# Patient Record
Sex: MALE | Race: WHITE | NOT HISPANIC OR LATINO | Employment: OTHER | URBAN - METROPOLITAN AREA
[De-identification: names, ages, dates, MRNs, and addresses within clinical notes are randomized per-mention and may not be internally consistent; named-entity substitution may affect disease eponyms.]

---

## 2017-01-25 DIAGNOSIS — E11.9 TYPE 2 DIABETES MELLITUS WITHOUT COMPLICATIONS (HCC): ICD-10-CM

## 2017-02-01 ENCOUNTER — APPOINTMENT (OUTPATIENT)
Dept: LAB | Facility: CLINIC | Age: 77
End: 2017-02-01
Payer: MEDICARE

## 2017-02-01 DIAGNOSIS — E78.5 HYPERLIPIDEMIA: ICD-10-CM

## 2017-02-01 DIAGNOSIS — I10 ESSENTIAL (PRIMARY) HYPERTENSION: ICD-10-CM

## 2017-02-01 DIAGNOSIS — E11.9 TYPE 2 DIABETES MELLITUS WITHOUT COMPLICATIONS (HCC): ICD-10-CM

## 2017-02-01 DIAGNOSIS — D64.9 ANEMIA: ICD-10-CM

## 2017-02-01 LAB
EST. AVERAGE GLUCOSE BLD GHB EST-MCNC: 180 MG/DL
HBA1C MFR BLD: 7.9 % (ref 4.2–6.3)

## 2017-02-01 PROCEDURE — 36415 COLL VENOUS BLD VENIPUNCTURE: CPT

## 2017-02-01 PROCEDURE — 83036 HEMOGLOBIN GLYCOSYLATED A1C: CPT

## 2017-03-21 ENCOUNTER — ALLSCRIPTS OFFICE VISIT (OUTPATIENT)
Dept: OTHER | Facility: OTHER | Age: 77
End: 2017-03-21

## 2017-03-22 ENCOUNTER — APPOINTMENT (OUTPATIENT)
Dept: LAB | Facility: CLINIC | Age: 77
End: 2017-03-22
Payer: MEDICARE

## 2017-03-22 DIAGNOSIS — E11.9 TYPE 2 DIABETES MELLITUS WITHOUT COMPLICATIONS (HCC): ICD-10-CM

## 2017-03-22 DIAGNOSIS — I10 ESSENTIAL (PRIMARY) HYPERTENSION: ICD-10-CM

## 2017-03-22 DIAGNOSIS — D64.9 ANEMIA: ICD-10-CM

## 2017-03-22 DIAGNOSIS — E78.5 HYPERLIPIDEMIA: ICD-10-CM

## 2017-03-22 LAB
ALBUMIN SERPL BCP-MCNC: 3.5 G/DL (ref 3.5–5)
ALP SERPL-CCNC: 98 U/L (ref 46–116)
ALT SERPL W P-5'-P-CCNC: 23 U/L (ref 12–78)
ANION GAP SERPL CALCULATED.3IONS-SCNC: 7 MMOL/L (ref 4–13)
AST SERPL W P-5'-P-CCNC: 12 U/L (ref 5–45)
BASOPHILS # BLD AUTO: 0.12 THOUSANDS/ΜL (ref 0–0.1)
BASOPHILS NFR BLD AUTO: 2 % (ref 0–1)
BILIRUB SERPL-MCNC: 0.5 MG/DL (ref 0.2–1)
BUN SERPL-MCNC: 15 MG/DL (ref 5–25)
CALCIUM SERPL-MCNC: 9.4 MG/DL (ref 8.3–10.1)
CHLORIDE SERPL-SCNC: 107 MMOL/L (ref 100–108)
CHOLEST SERPL-MCNC: 145 MG/DL (ref 50–200)
CO2 SERPL-SCNC: 28 MMOL/L (ref 21–32)
CREAT SERPL-MCNC: 1.16 MG/DL (ref 0.6–1.3)
EOSINOPHIL # BLD AUTO: 0.42 THOUSAND/ΜL (ref 0–0.61)
EOSINOPHIL NFR BLD AUTO: 7 % (ref 0–6)
ERYTHROCYTE [DISTWIDTH] IN BLOOD BY AUTOMATED COUNT: 13.2 % (ref 11.6–15.1)
FERRITIN SERPL-MCNC: 40 NG/ML (ref 8–388)
GFR SERPL CREATININE-BSD FRML MDRD: >60 ML/MIN/1.73SQ M
GLUCOSE P FAST SERPL-MCNC: 169 MG/DL (ref 65–99)
HCT VFR BLD AUTO: 43.9 % (ref 36.5–49.3)
HGB BLD-MCNC: 14.8 G/DL (ref 12–17)
IRON SERPL-MCNC: 62 UG/DL (ref 65–175)
LYMPHOCYTES # BLD AUTO: 1.93 THOUSANDS/ΜL (ref 0.6–4.47)
LYMPHOCYTES NFR BLD AUTO: 31 % (ref 14–44)
MCH RBC QN AUTO: 29.8 PG (ref 26.8–34.3)
MCHC RBC AUTO-ENTMCNC: 33.7 G/DL (ref 31.4–37.4)
MCV RBC AUTO: 88 FL (ref 82–98)
MONOCYTES # BLD AUTO: 0.42 THOUSAND/ΜL (ref 0.17–1.22)
MONOCYTES NFR BLD AUTO: 7 % (ref 4–12)
NEUTROPHILS # BLD AUTO: 3.23 THOUSANDS/ΜL (ref 1.85–7.62)
NEUTS SEG NFR BLD AUTO: 53 % (ref 43–75)
NRBC BLD AUTO-RTO: 0 /100 WBCS
PLATELET # BLD AUTO: 211 THOUSANDS/UL (ref 149–390)
PMV BLD AUTO: 11 FL (ref 8.9–12.7)
POTASSIUM SERPL-SCNC: 4.7 MMOL/L (ref 3.5–5.3)
PROT SERPL-MCNC: 7.2 G/DL (ref 6.4–8.2)
RBC # BLD AUTO: 4.97 MILLION/UL (ref 3.88–5.62)
SODIUM SERPL-SCNC: 142 MMOL/L (ref 136–145)
T4 FREE SERPL-MCNC: 0.89 NG/DL (ref 0.76–1.46)
TSH SERPL DL<=0.05 MIU/L-ACNC: 1.93 UIU/ML (ref 0.36–3.74)
VIT B12 SERPL-MCNC: 285 PG/ML (ref 100–900)
WBC # BLD AUTO: 6.14 THOUSAND/UL (ref 4.31–10.16)

## 2017-03-22 PROCEDURE — 82607 VITAMIN B-12: CPT

## 2017-03-22 PROCEDURE — 85025 COMPLETE CBC W/AUTO DIFF WBC: CPT

## 2017-03-22 PROCEDURE — 84443 ASSAY THYROID STIM HORMONE: CPT

## 2017-03-22 PROCEDURE — 36415 COLL VENOUS BLD VENIPUNCTURE: CPT

## 2017-03-22 PROCEDURE — 82728 ASSAY OF FERRITIN: CPT

## 2017-03-22 PROCEDURE — 83540 ASSAY OF IRON: CPT

## 2017-03-22 PROCEDURE — 84439 ASSAY OF FREE THYROXINE: CPT

## 2017-03-22 PROCEDURE — 82465 ASSAY BLD/SERUM CHOLESTEROL: CPT

## 2017-03-22 PROCEDURE — 80053 COMPREHEN METABOLIC PANEL: CPT

## 2017-03-22 PROCEDURE — 83918 ORGANIC ACIDS TOTAL QUANT: CPT

## 2017-03-25 LAB — METHYLMALONATE SERPL-SCNC: 206 NMOL/L (ref 0–378)

## 2017-04-04 ENCOUNTER — HOSPITAL ENCOUNTER (OUTPATIENT)
Dept: RADIOLOGY | Facility: HOSPITAL | Age: 77
Discharge: HOME | End: 2017-04-04
Attending: ORTHOPAEDIC SURGERY
Payer: MEDICARE

## 2017-04-04 ENCOUNTER — ALLSCRIPTS OFFICE VISIT (OUTPATIENT)
Dept: OTHER | Facility: OTHER | Age: 77
End: 2017-04-04

## 2017-04-04 DIAGNOSIS — M25.512 PAIN IN LEFT SHOULDER: ICD-10-CM

## 2017-04-04 PROCEDURE — 73030 X-RAY EXAM OF SHOULDER: CPT

## 2017-04-05 ENCOUNTER — GENERIC CONVERSION - ENCOUNTER (OUTPATIENT)
Dept: OTHER | Facility: OTHER | Age: 77
End: 2017-04-05

## 2017-04-05 ENCOUNTER — APPOINTMENT (OUTPATIENT)
Dept: PHYSICAL THERAPY | Age: 77
End: 2017-04-05
Payer: MEDICARE

## 2017-04-05 DIAGNOSIS — M25.512 PAIN IN LEFT SHOULDER: ICD-10-CM

## 2017-04-05 PROCEDURE — 97110 THERAPEUTIC EXERCISES: CPT

## 2017-04-05 PROCEDURE — G8991 OTHER PT/OT GOAL STATUS: HCPCS

## 2017-04-05 PROCEDURE — 97162 PT EVAL MOD COMPLEX 30 MIN: CPT

## 2017-04-05 PROCEDURE — G8990 OTHER PT/OT CURRENT STATUS: HCPCS

## 2017-04-10 ENCOUNTER — APPOINTMENT (OUTPATIENT)
Dept: PHYSICAL THERAPY | Age: 77
End: 2017-04-10
Payer: MEDICARE

## 2017-04-10 PROCEDURE — 97110 THERAPEUTIC EXERCISES: CPT

## 2017-04-10 PROCEDURE — 97140 MANUAL THERAPY 1/> REGIONS: CPT

## 2017-04-11 ENCOUNTER — APPOINTMENT (OUTPATIENT)
Dept: PHYSICAL THERAPY | Age: 77
End: 2017-04-11
Payer: MEDICARE

## 2017-04-17 ENCOUNTER — APPOINTMENT (OUTPATIENT)
Dept: PHYSICAL THERAPY | Age: 77
End: 2017-04-17
Payer: MEDICARE

## 2017-04-18 ENCOUNTER — APPOINTMENT (OUTPATIENT)
Dept: PHYSICAL THERAPY | Age: 77
End: 2017-04-18
Payer: MEDICARE

## 2017-04-24 ENCOUNTER — APPOINTMENT (OUTPATIENT)
Dept: PHYSICAL THERAPY | Age: 77
End: 2017-04-24
Payer: MEDICARE

## 2017-04-24 PROCEDURE — 97140 MANUAL THERAPY 1/> REGIONS: CPT

## 2017-04-24 PROCEDURE — 97110 THERAPEUTIC EXERCISES: CPT

## 2017-04-25 ENCOUNTER — APPOINTMENT (OUTPATIENT)
Dept: PHYSICAL THERAPY | Age: 77
End: 2017-04-25
Payer: MEDICARE

## 2017-04-25 PROCEDURE — 97110 THERAPEUTIC EXERCISES: CPT

## 2017-04-25 PROCEDURE — 97140 MANUAL THERAPY 1/> REGIONS: CPT

## 2017-05-01 ENCOUNTER — APPOINTMENT (OUTPATIENT)
Dept: PHYSICAL THERAPY | Age: 77
End: 2017-05-01
Payer: MEDICARE

## 2017-05-01 DIAGNOSIS — I65.29 OCCLUSION AND STENOSIS OF UNSPECIFIED CAROTID ARTERY: ICD-10-CM

## 2017-05-01 DIAGNOSIS — D64.9 ANEMIA: ICD-10-CM

## 2017-05-01 DIAGNOSIS — E78.5 HYPERLIPIDEMIA: ICD-10-CM

## 2017-05-01 DIAGNOSIS — I73.9 PERIPHERAL VASCULAR DISEASE (HCC): ICD-10-CM

## 2017-05-01 DIAGNOSIS — R07.9 CHEST PAIN: ICD-10-CM

## 2017-05-01 DIAGNOSIS — I25.10 ATHEROSCLEROTIC HEART DISEASE OF NATIVE CORONARY ARTERY WITHOUT ANGINA PECTORIS: ICD-10-CM

## 2017-05-01 DIAGNOSIS — I10 ESSENTIAL (PRIMARY) HYPERTENSION: ICD-10-CM

## 2017-05-02 ENCOUNTER — APPOINTMENT (OUTPATIENT)
Dept: PHYSICAL THERAPY | Age: 77
End: 2017-05-02
Payer: MEDICARE

## 2017-05-02 PROCEDURE — 97112 NEUROMUSCULAR REEDUCATION: CPT

## 2017-05-02 PROCEDURE — 97140 MANUAL THERAPY 1/> REGIONS: CPT

## 2017-05-02 PROCEDURE — 97110 THERAPEUTIC EXERCISES: CPT

## 2017-05-08 ENCOUNTER — APPOINTMENT (OUTPATIENT)
Dept: PHYSICAL THERAPY | Age: 77
End: 2017-05-08
Payer: MEDICARE

## 2017-05-08 PROCEDURE — 97110 THERAPEUTIC EXERCISES: CPT

## 2017-05-08 PROCEDURE — 97140 MANUAL THERAPY 1/> REGIONS: CPT

## 2017-05-09 ENCOUNTER — APPOINTMENT (OUTPATIENT)
Dept: PHYSICAL THERAPY | Age: 77
End: 2017-05-09
Payer: MEDICARE

## 2017-05-09 PROCEDURE — 97140 MANUAL THERAPY 1/> REGIONS: CPT

## 2017-05-09 PROCEDURE — 97110 THERAPEUTIC EXERCISES: CPT

## 2017-05-15 ENCOUNTER — APPOINTMENT (OUTPATIENT)
Dept: PHYSICAL THERAPY | Age: 77
End: 2017-05-15
Payer: MEDICARE

## 2017-05-15 PROCEDURE — 97110 THERAPEUTIC EXERCISES: CPT

## 2017-05-15 PROCEDURE — 97140 MANUAL THERAPY 1/> REGIONS: CPT

## 2017-05-16 ENCOUNTER — APPOINTMENT (OUTPATIENT)
Dept: PHYSICAL THERAPY | Age: 77
End: 2017-05-16
Payer: MEDICARE

## 2017-05-16 PROCEDURE — 97112 NEUROMUSCULAR REEDUCATION: CPT

## 2017-05-16 PROCEDURE — 97110 THERAPEUTIC EXERCISES: CPT

## 2017-05-16 PROCEDURE — G8991 OTHER PT/OT GOAL STATUS: HCPCS

## 2017-05-16 PROCEDURE — G8990 OTHER PT/OT CURRENT STATUS: HCPCS

## 2017-05-16 PROCEDURE — 97140 MANUAL THERAPY 1/> REGIONS: CPT

## 2017-05-22 ENCOUNTER — ALLSCRIPTS OFFICE VISIT (OUTPATIENT)
Dept: OTHER | Facility: OTHER | Age: 77
End: 2017-05-22

## 2017-05-22 ENCOUNTER — APPOINTMENT (OUTPATIENT)
Dept: PHYSICAL THERAPY | Age: 77
End: 2017-05-22
Payer: MEDICARE

## 2017-05-22 LAB — HBA1C MFR BLD HPLC: 8.1 %

## 2017-05-23 ENCOUNTER — APPOINTMENT (OUTPATIENT)
Dept: PHYSICAL THERAPY | Age: 77
End: 2017-05-23
Payer: MEDICARE

## 2017-05-26 ENCOUNTER — HOSPITAL ENCOUNTER (OUTPATIENT)
Dept: NON INVASIVE DIAGNOSTICS | Facility: CLINIC | Age: 77
Discharge: HOME | End: 2017-05-26
Payer: MEDICARE

## 2017-05-26 DIAGNOSIS — I25.10 ATHEROSCLEROTIC HEART DISEASE OF NATIVE CORONARY ARTERY WITHOUT ANGINA PECTORIS: ICD-10-CM

## 2017-05-26 DIAGNOSIS — R07.9 CHEST PAIN: ICD-10-CM

## 2017-05-26 LAB
ARRHY DURING EX: NORMAL
CHEST PAIN STATEMENT: NORMAL
MAX DIASTOLIC BP: 66 MMHG
MAX HEART RATE: 85 BPM
MAX PREDICTED HEART RATE: 143 BPM
MAX. SYSTOLIC BP: 156 MMHG
PROTOCOL NAME: NORMAL
REASON FOR TERMINATION: NORMAL
TARGET HR FORMULA: NORMAL
TEST INDICATION: NORMAL
TIME IN EXERCISE PHASE: 180 S

## 2017-05-26 PROCEDURE — 78452 HT MUSCLE IMAGE SPECT MULT: CPT

## 2017-05-26 PROCEDURE — A9502 TC99M TETROFOSMIN: HCPCS

## 2017-05-26 PROCEDURE — 93017 CV STRESS TEST TRACING ONLY: CPT

## 2017-05-26 RX ADMIN — REGADENOSON 0.4 MG: 0.08 INJECTION, SOLUTION INTRAVENOUS at 14:30

## 2017-06-02 ENCOUNTER — HOSPITAL ENCOUNTER (OUTPATIENT)
Dept: NON INVASIVE DIAGNOSTICS | Facility: CLINIC | Age: 77
Discharge: HOME | End: 2017-06-02
Payer: MEDICARE

## 2017-06-02 DIAGNOSIS — I25.10 ATHEROSCLEROTIC HEART DISEASE OF NATIVE CORONARY ARTERY WITHOUT ANGINA PECTORIS: ICD-10-CM

## 2017-06-02 DIAGNOSIS — I65.29 OCCLUSION AND STENOSIS OF UNSPECIFIED CAROTID ARTERY: ICD-10-CM

## 2017-06-02 DIAGNOSIS — I73.9 PERIPHERAL VASCULAR DISEASE (HCC): ICD-10-CM

## 2017-06-02 PROCEDURE — 93923 UPR/LXTR ART STDY 3+ LVLS: CPT

## 2017-06-02 PROCEDURE — 93880 EXTRACRANIAL BILAT STUDY: CPT

## 2017-06-02 PROCEDURE — 93925 LOWER EXTREMITY STUDY: CPT

## 2017-06-12 ENCOUNTER — ALLSCRIPTS OFFICE VISIT (OUTPATIENT)
Dept: OTHER | Facility: OTHER | Age: 77
End: 2017-06-12

## 2017-07-05 ENCOUNTER — ALLSCRIPTS OFFICE VISIT (OUTPATIENT)
Dept: OTHER | Facility: OTHER | Age: 77
End: 2017-07-05

## 2017-07-17 ENCOUNTER — ALLSCRIPTS OFFICE VISIT (OUTPATIENT)
Dept: OTHER | Facility: OTHER | Age: 77
End: 2017-07-17

## 2017-07-18 ENCOUNTER — ALLSCRIPTS OFFICE VISIT (OUTPATIENT)
Dept: OTHER | Facility: OTHER | Age: 77
End: 2017-07-18

## 2017-07-19 ENCOUNTER — GENERIC CONVERSION - ENCOUNTER (OUTPATIENT)
Dept: OTHER | Facility: OTHER | Age: 77
End: 2017-07-19

## 2017-07-24 ENCOUNTER — ALLSCRIPTS OFFICE VISIT (OUTPATIENT)
Dept: OTHER | Facility: OTHER | Age: 77
End: 2017-07-24

## 2017-09-19 ENCOUNTER — ALLSCRIPTS OFFICE VISIT (OUTPATIENT)
Dept: OTHER | Facility: OTHER | Age: 77
End: 2017-09-19

## 2017-11-17 DIAGNOSIS — Z72.0 TOBACCO USE: ICD-10-CM

## 2017-11-17 DIAGNOSIS — I65.29 OCCLUSION AND STENOSIS OF UNSPECIFIED CAROTID ARTERY: ICD-10-CM

## 2017-11-17 DIAGNOSIS — E11.9 TYPE 2 DIABETES MELLITUS WITHOUT COMPLICATIONS (HCC): ICD-10-CM

## 2017-11-17 DIAGNOSIS — E78.5 HYPERLIPIDEMIA: ICD-10-CM

## 2017-11-17 DIAGNOSIS — I70.219 ATHEROSCLEROSIS OF NATIVE ARTERIES OF EXTREMITY WITH INTERMITTENT CLAUDICATION (HCC): ICD-10-CM

## 2017-11-17 DIAGNOSIS — I10 ESSENTIAL (PRIMARY) HYPERTENSION: ICD-10-CM

## 2017-12-02 ENCOUNTER — GENERIC CONVERSION - ENCOUNTER (OUTPATIENT)
Dept: OTHER | Facility: OTHER | Age: 77
End: 2017-12-02

## 2017-12-20 DIAGNOSIS — I73.9 PERIPHERAL VASCULAR DISEASE (HCC): ICD-10-CM

## 2017-12-21 ENCOUNTER — GENERIC CONVERSION - ENCOUNTER (OUTPATIENT)
Dept: OTHER | Facility: OTHER | Age: 77
End: 2017-12-21

## 2017-12-21 ENCOUNTER — HOSPITAL ENCOUNTER (OUTPATIENT)
Dept: NON INVASIVE DIAGNOSTICS | Facility: CLINIC | Age: 77
Discharge: HOME | End: 2017-12-21
Payer: MEDICARE

## 2017-12-21 DIAGNOSIS — I65.29 OCCLUSION AND STENOSIS OF UNSPECIFIED CAROTID ARTERY: ICD-10-CM

## 2017-12-21 PROCEDURE — 93880 EXTRACRANIAL BILAT STUDY: CPT

## 2017-12-29 ENCOUNTER — GENERIC CONVERSION - ENCOUNTER (OUTPATIENT)
Dept: OTHER | Facility: OTHER | Age: 77
End: 2017-12-29

## 2017-12-29 ENCOUNTER — HOSPITAL ENCOUNTER (OUTPATIENT)
Dept: NON INVASIVE DIAGNOSTICS | Facility: CLINIC | Age: 77
Discharge: HOME | End: 2017-12-29
Payer: MEDICARE

## 2017-12-29 DIAGNOSIS — I73.9 PERIPHERAL VASCULAR DISEASE (HCC): ICD-10-CM

## 2017-12-29 PROCEDURE — 93971 EXTREMITY STUDY: CPT

## 2018-01-12 VITALS — RESPIRATION RATE: 14 BRPM | SYSTOLIC BLOOD PRESSURE: 130 MMHG | HEART RATE: 68 BPM | DIASTOLIC BLOOD PRESSURE: 80 MMHG

## 2018-01-12 VITALS — HEART RATE: 68 BPM | SYSTOLIC BLOOD PRESSURE: 130 MMHG | RESPIRATION RATE: 14 BRPM | DIASTOLIC BLOOD PRESSURE: 80 MMHG

## 2018-01-12 VITALS
HEART RATE: 72 BPM | RESPIRATION RATE: 14 BRPM | HEIGHT: 67 IN | BODY MASS INDEX: 28.81 KG/M2 | WEIGHT: 183.56 LBS | DIASTOLIC BLOOD PRESSURE: 74 MMHG | SYSTOLIC BLOOD PRESSURE: 120 MMHG

## 2018-01-13 VITALS
DIASTOLIC BLOOD PRESSURE: 72 MMHG | WEIGHT: 184 LBS | BODY MASS INDEX: 27.89 KG/M2 | SYSTOLIC BLOOD PRESSURE: 112 MMHG | HEART RATE: 66 BPM | HEIGHT: 68 IN

## 2018-01-13 VITALS
BODY MASS INDEX: 28.04 KG/M2 | SYSTOLIC BLOOD PRESSURE: 148 MMHG | HEART RATE: 56 BPM | DIASTOLIC BLOOD PRESSURE: 80 MMHG | HEIGHT: 68 IN | WEIGHT: 185 LBS

## 2018-01-13 VITALS
WEIGHT: 183 LBS | DIASTOLIC BLOOD PRESSURE: 80 MMHG | RESPIRATION RATE: 16 BRPM | SYSTOLIC BLOOD PRESSURE: 140 MMHG | HEIGHT: 68 IN | BODY MASS INDEX: 27.74 KG/M2 | HEART RATE: 87 BPM

## 2018-01-13 VITALS
HEART RATE: 56 BPM | BODY MASS INDEX: 28.61 KG/M2 | HEIGHT: 67 IN | WEIGHT: 182.25 LBS | DIASTOLIC BLOOD PRESSURE: 85 MMHG | SYSTOLIC BLOOD PRESSURE: 147 MMHG

## 2018-01-13 VITALS — DIASTOLIC BLOOD PRESSURE: 67 MMHG | SYSTOLIC BLOOD PRESSURE: 106 MMHG | HEART RATE: 73 BPM

## 2018-01-14 VITALS
WEIGHT: 183.25 LBS | BODY MASS INDEX: 27.77 KG/M2 | HEIGHT: 68 IN | DIASTOLIC BLOOD PRESSURE: 73 MMHG | HEART RATE: 79 BPM | SYSTOLIC BLOOD PRESSURE: 115 MMHG

## 2018-01-18 ENCOUNTER — TELEPHONE (OUTPATIENT)
Dept: RADIOLOGY | Facility: HOSPITAL | Age: 78
End: 2018-01-18

## 2018-01-18 ENCOUNTER — GENERIC CONVERSION - ENCOUNTER (OUTPATIENT)
Dept: OTHER | Facility: OTHER | Age: 78
End: 2018-01-18

## 2018-01-18 ENCOUNTER — HOSPITAL ENCOUNTER (OUTPATIENT)
Dept: NON INVASIVE DIAGNOSTICS | Facility: CLINIC | Age: 78
Discharge: HOME | End: 2018-01-18
Payer: MEDICARE

## 2018-01-18 DIAGNOSIS — I73.9 PERIPHERAL VASCULAR DISEASE (HCC): ICD-10-CM

## 2018-01-18 DIAGNOSIS — I70.219 ATHEROSCLEROSIS OF NATIVE ARTERIES OF EXTREMITY WITH INTERMITTENT CLAUDICATION (HCC): ICD-10-CM

## 2018-01-18 PROCEDURE — 93925 LOWER EXTREMITY STUDY: CPT

## 2018-01-18 PROCEDURE — 93923 UPR/LXTR ART STDY 3+ LVLS: CPT

## 2018-01-18 RX ORDER — SODIUM CHLORIDE 9 MG/ML
75 INJECTION, SOLUTION INTRAVENOUS CONTINUOUS
Status: CANCELLED | OUTPATIENT
Start: 2018-01-18

## 2018-01-19 ENCOUNTER — ALLSCRIPTS OFFICE VISIT (OUTPATIENT)
Dept: OTHER | Facility: OTHER | Age: 78
End: 2018-01-19

## 2018-01-21 ENCOUNTER — TELEPHONE (OUTPATIENT)
Dept: INPATIENT UNIT | Facility: HOSPITAL | Age: 78
End: 2018-01-21

## 2018-01-21 NOTE — PROGRESS NOTES
Assessment   1  Peripheral arterial disease (443 9) (I73 9)   2  Nicotine dependence (305 1) (F17 200)   3  Hypertension (401 9) (I10)   4  Hyperlipidemia (272 4) (E78 5)   5  Coronary artery disease (414 00) (I25 10)   6  Controlled diabetes mellitus (250 00) (E11 9)   7  Iron deficiency anemia (280 9) (D50 9)      1  Peripheral arterial disease-has had prior revascularization but now with worsening symptoms and significantly abnormal Doppler-specifically right side shows patent common femoral to below-the-knee popliteal artery bypass graft with index of 0 95  Left leg however shows greater than 75% stenosis in the anastomosis of common femoral artery to below-knee popliteal artery an GIUSEPPE of 0 26  Scheduled for aortogram with intervention on January 22nd     2  General care-reviewed what to do with his meds for this procedure  Hold metformin day before day of procedure  Hold glipizide day before day of procedure  Hold ACE-inhibitor day before day of procedure  Take usual dose of metoprolol morning up procedure     3  Tobacco abuse-longstanding-was unable to tolerate Wellbutrin  Not felt to be a good candidate for Chantix-he feels he reacted poorly to nicotine patch  He will be trying nicotine gum     4 carotid stenosis--asymptomatic-less than 50% on the right and 50-70% on the left  Continue with aggressive control of risk factors  Most recent study done a December of 2017     #5 coronary artery disease-Myoview shows mildly severe small reversible defect of the inferior wall  Asymptomatic with this study  Cardiology feels we should monitor at present and he may need a catheter in the future  He understands importance of aggressive control of his risk factors-he is a history of some chest pain over the nipples-question related to activity   At this point asymptomatic remains on dual antiplatelet therapy at this point-recently saw cardiology felt he was stable-air scheduled to see him again in 9 months #6 right shoulder pain-at one point I was worried about frozen shoulder and he was referred to orthopedic physician     #7 iron deficiency anemia-hemoglobin was down to 11 7  Colonoscopy in April 2014 showed diverticular disease  Repeat colonoscopy and endoscopy for the past year by outside surgeon benign other than diverticular disease  CAT scan of abdomen benign other than nephrolithiasis  Endoscopy of the Small Intestine Recommended but Patient Deferred  Protein Electrophoresis, Vitamin B 12 Level, Methylmalonic Acid Level, TSH Normal  Follow-Up Hemoglobin over 14  Now Discontinuing Iron and Repeat Prior to Next Visit-Have to watch carefully with dual antiplatelet therapy and if again recurs we may need to DC his aspirin     #8 previous noted weight loss-lost 5 pounds without obvious etiology  All screening labs normal  Chest x-ray normal  Probably related to metformin  At this point weight stabilized     #9 abnormal chest x-ray-felt related to nipple shadow-had repeat chest x-ray with nipple markers which were benign     #10 general care-[facility] 13 [facility]-REVIEW NEXT VISIT     11 kwzbghufcbamgdp-duycptuuobap-uy CAT scan the abdomen has nonobstructing stones  He knows to maintain a high fluid intake     #12 hypertension-stable on current dose of ACE inhibitor plus beta blocker     #13 quiav positive stool noted previously-not evident on ouusow-ha-gmn a very tight sphincter     #14 diabetes mellitus-recent goal A1c 7 1  Cannot tolerate short acting metformin  Only tolerated thousand milligrams of long-acting metformin  On glipizide  Again strongly recommended that he monitor his blood sugar  145 Potential for AAA-had prior CT scan when having his iron deficiency anemia evaluated    Having abdominal aortogram study this week      All other problems as per note of September 2013          MEDICAL REGIMEN: Paxil 40 MGs-half tab daily, pantoprazole 40 mg daily, glipizide 5 MGs in the a m  and 2 5 mg in the p m , metoprolol titrate 12 5 mg twice a day, metformin thousand milligrams daily, lisinopril 20 mg daily, aspirin 81 mg daily, Plavix 75 mg daily, generic Vytorin 1020 daily-meds for procedure adjusted as above       Plan   No glipizide day before day of procedure  No metformin day before day of procedure  Skip ACE-inhibitor day before day of procedure  Take usual dose of metoprolol morning of procedure  Prescription written for Vytorin and/or its components  History of Present Illness   HPI:  patient is seen today as an emergency appointment  This patient has known significant peripheral arterial disease  He recently underwent follow-up Doppler after having worsening claudication symptoms of his left leg  He has extreme disease on Doppler done in January the 18-specifically right side shows a widely patent common femoral artery to below-the-knee popliteal artery bypass graft with no signs of significant stenosis with an index of 0 95  However left-sided showed greater than 75% stenosis in the distal anastomosis of the common femoral artery to below the knee popliteal bypass graft  Compared to 6 months ago this is a new finding of the left with significant decrease in left ankle-brachial index  He is having ongoing severe pain  He has pain at rest  He has been in touch with the vascular group and they have him scheduled for a ordered gram with intervention on January 22nd  He presents for preparation for the study and to reassess his overall status  Unfortunately he is a long-term smoker and continues to smoke  He also has diabetes, hypertension and hyperlipidemia  We reviewed the importance of discontinuing smoking  In the past he had side effects with Wellbutrin  Not felt to be candidate for Chantix-as a history psychiatric disease in there is potential concerned about using this drug  He has tried the patch in the past says he did tolerate that   He is considering nicotine gum  patient denies any systemic symptoms  Specifically there has been no evidence of fever, night sweats, significant weight loss or significant decrease in appetite  We reviewed how he needs aggressive control of risk factors  BP under 130/80, LDL under 100 preferably under 70, triglycerides under 100 HDL over 40  A1c under 7    has known hypertension  BP adequately controlled on current regimen  He is avoiding salt and decongestants   was accompanied to the visit today by his son and his significant other  We reviewed the importance of not smoking  We reviewed what to do with his meds for his procedure  Long discussion held today regarding the above  This was a 45 minutes visit with more than 50% of the time spent counseling the patient and formulating a treatment plan   has known hypertension  Preferred BP under 130/80  BP stable at present on current regimen     patient denies any systemic symptoms  Specifically there has been no evidence of fever, night sweats, significant weight loss or significant decrease in appetite  We reviewed what to do with his meds perioperatively  He will hold glipizide day before day of procedure, hold metformin day before day of procedure to decrease incidence of lactic acidosis and hold his ACE-inhibitor day before day of procedure to decrease incidence of dye induced renal disease      Review of Systems   Complete-Male:      Constitutional: No fever or chills, feels well, no tiredness, no recent weight gain or weight loss  Eyes: No complaints of eye pain, no red eyes, no discharge from eyes, no itchy eyes  ENT: no complaints of earache, no hearing loss, no nosebleeds, no nasal discharge, no sore throat, no hoarseness        Cardiovascular: chest pain,-- intermittent leg claudication-- and-- Leg claudication when walking uphill but not on a level surface at present, but-- the heart rate was not slow,-- the heart rate was not fast,-- no palpitations-- and-- no extremity edema  Respiratory: shortness of breath-- and-- shortness of breath during exertion, but-- no cough,-- no orthopnea,-- no wheezing-- and-- no PND  Gastrointestinal: No complaints of abdominal pain, no constipation, no nausea or vomiting, no diarrhea or bloody stools  Genitourinary: nocturia, but-- no dysuria,-- no urinary hesitancy,-- no genital lesions,-- no incontinence-- and-- no testicular pain  Musculoskeletal: arthralgias,-- joint stiffness-- and-- Significant left lower leg pain Low back pain-right shoulder pain, but-- no joint swelling,-- no limb pain,-- no myalgias-- and-- no limb swelling  Integumentary: itching,-- skin lesion-- and-- Urticaria, but-- no rashes,-- no dry skin-- and-- no skin wound  Neurological: dizziness, but-- no headache,-- no numbness,-- no tingling,-- no confusion,-- no limb weakness,-- no fainting-- and-- no difficulty walking  Psychiatric: Is not suicidal, no sleep disturbances, no anxiety or depression, no change in personality, no emotional problems  Endocrine: No complaints of proptosis, no hot flashes, no muscle weakness, no erectile dysfunction, no deepening of the voice, no feelings of weakness  Hematologic/Lymphatic: No complaints of swollen glands, no swollen glands in the neck, does not bleed easily, no easy bruising  Active Problems   1  Abnormal chest xray (793 2) (R93 8)   2  Abnormal exercise myocardial perfusion study (794 39) (R94 39)   3  Abnormal loss of weight (783 21) (R63 4)   4  Anemia (285 9) (D64 9)   5  Anxiety (300 00) (F41 9)   6  Arthritis (716 90) (M19 90)   7  Asymptomatic carotid artery stenosis (433 10) (I65 29)   8  Atherosclerosis of artery of extremity with intermittent claudication (440 21) (I70 219)   9  Backache (724 5) (M54 9)   10  Chest pain (786 50) (R07 9)   11  Controlled diabetes mellitus (250 00) (E11 9)   12   Coronary artery disease (414 00) (I25 10) 13  Cough (786 2) (R05)   14  Difficulty breathing (786 09) (R06 89)   15  Dizziness (780 4) (R42)   16  Elevated prostate specific antigen (PSA) (790 93) (R97 20)   17  Enlarged prostate without lower urinary tract symptoms (luts) (600 00) (N40 0)   18  Guaiac positive stools (792 1) (R19 5)   19  Hyperlipidemia (272 4) (E78 5)   20  Hypertension (401 9) (I10)   21  Iron deficiency anemia (280 9) (D50 9)   22  Joint pain, knee (719 46) (M25 569)   23  Need for prophylactic vaccination and inoculation against influenza (V04 81) (Z23)   24  Nicotine dependence (305 1) (F17 200)   25  Pain in upper limb (729 5) (M79 603)   26  Peripheral arterial disease (443 9) (I73 9)   27  Right shoulder pain (719 41) (M25 511)   28  Shoulder pain (719 41) (M25 519)   29  Skin lesion (709 9) (L98 9)   30  Subacromial impingement of right shoulder (726 19) (M75 41)   31  History of Tobacco use (305 1) (Z72 0)   32  Urticaria (708 9) (L50 9)   33  Weight loss (783 21) (R63 4)    Past Medical History   1  History of Atherosclerosis (440 9) (I70 90)   2  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 (440 31)   3  History of Coronary Artery Disease (V12 59)   4  History of chronic obstructive lung disease (V12 69) (Z87 09)   5  History of hyperlipidemia (V12 29) (Z86 39)   6  History of intestinal obstruction (V12 79) (Z87 19)   7  History of nicotine dependence (V15 82) (Z87 891)   8  History of Shoulder pain, left (719 41) (M25 512)   9  Skin rash (782 1) (R21)  Active Problems And Past Medical History Reviewed: The active problems and past medical history were reviewed and updated today  Surgical History   1  History of Bypass Graft Using Vein: Femoral-popliteal   2  History of Bypass Graft Using Vein: Femoral-popliteal   3  History of Hand Surgery   4  History of Heart Surgery   5  History of Leg Repair   6  History of Percutaneous Repair Nasoethmoid Fx & Lacrimal Apparatus   7   History of PTA Femoral-Popliteal Left   8  History of PTA Femoral-Popliteal Right   9  History of Tibioperoneal Trunk Thromboendarterectomy   10  History of Tonsillectomy  Surgical History Reviewed: The surgical history was reviewed and updated today  Family History   Mother    1  Family history of Arthritis (V17 7)  Family History    2  Family history of Arthritis (V17 7)   3  Family history of Atherosclerosis (V17 49)   4  Family history of Hyperlipidemia   5  Family history of Peripheral Vascular Disease  Family History Reviewed: The family history was reviewed and updated today  Social History    · Being A Social Drinker   · Denied: History of Drug Use   · Former smoker (V15 82) (R45 280)   · History of Tobacco use (305 1) (Z72 0)  Social History Reviewed: The social history was reviewed and updated today  The social history was reviewed and is unchanged  Current Meds    1  Ferrous Sulfate 325 (65 Fe) MG Oral Tablet; TAKE 1 TABLET DAILY AS DIRECTED; Therapy: 12QSW6036 to (Evaluate:10Nov2016) Recorded   2  GlipiZIDE 5 MG Oral Tablet; take 1/2 tablet twice a day; Therapy: 52PKF1181 to Recorded   3  Lisinopril 20 MG Oral Tablet; TAKE 1 TABLET DAILY; Therapy: (66 [phone] 64 [id number]) to Recorded   4  MetFORMIN HCl  MG Oral Tablet Extended Release 24 Hour; TAKE 2 TABLETS IN     THE AM AND 2 TABLETS IN THE PM;     Therapy: 96YIH0677 to Recorded   5  Metoprolol Tartrate 25 MG Oral Tablet; TAKE 1 TABLET DAILY; Therapy: 15HAC9814 to (Evaluate:39Rgf2832) Recorded   6  Pantoprazole Sodium 40 MG Oral Tablet Delayed Release; Take 1 tablet once daily; Therapy: 54HDQ4953 to Recorded   7  Paxil 40 MG Oral Tablet (PARoxetine HCl); TAKE 1/2 TABLET DAILY; Therapy: (Recorded:62Niw6565) to Recorded   8  Plavix 75 MG Oral Tablet (Clopidogrel Bisulfate); TAKE 1 TABLET DAILY; Therapy: 08FFH9524 to (Evaluate:14Apr2016) Recorded   9  Vitamin C 500 MG Oral Tablet; TAKE 1 TABLET DAILY;      Therapy: 17JOD6781 to Recorded   10  Vytorin 10-20 MG Oral Tablet (Ezetimibe-Simvastatin); TAKE 1 TABLET DAILY; Therapy: (Recorded:51Noa3872) to Recorded    Allergies   1  Etomidate POWD   2  Etomidate SOLN   3  Penicillins    Vitals   Signs   Recorded: 79QZE0529 08:23AM   Heart Rate: 62  Respiration: 14  Systolic: 065, RUE, Sitting  Diastolic: 70, RUE, Sitting  BP CUFF SIZE: Large    Physical Exam        Constitutional      General appearance: No acute distress, well appearing and well nourished  Head and Face      Head and face: Normal        Palpation of the face and sinuses: No sinus tenderness  Eyes      Conjunctiva and lids: No erythema, swelling or discharge  Pupils and irises: Equal, round, reactive to light  Ears, Nose, Mouth, and Throat      External inspection of ears and nose: Normal        Otoscopic examination: Tympanic membranes translucent with normal light reflex  Canals patent without erythema  Hearing: Normal        Nasal mucosa, septum, and turbinates: Normal without edema or erythema  Lips, teeth, and gums: Normal, good dentition  Oropharynx: Normal with no erythema, edema, exudate or lesions  Neck      Neck: Supple, symmetric, trachea midline, no masses  Thyroid: Normal, no thyromegaly  Pulmonary      Respiratory effort: No increased work of breathing or signs of respiratory distress  Percussion of chest: Normal        Palpation of chest: Normal        Auscultation of lungs: Clear to auscultation  Cardiovascular      Palpation of heart: Abnormal  -- Cardiomegaly to percussion  Auscultation of heart: Abnormal  -- S4 gallop  Carotid pulses: 2+ bilaterally  Abdominal aorta: Normal        Femoral pulses: 2+ bilaterally  Pedal pulses: 2+ bilaterally  Peripheral vascular exam: Abnormal  -- Unable to feel left lower leg pulses right DP and PT pulses 1+        Examination of extremities for edema and/or varicosities: Normal        Chest      Breasts: Normal, no dimpling or skin changes appreciated  Palpation of breasts and axillae: Normal, no masses palpated  Chest: Normal        Abdomen      Abdomen: Non-tender, no masses  Liver and spleen: No hepatomegaly or splenomegaly  Examination for hernias: No hernias appreciated  Anus, perineum, and rectum: Normal sphincter tone, no masses, no prolapse  Stool sample for occult blood: Abnormal  -- Stool grossly guaiac positive  Genitourinary      Scrotal contents: Normal testes, no masses  Penis: Normal, no lesions  Digital rectal exam of prostate: Abnormal  -- Enlarged prostate without nodules although exam nonoptimal because of patient discomfort  Lymphatic      Palpation of lymph nodes in neck: No lymphadenopathy  Palpation of lymph nodes in axillae: No lymphadenopathy  Palpation of lymph nodes in groin: No lymphadenopathy  Palpation of lymph nodes in other areas: No lymphadenopathy  Musculoskeletal      Gait and station: Normal  -- Some tenderness to palpation of the lower back  Inspection/palpation of digits and nails: Normal without clubbing or cyanosis  Inspection/palpation of joints, bones, and muscles: Normal  -- Pain on palpation of the left calf Significant pain on Range of motion of right shoulder  Range of motion: Normal        Stability: Normal        Muscle strength/tone: Normal        Skin      Skin and subcutaneous tissue: Normal without rashes or lesions  Examination of the skin for lesions: Abnormal  -- Benign keratoses  Palpation of skin and subcutaneous tissue: Normal turgor  Neurologic      Cranial nerves: Cranial nerves 2-12 intact  Cortical function: Normal mental status  Reflexes: 2+ and symmetric  Sensation: No sensory loss  Coordination: Normal finger to nose and heel to shin         Psychiatric      Judgment and insight: Normal        Orientation to person, place and time: Normal        Recent and remote memory: Intact  Mood and affect: Normal        Future Appointments      Date/Time Provider Specialty Site   04/17/2018 08:30 AM ELISEO Eugene   Internal Medicine Markie Cormier MD     Signatures    Electronically signed by : ELISEO Armstrong ; Jan 20 2018  8:34AM EST                       (Author)

## 2018-01-22 ENCOUNTER — HOSPITAL ENCOUNTER (OUTPATIENT)
Dept: RADIOLOGY | Facility: HOSPITAL | Age: 78
Discharge: HOME | End: 2018-01-22
Attending: SURGERY | Admitting: RADIOLOGY
Payer: MEDICARE

## 2018-01-22 VITALS
RESPIRATION RATE: 16 BRPM | HEART RATE: 51 BPM | DIASTOLIC BLOOD PRESSURE: 72 MMHG | SYSTOLIC BLOOD PRESSURE: 148 MMHG | WEIGHT: 184 LBS | OXYGEN SATURATION: 94 % | BODY MASS INDEX: 27.89 KG/M2 | TEMPERATURE: 97 F | HEIGHT: 68 IN

## 2018-01-22 VITALS — HEART RATE: 62 BPM | DIASTOLIC BLOOD PRESSURE: 70 MMHG | RESPIRATION RATE: 14 BRPM | SYSTOLIC BLOOD PRESSURE: 128 MMHG

## 2018-01-22 DIAGNOSIS — I70.219 EXTREMITY ATHEROSCLEROSIS WITH INTERMITTENT CLAUDICATION (HCC): ICD-10-CM

## 2018-01-22 LAB
ANION GAP SERPL CALCULATED.3IONS-SCNC: 7 MMOL/L (ref 4–13)
BUN SERPL-MCNC: 14 MG/DL (ref 5–25)
CALCIUM SERPL-MCNC: 9.1 MG/DL (ref 8.3–10.1)
CHLORIDE SERPL-SCNC: 106 MMOL/L (ref 100–108)
CO2 SERPL-SCNC: 26 MMOL/L (ref 21–32)
CREAT SERPL-MCNC: 1.07 MG/DL (ref 0.6–1.3)
ERYTHROCYTE [DISTWIDTH] IN BLOOD BY AUTOMATED COUNT: 12.8 % (ref 11.6–15.1)
GFR SERPL CREATININE-BSD FRML MDRD: 66 ML/MIN/1.73SQ M
GLUCOSE P FAST SERPL-MCNC: 194 MG/DL (ref 65–99)
GLUCOSE SERPL-MCNC: 194 MG/DL (ref 65–140)
HCT VFR BLD AUTO: 41.5 % (ref 36.5–49.3)
HGB BLD-MCNC: 13.8 G/DL (ref 12–17)
INR PPP: 1.12 (ref 0.86–1.16)
MCH RBC QN AUTO: 28.9 PG (ref 26.8–34.3)
MCHC RBC AUTO-ENTMCNC: 33.3 G/DL (ref 31.4–37.4)
MCV RBC AUTO: 87 FL (ref 82–98)
PLATELET # BLD AUTO: 233 THOUSANDS/UL (ref 149–390)
PMV BLD AUTO: 10.9 FL (ref 8.9–12.7)
POTASSIUM SERPL-SCNC: 4.2 MMOL/L (ref 3.5–5.3)
PROTHROMBIN TIME: 14.4 SECONDS (ref 12.1–14.4)
RBC # BLD AUTO: 4.78 MILLION/UL (ref 3.88–5.62)
SODIUM SERPL-SCNC: 139 MMOL/L (ref 136–145)
WBC # BLD AUTO: 5.59 THOUSAND/UL (ref 4.31–10.16)

## 2018-01-22 PROCEDURE — C1769 GUIDE WIRE: HCPCS

## 2018-01-22 PROCEDURE — C1725 CATH, TRANSLUMIN NON-LASER: HCPCS

## 2018-01-22 PROCEDURE — 37226 HB FEM/POPL REVASC W/STENT: CPT

## 2018-01-22 PROCEDURE — C1760 CLOSURE DEV, VASC: HCPCS

## 2018-01-22 PROCEDURE — 75710 ARTERY X-RAYS ARM/LEG: CPT

## 2018-01-22 PROCEDURE — 76937 US GUIDE VASCULAR ACCESS: CPT

## 2018-01-22 PROCEDURE — C2623 CATH, TRANSLUMIN, DRUG-COAT: HCPCS

## 2018-01-22 PROCEDURE — C1894 INTRO/SHEATH, NON-LASER: HCPCS

## 2018-01-22 PROCEDURE — 99152 MOD SED SAME PHYS/QHP 5/>YRS: CPT

## 2018-01-22 PROCEDURE — 80048 BASIC METABOLIC PNL TOTAL CA: CPT | Performed by: RADIOLOGY

## 2018-01-22 PROCEDURE — 85027 COMPLETE CBC AUTOMATED: CPT | Performed by: RADIOLOGY

## 2018-01-22 PROCEDURE — C1874 STENT, COATED/COV W/DEL SYS: HCPCS

## 2018-01-22 PROCEDURE — 75630 X-RAY AORTA LEG ARTERIES: CPT

## 2018-01-22 PROCEDURE — 99153 MOD SED SAME PHYS/QHP EA: CPT

## 2018-01-22 PROCEDURE — 85610 PROTHROMBIN TIME: CPT | Performed by: RADIOLOGY

## 2018-01-22 RX ORDER — PANTOPRAZOLE SODIUM 40 MG/1
40 TABLET, DELAYED RELEASE ORAL DAILY
COMMUNITY
End: 2022-05-13 | Stop reason: SDUPTHER

## 2018-01-22 RX ORDER — FENTANYL CITRATE 50 UG/ML
INJECTION, SOLUTION INTRAMUSCULAR; INTRAVENOUS CODE/TRAUMA/SEDATION MEDICATION
Status: COMPLETED | OUTPATIENT
Start: 2018-01-22 | End: 2018-01-22

## 2018-01-22 RX ORDER — ASPIRIN 81 MG/1
81 TABLET, CHEWABLE ORAL DAILY
COMMUNITY
End: 2018-04-04 | Stop reason: ALTCHOICE

## 2018-01-22 RX ORDER — SODIUM CHLORIDE 9 MG/ML
75 INJECTION, SOLUTION INTRAVENOUS CONTINUOUS
Status: DISCONTINUED | OUTPATIENT
Start: 2018-01-22 | End: 2018-01-22 | Stop reason: HOSPADM

## 2018-01-22 RX ORDER — HYDROCODONE BITARTRATE AND ACETAMINOPHEN 5; 325 MG/1; MG/1
1 TABLET ORAL EVERY 6 HOURS PRN
Status: DISCONTINUED | OUTPATIENT
Start: 2018-01-22 | End: 2018-01-22 | Stop reason: HOSPADM

## 2018-01-22 RX ORDER — EZETIMIBE AND SIMVASTATIN 10; 20 MG/1; MG/1
1 TABLET ORAL
COMMUNITY
End: 2018-03-26 | Stop reason: ALTCHOICE

## 2018-01-22 RX ORDER — CLOPIDOGREL BISULFATE 75 MG/1
75 TABLET ORAL DAILY
COMMUNITY
End: 2022-05-13 | Stop reason: SDUPTHER

## 2018-01-22 RX ORDER — PAROXETINE HYDROCHLORIDE 40 MG/1
40 TABLET, FILM COATED ORAL
COMMUNITY
End: 2022-05-13 | Stop reason: SDUPTHER

## 2018-01-22 RX ORDER — GLIPIZIDE 5 MG/1
5 TABLET ORAL
COMMUNITY
End: 2022-04-11

## 2018-01-22 RX ORDER — HEPARIN SODIUM 1000 [USP'U]/ML
INJECTION, SOLUTION INTRAVENOUS; SUBCUTANEOUS CODE/TRAUMA/SEDATION MEDICATION
Status: COMPLETED | OUTPATIENT
Start: 2018-01-22 | End: 2018-01-22

## 2018-01-22 RX ORDER — ONDANSETRON 2 MG/ML
4 INJECTION INTRAMUSCULAR; INTRAVENOUS EVERY 6 HOURS PRN
Status: DISCONTINUED | OUTPATIENT
Start: 2018-01-22 | End: 2018-01-22 | Stop reason: HOSPADM

## 2018-01-22 RX ORDER — LISINOPRIL 20 MG/1
20 TABLET ORAL DAILY
COMMUNITY
End: 2022-05-13 | Stop reason: SDUPTHER

## 2018-01-22 RX ORDER — MIDAZOLAM HYDROCHLORIDE 1 MG/ML
INJECTION INTRAMUSCULAR; INTRAVENOUS CODE/TRAUMA/SEDATION MEDICATION
Status: COMPLETED | OUTPATIENT
Start: 2018-01-22 | End: 2018-01-22

## 2018-01-22 RX ADMIN — MIDAZOLAM 0.5 MG: 1 INJECTION INTRAMUSCULAR; INTRAVENOUS at 10:30

## 2018-01-22 RX ADMIN — FENTANYL CITRATE 25 MCG: 50 INJECTION, SOLUTION INTRAMUSCULAR; INTRAVENOUS at 09:06

## 2018-01-22 RX ADMIN — MIDAZOLAM 0.5 MG: 1 INJECTION INTRAMUSCULAR; INTRAVENOUS at 09:06

## 2018-01-22 RX ADMIN — FENTANYL CITRATE 25 MCG: 50 INJECTION, SOLUTION INTRAMUSCULAR; INTRAVENOUS at 10:40

## 2018-01-22 RX ADMIN — FENTANYL CITRATE 25 MCG: 50 INJECTION, SOLUTION INTRAMUSCULAR; INTRAVENOUS at 10:30

## 2018-01-22 RX ADMIN — HEPARIN SODIUM 5000 UNITS: 1000 INJECTION INTRAVENOUS; SUBCUTANEOUS at 09:28

## 2018-01-22 RX ADMIN — FENTANYL CITRATE 25 MCG: 50 INJECTION, SOLUTION INTRAMUSCULAR; INTRAVENOUS at 09:35

## 2018-01-22 RX ADMIN — MIDAZOLAM 1 MG: 1 INJECTION INTRAMUSCULAR; INTRAVENOUS at 08:46

## 2018-01-22 RX ADMIN — SODIUM CHLORIDE 75 ML/HR: 0.9 INJECTION, SOLUTION INTRAVENOUS at 07:25

## 2018-01-22 RX ADMIN — FENTANYL CITRATE 50 MCG: 50 INJECTION, SOLUTION INTRAMUSCULAR; INTRAVENOUS at 08:46

## 2018-01-22 RX ADMIN — FENTANYL CITRATE 50 MCG: 50 INJECTION, SOLUTION INTRAMUSCULAR; INTRAVENOUS at 10:38

## 2018-01-22 RX ADMIN — MIDAZOLAM 0.5 MG: 1 INJECTION INTRAMUSCULAR; INTRAVENOUS at 09:35

## 2018-01-22 NOTE — DISCHARGE INSTRUCTIONS
ARTERIOGRAM    WHAT YOU SHOULD KNOW:   An angiogram is a procedure to look at arteries in your body  Arteries are the blood vessels that carry blood from your heart to your body  AFTER YOU LEAVE:     Self-care:   · Limit activity: Rest for the remainder of the day of your procedure  Have some one with you until the next morning  Keep your arm or leg straight as much as possible  Rest as much as possible, sitting lying or reclining  Walk only to go to the bathroom, to bed or to eat  If the angiogram catheter was put in your leg, use the stairs as little as possible  No driving  · Keep your wound clean and dry  You may shower 24 hours after your procedure  The bandage you have on should fall off in 2-3 days  If there is any drainage from the puncture site, you should put on a clean bandage  · Watch for bleeding and bruising: It is normal to have a bruise and soreness where the angiogram catheter went in  · Diet:   · You may resume your regular diet, Sips of flat soda will help with mild nausea  · Drink more liquids than usual for the next 24 hours      · IMMEDIATELY Contact Interventional Radiology at [phone] Raj PATIENTS: Contact Interventional Radiology at [phone]) Serg Nagel PATIENTS: Contact Interventional Radiology at [phone]) if any of the following occur:  · If your bruise gets larger or if you notice any active bleeding  APPLY DIRECT PRESSURE TO THE BLEEDING SITE  · If you notice increased swelling or have increased pain at the puncture site   · If you have any numbness or pain in the extremity of the puncture site   · If that extremity seems cold or pale      · You have fever greater than 101  · Persistent nausea or vomitting    Follow up with your primary healthcare provider  as directed: Write down your questions so you remember to ask them during your visits

## 2018-01-23 NOTE — MISCELLANEOUS
Message  Yuliana Flanagan called re: study pt had today, he wanted to alert us to a change  Dr Esperanza Dove ordered but he's our pt  Dr Connie Mcgrath last saw him in July  I called pt - he said for a month he's been having increased pain LLE, "calf muscle is always sore" and every night he gets up at 0200 due to pain in foot  No ulcers, no discoloration  He said the foot feels cold to him but when you touch it it is warm  Also his mid foot and heel are painful intermittently, this occurs w/ walking and sitting  rev w/ Dr Connie Mcgrath, he req pt be set up for agram asap to tx left graft stenosis, then needs f/u ov  he can do, or IR, whichever is available sooner  s/w pt - he agrees to plan  emailed surgery sched and also s/w Yanet and Ishmael Friend and informed them agram needs to be sched asap and pt aware, awaiting call to sched  Active Problems    1  Abnormal chest xray (793 2) (R93 8)   2  Abnormal exercise myocardial perfusion study (794 39) (R94 39)   3  Abnormal loss of weight (783 21) (R63 4)   4  Anemia (285 9) (D64 9)   5  Anxiety (300 00) (F41 9)   6  Arthritis (716 90) (M19 90)   7  Asymptomatic carotid artery stenosis (433 10) (I65 29)   8  Atherosclerosis of artery of extremity with intermittent claudication (440 21) (I70 219)   9  Backache (724 5) (M54 9)   10  Chest pain (786 50) (R07 9)   11  Controlled diabetes mellitus (250 00) (E11 9)   12  Coronary artery disease (414 00) (I25 10)   13  Cough (786 2) (R05)   14  Difficulty breathing (786 09) (R06 89)   15  Dizziness (780 4) (R42)   16  Elevated prostate specific antigen (PSA) (790 93) (R97 20)   17  Enlarged prostate without lower urinary tract symptoms (luts) (600 00) (N40 0)   18  Guaiac positive stools (792 1) (R19 5)   19  Hyperlipidemia (272 4) (E78 5)   20  Hypertension (401 9) (I10)   21  Iron deficiency anemia (280 9) (D50 9)   22  Joint pain, knee (719 46) (V80 653)   23  Need for prophylactic vaccination and inoculation against influenza (V04 81) (Z23)   24   Nicotine dependence (305 1) (F17 200)   25  Pain in upper limb (729 5) (M79 603)   26  Peripheral arterial disease (443 9) (I73 9)   27  Right shoulder pain (719 41) (M25 511)   28  Shoulder pain (719 41) (M25 519)   29  Skin lesion (709 9) (L98 9)   30  Subacromial impingement of right shoulder (726 19) (M75 41)   31  History of Tobacco use (305 1) (Z72 0)   32  Urticaria (708 9) (L50 9)   33  Weight loss (783 21) (R63 4)    Current Meds   1  Ferrous Sulfate 325 (65 Fe) MG Oral Tablet; TAKE 1 TABLET DAILY AS DIRECTED; Therapy: 97GHQ0974 to (Evaluate:47Eph5974) Recorded   2  GlipiZIDE 5 MG Oral Tablet; take 1/2 tablet twice a day; Therapy: 61XIU0056 to Recorded   3  Lisinopril 20 MG Oral Tablet; TAKE 1 TABLET DAILY; Therapy: (Zakiya Wolff) to Recorded   4  MetFORMIN HCl  MG Oral Tablet Extended Release 24 Hour; TAKE 2 TABLETS   IN THE AM AND 2 TABLETS IN THE PM;   Therapy: 06WYF6013 to Recorded   5  Metoprolol Tartrate 25 MG Oral Tablet; TAKE 1 TABLET DAILY; Therapy: 87KHJ0613 to (Evaluate:62Gng0705) Recorded   6  Pantoprazole Sodium 40 MG Oral Tablet Delayed Release; Take 1 tablet once daily; Therapy: 55ABV7115 to Recorded   7  Paxil 40 MG Oral Tablet (PARoxetine HCl); TAKE 1/2 TABLET DAILY; Therapy: (Recorded:91Idb6506) to Recorded   8  Plavix 75 MG Oral Tablet (Clopidogrel Bisulfate); TAKE 1 TABLET DAILY; Therapy: 80HCN4404 to (Evaluate:64Dum1934) Recorded   9  Vitamin C 500 MG Oral Tablet; TAKE 1 TABLET DAILY; Therapy: 39JQI6466 to Recorded   10  Vytorin 10-20 MG Oral Tablet (Ezetimibe-Simvastatin); TAKE 1 TABLET DAILY; Therapy: (Recorded:58Prj6596) to Recorded    Allergies    1  Etomidate POWD   2  Etomidate SOLN   3   Penicillins    Signatures   Electronically signed by : Haydee Barajas, ; Jan 18 2018  1:40PM EST                       (Author)

## 2018-01-24 VITALS — RESPIRATION RATE: 14 BRPM | SYSTOLIC BLOOD PRESSURE: 130 MMHG | DIASTOLIC BLOOD PRESSURE: 80 MMHG | HEART RATE: 68 BPM

## 2018-01-31 ENCOUNTER — DOCUMENTATION (OUTPATIENT)
Dept: INTERNAL MEDICINE CLINIC | Facility: CLINIC | Age: 78
End: 2018-01-31

## 2018-01-31 ENCOUNTER — TELEPHONE (OUTPATIENT)
Dept: INTERNAL MEDICINE CLINIC | Facility: CLINIC | Age: 78
End: 2018-01-31

## 2018-01-31 NOTE — TELEPHONE ENCOUNTER
Patient stated he was put on Vytorin, when he went to get it refilled it was 850$, patients states that was the generic price also, they let him know he can be switched to crestor  Please advise med change for this patient

## 2018-02-02 DIAGNOSIS — E78.5 HYPERLIPIDEMIA, UNSPECIFIED HYPERLIPIDEMIA TYPE: Primary | ICD-10-CM

## 2018-02-02 RX ORDER — ROSUVASTATIN CALCIUM 5 MG/1
5 TABLET, COATED ORAL DAILY
Qty: 90 TABLET | Refills: 3 | Status: SHIPPED | OUTPATIENT
Start: 2018-02-02 | End: 2021-03-30 | Stop reason: DRUGHIGH

## 2018-02-05 ENCOUNTER — TELEPHONE (OUTPATIENT)
Dept: INTERNAL MEDICINE CLINIC | Facility: CLINIC | Age: 78
End: 2018-02-05

## 2018-02-22 ENCOUNTER — TELEPHONE (OUTPATIENT)
Dept: INTERNAL MEDICINE CLINIC | Facility: CLINIC | Age: 78
End: 2018-02-22

## 2018-02-22 NOTE — TELEPHONE ENCOUNTER
Pt walked up to office stating he called   About obtaining a letter to provide to [facility]  [facility] advising that Dr Mccracken

## 2018-03-05 DIAGNOSIS — I73.9 PAD (PERIPHERAL ARTERY DISEASE) (HCC): Primary | ICD-10-CM

## 2018-03-05 NOTE — PROGRESS NOTES
Pt called stating his lle is improved s/p agram 1/22/18 however he notes he is beginning to have pain in L calf w/ walking again  He notes foot/toes/heel hurt but is has "neuropathy pain"  His foot also feels cool but notes this is no change  He denies any wounds/ulcers  Rev w/ B Francisco MELENDEZ, pt needs lead and ov  Pt informed of same, will email sched to call him to arrange this week, pt erq [address]

## 2018-03-12 ENCOUNTER — HOSPITAL ENCOUNTER (OUTPATIENT)
Dept: NON INVASIVE DIAGNOSTICS | Facility: CLINIC | Age: 78
Discharge: HOME | End: 2018-03-12
Payer: MEDICARE

## 2018-03-12 ENCOUNTER — TELEPHONE (OUTPATIENT)
Dept: VASCULAR SURGERY | Facility: CLINIC | Age: 78
End: 2018-03-12

## 2018-03-12 DIAGNOSIS — I73.9 CLAUDICATION IN PERIPHERAL VASCULAR DISEASE (HCC): ICD-10-CM

## 2018-03-12 DIAGNOSIS — I73.9 PAD (PERIPHERAL ARTERY DISEASE) (HCC): ICD-10-CM

## 2018-03-12 PROCEDURE — 93923 UPR/LXTR ART STDY 3+ LVLS: CPT

## 2018-03-12 PROCEDURE — 93925 LOWER EXTREMITY STUDY: CPT

## 2018-03-12 NOTE — TELEPHONE ENCOUNTER
I received a call from Yeny Rome vas tech , and he said this patient doppler showed occluded popliteal stent and GIUSEPPE of  39 on the left,  Patient has appt with Dr Genao [address] on 4/9, I will ask scheduling to move up because patient is very symptomatic

## 2018-03-13 ENCOUNTER — OFFICE VISIT (OUTPATIENT)
Dept: VASCULAR SURGERY | Facility: CLINIC | Age: 78
End: 2018-03-13
Payer: MEDICARE

## 2018-03-13 VITALS
DIASTOLIC BLOOD PRESSURE: 72 MMHG | HEIGHT: 68 IN | BODY MASS INDEX: 28.79 KG/M2 | RESPIRATION RATE: 16 BRPM | WEIGHT: 190 LBS | HEART RATE: 72 BPM | SYSTOLIC BLOOD PRESSURE: 122 MMHG

## 2018-03-13 DIAGNOSIS — I70.202 POPLITEAL ARTERY OCCLUSION, LEFT (HCC): Chronic | ICD-10-CM

## 2018-03-13 DIAGNOSIS — I70.219 ATHEROSCLEROSIS OF ARTERY OF EXTREMITY WITH INTERMITTENT CLAUDICATION (HCC): Primary | Chronic | ICD-10-CM

## 2018-03-13 DIAGNOSIS — Z87.891 HISTORY OF CIGARETTE SMOKING: Chronic | ICD-10-CM

## 2018-03-13 PROCEDURE — 99215 OFFICE O/P EST HI 40 MIN: CPT | Performed by: SURGERY

## 2018-03-13 NOTE — ASSESSMENT & PLAN NOTE
Recurrent occlusion of left popliteal artery below femoral to above knee popliteal artery bypass  Recurrence of severe claudication along with numbness of the left foot with hypersensitivity with weight-bearing  Will require revascularization  Discussed options of repeat endovascular intervention versus more definitive surgical revascularization  Will obtain CT angiogram, cardiac evaluation and clearance as well as vein mapping in preparation for revascularization  Final recommendations will be made after above

## 2018-03-13 NOTE — PROGRESS NOTES
Assessment/Plan:    Popliteal artery occlusion, left (HCC)  Recurrent occlusion of left popliteal artery below femoral to above knee popliteal artery bypass  Recurrence of severe claudication along with numbness of the left foot with hypersensitivity with weight-bearing  Will require revascularization  Discussed options of repeat endovascular intervention versus more definitive surgical revascularization  Will obtain CT angiogram, cardiac evaluation and clearance as well as vein mapping in preparation for revascularization  Final recommendations will be made after above  Atherosclerosis of artery of extremity with intermittent claudication (HCC)  Recurrent occlusion of left popliteal artery below femoral to above knee popliteal artery bypass  Recurrence of severe claudication along with numbness of the left foot with hypersensitivity with weight-bearing  Will require revascularization  Discussed options of repeat endovascular intervention versus more definitive surgical revascularization  Will obtain CT angiogram, cardiac evaluation and clearance as well as vein mapping in preparation for revascularization  Final recommendations will be made after above  History of cigarette smoking  History of cigarette smoking  Quit 1/18  Diagnoses and all orders for this visit:    Atherosclerosis of artery of extremity with intermittent claudication ([address] )  -     Basic metabolic panel; Future  -     CTA abdominal w run off wo contrast; Future  -     VAS lower limb vein mapping bypass graft; Future  -     Ambulatory referral to Cardiology; Future    Popliteal artery occlusion, left (HCC)  -     Basic metabolic panel; Future  -     CTA abdominal w run off wo contrast; Future  -     VAS lower limb vein mapping bypass graft; Future  -     Ambulatory referral to Cardiology; Future    History of cigarette smoking             Patient ID: Odette Cox is a 65 y o  male    Chief Complaint: pt is here to review OSMEL 3/12 SL/popliteal stent on left is occluded and the GIUSEPPE is   44 and patient is symptomatic  Pt c/o of pain when walking to calf and bottom of foot/ arch/ toes  Pt states feet feel cold  Pt c/o of numbness and tingling to bilateral feet R>L  Pt denies open wounds or sores  Pt is on aspirin and plavix  43-year-old with remote history of left femoral to above knee popliteal artery bypass  He was found to have occlusion of the more distal popliteal artery which was treated with angioplasty and covered stent placement 01/22/2018  He noticed significant improvement following this procedure for approximately 1 month then noticed severe discomfort of the left calf which she describes as a severe charley horse which slowly resolved  He also noticed pain of the left foot and numbness  This also improved with time  He does still notice some hypersensitivity to the left foot with weight-bearing and feels there is some numbness which is more than his baseline  He complains of severe left calf claudication with short distance ambulation which does resolve with rest   He denies rest pain nor any areas of tissue loss  On review of duplex evaluation 03/12/2018 there is recurrent occlusion of the popliteal artery but the bypass graft remains patent with outflow via large collateral     Arteriogram from 01/22/2018 with occlusion of the popliteal artery just below the bypass graft with outflow via a large collateral   There was successful recanalization with angioplasty and stenting of a resistant lesion proximally using a Viabahn stent  There was a sed significant residual stenosis in the more distal popliteal artery then good tibial runoff          The following portions of the patient's history were reviewed and updated as appropriate: allergies, current medications, past family history, past medical history, past social history, past surgical history and problem list     Review of Systems   Constitutional: Positive for fatigue  HENT: Positive for postnasal drip  Eyes: Negative  Respiratory: Positive for apnea  Cardiovascular: Negative  Gastrointestinal: Negative  Endocrine: Negative  Genitourinary: Negative  Musculoskeletal: Positive for myalgias  Skin: Negative  Allergic/Immunologic: Positive for environmental allergies  Neurological: Negative  Hematological: Negative  Psychiatric/Behavioral: Negative  Objective:      /72 (BP Location: Right arm, Patient Position: Sitting, Cuff Size: Adult)   Pulse 72   Resp 16   Ht 5' 8" (1 727 m)   Wt 86 2 kg (190 lb)   BMI 28 89 kg/m²          Physical Exam   Constitutional: He is oriented to person, place, and time  He appears well-developed and well-nourished  HENT:   Head: Normocephalic and atraumatic  Eyes: Conjunctivae and EOM are normal    Neck: Normal range of motion  Neck supple  No JVD present  Carotid bruit is not present  Cardiovascular: Normal rate, regular rhythm, S1 normal, S2 normal and normal heart sounds  No murmur heard  Pulses:       Carotid pulses are 2+ on the right side, and 2+ on the left side  Radial pulses are 2+ on the right side, and 2+ on the left side  Femoral pulses are 2+ on the right side, and 2+ on the left side  Popliteal pulses are 1+ on the right side, and 0 on the left side  Dorsalis pedis pulses are 0 on the right side, and 0 on the left side  Posterior tibial pulses are 2+ on the right side, and 0 on the left side  Left foot is cool as compared to the right with decreased capillary refill   Pulmonary/Chest: Effort normal and breath sounds normal    Abdominal: Soft  Normal aorta  There is no tenderness  No hernia  Musculoskeletal: Normal range of motion  He exhibits no edema, tenderness or deformity  Neurological: He is alert and oriented to person, place, and time  No cranial nerve deficit  Skin: Skin is warm, dry and intact     Psychiatric: He has a normal mood and affect

## 2018-03-13 NOTE — LETTER
March 13, 2018     Aileen Alston MD  [address]  [address] [place] [address]    Patient: Martine Duke   YOB: 1940   Date of Visit: 3/13/2018       Dear Dr Winsome Storey: Thank you for referring Anibal Bateman to me for evaluation  Below are the relevant portions of my assessment and plan of care  Diagnoses and all orders for this visit:    Popliteal artery occlusion, left (HCC)  Recurrent occlusion of left popliteal artery below femoral to above knee popliteal artery bypass  Recurrence of severe claudication along with numbness of the left foot with hypersensitivity with weight-bearing  Will require revascularization  Discussed options of repeat endovascular intervention versus more definitive surgical revascularization  Will obtain CT angiogram, cardiac evaluation and clearance as well as vein mapping in preparation for revascularization  Final recommendations will be made after above  History of cigarette smoking  History of cigarette smoking  Quit 1/18  Atherosclerosis of artery of extremity with intermittent claudication (HCC)  -     Basic metabolic panel; Future  -     CTA abdominal w run off wo contrast; Future  -     VAS lower limb vein mapping bypass graft; Future  -     Ambulatory referral to Cardiology; Future      If you have questions, please do not hesitate to call me  I look forward to following Tamiko Rees along with you           Sincerely,        Aniya Baig MD        CC: Gladis Paula MD

## 2018-03-13 NOTE — PATIENT INSTRUCTIONS
Popliteal artery occlusion, left (HCC)  Recurrent occlusion of left popliteal artery below femoral to above knee popliteal artery bypass  Recurrence of severe claudication along with numbness of the left foot with hypersensitivity with weight-bearing  Will require revascularization  Discussed options of repeat endovascular intervention versus more definitive surgical revascularization  Will obtain CT angiogram, cardiac evaluation and clearance as well as vein mapping in preparation for revascularization  Final recommendations will be made after above  History of cigarette smoking  History of cigarette smoking  Quit 1/18

## 2018-03-14 ENCOUNTER — HOSPITAL ENCOUNTER (OUTPATIENT)
Dept: NON INVASIVE DIAGNOSTICS | Facility: CLINIC | Age: 78
Discharge: HOME | End: 2018-03-14
Payer: MEDICARE

## 2018-03-14 ENCOUNTER — APPOINTMENT (OUTPATIENT)
Dept: LAB | Facility: CLINIC | Age: 78
End: 2018-03-14
Payer: MEDICARE

## 2018-03-14 DIAGNOSIS — I70.219 ATHEROSCLEROSIS OF ARTERY OF EXTREMITY WITH INTERMITTENT CLAUDICATION (HCC): Chronic | ICD-10-CM

## 2018-03-14 DIAGNOSIS — I70.202 POPLITEAL ARTERY OCCLUSION, LEFT (HCC): Chronic | ICD-10-CM

## 2018-03-14 LAB
ANION GAP SERPL CALCULATED.3IONS-SCNC: 7 MMOL/L (ref 4–13)
BUN SERPL-MCNC: 14 MG/DL (ref 5–25)
CALCIUM SERPL-MCNC: 9.3 MG/DL (ref 8.3–10.1)
CHLORIDE SERPL-SCNC: 103 MMOL/L (ref 100–108)
CO2 SERPL-SCNC: 29 MMOL/L (ref 21–32)
CREAT SERPL-MCNC: 1.24 MG/DL (ref 0.6–1.3)
GFR SERPL CREATININE-BSD FRML MDRD: 55 ML/MIN/1.73SQ M
GLUCOSE SERPL-MCNC: 227 MG/DL (ref 65–140)
POTASSIUM SERPL-SCNC: 4.5 MMOL/L (ref 3.5–5.3)
SODIUM SERPL-SCNC: 139 MMOL/L (ref 136–145)

## 2018-03-14 PROCEDURE — 93971 EXTREMITY STUDY: CPT | Performed by: SURGERY

## 2018-03-14 PROCEDURE — 80048 BASIC METABOLIC PNL TOTAL CA: CPT

## 2018-03-14 PROCEDURE — 93922 UPR/L XTREMITY ART 2 LEVELS: CPT | Performed by: SURGERY

## 2018-03-14 PROCEDURE — 36415 COLL VENOUS BLD VENIPUNCTURE: CPT

## 2018-03-14 PROCEDURE — 93925 LOWER EXTREMITY STUDY: CPT | Performed by: SURGERY

## 2018-03-14 PROCEDURE — 93971 EXTREMITY STUDY: CPT

## 2018-03-20 ENCOUNTER — HOSPITAL ENCOUNTER (OUTPATIENT)
Dept: RADIOLOGY | Facility: HOSPITAL | Age: 78
Discharge: HOME | End: 2018-03-20
Attending: SURGERY
Payer: MEDICARE

## 2018-03-20 ENCOUNTER — TRANSCRIBE ORDERS (OUTPATIENT)
Dept: RADIOLOGY | Facility: HOSPITAL | Age: 78
End: 2018-03-20

## 2018-03-20 DIAGNOSIS — I70.219 ATHEROSCLEROSIS OF ARTERY OF EXTREMITY WITH INTERMITTENT CLAUDICATION (HCC): Chronic | ICD-10-CM

## 2018-03-20 DIAGNOSIS — I70.202 POPLITEAL ARTERY OCCLUSION, LEFT (HCC): Chronic | ICD-10-CM

## 2018-03-20 PROCEDURE — 75635 CT ANGIO ABDOMINAL ARTERIES: CPT

## 2018-03-20 RX ADMIN — IOHEXOL 120 ML: 350 INJECTION, SOLUTION INTRAVENOUS at 07:31

## 2018-03-26 ENCOUNTER — OFFICE VISIT (OUTPATIENT)
Dept: CARDIOLOGY CLINIC | Facility: CLINIC | Age: 78
End: 2018-03-26
Payer: MEDICARE

## 2018-03-26 VITALS
HEIGHT: 68 IN | SYSTOLIC BLOOD PRESSURE: 126 MMHG | HEART RATE: 70 BPM | BODY MASS INDEX: 28.19 KG/M2 | DIASTOLIC BLOOD PRESSURE: 70 MMHG | WEIGHT: 186 LBS

## 2018-03-26 DIAGNOSIS — I70.219 ATHEROSCLEROSIS OF ARTERY OF EXTREMITY WITH INTERMITTENT CLAUDICATION (HCC): Chronic | ICD-10-CM

## 2018-03-26 DIAGNOSIS — R94.39 ABNORMAL NUCLEAR STRESS TEST: ICD-10-CM

## 2018-03-26 DIAGNOSIS — Z01.810 PREOP CARDIOVASCULAR EXAM: Primary | ICD-10-CM

## 2018-03-26 DIAGNOSIS — I10 ESSENTIAL HYPERTENSION: ICD-10-CM

## 2018-03-26 PROCEDURE — 93000 ELECTROCARDIOGRAM COMPLETE: CPT | Performed by: INTERNAL MEDICINE

## 2018-03-26 PROCEDURE — 99214 OFFICE O/P EST MOD 30 MIN: CPT | Performed by: INTERNAL MEDICINE

## 2018-03-26 RX ORDER — MELATONIN
1000 DAILY
COMMUNITY
End: 2018-04-24

## 2018-03-26 NOTE — PROGRESS NOTES
Cardiology Follow Up    Lakisha Bay  1940  [id number]  [address] [facility]  [address] [place] [address] [address]    1  Preop cardiovascular exam     2  Atherosclerosis of artery of extremity with intermittent claudication [facility])  Ambulatory referral to Cardiology    POCT ECG   3  Essential hypertension     4  Abnormal nuclear stress test           Discussion/Summary: I do not feel that he is having active anginal symptoms  He very likely has CAD but I do not recommend cardiac cath just to define his anatomy  His ECG is normal  EF is normal  Exercise tolerance is decent despite his claudication  His cardiac risk is increased but acceptable to proceed with PV surgery  I have reviewed his medications and made no changes  Interval History: He has not had any cardiac problems since his last OV on 9/19/2017  He was in [place] in 12/2017 and did a lot of walking without CP, significant SOB  He was getting a lot of LLE claudication and subsequently had a stent placed  This improved his symptoms but the stent has since occluded  He will require surgical revascularization  He has known severe PAD  He has no documented CAD  He did have a nuclear stess test in 5/2017 which showed inferior ischemia  ECG is normal  EF is normal   He denies any CP, chest pressure with activity at this time  He quit smoking 1/20/2018      Patient Active Problem List   Diagnosis    History of cigarette smoking    Atherosclerosis of artery of extremity with intermittent claudication (HCC)    Popliteal artery occlusion, left (HCC)    Preop cardiovascular exam    Essential hypertension    Abnormal nuclear stress test     Past Medical History:   Diagnosis Date    COPD [facility]chronic obstructive pulmonary disease) ([facility] )     Coronary artery disease     Hyperlipidemia     Hypertension     Peripheral arterial disease ([facility] )     Smoking addiction Social History     Social History    Marital status:      Spouse name: N/A    Number of children: N/A    Years of education: N/A     Occupational History    Not on file  Social History Main Topics    Smoking status: Former Smoker     Quit date: 01/2018    Smokeless tobacco: Never Used    Alcohol use Not on file    Drug use: Unknown    Sexual activity: Not on file     Other Topics Concern    Not on file     Social History Narrative    No narrative on file      No family history on file    Past Surgical History:   Procedure Laterality Date    FEMORAL ARTERY - POPLITEAL ARTERY BYPASS GRAFT Left     HAND SURGERY      TONSILLECTOMY         Current Outpatient Prescriptions:     aspirin 81 mg chewable tablet, Chew 81 mg daily, Disp: , Rfl:     cholecalciferol (VITAMIN D3) 1,000 units tablet, Take 1,000 Units by mouth daily, Disp: , Rfl:     clopidogrel (PLAVIX) 75 mg tablet, Take 75 mg by mouth daily, Disp: , Rfl:     glipiZIDE (GLUCOTROL) 5 mg tablet, Take 5 mg by mouth 2 (two) times a day before meals, Disp: , Rfl:     lisinopril (ZESTRIL) 20 mg tablet, Take 20 mg by mouth daily, Disp: , Rfl:     metFORMIN (GLUCOPHAGE) 1000 MG tablet, Take 1,000 mg by mouth 2 (two) times a day with meals, Disp: , Rfl:     metoprolol tartrate (LOPRESSOR) 25 mg tablet, Take 25 mg by mouth every 12 (twelve) hours, Disp: , Rfl:     pantoprazole (PROTONIX) 40 mg tablet, Take 40 mg by mouth daily, Disp: , Rfl:     PARoxetine (PAXIL) 40 MG tablet, Take 40 mg by mouth every morning, Disp: , Rfl:     rosuvastatin (CRESTOR) 5 mg tablet, Take 1 tablet (5 mg total) by mouth daily, Disp: 90 tablet, Rfl: 3  Allergies   Allergen Reactions    Etomidate Swelling     "I blew up and couldn't breath"    Penicillins        Labs:  Appointment on 03/14/2018   Component Date Value    Sodium 03/14/2018 139     Potassium 03/14/2018 4 5     Chloride 03/14/2018 103     CO2 03/14/2018 29     Anion Gap 03/14/2018 7     BUN 03/14/2018 14     Creatinine 03/14/2018 1 24     Glucose 03/14/2018 227*    Calcium 03/14/2018 9 3     eGFR 03/14/2018 55    Admission on 01/22/2018, Discharged on 01/22/2018   Component Date Value    Sodium 01/22/2018 139     Potassium 01/22/2018 4 2     Chloride 01/22/2018 106     CO2 01/22/2018 26     Anion Gap 01/22/2018 7     BUN 01/22/2018 14     Creatinine 01/22/2018 1 07     Glucose 01/22/2018 194*    Glucose, Fasting 01/22/2018 194*    Calcium 01/22/2018 9 1     eGFR 01/22/2018 66     WBC 01/22/2018 5 59     RBC 01/22/2018 4 78     Hemoglobin 01/22/2018 13 8     Hematocrit 01/22/2018 41 5     MCV 01/22/2018 87     MCH 01/22/2018 28 9     MCHC 01/22/2018 33 3     RDW 01/22/2018 12 8     Platelets 00/87/7399 233     MPV 01/22/2018 10 9     Protime 01/22/2018 14 4     INR 01/22/2018 1 12      Imaging: Cta Abdominal W Run Off Wo Contrast    Result Date: 3/23/2018  Narrative: CT ANGIOGRAM OF THE AORTA AND LOWER EXTREMITIES WITH IV CONTRAST INDICATION:  Left popliteal artery occlusion status post intervention COMPARISON: January 22, 2018 arteriogram TECHNIQUE:  CT angiogram examination of the abdomen, pelvis, and lower extremities was performed according to standard protocol with intravenous contrast   This examination, like all CT scans performed in the [facility], was performed utilizing techniques to minimize radiation dose exposure, including the use of iterative reconstruction and automated exposure control  3D reconstructions were performed an independent workstation, and are supplied for review  Rad dose 2413 58 mGy-cm IV Contrast:  120 mL of iohexol (OMNIPAQUE)  FINDINGS: VASCULAR STRUCTURES:   There is diffuse aortoiliac atherosclerotic disease with circumferential calcified plaque primarily in the distal aorta and common iliac arteries  There is no significant occlusive disease  Celiac artery is patent  There is no SMA stenosis  CARL is patent    No evidence of renal artery stenosis  There are 2 right-sided renal arteries  The right leg, there is no significant common or external iliac stenosis  There is a common femoral vein graft with distal anastomosis to the below-knee popliteal artery  The graft is widely patent with no evidence of anastomotic stenosis  Native SFA and popliteal arteries are occluded containing stent throughout  Three-vessel runoff is continuous from the graft posterior tibial dominant  In the left leg, vein graft from the common femoral is widely patent similar to previous studies  There are several caliber transitions though no intragraft stenosis is seen  There is no inflow stenosis to the graft  Specifically, no common iliac or external iliac stenosis is seen  The outflow the graft is occluded  The stent within the segment immediately distal to the anastomosis is totally occluded  There is no significant stent compression however  The native popliteal artery distal to the stent is also occluded to the level of the femoral condyles  At this level, there is calcified plaque leading to at least moderate stenosis of the popliteal artery  The below-knee popliteal arteries normal in caliber and two-vessel runoff is seen through the anterior tibial artery and peroneal artery  The origin of the anterior tibial artery and TP trunk both appear fairly diseased on the CTA though relatively normal in appearance particularly anterior tibial artery on the recent arteriogram   OTHER FINDINGS ABDOMEN LOWER CHEST:  No significant abnormality in the lung bases  LIVER/BILIARY TREE:  Unremarkable  GALLBLADDER:  No calcified gallstones  No pericholecystic inflammatory change  SPLEEN:  Unremarkable  Normal size  PANCREAS:  Unremarkable  ADRENAL GLANDS: Unremarkable  KIDNEYS/URETERS:  No solid renal mass  No hydronephrosis  No urinary tract calculi  PELVIS REPRODUCTIVE ORGANS:  Unremarkable for patient's age  URINARY BLADDER:  Unremarkable  ADDITIONAL ABDOMINAL AND PELVIC STRUCTURES STOMACH AND BOWEL:  Unremarkable ABDOMINOPELVIC CAVITY:   No pathologically enlarged mesenteric or retroperitoneal lymph nodes  No ascites or free intraperitoneal air  ABDOMINAL WALL/INGUINAL REGIONS:  Unremarkable  OSSEOUS STRUCTURES:  No acute fracture or destructive osseous lesion  Impression: 1  No significant aortoiliac occlusive disease 2  Patent left leg bypass graft with no inflow stenosis  Covered stent placed immediately distal to the anastomosis is occluded though without significant external stent compression  There is a short segment of native popliteal artery distal to the stent which also is occluded to the level the femoral condyles and contain significant calcification  Below-knee popliteal artery is normal in caliber and two-vessel runoff remains patent  3   Widely patent right leg bypass graft with no significant inflow or outflow stenosis  Three-vessel runoff is intact  Workstation performed: [id number]     Vas Lower Limb Arterial Duplex, Complete Bilateral    Result Date: 3/14/2018  Narrative:  [facility] REPORT CLINICAL: Indications:  Initial Post-op evaluation s/p revascularization  Patient reports after procedure, he was getting calf pain which resolved, then heel pain which resolved, and followed by Left Great Toe pain which resolved  Currently, he reports his calf pain came back and starts within 1 block  Operative History 1/22/2018 Drug eluded balloon angioplasty Left Popliteal artery with stent placement 4/4/2012 Left Percutaneous graft balloon angioplasty 4/4/2012 Right Percutaneous graft balloon angioplasty 1/25/2011 Left FemPop Bypass AK 10/12/2010 Right FemPop Bypass BK 10/12/2010 Right Tibioperoneal endarterectomy 4/4/2012 Rt SFA Stent Risk Factors:  The patient has history of HTN, NIDDM (oral meds), hyperlipidemia, PAD and smoking (current) 1 ppd  FINDINGS:  Segment                Rig       Left PSV  EDV  Impression  PSV  EDV  Inflow Anastomosis     123                    54       High Thigh (Graft)      57                    44       Mid Thigh (Graft)       86                    56       Low Thigh (Graft)       78                    57       Near Knee (Graft)       77                             Outflow Anastomosis     67                    88       Common Femoral Artery   72    0               88    9  Prox Profunda                                205   12  Proximal Pop                     Occluded              Distal Pop                                    21    8  Tibioperoneal           71    0                        Dist Post Tibial        40    0  Occluded              Dist  Ant  Tibial       24    0               28   16     CONCLUSION:  Impression: RIGHT LOWER LIMB: Widely patent Femoral to Below Knee Popliteal artery bypass graft  Ankle/Brachial index: 0 93 , Prior 0 95 PVR/ PPG tracings are dampened  Metatarsal pressure of 107mmHg Great toe pressure of 67mmHg, within the healing range LEFT LOWER LIMB: Widely patent Femoral to Above knee Popliteal artery bypass graft  High grade vs occlusion of popliteal artery distal to anastomosis of bypass graft  Ankle/Brachial index: 0 38, Prior 0 26 PVR/ PPG tracings are severely dampened  Metatarsal and Great toe pressures could not be obtained secondary to attenuated PPG waveform  Compared to previous study on 1/18/18, there is a progression of disease on the left s/p intervention  Preliminary report was called to Johan Salinas at completion of exam   SIGNATURE: Electronically Signed by: Kelsy Rosales on 2018-03-14 12:58:10 PM    Vas Upper Limb Vein Mapping    Result Date: 3/14/2018  Narrative:  [facility] REPORT CLINICAL: Indications:  Bilateral Other Specified Pre-Operative Examination [Z01 [id number]]  Upper Extremity vein mapping was done since patient does not have usable veins for bypass graft in the lower extremity   Operative History: 2018-01-22 Drug eluded balloon angioplasty Left Popliteal artery with stent placement 2012-04-04 Left Percutaneous graft balloon angioplasty 2012-04-04 Right Percutaneous graft balloon angioplasty 2012-04-04 Rt SFA Stent 2011-01-25 Left FemPop Bypass AK Greater saphenous 2010-10-12 Right FemPop Bypass BK Greater saphenous 2010-10-12 Right Tibioperoneal endarterectomy Greater saphenous patch   FINDINGS:  Segment                          Right    Left                                                              AP (mm)  AP (mm)  Bas Upper                            6 7      5 1  Bas Mid Arm                          5 0      4 2  Bas AC                               4 5      3 1  Basilic Vein - Forearm Proximal      2 1      2 1  Bas Mid Forearm                      2 3      2 1  Basilic Vein - Forearm Distal        2 1      2 1  Ceph Upper                           3 3      3 9  Ceph Mid Arm                         3 7      3 9  Ceph AC                              4 2      4 0  Ceph Mid Forearm                     3 2      2 9     CONCLUSION:  Impression RIGHT ARM: Evaluation shows patent and thrombus free cephalic vein and basilic vein  The intraluminal diameters of the cephalic vein are adequate for use as and arterial conduit  The intraluminal diameters of the basilic vein are adequate for use as and arterial conduit  LEFT ARM: Evaluation shows patent and thrombus free cephalic vein and basilic vein  The intraluminal diameters of the cephalic vein are adequate for use as and arterial conduit  The intraluminal diameters of the basilic vein are adequate for use as and arterial conduit  SIGNATURE: Electronically Signed by: Debbie Cox on 2018-03-14 12:57:20 PM    EKG: NSR, normal ECG  Review of Systems:  Review of Systems   Constitutional: Negative for diaphoresis, fatigue, fever and unexpected weight change  HENT: Negative  Respiratory: Negative for cough, shortness of breath and wheezing  Cardiovascular: Negative for chest pain, palpitations and leg swelling  Positive for LLE claudication at very low levels of activity   Gastrointestinal: Negative for abdominal pain, diarrhea and nausea  Musculoskeletal: Negative for gait problem and myalgias  Skin: Negative for rash  Neurological: Negative for dizziness and numbness  Psychiatric/Behavioral: Negative  Physical Exam:  Physical Exam   Constitutional: He is oriented to person, place, and time  He appears well-developed and well-nourished  HENT:   Head: Normocephalic and atraumatic  Eyes: Pupils are equal, round, and reactive to light  Neck: Normal range of motion  Neck supple  No JVD present  Cardiovascular: Regular rhythm, S1 normal, S2 normal and normal pulses  Pulses:       Carotid pulses are 2+ on the right side, and 2+ on the left side  Pulmonary/Chest: Effort normal and breath sounds normal  He has no wheezes  He has no rales  Abdominal: Soft  Bowel sounds are normal  There is no tenderness  Musculoskeletal: Normal range of motion  He exhibits no edema or tenderness  Neurological: He is alert and oriented to person, place, and time  He has normal reflexes  No cranial nerve deficit  Skin: Skin is warm  Psychiatric: He has a normal mood and affect

## 2018-03-31 ENCOUNTER — DOCUMENTATION (OUTPATIENT)
Dept: INTERNAL MEDICINE CLINIC | Facility: CLINIC | Age: 78
End: 2018-03-31

## 2018-04-02 DIAGNOSIS — I10 ESSENTIAL (PRIMARY) HYPERTENSION: ICD-10-CM

## 2018-04-02 DIAGNOSIS — E11.9 TYPE 2 DIABETES MELLITUS WITHOUT COMPLICATIONS (HCC): ICD-10-CM

## 2018-04-02 DIAGNOSIS — D64.9 ANEMIA: ICD-10-CM

## 2018-04-02 DIAGNOSIS — E78.5 HYPERLIPIDEMIA: ICD-10-CM

## 2018-04-04 ENCOUNTER — OFFICE VISIT (OUTPATIENT)
Dept: VASCULAR SURGERY | Facility: CLINIC | Age: 78
End: 2018-04-04
Payer: MEDICARE

## 2018-04-04 VITALS
DIASTOLIC BLOOD PRESSURE: 80 MMHG | HEIGHT: 68 IN | RESPIRATION RATE: 16 BRPM | SYSTOLIC BLOOD PRESSURE: 142 MMHG | HEART RATE: 72 BPM | BODY MASS INDEX: 28.19 KG/M2 | WEIGHT: 186 LBS | TEMPERATURE: 98 F

## 2018-04-04 DIAGNOSIS — I70.219 ATHEROSCLEROSIS OF ARTERY OF EXTREMITY WITH INTERMITTENT CLAUDICATION (HCC): Chronic | ICD-10-CM

## 2018-04-04 DIAGNOSIS — I70.202 POPLITEAL ARTERY OCCLUSION, LEFT (HCC): Primary | Chronic | ICD-10-CM

## 2018-04-04 PROCEDURE — 99214 OFFICE O/P EST MOD 30 MIN: CPT | Performed by: SURGERY

## 2018-04-04 RX ORDER — CLINDAMYCIN PHOSPHATE 900 MG/50ML
900 INJECTION INTRAVENOUS ONCE
Status: CANCELLED | OUTPATIENT
Start: 2018-04-30 | End: 2018-04-30

## 2018-04-04 NOTE — ASSESSMENT & PLAN NOTE
Reocclusion of left popliteal artery below left femoral to popliteal artery bypass with severe claudication  This occlusion also puts the bypass graft at risk  We discussed the findings on recent CT angiogram along with the treatment options available and their associated risks and benefits  We will plan on revising his femoral to above knee popliteal artery bypass graft by extending it to the below knee popliteal artery  Most likely we will have enough residual distal greater saphenous vein but may require arm vein

## 2018-04-04 NOTE — PATIENT INSTRUCTIONS
Popliteal artery occlusion, left (HCC)  Reocclusion of left popliteal artery below left femoral to popliteal artery bypass with severe claudication  This occlusion also puts the bypass graft at risk  We discussed the findings on recent CT angiogram along with the treatment options available and their associated risks and benefits  We will plan on revising his femoral to above knee popliteal artery bypass graft by extending it to the below knee popliteal artery  Most likely we will have enough residual distal greater saphenous vein but may require arm vein

## 2018-04-04 NOTE — PROGRESS NOTES
Assessment/Plan:    Popliteal artery occlusion, left (HCC)  Reocclusion of left popliteal artery below left femoral to popliteal artery bypass with severe claudication  This occlusion also puts the bypass graft at risk  We discussed the findings on recent CT angiogram along with the treatment options available and their associated risks and benefits  We will plan on revising his femoral to above knee popliteal artery bypass graft by extending it to the below knee popliteal artery  Most likely we will have enough residual distal greater saphenous vein but may require arm vein  Diagnoses and all orders for this visit:    Popliteal artery occlusion, left [facility])    Atherosclerosis of artery of extremity with intermittent claudication (HCC)          Subjective:      Patient ID: Kanchan Liu is a 65 y o  male  Patient is here to discuss surgery  He had VM on 3/14 and CTA on 3/20  Patient is s/p LLE stenting 1/2018  He has pain and cramping in the left calf, foot and toes with activity and with walking short distances  Symptoms relieved with rest  His L foot constantly feels cold  He takes Plavix daily  He denies open wounds or sores  Prior tobacco use  65-year-old with history of left femoral to above knee popliteal artery bypass  He subsequently had occlusion of his at knee popliteal artery which was treated with angioplasty and stenting in January of 2018  He only had a short period of symptom resolution and was found to have Re occlusion of the popliteal artery below the bypass graft  He now has severe claudication and his graft is threatened but remained patent  He recently underwent CT angiogram in preparation for revision of this bypass  On examination today he states there has been no significant change with continued severe left calf claudication causing limitation in his activities  He denies rest pain but does have some neuropathic symptoms    He denies any wounds or difficulty healing  Review of CT angiogram 03/20/2018 and correlation with arteriogram 01/22/2018 there is reocclusion of the popliteal artery at the knee below the bypass graft  The bypass graft remains patent with retrograde outflow  There is then reconstitution of the below knee popliteal artery with 2 vessel runoff via the anterior tibial and peroneal arteries  Vein mapping shows both cephalic and basilic veins to be adequate  The following portions of the patient's history were reviewed and updated as appropriate: allergies, current medications, past family history, past medical history, past social history, past surgical history and problem list     Review of Systems   Constitutional: Positive for fatigue  HENT: Negative  Eyes: Negative  Respiratory: Positive for apnea  Cardiovascular: Negative  Gastrointestinal: Negative  Endocrine: Negative  Genitourinary: Negative  Musculoskeletal:        Calf cramping with walking   Skin: Negative  Allergic/Immunologic: Positive for environmental allergies  Neurological: Negative  Hematological: Negative  Psychiatric/Behavioral: Negative  Objective:      /80 (BP Location: Left arm, Patient Position: Sitting, Cuff Size: Adult)   Pulse 72   Temp 98 °F (36 7 °C) (Tympanic)   Resp 16   Ht 5' 8" (1 727 m)   Wt 84 4 kg (186 lb)   BMI 28 28 kg/m²          Physical Exam   Constitutional: He is oriented to person, place, and time  He appears well-developed and well-nourished  HENT:   Head: Normocephalic and atraumatic  Eyes: Conjunctivae and EOM are normal    Neck: Normal range of motion  Neck supple  Cardiovascular: Normal rate, regular rhythm, S1 normal, S2 normal and normal heart sounds  No murmur heard  Pulses:       Carotid pulses are 2+ on the right side, and 2+ on the left side  Radial pulses are 2+ on the right side, and 2+ on the left side          Femoral pulses are 2+ on the right side, and 2+ on the left side  Popliteal pulses are 2+ on the right side, and 0 on the left side  Dorsalis pedis pulses are 0 on the left side  Posterior tibial pulses are 0 on the left side  Easily palpable left graft pulse in the mid and distal thigh  The distal greater saphenous vein is normal in appearance throughout the calf on visual inspection  Pulmonary/Chest: Effort normal and breath sounds normal    Abdominal: Soft  Normal aorta  There is no tenderness  No hernia  Musculoskeletal: Normal range of motion  He exhibits no edema, tenderness or deformity  Neurological: He is alert and oriented to person, place, and time  No cranial nerve deficit  Skin: Skin is warm, dry and intact  Psychiatric: He has a normal mood and affect  Operative Scheduling Information:    Hospital:  [facility]    Physician:  [address] [place] [address]    Surgery:  Revision of left femoral to popliteal artery bypass with extension to the below knee popliteal artery    Possible arm vein    Urgency:  Standard    Case Length:  Normal    Post-op Bed:  Stepdown    OR Table:  Standard    Equipment Needs:      Medication Instructions:  Plavix:  Hold for 7 days prior to procedure    Hydration:  No

## 2018-04-04 NOTE — LETTER
April 4, 2018     Timi Tucker MD  [address]  [address] [place] [address]    Patient: Sterling Faustin   YOB: 1940   Date of Visit: 4/4/2018       Dear Dr Khanna Citizen: Thank you for referring Stan Dance to me for evaluation  Below are the relevant portions of my assessment and plan of care  Diagnoses and all orders for this visit:    Popliteal artery occlusion, left (HCC)  Reocclusion of left popliteal artery below left femoral to popliteal artery bypass with severe claudication  This occlusion also puts the bypass graft at risk  We discussed the findings on recent CT angiogram along with the treatment options available and their associated risks and benefits  We will plan on revising his femoral to above knee popliteal artery bypass graft by extending it to the below knee popliteal artery  Most likely we will have enough residual distal greater saphenous vein but may require arm vein  If you have questions, please do not hesitate to call me  I look forward to following Neisha Gupta along with you           Sincerely,        Kaleb Pierre MD        CC: No Recipients

## 2018-04-15 PROBLEM — R07.9 CHEST PAIN: Status: ACTIVE | Noted: 2017-05-22

## 2018-04-15 PROBLEM — M25.519 SHOULDER PAIN: Status: ACTIVE | Noted: 2017-06-12

## 2018-04-17 ENCOUNTER — OFFICE VISIT (OUTPATIENT)
Dept: INTERNAL MEDICINE CLINIC | Facility: CLINIC | Age: 78
End: 2018-04-17
Payer: MEDICARE

## 2018-04-17 ENCOUNTER — PREP FOR PROCEDURE (OUTPATIENT)
Dept: VASCULAR SURGERY | Facility: CLINIC | Age: 78
End: 2018-04-17

## 2018-04-17 VITALS
SYSTOLIC BLOOD PRESSURE: 128 MMHG | HEIGHT: 68 IN | DIASTOLIC BLOOD PRESSURE: 78 MMHG | RESPIRATION RATE: 14 BRPM | HEART RATE: 72 BPM | BODY MASS INDEX: 27.71 KG/M2 | WEIGHT: 182.8 LBS

## 2018-04-17 DIAGNOSIS — I25.10 CORONARY ARTERY DISEASE INVOLVING NATIVE HEART WITHOUT ANGINA PECTORIS, UNSPECIFIED VESSEL OR LESION TYPE: ICD-10-CM

## 2018-04-17 DIAGNOSIS — E78.2 MIXED HYPERLIPIDEMIA: ICD-10-CM

## 2018-04-17 DIAGNOSIS — I70.202 POPLITEAL ARTERY OCCLUSION, LEFT (HCC): Primary | ICD-10-CM

## 2018-04-17 DIAGNOSIS — Z01.818 PRE-OP EXAMINATION: Primary | ICD-10-CM

## 2018-04-17 DIAGNOSIS — E11.9 CONTROLLED TYPE 2 DIABETES MELLITUS WITHOUT COMPLICATION, WITHOUT LONG-TERM CURRENT USE OF INSULIN (HCC): ICD-10-CM

## 2018-04-17 DIAGNOSIS — I73.9 PERIPHERAL ARTERIAL DISEASE (HCC): ICD-10-CM

## 2018-04-17 DIAGNOSIS — I65.23 ASYMPTOMATIC BILATERAL CAROTID ARTERY STENOSIS: ICD-10-CM

## 2018-04-17 DIAGNOSIS — F41.9 ANXIETY: ICD-10-CM

## 2018-04-17 DIAGNOSIS — I70.219 ATHEROSCLEROSIS OF ARTERY OF EXTREMITY WITH INTERMITTENT CLAUDICATION (HCC): Chronic | ICD-10-CM

## 2018-04-17 DIAGNOSIS — I10 ESSENTIAL HYPERTENSION: ICD-10-CM

## 2018-04-17 PROCEDURE — 99215 OFFICE O/P EST HI 40 MIN: CPT | Performed by: INTERNAL MEDICINE

## 2018-04-17 NOTE — PROGRESS NOTES
Assessment/Plan:  1  General care-medically clear for surgery  Do not take metformin day of surgery  Skip lisinopril day before day of surgery  Do not take glipizide day before day of surgery  Take usual dose of metoprolol morning of surgery with sips of water  2  Peripheral arterial disease-recent evaluation with CTA of abdomen on runoff showing patent left bypass graft but stent distal to the anastomosis is occluded as well as short-segment a native popliteal artery distal to the stent which is also occluded, widely patent right leg bypass graft, diffuse atherosclerosis but no AAA or renal artery stenosis  There are 2 right-sided renal artery  As noted now scheduled for revascularization  3  Coronary artery disease-Myoview shows only mildly severe reversible defect inferior wall  He was asymptomatic  Cardiology feels we can monitor knee can proceed with surgery  He understands the importance of aggressive control of risk factors  He remains on dual antiplatelet therapy-she is asking vascular surgery what they wanted to do with his antiplatelet therapy prior to surgery  4  Right shoulder pain-previously I was worried about frozen shoulder and he was referred to orthopedic physician  5  Iron deficiency anemia-hemoglobin dropped to 11 7  Colonoscopy in April 2014 showed diverticular disease  Repeat endoscopy and colonoscopy over the past year and half by outside surgeon benign other than diverticular disease  CT scan of abdomen benign of the nephrolithiasis  Endoscopy the small intestine recommended but patient deferred  Protein electrophoresis, vitamin B12 level, TSH, methylmalonic acid level all normal   Follow-up hemoglobins now over 14  He is off iron  He knows if he has significant drop in his hemoglobin again with dual antiplatelet therapy he may need to discontinue aspirin  6  Previously noted weight loss -5 pounds without obvious etiology    Screening labs normal   Chest x-ray normal   May been related to metformin-weight has stabilized  7  Abnormal chest x-ray-felt related to nipple shadow-had repeat chest x-ray with nipple markers which were benign  8  General care-[facility] pneum[facility] 13 [facility]-REVIEW NEXT VISIT  9  Tobacco abuse-stop smoking 3 months ago  Felt poorly when he took [address] felt to be a good candidate for Chantix  10  Omaiwevcwbzrvvm-[id number]-rf CT scan of the abdomen has nonobstructing stones  He knows to maintain high fluid intake  11  Hypertension-adequate control on current dose of ACE-inhibitor plus beta-blocker-no adjustments made in that regard  12  Diabetes mellitus-most recent A1c 7 1  Can't tolerate short-acting metformin and only tolerates 1000 milligrams of long-acting metformin  On glipizide  Again strongly recommended he monitors blood sugar    All other problems as per note September 2013      MEDICAL REGIMEN:  Paxil 40 milligrams-half tab daily, pantoprazole 40 milligrams daily, glipizide 5 milligrams in a m  and 2 5 milligrams in the p m , metoprolol tartrate 12 5 milligrams b i d , metformin a 1000 milligrams daily, lisinopril 20 milligrams daily, aspirin 81 milligrams a day, Plavix 75 milligrams a day, generic Vytorin 10 20 daily    Await results of surgery    Addendum-patient went for laboratory testing on April 20th showing A1c of 8 1, urine for microalbumin negative, chemistry profile normal other than a sugar of 170-creatinine 1 18, cholesterol 132, triglycerides 86, HDL 51, LDL 75, iron low at 45 but ferritin of 27-hemoglobin 13 4 with an MCV of 87 normal white count and platelet count        No problem-specific Assessment & Plan notes found for this encounter         Diagnoses and all orders for this visit:    Pre-op examination  -     XR chest pa & lateral; Future    Controlled type 2 diabetes mellitus without complication, without long-term current use of insulin ([address] )    Essential hypertension    Atherosclerosis of artery of extremity with intermittent claudication (HCC)    Coronary artery disease involving native heart without angina pectoris, unspecified vessel or lesion type    Asymptomatic bilateral carotid artery stenosis    Peripheral arterial disease (HCC)    Anxiety    Mixed hyperlipidemia          Subjective:      Patient ID: Kevin Dukes is a 65 y o  male  He is being seen today to clear him for surgery  He underwent evaluation by vascular surgery  He had a CTA of the abdomen with runoff showing patent left bypass graft  However stent placed distal to the anastomosis is occluded  There is a short segment of the native popliteal artery distal to the stent which is also occluded  He had a widely patent right leg bypass graft  He saw Cardiology and has medical clearance and is now ready to proceed with surgery  Cardiology felt he is not having any active anginal symptoms  They did not feel it was appropriate to do a catheterization just to define his anatomy  Laboratory testing done a month ago showed creatinine 1 24, sugar 227,  He denies chest pain or pressure with activity  He denies episodes of weakness of either arm and leg compared to the other  He denies episodes of numbness of either arm and leg compared to the other  He denies blurred or double vision or difficulty with his speech    He has known hypertension  He is currently on an ACE-inhibitor as well as a beta-blocker  We reviewed he should hold his ACE-inhibitor day before day of surgery but take his usual dose of beta-blocker morning of surgery  He has known diabetes  He is currently on metformin and glipizide  He cannot tolerate short-acting metformin  He can only tolerate a 1000 milligrams of long-acting metformin  He knows not to take this day before day of surgery  He is also on glipizide    We had offered him other agents but he deferred because of cost       This patient denies any systemic symptoms  Specifically there has been no evidence of fever, night sweats, significant weight loss or significant decrease in appetite  He was a long-term tobacco smoker but stopped 2 months ago  He has known carotid stenosis-50 percent of the right and 50-70 percent on the left  He needs aggressive control of risk factors  He will be scheduled for follow-up carotid Doppler over the next several months  This patient wanted to know their preferred analgesic agent  Because of their various comorbidities I recommended that this be acetaminophen  This patient has no history of chronic liver disease that would put them at greater risk for use of acetaminophen  This patient may use up to 500-650 mg of acetaminophen at a time and no more than 3 g a day total  Nonsteroidal anti-inflammatory agents have the potential to exacerbate hypertension, hypercoagulability, chronic renal failure, congestive heart failure, and various allergic tendencies  Because of his comorbidities we wanted to use acetaminophen rather than nonsteroidals  We had a long discussion today regarding his surgery  We went over what to do with all of his meds  I told him I am also concerned about the potential for postop CNS changes with likely small vessel disease of the brain  I spoke with his significant other about this today  This was a 40 minutes visit with more than 50 percent of the time spent counseling the patient and formulating a treatment plan  Multiple questions were answered        The following portions of the patient's history were reviewed and updated as appropriate: current medications, past family history, past medical history, past social history, past surgical history and problem list     Review of Systems   Constitutional: Negative  Respiratory: Positive for shortness of breath  Cardiovascular: Negative  Claudication symptoms of the left leg   Gastrointestinal: Negative  Endocrine: Negative  Genitourinary: Negative  Nocturia x2   Musculoskeletal: Positive for arthralgias  Neurological: Negative  Hematological: Negative  Psychiatric/Behavioral: Negative  Objective:      /78   Pulse 72   Resp 14   Ht 5' 8" (1 727 m)   Wt 82 9 kg (182 lb 12 8 oz)   BMI 27 79 kg/m²          Physical Exam   Constitutional: He is oriented to person, place, and time  He appears well-developed and well-nourished  No distress  HENT:   Head: Normocephalic and atraumatic  Right Ear: External ear normal    Left Ear: External ear normal    Nose: Nose normal    Mouth/Throat: Oropharynx is clear and moist  No oropharyngeal exudate  Eyes: Conjunctivae and EOM are normal  Pupils are equal, round, and reactive to light  Right eye exhibits no discharge  Left eye exhibits no discharge  No scleral icterus  Neck: Normal range of motion  Neck supple  No JVD present  No tracheal deviation present  No thyromegaly present  Cardiovascular: Normal rate, regular rhythm and normal heart sounds  Exam reveals no gallop and no friction rub  No murmur heard  Decreased peripheral pulses   Pulmonary/Chest: Effort normal and breath sounds normal  No stridor  No respiratory distress  He has no wheezes  He exhibits no tenderness  Abdominal: Bowel sounds are normal  He exhibits no distension and no mass  There is no tenderness  There is no rebound and no guarding  Genitourinary: Rectal exam shows guaiac negative stool  Musculoskeletal: Normal range of motion  He exhibits no edema, tenderness or deformity  Lymphadenopathy:     He has no cervical adenopathy  Neurological: He is alert and oriented to person, place, and time  He has normal reflexes  No cranial nerve deficit  He exhibits normal muscle tone  Coordination normal    Skin: Skin is warm and dry  No rash noted  He is not diaphoretic  No erythema  No pallor     Benign keratoses   Psychiatric: He has a normal mood and affect  His behavior is normal  Judgment and thought content normal    Vitals reviewed

## 2018-04-17 NOTE — PATIENT INSTRUCTIONS
No glipizide day before or day of surgery    No metformin - day of surgery    Skip lisinopril day before & day of surgery    Take Paroxetine & Pantoprazole morning of surgery with sips of water    Take Metoprolol morning of surgery with sips of water    Ask Dr Gaston office - what do they want with Aspirin & Clopidogrel with surgery ?     Take Rosuvastatin in evening including night before surgery

## 2018-04-20 ENCOUNTER — LAB REQUISITION (OUTPATIENT)
Dept: LAB | Facility: HOSPITAL | Age: 78
End: 2018-04-20
Payer: MEDICARE

## 2018-04-20 ENCOUNTER — APPOINTMENT (OUTPATIENT)
Dept: LAB | Facility: CLINIC | Age: 78
End: 2018-04-20
Payer: MEDICARE

## 2018-04-20 ENCOUNTER — TRANSCRIBE ORDERS (OUTPATIENT)
Dept: LAB | Facility: CLINIC | Age: 78
End: 2018-04-20

## 2018-04-20 DIAGNOSIS — E11.9 TYPE 2 DIABETES MELLITUS WITHOUT COMPLICATIONS (HCC): ICD-10-CM

## 2018-04-20 DIAGNOSIS — Z01.818 ENCOUNTER FOR OTHER PREPROCEDURAL EXAMINATION: ICD-10-CM

## 2018-04-20 DIAGNOSIS — D64.9 ANEMIA: ICD-10-CM

## 2018-04-20 DIAGNOSIS — I70.202 POPLITEAL ARTERY OCCLUSION, LEFT (HCC): Chronic | ICD-10-CM

## 2018-04-20 DIAGNOSIS — I70.219 ATHEROSCLEROSIS OF ARTERY OF EXTREMITY WITH INTERMITTENT CLAUDICATION (HCC): Chronic | ICD-10-CM

## 2018-04-20 DIAGNOSIS — I70.202 ATHEROSCLEROSIS OF NATIVE ARTERY OF LEFT LOWER EXTREMITY (HCC): ICD-10-CM

## 2018-04-20 DIAGNOSIS — I70.219 EXTREMITY ATHEROSCLEROSIS WITH INTERMITTENT CLAUDICATION (HCC): ICD-10-CM

## 2018-04-20 DIAGNOSIS — E78.5 HYPERLIPIDEMIA: ICD-10-CM

## 2018-04-20 DIAGNOSIS — I70.219 EXTREMITY ATHEROSCLEROSIS WITH INTERMITTENT CLAUDICATION (HCC): Primary | ICD-10-CM

## 2018-04-20 DIAGNOSIS — I10 ESSENTIAL (PRIMARY) HYPERTENSION: ICD-10-CM

## 2018-04-20 LAB
ABO GROUP BLD: NORMAL
ALBUMIN SERPL BCP-MCNC: 3.5 G/DL (ref 3.5–5)
ALP SERPL-CCNC: 97 U/L (ref 46–116)
ALT SERPL W P-5'-P-CCNC: 17 U/L (ref 12–78)
ANION GAP SERPL CALCULATED.3IONS-SCNC: 4 MMOL/L (ref 4–13)
AST SERPL W P-5'-P-CCNC: 9 U/L (ref 5–45)
BASOPHILS # BLD AUTO: 0.08 THOUSANDS/ΜL (ref 0–0.1)
BASOPHILS NFR BLD AUTO: 1 % (ref 0–1)
BILIRUB SERPL-MCNC: 0.42 MG/DL (ref 0.2–1)
BLD GP AB SCN SERPL QL: NEGATIVE
BUN SERPL-MCNC: 13 MG/DL (ref 5–25)
CALCIUM SERPL-MCNC: 9.2 MG/DL (ref 8.3–10.1)
CHLORIDE SERPL-SCNC: 107 MMOL/L (ref 100–108)
CHOLEST SERPL-MCNC: 132 MG/DL (ref 50–200)
CO2 SERPL-SCNC: 28 MMOL/L (ref 21–32)
CREAT SERPL-MCNC: 1.18 MG/DL (ref 0.6–1.3)
CREAT UR-MCNC: 118 MG/DL
EOSINOPHIL # BLD AUTO: 0.35 THOUSAND/ΜL (ref 0–0.61)
EOSINOPHIL NFR BLD AUTO: 6 % (ref 0–6)
ERYTHROCYTE [DISTWIDTH] IN BLOOD BY AUTOMATED COUNT: 14.2 % (ref 11.6–15.1)
EST. AVERAGE GLUCOSE BLD GHB EST-MCNC: 186 MG/DL
FERRITIN SERPL-MCNC: 27 NG/ML (ref 8–388)
GFR SERPL CREATININE-BSD FRML MDRD: 59 ML/MIN/1.73SQ M
GLUCOSE P FAST SERPL-MCNC: 170 MG/DL (ref 65–99)
HBA1C MFR BLD: 8.1 % (ref 4.2–6.3)
HCT VFR BLD AUTO: 40.9 % (ref 36.5–49.3)
HDLC SERPL-MCNC: 51 MG/DL (ref 40–60)
HGB BLD-MCNC: 13.4 G/DL (ref 12–17)
INR PPP: 1.09 (ref 0.86–1.16)
IRON SERPL-MCNC: 45 UG/DL (ref 65–175)
LDLC SERPL DIRECT ASSAY-MCNC: 75 MG/DL (ref 0–100)
LYMPHOCYTES # BLD AUTO: 2 THOUSANDS/ΜL (ref 0.6–4.47)
LYMPHOCYTES NFR BLD AUTO: 31 % (ref 14–44)
MCH RBC QN AUTO: 28.4 PG (ref 26.8–34.3)
MCHC RBC AUTO-ENTMCNC: 32.8 G/DL (ref 31.4–37.4)
MCV RBC AUTO: 87 FL (ref 82–98)
MICROALBUMIN UR-MCNC: 8.9 MG/L (ref 0–20)
MICROALBUMIN/CREAT 24H UR: 8 MG/G CREATININE (ref 0–30)
MONOCYTES # BLD AUTO: 0.45 THOUSAND/ΜL (ref 0.17–1.22)
MONOCYTES NFR BLD AUTO: 7 % (ref 4–12)
NEUTROPHILS # BLD AUTO: 3.5 THOUSANDS/ΜL (ref 1.85–7.62)
NEUTS SEG NFR BLD AUTO: 55 % (ref 43–75)
NRBC BLD AUTO-RTO: 0 /100 WBCS
PLATELET # BLD AUTO: 225 THOUSANDS/UL (ref 149–390)
PMV BLD AUTO: 11.2 FL (ref 8.9–12.7)
POTASSIUM SERPL-SCNC: 4.5 MMOL/L (ref 3.5–5.3)
PROT SERPL-MCNC: 7.3 G/DL (ref 6.4–8.2)
PROTHROMBIN TIME: 14.1 SECONDS (ref 12.1–14.4)
RBC # BLD AUTO: 4.72 MILLION/UL (ref 3.88–5.62)
RH BLD: POSITIVE
SODIUM SERPL-SCNC: 139 MMOL/L (ref 136–145)
SPECIMEN EXPIRATION DATE: NORMAL
TRIGL SERPL-MCNC: 86 MG/DL
WBC # BLD AUTO: 6.39 THOUSAND/UL (ref 4.31–10.16)

## 2018-04-20 PROCEDURE — 86900 BLOOD TYPING SEROLOGIC ABO: CPT | Performed by: SURGERY

## 2018-04-20 PROCEDURE — 82043 UR ALBUMIN QUANTITATIVE: CPT

## 2018-04-20 PROCEDURE — 80061 LIPID PANEL: CPT

## 2018-04-20 PROCEDURE — 36415 COLL VENOUS BLD VENIPUNCTURE: CPT

## 2018-04-20 PROCEDURE — 82570 ASSAY OF URINE CREATININE: CPT

## 2018-04-20 PROCEDURE — 82728 ASSAY OF FERRITIN: CPT

## 2018-04-20 PROCEDURE — 86850 RBC ANTIBODY SCREEN: CPT | Performed by: SURGERY

## 2018-04-20 PROCEDURE — 83036 HEMOGLOBIN GLYCOSYLATED A1C: CPT

## 2018-04-20 PROCEDURE — 83540 ASSAY OF IRON: CPT

## 2018-04-20 PROCEDURE — 85025 COMPLETE CBC W/AUTO DIFF WBC: CPT

## 2018-04-20 PROCEDURE — 86901 BLOOD TYPING SEROLOGIC RH(D): CPT | Performed by: SURGERY

## 2018-04-20 PROCEDURE — 83721 ASSAY OF BLOOD LIPOPROTEIN: CPT

## 2018-04-20 PROCEDURE — 80053 COMPREHEN METABOLIC PANEL: CPT

## 2018-04-20 PROCEDURE — 85610 PROTHROMBIN TIME: CPT

## 2018-04-23 ENCOUNTER — APPOINTMENT (OUTPATIENT)
Dept: RADIOLOGY | Age: 78
End: 2018-04-23
Payer: MEDICARE

## 2018-04-23 DIAGNOSIS — Z01.818 PRE-OP EXAMINATION: ICD-10-CM

## 2018-04-23 PROCEDURE — 71046 X-RAY EXAM CHEST 2 VIEWS: CPT

## 2018-04-29 ENCOUNTER — ANESTHESIA EVENT (OUTPATIENT)
Dept: PERIOP | Facility: HOSPITAL | Age: 78
DRG: 253 | End: 2018-04-29
Payer: MEDICARE

## 2018-04-29 NOTE — ANESTHESIA PREPROCEDURE EVALUATION
Review of Systems/Medical History  Patient summary reviewed  Chart reviewed  History of anesthetic complications ([id number] Etomidate for induction--pt swelled up , difficulty breathing, case cx)     Cardiovascular  EKG reviewed, Hyperlipidemia, Hypertension , CAD (Mildly severe reversible inferior wall defect), ,   Comment: PVD/PAD--Left Popliteal Artery occlusion/Intermittent claudication---s/p Left BPG with occluded stent distal to anastomosis and native popliteal artery distal to stent occluded  S/P Right Fem-pop BPG with stent x3    EKG-SR 70 (3/2018)  NM Perfusion 2017--EF 60%, small mildly severe reversible inferior wall defect,  Pulmonary  Smoker (D/c 1/2018, Prev 1/2 ppd) ex-smoker  , COPD ,        GI/Hepatic      Comment: Hx diverticulosis     Kidney stones, Prostatic disorder, benign prostatic hyperplasia       Endo/Other  Diabetes (A1C 7 1) type 2 Oral agent,      GYN       Hematology  Anemia iron deficiency anemia,     Musculoskeletal  Osteoarthritis,   Comment: Right shoulder pain      Neurology   Psychology   Anxiety,              Physical Exam    Airway    Mallampati score: II  TM Distance: >3 FB       Dental   upper dentures and lower dentures,     Cardiovascular  Rhythm: regular,     Pulmonary  Breath sounds clear to auscultation,     Other Findings        Anesthesia Plan  ASA Score- 3     Anesthesia Type- general with ASA Monitors  Additional Monitors: arterial line  Airway Plan: ETT  Comment: 4/20/2018--K 4 5, Cr 1 18, H/H 13/40, T & S  Took am Metoprolol  Plan Factors-  Patient did not smoke on day of surgery  Induction- intravenous  Postoperative Plan- Plan for postoperative opioid use  Planned trial extubation    Informed Consent- Anesthetic plan and risks discussed with patient  I personally reviewed this patient with the CRNA  Discussed and agreed on the Anesthesia Plan with the CRNA  Zenaida Orta

## 2018-04-30 ENCOUNTER — APPOINTMENT (OUTPATIENT)
Dept: RADIOLOGY | Facility: HOSPITAL | Age: 78
DRG: 253 | End: 2018-04-30
Payer: MEDICARE

## 2018-04-30 ENCOUNTER — ANESTHESIA (OUTPATIENT)
Dept: PERIOP | Facility: HOSPITAL | Age: 78
DRG: 253 | End: 2018-04-30
Payer: MEDICARE

## 2018-04-30 ENCOUNTER — HOSPITAL ENCOUNTER (INPATIENT)
Facility: HOSPITAL | Age: 78
LOS: 1 days | Discharge: HOME | DRG: 253 | End: 2018-05-01
Attending: SURGERY | Admitting: SURGERY
Payer: MEDICARE

## 2018-04-30 DIAGNOSIS — I70.219 ATHEROSCLEROSIS OF ARTERY OF EXTREMITY WITH INTERMITTENT CLAUDICATION (HCC): Primary | Chronic | ICD-10-CM

## 2018-04-30 LAB
BASE EXCESS BLDA CALC-SCNC: -1 MMOL/L (ref -2–3)
CA-I BLD-SCNC: 1.13 MMOL/L (ref 1.12–1.32)
GLUCOSE SERPL-MCNC: 201 MG/DL (ref 65–140)
GLUCOSE SERPL-MCNC: 210 MG/DL (ref 65–140)
GLUCOSE SERPL-MCNC: 244 MG/DL (ref 65–140)
GLUCOSE SERPL-MCNC: 247 MG/DL (ref 65–140)
GLUCOSE SERPL-MCNC: 248 MG/DL (ref 65–140)
HCO3 BLDA-SCNC: 24.4 MMOL/L (ref 22–28)
HCT VFR BLD CALC: 32 % (ref 36.5–49.3)
HGB BLDA-MCNC: 10.9 G/DL (ref 12–17)
KCT BLD-ACNC: 172 SEC (ref 89–137)
KCT BLD-ACNC: 195 SEC (ref 89–137)
KCT BLD-ACNC: 200 SEC (ref 89–137)
KCT BLD-ACNC: 212 SEC (ref 89–137)
PCO2 BLD: 26 MMOL/L (ref 21–32)
PCO2 BLD: 41.8 MM HG (ref 36–44)
PH BLD: 7.37 [PH] (ref 7.35–7.45)
PO2 BLD: 161 MM HG (ref 75–129)
POTASSIUM BLD-SCNC: 4.5 MMOL/L (ref 3.5–5.3)
SAO2 % BLD FROM PO2: 99 % (ref 95–98)
SODIUM BLD-SCNC: 137 MMOL/L (ref 136–145)
SPECIMEN SOURCE: ABNORMAL

## 2018-04-30 PROCEDURE — 82330 ASSAY OF CALCIUM: CPT

## 2018-04-30 PROCEDURE — 82803 BLOOD GASES ANY COMBINATION: CPT

## 2018-04-30 PROCEDURE — 06BQ0ZZ EXCISION OF LEFT SAPHENOUS VEIN, OPEN APPROACH: ICD-10-PCS | Performed by: SURGERY

## 2018-04-30 PROCEDURE — C1894 INTRO/SHEATH, NON-LASER: HCPCS | Performed by: SURGERY

## 2018-04-30 PROCEDURE — 84132 ASSAY OF SERUM POTASSIUM: CPT

## 2018-04-30 PROCEDURE — 82947 ASSAY GLUCOSE BLOOD QUANT: CPT

## 2018-04-30 PROCEDURE — 75710 ARTERY X-RAYS ARM/LEG: CPT

## 2018-04-30 PROCEDURE — 041L09L BYPASS LEFT FEMORAL ARTERY TO POPLITEAL ARTERY WITH AUTOLOGOUS VENOUS TISSUE, OPEN APPROACH: ICD-10-PCS | Performed by: SURGERY

## 2018-04-30 PROCEDURE — 35556 ART BYP GRFT FEM-POPLITEAL: CPT | Performed by: SURGERY

## 2018-04-30 PROCEDURE — 84295 ASSAY OF SERUM SODIUM: CPT

## 2018-04-30 PROCEDURE — 82948 REAGENT STRIP/BLOOD GLUCOSE: CPT

## 2018-04-30 PROCEDURE — 85347 COAGULATION TIME ACTIVATED: CPT

## 2018-04-30 PROCEDURE — 85014 HEMATOCRIT: CPT

## 2018-04-30 RX ORDER — HEPARIN SODIUM 5000 [USP'U]/ML
5000 INJECTION, SOLUTION INTRAVENOUS; SUBCUTANEOUS EVERY 8 HOURS SCHEDULED
Status: DISCONTINUED | OUTPATIENT
Start: 2018-04-30 | End: 2018-05-01 | Stop reason: HOSPADM

## 2018-04-30 RX ORDER — GLYCOPYRROLATE 0.2 MG/ML
INJECTION INTRAMUSCULAR; INTRAVENOUS AS NEEDED
Status: DISCONTINUED | OUTPATIENT
Start: 2018-04-30 | End: 2018-04-30 | Stop reason: SURG

## 2018-04-30 RX ORDER — PRAVASTATIN SODIUM 40 MG
40 TABLET ORAL
Status: DISCONTINUED | OUTPATIENT
Start: 2018-04-30 | End: 2018-05-01 | Stop reason: HOSPADM

## 2018-04-30 RX ORDER — ACETAMINOPHEN 325 MG/1
650 TABLET ORAL EVERY 6 HOURS PRN
Status: DISCONTINUED | OUTPATIENT
Start: 2018-04-30 | End: 2018-05-01 | Stop reason: HOSPADM

## 2018-04-30 RX ORDER — ALBUMIN, HUMAN INJ 5% 5 %
SOLUTION INTRAVENOUS CONTINUOUS PRN
Status: DISCONTINUED | OUTPATIENT
Start: 2018-04-30 | End: 2018-04-30 | Stop reason: SURG

## 2018-04-30 RX ORDER — ONDANSETRON 2 MG/ML
INJECTION INTRAMUSCULAR; INTRAVENOUS AS NEEDED
Status: DISCONTINUED | OUTPATIENT
Start: 2018-04-30 | End: 2018-04-30 | Stop reason: SURG

## 2018-04-30 RX ORDER — OXYCODONE HYDROCHLORIDE 10 MG/1
10 TABLET ORAL EVERY 4 HOURS PRN
Status: DISCONTINUED | OUTPATIENT
Start: 2018-04-30 | End: 2018-05-01 | Stop reason: HOSPADM

## 2018-04-30 RX ORDER — MEPERIDINE HYDROCHLORIDE 25 MG/ML
12.5 INJECTION INTRAMUSCULAR; INTRAVENOUS; SUBCUTANEOUS ONCE AS NEEDED
Status: DISCONTINUED | OUTPATIENT
Start: 2018-04-30 | End: 2018-04-30 | Stop reason: HOSPADM

## 2018-04-30 RX ORDER — ROCURONIUM BROMIDE 10 MG/ML
INJECTION, SOLUTION INTRAVENOUS AS NEEDED
Status: DISCONTINUED | OUTPATIENT
Start: 2018-04-30 | End: 2018-04-30 | Stop reason: SURG

## 2018-04-30 RX ORDER — PROPOFOL 10 MG/ML
INJECTION, EMULSION INTRAVENOUS AS NEEDED
Status: DISCONTINUED | OUTPATIENT
Start: 2018-04-30 | End: 2018-04-30 | Stop reason: SURG

## 2018-04-30 RX ORDER — CLINDAMYCIN PHOSPHATE 900 MG/50ML
900 INJECTION INTRAVENOUS ONCE
Status: COMPLETED | OUTPATIENT
Start: 2018-04-30 | End: 2018-04-30

## 2018-04-30 RX ORDER — EPHEDRINE SULFATE 50 MG/ML
INJECTION, SOLUTION INTRAVENOUS AS NEEDED
Status: DISCONTINUED | OUTPATIENT
Start: 2018-04-30 | End: 2018-04-30 | Stop reason: SURG

## 2018-04-30 RX ORDER — ACETAMINOPHEN 160 MG
1000 TABLET,DISINTEGRATING ORAL DAILY
COMMUNITY

## 2018-04-30 RX ORDER — PANTOPRAZOLE SODIUM 40 MG/1
40 TABLET, DELAYED RELEASE ORAL
Status: DISCONTINUED | OUTPATIENT
Start: 2018-05-01 | End: 2018-05-01 | Stop reason: HOSPADM

## 2018-04-30 RX ORDER — OXYCODONE HYDROCHLORIDE 5 MG/1
5 TABLET ORAL EVERY 4 HOURS PRN
Status: DISCONTINUED | OUTPATIENT
Start: 2018-04-30 | End: 2018-05-01 | Stop reason: HOSPADM

## 2018-04-30 RX ORDER — SODIUM CHLORIDE 9 MG/ML
75 INJECTION, SOLUTION INTRAVENOUS CONTINUOUS
Status: DISCONTINUED | OUTPATIENT
Start: 2018-04-30 | End: 2018-05-01

## 2018-04-30 RX ORDER — CHLORHEXIDINE GLUCONATE 0.12 MG/ML
15 RINSE ORAL ONCE
Status: COMPLETED | OUTPATIENT
Start: 2018-04-30 | End: 2018-04-30

## 2018-04-30 RX ORDER — DOCUSATE SODIUM 100 MG/1
100 CAPSULE, LIQUID FILLED ORAL 2 TIMES DAILY
Status: DISCONTINUED | OUTPATIENT
Start: 2018-04-30 | End: 2018-05-01 | Stop reason: HOSPADM

## 2018-04-30 RX ORDER — FENTANYL CITRATE/PF 50 MCG/ML
25 SYRINGE (ML) INJECTION
Status: DISCONTINUED | OUTPATIENT
Start: 2018-04-30 | End: 2018-04-30 | Stop reason: HOSPADM

## 2018-04-30 RX ORDER — SODIUM CHLORIDE 9 MG/ML
INJECTION, SOLUTION INTRAVENOUS CONTINUOUS PRN
Status: DISCONTINUED | OUTPATIENT
Start: 2018-04-30 | End: 2018-04-30 | Stop reason: SURG

## 2018-04-30 RX ORDER — FENTANYL CITRATE 50 UG/ML
INJECTION, SOLUTION INTRAMUSCULAR; INTRAVENOUS AS NEEDED
Status: DISCONTINUED | OUTPATIENT
Start: 2018-04-30 | End: 2018-04-30 | Stop reason: SURG

## 2018-04-30 RX ORDER — MELATONIN
1000 DAILY
Status: DISCONTINUED | OUTPATIENT
Start: 2018-05-01 | End: 2018-05-01 | Stop reason: HOSPADM

## 2018-04-30 RX ORDER — HEPARIN SODIUM 1000 [USP'U]/ML
INJECTION, SOLUTION INTRAVENOUS; SUBCUTANEOUS AS NEEDED
Status: DISCONTINUED | OUTPATIENT
Start: 2018-04-30 | End: 2018-04-30 | Stop reason: SURG

## 2018-04-30 RX ORDER — LABETALOL HYDROCHLORIDE 5 MG/ML
INJECTION, SOLUTION INTRAVENOUS AS NEEDED
Status: DISCONTINUED | OUTPATIENT
Start: 2018-04-30 | End: 2018-04-30 | Stop reason: SURG

## 2018-04-30 RX ORDER — ONDANSETRON 2 MG/ML
4 INJECTION INTRAMUSCULAR; INTRAVENOUS ONCE AS NEEDED
Status: DISCONTINUED | OUTPATIENT
Start: 2018-04-30 | End: 2018-04-30 | Stop reason: HOSPADM

## 2018-04-30 RX ORDER — CLOPIDOGREL BISULFATE 75 MG/1
75 TABLET ORAL DAILY
Status: DISCONTINUED | OUTPATIENT
Start: 2018-05-01 | End: 2018-05-01 | Stop reason: HOSPADM

## 2018-04-30 RX ORDER — SODIUM CHLORIDE, SODIUM LACTATE, POTASSIUM CHLORIDE, CALCIUM CHLORIDE 600; 310; 30; 20 MG/100ML; MG/100ML; MG/100ML; MG/100ML
50 INJECTION, SOLUTION INTRAVENOUS CONTINUOUS
Status: DISCONTINUED | OUTPATIENT
Start: 2018-04-30 | End: 2018-05-01

## 2018-04-30 RX ORDER — PAROXETINE HYDROCHLORIDE 20 MG/1
40 TABLET, FILM COATED ORAL EVERY MORNING
Status: DISCONTINUED | OUTPATIENT
Start: 2018-05-01 | End: 2018-05-01 | Stop reason: HOSPADM

## 2018-04-30 RX ORDER — SODIUM CHLORIDE, SODIUM LACTATE, POTASSIUM CHLORIDE, CALCIUM CHLORIDE 600; 310; 30; 20 MG/100ML; MG/100ML; MG/100ML; MG/100ML
125 INJECTION, SOLUTION INTRAVENOUS CONTINUOUS
Status: DISCONTINUED | OUTPATIENT
Start: 2018-04-30 | End: 2018-04-30

## 2018-04-30 RX ORDER — ONDANSETRON 2 MG/ML
4 INJECTION INTRAMUSCULAR; INTRAVENOUS EVERY 6 HOURS PRN
Status: DISCONTINUED | OUTPATIENT
Start: 2018-04-30 | End: 2018-05-01 | Stop reason: HOSPADM

## 2018-04-30 RX ORDER — METOCLOPRAMIDE HYDROCHLORIDE 5 MG/ML
10 INJECTION INTRAMUSCULAR; INTRAVENOUS ONCE AS NEEDED
Status: DISCONTINUED | OUTPATIENT
Start: 2018-04-30 | End: 2018-04-30 | Stop reason: HOSPADM

## 2018-04-30 RX ADMIN — HEPARIN SODIUM 5000 UNITS: 1000 INJECTION INTRAVENOUS; SUBCUTANEOUS at 08:56

## 2018-04-30 RX ADMIN — HEPARIN SODIUM 3000 UNITS: 1000 INJECTION INTRAVENOUS; SUBCUTANEOUS at 09:13

## 2018-04-30 RX ADMIN — HEPARIN SODIUM 5000 UNITS: 5000 INJECTION, SOLUTION INTRAVENOUS; SUBCUTANEOUS at 16:46

## 2018-04-30 RX ADMIN — ROCURONIUM BROMIDE 50 MG: 10 INJECTION INTRAVENOUS at 07:46

## 2018-04-30 RX ADMIN — FENTANYL CITRATE 50 MCG: 50 INJECTION, SOLUTION INTRAMUSCULAR; INTRAVENOUS at 11:15

## 2018-04-30 RX ADMIN — LIDOCAINE HYDROCHLORIDE 50 MG: 20 INJECTION, SOLUTION INTRAVENOUS at 07:45

## 2018-04-30 RX ADMIN — PRAVASTATIN SODIUM 40 MG: 40 TABLET ORAL at 16:46

## 2018-04-30 RX ADMIN — SODIUM CHLORIDE: 0.9 INJECTION, SOLUTION INTRAVENOUS at 09:45

## 2018-04-30 RX ADMIN — INSULIN LISPRO 3 UNITS: 100 INJECTION, SOLUTION INTRAVENOUS; SUBCUTANEOUS at 16:46

## 2018-04-30 RX ADMIN — ACETAMINOPHEN 650 MG: 325 TABLET, FILM COATED ORAL at 21:12

## 2018-04-30 RX ADMIN — LABETALOL 20 MG/4 ML (5 MG/ML) INTRAVENOUS SYRINGE 10 MG: at 11:23

## 2018-04-30 RX ADMIN — ALBUMIN HUMAN: 0.05 INJECTION, SOLUTION INTRAVENOUS at 08:58

## 2018-04-30 RX ADMIN — HEPARIN SODIUM 5000 UNITS: 5000 INJECTION, SOLUTION INTRAVENOUS; SUBCUTANEOUS at 21:07

## 2018-04-30 RX ADMIN — FENTANYL CITRATE 50 MCG: 50 INJECTION, SOLUTION INTRAMUSCULAR; INTRAVENOUS at 10:15

## 2018-04-30 RX ADMIN — FENTANYL CITRATE 50 MCG: 50 INJECTION, SOLUTION INTRAMUSCULAR; INTRAVENOUS at 07:46

## 2018-04-30 RX ADMIN — FENTANYL CITRATE 25 MCG: 50 INJECTION, SOLUTION INTRAMUSCULAR; INTRAVENOUS at 12:05

## 2018-04-30 RX ADMIN — FENTANYL CITRATE 50 MCG: 50 INJECTION, SOLUTION INTRAMUSCULAR; INTRAVENOUS at 11:32

## 2018-04-30 RX ADMIN — SODIUM CHLORIDE: 0.9 INJECTION, SOLUTION INTRAVENOUS at 07:52

## 2018-04-30 RX ADMIN — DEXAMETHASONE SODIUM PHOSPHATE 6 MG: 10 INJECTION INTRAMUSCULAR; INTRAVENOUS at 08:11

## 2018-04-30 RX ADMIN — ROCURONIUM BROMIDE 10 MG: 10 INJECTION INTRAVENOUS at 09:04

## 2018-04-30 RX ADMIN — HEPARIN SODIUM 1000 UNITS: 1000 INJECTION INTRAVENOUS; SUBCUTANEOUS at 09:23

## 2018-04-30 RX ADMIN — FENTANYL CITRATE 50 MCG: 50 INJECTION, SOLUTION INTRAMUSCULAR; INTRAVENOUS at 08:25

## 2018-04-30 RX ADMIN — FENTANYL CITRATE 25 MCG: 50 INJECTION, SOLUTION INTRAMUSCULAR; INTRAVENOUS at 13:50

## 2018-04-30 RX ADMIN — ROCURONIUM BROMIDE 10 MG: 10 INJECTION INTRAVENOUS at 09:39

## 2018-04-30 RX ADMIN — EPHEDRINE SULFATE 10 MG: 50 INJECTION, SOLUTION INTRAMUSCULAR; INTRAVENOUS; SUBCUTANEOUS at 09:03

## 2018-04-30 RX ADMIN — HEPARIN SODIUM 2000 UNITS: 1000 INJECTION INTRAVENOUS; SUBCUTANEOUS at 09:55

## 2018-04-30 RX ADMIN — FENTANYL CITRATE 25 MCG: 50 INJECTION, SOLUTION INTRAMUSCULAR; INTRAVENOUS at 13:23

## 2018-04-30 RX ADMIN — SODIUM CHLORIDE, SODIUM LACTATE, POTASSIUM CHLORIDE, AND CALCIUM CHLORIDE: .6; .31; .03; .02 INJECTION, SOLUTION INTRAVENOUS at 07:42

## 2018-04-30 RX ADMIN — FENTANYL CITRATE 25 MCG: 50 INJECTION, SOLUTION INTRAMUSCULAR; INTRAVENOUS at 11:54

## 2018-04-30 RX ADMIN — SODIUM CHLORIDE 75 ML/HR: 0.9 INJECTION, SOLUTION INTRAVENOUS at 13:42

## 2018-04-30 RX ADMIN — EPHEDRINE SULFATE 5 MG: 50 INJECTION, SOLUTION INTRAMUSCULAR; INTRAVENOUS; SUBCUTANEOUS at 08:55

## 2018-04-30 RX ADMIN — FENTANYL CITRATE 50 MCG: 50 INJECTION, SOLUTION INTRAMUSCULAR; INTRAVENOUS at 10:53

## 2018-04-30 RX ADMIN — EPHEDRINE SULFATE 5 MG: 50 INJECTION, SOLUTION INTRAMUSCULAR; INTRAVENOUS; SUBCUTANEOUS at 08:33

## 2018-04-30 RX ADMIN — METOPROLOL TARTRATE 25 MG: 25 TABLET ORAL at 21:07

## 2018-04-30 RX ADMIN — CHLORHEXIDINE GLUCONATE 15 ML: 1.2 RINSE ORAL at 06:30

## 2018-04-30 RX ADMIN — PROPOFOL 200 MG: 10 INJECTION, EMULSION INTRAVENOUS at 07:45

## 2018-04-30 RX ADMIN — ONDANSETRON 4 MG: 2 INJECTION INTRAMUSCULAR; INTRAVENOUS at 10:53

## 2018-04-30 RX ADMIN — NEOSTIGMINE METHYLSULFATE 3 MG: 1 INJECTION, SOLUTION INTRAMUSCULAR; INTRAVENOUS; SUBCUTANEOUS at 11:15

## 2018-04-30 RX ADMIN — GLYCOPYRROLATE 0.4 MG: 0.2 INJECTION, SOLUTION INTRAMUSCULAR; INTRAVENOUS at 11:15

## 2018-04-30 RX ADMIN — CLINDAMYCIN PHOSPHATE 900 MG: 18 INJECTION, SOLUTION INTRAMUSCULAR; INTRAVENOUS at 07:54

## 2018-04-30 RX ADMIN — ROCURONIUM BROMIDE 10 MG: 10 INJECTION INTRAVENOUS at 10:15

## 2018-04-30 NOTE — ANESTHESIA POSTPROCEDURE EVALUATION
Post-Op Assessment Note      CV Status:  Stable    Mental Status:  Alert and awake    Hydration Status:  Stable    PONV Controlled:  Controlled    Airway Patency:  Patent    Post Op Vitals Reviewed: Yes          Staff: CRNA           BP   159/84   Temp 97 2   Pulse 90   Resp 20   SpO2 99

## 2018-04-30 NOTE — H&P (VIEW-ONLY)
Assessment/Plan:    Popliteal artery occlusion, left (HCC)  Reocclusion of left popliteal artery below left femoral to popliteal artery bypass with severe claudication  This occlusion also puts the bypass graft at risk  We discussed the findings on recent CT angiogram along with the treatment options available and their associated risks and benefits  We will plan on revising his femoral to above knee popliteal artery bypass graft by extending it to the below knee popliteal artery  Most likely we will have enough residual distal greater saphenous vein but may require arm vein  Diagnoses and all orders for this visit:    Popliteal artery occlusion, left [facility])    Atherosclerosis of artery of extremity with intermittent claudication (HCC)          Subjective:      Patient ID: Fermin Jane is a 65 y o  male  Patient is here to discuss surgery  He had VM on 3/14 and CTA on 3/20  Patient is s/p LLE stenting 1/2018  He has pain and cramping in the left calf, foot and toes with activity and with walking short distances  Symptoms relieved with rest  His L foot constantly feels cold  He takes Plavix daily  He denies open wounds or sores  Prior tobacco use  76-year-old with history of left femoral to above knee popliteal artery bypass  He subsequently had occlusion of his at knee popliteal artery which was treated with angioplasty and stenting in January of 2018  He only had a short period of symptom resolution and was found to have Re occlusion of the popliteal artery below the bypass graft  He now has severe claudication and his graft is threatened but remained patent  He recently underwent CT angiogram in preparation for revision of this bypass  On examination today he states there has been no significant change with continued severe left calf claudication causing limitation in his activities  He denies rest pain but does have some neuropathic symptoms    He denies any wounds or difficulty healing  Review of CT angiogram 03/20/2018 and correlation with arteriogram 01/22/2018 there is reocclusion of the popliteal artery at the knee below the bypass graft  The bypass graft remains patent with retrograde outflow  There is then reconstitution of the below knee popliteal artery with 2 vessel runoff via the anterior tibial and peroneal arteries  Vein mapping shows both cephalic and basilic veins to be adequate  The following portions of the patient's history were reviewed and updated as appropriate: allergies, current medications, past family history, past medical history, past social history, past surgical history and problem list     Review of Systems   Constitutional: Positive for fatigue  HENT: Negative  Eyes: Negative  Respiratory: Positive for apnea  Cardiovascular: Negative  Gastrointestinal: Negative  Endocrine: Negative  Genitourinary: Negative  Musculoskeletal:        Calf cramping with walking   Skin: Negative  Allergic/Immunologic: Positive for environmental allergies  Neurological: Negative  Hematological: Negative  Psychiatric/Behavioral: Negative  Objective:      /80 (BP Location: Left arm, Patient Position: Sitting, Cuff Size: Adult)   Pulse 72   Temp 98 °F (36 7 °C) (Tympanic)   Resp 16   Ht 5' 8" (1 727 m)   Wt 84 4 kg (186 lb)   BMI 28 28 kg/m²          Physical Exam   Constitutional: He is oriented to person, place, and time  He appears well-developed and well-nourished  HENT:   Head: Normocephalic and atraumatic  Eyes: Conjunctivae and EOM are normal    Neck: Normal range of motion  Neck supple  Cardiovascular: Normal rate, regular rhythm, S1 normal, S2 normal and normal heart sounds  No murmur heard  Pulses:       Carotid pulses are 2+ on the right side, and 2+ on the left side  Radial pulses are 2+ on the right side, and 2+ on the left side          Femoral pulses are 2+ on the right side, and 2+ on the left side  Popliteal pulses are 2+ on the right side, and 0 on the left side  Dorsalis pedis pulses are 0 on the left side  Posterior tibial pulses are 0 on the left side  Easily palpable left graft pulse in the mid and distal thigh  The distal greater saphenous vein is normal in appearance throughout the calf on visual inspection  Pulmonary/Chest: Effort normal and breath sounds normal    Abdominal: Soft  Normal aorta  There is no tenderness  No hernia  Musculoskeletal: Normal range of motion  He exhibits no edema, tenderness or deformity  Neurological: He is alert and oriented to person, place, and time  No cranial nerve deficit  Skin: Skin is warm, dry and intact  Psychiatric: He has a normal mood and affect  Operative Scheduling Information:    Hospital:  [facility]    Physician:  Dale Vicente    Surgery:  Revision of left femoral to popliteal artery bypass with extension to the below knee popliteal artery    Possible arm vein    Urgency:  Standard    Case Length:  Normal    Post-op Bed:  Stepdown    OR Table:  Standard    Equipment Needs:      Medication Instructions:  Plavix:  Hold for 7 days prior to procedure    Hydration:  No

## 2018-04-30 NOTE — RESPIRATORY THERAPY NOTE
RT Protocol Note  Roel Villaseñor 65 y o  male MRN: [id number]  Unit/Bed#: [facility] 504-01 Encounter: [id number]    Assessment    Principal Problem: Atherosclerosis of artery of extremity with intermittent claudication (HCC)  Active Problems:    Popliteal artery occlusion, left (HCC)      Home Pulmonary Medications:    Home Devices/Therapy: (P) Other (Comment) (None)    Past Medical History:   Diagnosis Date    Atherosclerosis     COPD (chronic obstructive pulmonary disease) (HCC)     Coronary artery disease     Hyperlipidemia     Hypertension     Peripheral arterial disease (HCC)     Smoking addiction      Social History     Social History    Marital status:      Spouse name: N/A    Number of children: N/A    Years of education: N/A     Social History Main Topics    Smoking status: Former Smoker     Quit date: 01/2018    Smokeless tobacco: Former User    Alcohol use Yes      Comment: Social drinker    Drug use: No    Sexual activity: Yes     Other Topics Concern    None     Social History Narrative    None       Subjective         Objective    Physical Exam:   Assessment Type: (P) Assess only  General Appearance: (P) Awake  Respiratory Pattern: (P) Normal  Chest Assessment: (P) Chest expansion symmetrical  Bilateral Breath Sounds: (P) Clear  Cough: Non-productive    Vitals:  Blood pressure 96/66, pulse 90, temperature 98 6 °F (37 °C), temperature source Oral, resp  rate 13, height 5' 8" (1 727 m), weight 82 6 kg (182 lb), SpO2 96 %  Imaging and other studies: I have personally reviewed pertinent reports  Plan       Airway Clearance Plan: (P) Incentive Spirometer     Resp Comments: (P) Pt  has hx smoking  No other pulm hx   BS clear IS instucted and given Pt  understands  Protocol D/C'D

## 2018-04-30 NOTE — OP NOTE
OPERATIVE REPORT  PATIENT NAME: Sterling Faustin    :  1940  MRN: [id number]  Pt Location: [facility] HYBRID OR ROOM 02    SURGERY DATE: 2018    Surgeon(s) and Role:     * Kaleb Pierre MD - Primary     * Kev Mooney MD - Assisting    Preop Diagnosis:  Popliteal artery occlusion, left ([address] ) [I70 202]  Atherosclerosis of artery of extremity with intermittent claudication ([address] ) [I70 219]    Post-Op Diagnosis Codes:     * Popliteal artery occlusion, left (HCC) [I70 202]     * Atherosclerosis of artery of extremity with intermittent claudication ([address] ) [I70 219]    Procedure(s) (LRB):  BYPASS FEMORAL-POPLITEAL, WITH INTRAOP ARTERIOGRAM (Left)    Specimen(s):  * No specimens in log *    Estimated Blood Loss:   50 mL    Drains:  Urethral Catheter Latex 16 Fr  (Active)   Site Assessment Clean;Skin intact 2018  8:30 AM   Collection Container Standard drainage bag 2018  8:30 AM   Securement Method Securing device (Describe) 2018  8:30 AM   Number of days: 0       Anesthesia Type:   General    Operative Indications:  Popliteal artery occlusion, left (HCC) [I70 202]  Atherosclerosis of artery of extremity with intermittent claudication ([address] ) [O09060]  78year-old with long history of peripheral arterial occlusive disease multiple lower extremity revascularization procedures to include a left femoral to above knee popliteal artery bypass  He has had subsequent occlusion of the popliteal artery distal to this bypass which has been treated with endovascular intervention but has recurred  He now has severe disabling claudication and reocclusion of the popliteal artery occlusion  He presents for revision of this bypass graft  Operative Findings:  Inflow vein graft from previous femoral to above knee popliteal bypass of good quality  Below knee popliteal artery of good quality  Distal greater saphenous vein of good quality at approximately 5 mm in diameter    Completion imaging shows rapid flow through the revised bypass graft and outflow via the anterior tibial and peroneal arteries without evidence of stenosis or intraluminal defect  Imaging did show some compression of the vein graft at the knee  Following this image the fascia was released at this point  Patient a palpable anterior tibial pulse  Complications:   None    Procedure and Technique:  General anesthesia was administered  A-line was placed  The left leg was prepped and draped in the normal sterile fashion with chlorhexidine prep  Longitudinal incision was made on the medial aspect of the left leg just below the knee  The greater saphenous vein was identified at this point noted to be somewhat small and posterior in position  It was preserved through dissection  The fascia was opened in the popliteal fossa was entered  The popliteal artery and vein were visualized  The popliteal artery was dissected free over a 4-5 cm segment and encircled with vessel loops  A 2nd incision was made on the medial aspect of the distal thigh overlying the palpable vein graft from the previous femoral-above knee popliteal bypass  The vein graft was then exposed over a 3-4 cm segment and encircled with vessel loops proximally and distally  A tunnel tract was created between these 2 incisions and a Radha tunneler passed through this tract  IV heparin was administered  ACT levels were obtained  Further heparin boluses were administered to maintain a therapeutic level  The greater saphenous vein was then exposed from the ankle through the calf through a longitudinal incision  The vein was skeletonized and all branches were ligated with either clips or ties  There was a transition in this vein just below the knee at which point the vein became small more proximally  The distal segment was of good quality and appeared to be of adequate length to perform the jump graft  The vein was then ligated distally with clips and transected    The vein was then clipped and transected via the below knee incision  The vein was then dilated with the vein distension kit  The more distal vein was approximately 4 mm in diameter which narrowed to 2 5-3 mm in diameter in the more proximal segment  The segment of vein graph exposed in the thigh was then occluded with vascular clamps  An arteriotomy was created with an 11 blade and Zamuido scissors  The greater saphenous vein was then reversed and spatulated  An anastomosis was then created with a 6 0 Prolene suture  Once this anastomosis was completed all vessels were back bled and flushed appropriately  There was excellent flow noted through the vein graft and no bleeding at the anastomosis  The vein graft was then passed through the Radha tunneler  Again excellent bleeding was encountered  The below knee popliteal artery was occluded with vascular clamps  An arteriotomy was then created with a 11 blade and Zamudio scissors  The vein graft was then spatulated at a branch point  Of note beyond this branch point the vein did become smaller in caliber thus only the larger caliber segment of greater saphenous vein was utilized  Once the vein was spatulated and anastomosis was created with a running 6 0 Prolene suture  Once this anastomosis was nearly completed all vessels were back bled and flushed appropriately  The anastomosis was completed and flow was restored  No bleeding points were encounter  Good palpable pulse was noted in the popliteal artery distal to the anastomosis  Intraoperative arteriogram was then performed by puncturing the old vein graft just below the new anastomosis with a micropuncture kit  Stepped images were then obtained  The showed wide patency of the proximal and distal anastomoses with rapid flow through the new vein graft filling the popliteal artery below the knee with runoff via the anterior tibial and peroneal arteries    The vein graft itself was of good quality on arterial graphic imaging  There did appear to be some compression of the graft at the knee  This area was visualized and fascia was released to remove any point of compression  The micropuncture catheter was removed and the puncture site closed with a 6 0 Prolene suture  All wounds were irrigated with saline solution  Any bleeding points were coagulated or clipped  The deep tissues were reapproximated with 2 and 3 0 Monocryl suture and skin clips were placed to close all skin incisions  Mepilex dressings were placed  The patient had a palpable anterior tibial pulse and a good biphasic Doppler signal in the anterior tibial artery which was severely attenuated with graft compression  The patient was awoken, extubated and transferred to recovery room       I was present for the entire procedure    Patient Disposition:  PACU     SIGNATURE: Yanelis Costello MD  DATE: April 30, 2018  TIME: 11:27 AM

## 2018-05-01 ENCOUNTER — TRANSITIONAL CARE MANAGEMENT (OUTPATIENT)
Dept: INTERNAL MEDICINE CLINIC | Facility: CLINIC | Age: 78
End: 2018-05-01

## 2018-05-01 VITALS
BODY MASS INDEX: 27.58 KG/M2 | HEIGHT: 68 IN | HEART RATE: 60 BPM | RESPIRATION RATE: 16 BRPM | WEIGHT: 182 LBS | TEMPERATURE: 98.6 F | SYSTOLIC BLOOD PRESSURE: 143 MMHG | DIASTOLIC BLOOD PRESSURE: 70 MMHG | OXYGEN SATURATION: 96 %

## 2018-05-01 PROBLEM — D62 ACUTE BLOOD LOSS ANEMIA: Status: ACTIVE | Noted: 2018-05-01

## 2018-05-01 LAB
ANION GAP SERPL CALCULATED.3IONS-SCNC: 6 MMOL/L (ref 4–13)
BASOPHILS # BLD AUTO: 0.02 THOUSANDS/ΜL (ref 0–0.1)
BASOPHILS NFR BLD AUTO: 0 % (ref 0–1)
BUN SERPL-MCNC: 11 MG/DL (ref 5–25)
CALCIUM SERPL-MCNC: 8.4 MG/DL (ref 8.3–10.1)
CHLORIDE SERPL-SCNC: 105 MMOL/L (ref 100–108)
CO2 SERPL-SCNC: 27 MMOL/L (ref 21–32)
CREAT SERPL-MCNC: 1.06 MG/DL (ref 0.6–1.3)
EOSINOPHIL # BLD AUTO: 0.03 THOUSAND/ΜL (ref 0–0.61)
EOSINOPHIL NFR BLD AUTO: 1 % (ref 0–6)
ERYTHROCYTE [DISTWIDTH] IN BLOOD BY AUTOMATED COUNT: 14.5 % (ref 11.6–15.1)
GFR SERPL CREATININE-BSD FRML MDRD: 67 ML/MIN/1.73SQ M
GLUCOSE SERPL-MCNC: 178 MG/DL (ref 65–140)
GLUCOSE SERPL-MCNC: 189 MG/DL (ref 65–140)
GLUCOSE SERPL-MCNC: 198 MG/DL (ref 65–140)
HCT VFR BLD AUTO: 34.4 % (ref 36.5–49.3)
HGB BLD-MCNC: 10.8 G/DL (ref 12–17)
LYMPHOCYTES # BLD AUTO: 1.43 THOUSANDS/ΜL (ref 0.6–4.47)
LYMPHOCYTES NFR BLD AUTO: 25 % (ref 14–44)
MCH RBC QN AUTO: 27.7 PG (ref 26.8–34.3)
MCHC RBC AUTO-ENTMCNC: 31.4 G/DL (ref 31.4–37.4)
MCV RBC AUTO: 88 FL (ref 82–98)
MONOCYTES # BLD AUTO: 0.49 THOUSAND/ΜL (ref 0.17–1.22)
MONOCYTES NFR BLD AUTO: 9 % (ref 4–12)
NEUTROPHILS # BLD AUTO: 3.77 THOUSANDS/ΜL (ref 1.85–7.62)
NEUTS SEG NFR BLD AUTO: 65 % (ref 43–75)
NRBC BLD AUTO-RTO: 0 /100 WBCS
PLATELET # BLD AUTO: 164 THOUSANDS/UL (ref 149–390)
PMV BLD AUTO: 10.7 FL (ref 8.9–12.7)
POTASSIUM SERPL-SCNC: 4.1 MMOL/L (ref 3.5–5.3)
RBC # BLD AUTO: 3.9 MILLION/UL (ref 3.88–5.62)
SODIUM SERPL-SCNC: 138 MMOL/L (ref 136–145)
WBC # BLD AUTO: 5.75 THOUSAND/UL (ref 4.31–10.16)

## 2018-05-01 PROCEDURE — 80048 BASIC METABOLIC PNL TOTAL CA: CPT | Performed by: SURGERY

## 2018-05-01 PROCEDURE — G8978 MOBILITY CURRENT STATUS: HCPCS

## 2018-05-01 PROCEDURE — G8979 MOBILITY GOAL STATUS: HCPCS

## 2018-05-01 PROCEDURE — 85025 COMPLETE CBC W/AUTO DIFF WBC: CPT | Performed by: SURGERY

## 2018-05-01 PROCEDURE — 82948 REAGENT STRIP/BLOOD GLUCOSE: CPT

## 2018-05-01 PROCEDURE — 99024 POSTOP FOLLOW-UP VISIT: CPT | Performed by: SURGERY

## 2018-05-01 PROCEDURE — 97163 PT EVAL HIGH COMPLEX 45 MIN: CPT

## 2018-05-01 RX ORDER — ACETAMINOPHEN 325 MG/1
650 TABLET ORAL EVERY 6 HOURS PRN
Qty: 30 TABLET | Refills: 0 | COMMUNITY
Start: 2018-05-01 | End: 2019-03-26

## 2018-05-01 RX ORDER — OXYCODONE HYDROCHLORIDE 5 MG/1
5 TABLET ORAL EVERY 4 HOURS PRN
Qty: 30 TABLET | Refills: 0 | Status: SHIPPED | OUTPATIENT
Start: 2018-05-01 | End: 2018-05-11

## 2018-05-01 RX ORDER — DOCUSATE SODIUM 100 MG/1
100 CAPSULE, LIQUID FILLED ORAL 2 TIMES DAILY
Qty: 10 CAPSULE | Refills: 0 | COMMUNITY
Start: 2018-05-01 | End: 2018-09-25 | Stop reason: ALTCHOICE

## 2018-05-01 RX ADMIN — INSULIN LISPRO 1 UNITS: 100 INJECTION, SOLUTION INTRAVENOUS; SUBCUTANEOUS at 06:44

## 2018-05-01 RX ADMIN — CLOPIDOGREL BISULFATE 75 MG: 75 TABLET ORAL at 08:02

## 2018-05-01 RX ADMIN — ACETAMINOPHEN 650 MG: 325 TABLET, FILM COATED ORAL at 05:05

## 2018-05-01 RX ADMIN — VITAMIN D, TAB 1000IU (100/BT) 1000 UNITS: 25 TAB at 08:02

## 2018-05-01 RX ADMIN — METOPROLOL TARTRATE 25 MG: 25 TABLET ORAL at 08:02

## 2018-05-01 RX ADMIN — PAROXETINE HYDROCHLORIDE 40 MG: 20 TABLET, FILM COATED ORAL at 08:03

## 2018-05-01 RX ADMIN — PANTOPRAZOLE SODIUM 40 MG: 40 TABLET, DELAYED RELEASE ORAL at 08:02

## 2018-05-01 RX ADMIN — HEPARIN SODIUM 5000 UNITS: 5000 INJECTION, SOLUTION INTRAVENOUS; SUBCUTANEOUS at 05:16

## 2018-05-01 NOTE — PROGRESS NOTES
Vascular Surgery      Progress Note - Thalia Arora 1940, 65 y o  male MRN: [id number]    Unit/Bed#: [facility] 504-01 Encounter: [id number]    Primary Care Provider: Lamonte Villegas MD   Date and time admitted to hospital: 4/30/2018  5:25 AM    * Atherosclerosis of artery of extremity with intermittent claudication ([address] )   Assessment & Plan    S/P Left AK Pop to BK Pop bypass w/ reversed GSV, completion Agram 4/30    Plan:  --Continue Plavix/Crestor on discharge  --D/C Lowry  --D/C vaughn  --Saline lock  --OOB/Ambulate  --Pain control  --Tentative DC today vs tomorrow        Acute blood loss anemia   Assessment & Plan    In setting of vascular bypass surgery and IVF hydration    --Monitor          Subjective:  Pt doing well, c/o incisional pain, foot is hot, desires discharge home today    Vitals:  /65 (BP Location: Left arm)   Pulse 70   Temp 98 9 °F (37 2 °C) (Oral)   Resp 17   Ht 5' 8" (1 727 m)   Wt 82 6 kg (182 lb)   SpO2 93%   BMI 27 67 kg/m²     I/Os:  I/O last 3 completed shifts: In: 4162 5 [P O :840; I V :3072 5; IV Piggyback:250]  Out: 2831 [Urine:2775; Blood:100]  No intake/output data recorded      Lab Results and Cultures:   Lab Results   Component Value Date    WBC 5 75 05/01/2018    HGB 10 8 (L) 05/01/2018    HCT 34 4 (L) 05/01/2018    MCV 88 05/01/2018     05/01/2018     Lab Results   Component Value Date    GLUCOSE 178 (H) 05/01/2018    CALCIUM 8 4 05/01/2018     05/01/2018    K 4 1 05/01/2018    CO2 27 05/01/2018     05/01/2018    BUN 11 05/01/2018    CREATININE 1 06 05/01/2018     Lab Results   Component Value Date    INR 1 09 04/20/2018    INR 1 12 01/22/2018    PROTIME 14 1 04/20/2018    PROTIME 14 4 01/22/2018       Medications:  Current Facility-Administered Medications   Medication Dose Route Frequency    acetaminophen (TYLENOL) tablet 650 mg  650 mg Oral Q6H PRN    cholecalciferol (VITAMIN D3) tablet 1,000 Units  1,000 Units Oral Daily    clopidogrel (PLAVIX) tablet 75 mg  75 mg Oral Daily    docusate sodium (COLACE) capsule 100 mg  100 mg Oral BID    heparin (porcine) subcutaneous injection 5,000 Units  5,000 Units Subcutaneous Q8H [facility]    HYDROmorphone (DILAUDID) injection 0 5 mg  0 5 mg Intravenous Q3H PRN    insulin lispro (HumaLOG) 100 units/mL subcutaneous injection 1-6 Units  1-6 Units Subcutaneous TID AC    lactated ringers infusion  50 mL/hr Intravenous Continuous    metoprolol tartrate (LOPRESSOR) tablet 25 mg  25 mg Oral Q12H [facility]    ondansetron (ZOFRAN) injection 4 mg  4 mg Intravenous Q6H PRN    oxyCODONE (ROXICODONE) immediate release tablet 10 mg  10 mg Oral Q4H PRN    oxyCODONE (ROXICODONE) IR tablet 5 mg  5 mg Oral Q4H PRN    pantoprazole (PROTONIX) EC tablet 40 mg  40 mg Oral After Breakfast    PARoxetine (PAXIL) tablet 40 mg  40 mg Oral QAM    pravastatin (PRAVACHOL) tablet 40 mg  40 mg Oral Daily With Dinner    sodium chloride 0 9 % infusion  75 mL/hr Intravenous Continuous       Physical Exam:    General appearance: alert and oriented, in no acute distress  Lungs: clear to auscultation bilaterally  Heart: regular rate and rhythm, S1, S2 normal, no murmur, click, rub or gallop  Abdomen: soft, non-tender; bowel sounds normal; no masses,  no organomegaly  Extremities: Left foot hot, pink, M/S intact    Wound/Incision:  LLE incision clean, dry, and intact    Pulse exam:  Graft:  Left: doppler signal  DP:    Left: doppler signal  PT:    Left: doppler signal      Beni Poole PA-C  5/1/2018

## 2018-05-01 NOTE — PHYSICAL THERAPY NOTE
Physical Therapy Initial Evaluation   Lawyer Brambila, PT   05/01/18 1042   Note Type   Note type Eval only   Pain Assessment   Pain Assessment 0-10   Pain Score No Pain   Home Living   Type of Home House   Home Layout Multi-level;Bed/bath upstairs;Stairs to enter with rails  (5 BRAYDEN,5 to 2nd floor bed/bath in split level  Rails both  )   [address] [facility] Walker;Cane   Prior Function   Level of [place] Independent with ADLs and functional mobility   Lives With Spouse  (Who is able bodied and retired also  )   [place]   IADLs Needs assistance   Falls in the last 6 months 0   Vocational Retired   Comments Wife can provide assist if needed and transportation for pt  Restrictions/Precautions   Weight Bearing Precautions Per Order No   Braces or Orthoses Other (Comment)  (none)   Other Precautions Fall Risk;Telemetry   General   Additional Pertinent History dx: atherosclerosis of extremity artery with intermittent claudication  Pt underwent L fem-pop bypass graft with stentsx3 on 4/29/18  PMH: bypass graft both LE's, + smoker, L popliteal artery occlusion, OA, anemia, anxiety, carotid artery stenosis, chest pain, CAD, diverticulosis, BPH, HLD, PAD, shoulder pain, weight loss, kidney stones , DM-2  Family/Caregiver Present No   Cognition   Overall Cognitive Status WFL   Arousal/Participation Alert   Orientation Level Oriented X4   Memory Within functional limits   Following Commands Follows all commands and directions without difficulty   Comments Pleasant and cooperative      RLE Assessment   RLE Assessment WNL  (5/5)   LLE Assessment   LLE Assessment WFL  (4-/5 limited knee flex due to stitches  )   Coordination   Movements are Fluid and Coordinated 1   Bed Mobility   Rolling R (Pt seated in recliner upon PT's arrival in pt's room   )   Additional Comments Pt returned to recliner upon completion of PT eval     Transfers Sit to Stand 6  Modified independent   Additional items Increased time required  (RW)   Stand to Sit 6  Modified independent   Additional items Increased time required  (RW)   Ambulation/Elevation   Gait pattern Decreased L stance; Inconsistent vasile; Excessively slow   Gait Assistance 5  Supervision   Additional items Assist x 1;Verbal cues   Assistive Device Rolling walker   Distance 200'   Stair Management Assistance 5  Supervision   Additional items Assist x 1;Verbal cues; Increased time required   Stair Management Technique One rail L;Nonreciprocal   Number of Stairs 2  (Limited by pt discomfort  Denies pain just discomfort L LE )   Balance   Static Sitting Good   Dynamic Sitting Fair +   Static Standing Fair +   Dynamic Standing Fair   Ambulatory Fair   Endurance Deficit   Endurance Deficit Yes   Endurance Deficit Description LE discomfort  Activity Tolerance   Activity Tolerance Patient tolerated treatment well;Patient limited by pain   Medical Staff Made Aware RN consented to allow pt to participate in PT eval      Nurse Made Aware yes   Assessment   Prognosis Good   Problem List Decreased strength;Decreased range of motion;Decreased endurance; Impaired balance;Decreased mobility; Decreased skin integrity;Pain   Assessment Pt is 65 y o  male seen for PT evaluation s/p admit to [facility] on 4/30/2018 w/ Atherosclerosis of artery of extremity with intermittent claudication ([facility] [address] )  Pt underwent bypass surgery on 4/29  PT consulted to assess pt's functional mobility and d/c needs  Order placed for PT eval and tx, w/ ambulate in hallway order  Comorbidities affecting pt's physical performance at time of assessment include: hx of prior bypass surgeries bilaterally, DM-2, + smoker, chest pain, CAD, arthritis, anemia, anxiety, carotid A stenosis, HLD, BPH, PAD, shoulder pain, weight loss, kidney stones    PTA, pt was independent w/ all functional mobility w/ no need for an assistive device for ambulation, ambulates community distances and elevations, has 5 BRAYDEN, lives w/ his wife in split level home and retired  Personal factors affecting pt at time of IE include: lives in multi story house, ambulating w/ assistive device, stairs to enter home, inability to navigate community distances, anxiety, tobacco use, inability to perform IADLs and is currently functioning at a below baseline level of mobility    Please find objective findings from PT assessment regarding body systems outlined above with impairments and limitations including weakness, impaired balance, decreased endurance, gait deviations, pain, decreased functional mobility tolerance and decreased skin integrity  The following objective measures performed on IE also reveal limitations: Barthel Index: 75/100  Pt's clinical presentation is currently unstable/unpredictable seen in pt's presentation of having abnormal lab values, having a below baseline level of mobility, having steps entering his home and additional steps to his bed/bathroom, and having hx of CAD and DM-2  Pt to benefit from continued PT tx to address deficits as defined above and maximize level of functional independent mobility and consistency  From PT/mobility standpoint, recommendation at time of d/c would be home with home PT v/s outpt PT  Pt prefers to wait until his stitches are out to begin outpt PT rx's  pending progress in order to facilitate return to PLOF  Barriers to Discharge None   Barriers to Discharge Comments It is felt pt would be capable of doing 5 steps , resting, then 5 more   Having LE " discomfort" with stairs but was able to do 2 steps fairly well  Preferred not to do all 5  Goals   Patient Goals To go home  STG Expiration Date 05/06/18   Short Term Goal #1 1  Pt independent with ambulation with RW for 300' with good safety and balance noted   2  Pt independently ambulatory on 5+5 steps with 1 rail on each set of steps with good safety and balance noted  3  Pt independent with performance of home ex program for LE's  Treatment Day 0   Plan   Treatment/Interventions Functional transfer training;LE strengthening/ROM; Patient/family training;Equipment eval/education;Gait training;Bed mobility;Spoke to nursing;Spoke to case management   PT Frequency 5x/wk   Recommendation   Recommendation Home with family support  (home v's outpt PT    Pt prefers outpt PT rx's  )   Equipment Recommended Walker  (Has his own Rw  )   PT - OK to Discharge Yes   Barthel Index   Feeding 10   Bathing 5   Grooming Score 5   Dressing Score 5   Bladder Score 10   Bowels Score 10   Toilet Use Score 5   Transfers (Bed/Chair) Score 10   Mobility (Level Surface) Score 10   Stairs Score 5   Barthel Index Score 75

## 2018-05-01 NOTE — DISCHARGE SUMMARY
Discharge Summary note was initially performed and filed incorrectly as a progress note on 5/1/2018 11:25AM by Armin Sarmiento PA-C     Vascular Surgery    Progress Note - Delroy Fall 1940, 65 y o  male MRN: [id number]    [facility] Encounter: [id number]    Primary Care Provider: Sunshine Prince MD   Date and time admitted to hospital: 4/30/2018  5:25 AM      Discharge Date: 5/1/2018    Attending: Dr Pawan Sim    Admitting Diagnosis: Popliteal artery occlusion, left (HCC) [I70 202]  Atherosclerosis of artery of extremity with intermittent claudication ([facility] [address] ) [I70 219]    * Atherosclerosis of artery of extremity with intermittent claudication ([address] )   Assessment & Plan    S/P Left AK Pop to BK Pop bypass w/ reversed GSV, completion Agram 4/30    Plan:  --Continue Plavix/Crestor on discharge  --Pain control  --Outpatient follow-up with Dr Pawan Sim as scheduled        Acute blood loss anemia   Assessment & Plan    In setting of vascular bypass surgery and IVF hydration                Secondary Diagnosis:  Past Medical History:   Diagnosis Date    Atherosclerosis     COPD (chronic obstructive pulmonary disease) ([facility] [address] )     Coronary artery disease     Hyperlipidemia     Hypertension     Peripheral arterial disease ([address] )     Smoking addiction      Past Surgical History:   Procedure Laterality Date    BYPASS FEMORAL-POPLITEAL Left 4/30/2018    Procedure: BYPASS FEMORAL-POPLITEAL, WITH INTRAOP ARTERIOGRAM;  Surgeon: Yanelis Costello MD;  Location: [facility] MAIN OR;  Service: Vascular    CARDIAC SURGERY      FEMORAL ARTERY - POPLITEAL ARTERY BYPASS GRAFT Bilateral     HAND SURGERY      LEG SURGERY      Repair    OTHER SURGICAL HISTORY      Percutaneous repair nasoethmoid fx lacrimal apparatus    THROMBOENDARTERECTOMY  10/12/2010    Tibioperoneal trunk    TONSILLECTOMY      TONSILLECTOMY          Procedures Performed: S/P Left AK Pop to BK Pop bypass w/ reversed GSV, completion Agram 4/30    Discharge Medications:  See after visit summary for reconciled discharge medications provided to patient and family  Hospital Course:  21-year-old gentleman admitted electively to [facility] for treatment of left popliteal artery occlusion and PAD with claudication  He has a history of bilateral lower extremity bypass and left lower extremity bypass graft stenosis treated prior with endovascular intervention with recurrence  On the day of admission he underwent revision of prior left lower extremity bypass with left above knee pop to below-knee pop bypass with reverse greater saphenous vein and completion arteriogram on 4/30/18 by Dr Vanessa Severin  He tolerated surgery well  He was noted to have acute blood loss anemia postoperatively which was monitored  This was felt to be secondary to vascular surgery and IV fluid hydration  His postoperative course was otherwise uncomplicated  On postoperative day one he was able to ambulate, void and tolerate a diet without difficulty  His pain was well controlled  He was adamant about being discharged home  After meeting criteria he was approved for discharge  He was given instructions for his discharge medications and prescription for pain medication  He was given instructions for his postoperative care and for follow-up with Dr Vanessa Severin in the office  He was instructed to call with any questions concerns or changes in his condition  Condition at Discharge: stable     Provisions for Follow-Up Care:  See after visit summary for information related to follow-up care and any pertinent home health orders  Disposition: Home    Discharge instructions/Information to patient and family:   See after visit summary for information provided to patient and family  Planned Readmission: No    Discharge Statement   I spent 30 minutes discharging the patient  This time was spent on the day of discharge   I had direct contact with the patient on the day of discharge  Additional documentation is required if more than 30 minutes were spent on discharge

## 2018-05-01 NOTE — PROGRESS NOTES
Vascular Surgery    Progress Note - Bhakti Pino 1940, 65 y o  male MRN: [id number]    Unit/Bed#: [facility] 504-01 Encounter: [id number]    Primary Care Provider: Yudith Downing MD   Date and time admitted to hospital: 4/30/2018  5:25 AM      Discharge Date: 5/1/2018    Attending: Dr Cale Smith    Admitting Diagnosis: Popliteal artery occlusion, left ([facility]) [I70 202]  Atherosclerosis of artery of extremity with intermittent claudication ([facility] [address] ) [I70 219]    * Atherosclerosis of artery of extremity with intermittent claudication ([facility] [address] )   Assessment & Plan    S/P Left AK Pop to BK Pop bypass w/ reversed GSV, completion Agram 4/30    Plan:  --Continue Plavix/Crestor on discharge  --Pain control  --Outpatient follow-up with Dr Cale Smith as scheduled        Acute blood loss anemia   Assessment & Plan    In setting of vascular bypass surgery and IVF hydration                Secondary Diagnosis:  Past Medical History:   Diagnosis Date    Atherosclerosis     COPD (chronic obstructive pulmonary disease) ([facility] [address] [facility]     Coronary artery disease     Hyperlipidemia     Hypertension     Peripheral arterial disease ([facility] [address] )     Smoking addiction      Past Surgical History:   Procedure Laterality Date    BYPASS FEMORAL-POPLITEAL Left 4/30/2018    Procedure: BYPASS FEMORAL-POPLITEAL, WITH INTRAOP ARTERIOGRAM;  Surgeon: Fabio Queen MD;  [facility]: [facility];  Service: Vascular    CARDIAC SURGERY      FEMORAL ARTERY - POPLITEAL ARTERY BYPASS GRAFT Bilateral     HAND SURGERY      LEG SURGERY      Repair    OTHER SURGICAL HISTORY      Percutaneous repair nasoethmoid fx lacrimal apparatus    THROMBOENDARTERECTOMY  10/12/2010    Tibioperoneal trunk    TONSILLECTOMY      TONSILLECTOMY          Procedures Performed: S/P Left AK Pop to BK Pop bypass w/ reversed GSV, completion Agram 4/30    Discharge Medications:  See after visit summary for reconciled discharge medications provided to patient and family        Hospital Course:  50-year-old gentleman admitted electively to [facility] for treatment of left popliteal artery occlusion and PAD with claudication  He has a history of bilateral lower extremity bypass and left lower extremity bypass graft stenosis treated prior with endovascular intervention with recurrence  On the day of admission he underwent revision of prior left lower extremity bypass with left above knee pop to below-knee pop bypass with reverse greater saphenous vein and completion arteriogram on 4/30/18 by Dr Riley Curran  He tolerated surgery well  He was noted to have acute blood loss anemia postoperatively which was monitored  This was felt to be secondary to vascular surgery and IV fluid hydration  His postoperative course was otherwise uncomplicated  On postoperative day one he was able to ambulate, void and tolerate a diet without difficulty  His pain was well controlled  He was adamant about being discharged home  After meeting criteria he was approved for discharge  He was given instructions for his discharge medications and prescription for pain medication  He was given instructions for his postoperative care and for follow-up with Dr Riley Curran in the office  He was instructed to call with any questions concerns or changes in his condition  Condition at Discharge: stable     Provisions for Follow-Up Care:  See after visit summary for information related to follow-up care and any pertinent home health orders  Disposition: Home    Discharge instructions/Information to patient and family:   See after visit summary for information provided to patient and family  Planned Readmission: No    Discharge Statement   I spent 30 minutes discharging the patient  This time was spent on the day of discharge  I had direct contact with the patient on the day of discharge  Additional documentation is required if more than 30 minutes were spent on discharge

## 2018-05-01 NOTE — ASSESSMENT & PLAN NOTE
S/P Left AK Pop to BK Pop bypass w/ reversed GSV, completion Agram 4/30    Plan:  --Continue Plavix/Crestor on discharge  --D/C Suha  --D/C johana  --Saline lock  --OOB/Ambulate  --Pain control  --Tentative DC today vs tomorrow

## 2018-05-01 NOTE — PLAN OF CARE
GENITOURINARY - ADULT     Maintains or returns to baseline urinary function Progressing     Urinary catheter remains patent Progressing        MUSCULOSKELETAL - ADULT     Maintain or return mobility to safest level of function Progressing        Potential for Falls     Patient will remain free of falls Progressing        SKIN/TISSUE INTEGRITY - ADULT     Skin integrity remains intact Progressing     Incision(s), wounds(s) or drain site(s) healing without S/S of infection Progressing

## 2018-05-01 NOTE — SOCIAL WORK
CM met with Pt with an introductions and explanation of role  Pt reported residing with his significant other Ismael Mcdaniel in a multilevel home with no use of DME and 5 steps to enter the home  Pt reported being independent with ADLs, had [facility] in the past for PT services but no hx of SNF, mental health or drug/alcohol placements  Pt reported having a living will and uses [facility] pharmacy on [address]  Pt reported his PCP to be Dr Kathy Franco  CM review and provided the Pt with d/c checklist     CM reviewed d/c planning process including the following: identifying help at home, patient preference for d/c planning needs, Discharge Lounge, [facility] Meds to Bed program, availability of treatment team to discuss questions or concerns patient and/or family may have regarding understanding medications and recognizing signs and symptoms once discharged  CM also encouraged patient to follow up with all recommended appointments after discharge  Patient advised of importance for patient and family to participate in managing patients medical well being

## 2018-05-01 NOTE — CASE MANAGEMENT
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]  Atherosclerosis of artery of extremity with intermittent claudication ([facility] [address] ) [I70 219]     Post-Op Diagnosis Codes:     * Popliteal artery occlusion, left (HCC) [I70 202]     * Atherosclerosis of artery of extremity with intermittent claudication (HCC) [I70 219]     Procedure(s) (LRB):  BYPASS FEMORAL-POPLITEAL, WITH INTRAOP ARTERIOGRAM (Left)      Anesthesia Type: General      Operative Findings:  Inflow vein graft from previous femoral to above knee popliteal bypass of good quality  Below knee popliteal artery of good quality  Distal greater saphenous vein of good quality at approximately 5 mm in diameter  Completion imaging shows rapid flow through the revised bypass graft and outflow via the anterior tibial and peroneal arteries without evidence of stenosis or intraluminal defect  Imaging did show some compression of the vein graft at the knee  Following this image the fascia was released at this point  Patient a palpable anterior tibial pulse            Admission Orders: Date/Time/Statement: 18 @ 1143     Orders Placed This Encounter   Procedures    Inpatient Admission     Standing Status:   Standing     Number of Occurrences:   1     Order Specific Question:   Admitting Physician     Answer:   Sahil Zhao     Order Specific Question:   Level of Care     Answer:   Level 1 Stepdown [13]     Order Specific Question:   Estimated length of stay     Answer:   Inpatient Only Surgery       Vital Signs: /70 (BP Location: Left arm)   Pulse 60   Temp 98 6 °F (37 °C) (Oral)   Resp 16   Ht 5' 8" (1 727 m)   Wt 82 6 kg (182 lb)   SpO2 96%   BMI 27 67 kg/m²     Diet:        Diet Orders            Start     Ordered    04/30/18 1501  Diet [place]/CHO Controlled; Consistent Carbohydrate Diet Level 3 (6 carb servings/90 grams CHO/meal); Cardiac Step 1  Diet effective now     Question Answer Comment   Diet Type [place]/CHO Controlled    [place]/CHO Controlled Consistent Carbohydrate Diet Level 3 (6 carb servings/90 grams CHO/meal)    Other Restriction(s): Cardiac Step 1    RD to adjust diet per protocol? Yes        04/30/18 1500          Continuous IV Infusions:   IVF sodium chloride 0 9 % infusion at 75 cc/hr      Mobility:  Up as Tolerated    DVT Prophylaxis:  ASA ; Plavix ;  Heparin SQ; SCD    Scheduled Meds:  Current Facility-Administered Medications:  acetaminophen 650 mg Oral Q6H PRN Mague Walker MD   cholecalciferol 1,000 Units Oral Daily Mague Walker MD   clopidogrel 75 mg Oral Daily Mague Walker MD   docusate sodium 100 mg Oral BID Mague Walker MD   heparin (porcine) 5,000 Units Subcutaneous [facility] Mague Walker MD   HYDROmorphone 0 5 mg Intravenous Q3H PRN Mague Walker MD   insulin lispro 1-6 Units Subcutaneous TID [facility] Mague Walker MD   metoprolol tartrate 25 mg Oral Q12H [address] Mague Walker MD   ondansetron 4 mg Intravenous Q6H PRN Mague Walker MD   oxyCODONE 10 mg Oral Q4H PRN Mague Walker MD   oxyCODONE 5 mg Oral Q4H PRN Mague Walker MD   pantoprazole 40 mg Oral After Breakfast Mague Walker MD   PARoxetine 40 mg Oral QAM Mague Walker MD   pravastatin 40 mg Oral Daily With Jose Manuel Cruz MD     PRN Meds:     Acetaminophen 650 mg q6hrs prn given x 2    HYDROmorphone    ondansetron    oxyCODONE            Vascular Surgery  D C SUMMARY  Progress Notes POD # 1  Date of Service: 5/1/2018 11:25 AM     Admitting Diagnosis: Popliteal artery occlusion, left (HCC) [I70 202]  Atherosclerosis of artery of extremity with intermittent claudication (HCC) [I70 219]         * Atherosclerosis of artery of extremity with intermittent claudication (HCC)   Assessment & Plan     S/P Left AK Pop to BK Pop bypass w/ reversed GSV, completion Agr 4/30     Plan:  --Continue Plavix/Crestor on discharge  --Pain control  --Outpatient follow-up with Dr Bassem Allen as scheduled          Acute blood loss anemia   Assessment & Plan     In setting of vascular bypass surgery and IVF hydration                      Secondary Diagnosis:  Medical History        Past Medical History:   Diagnosis Date    Atherosclerosis      COPD (chronic obstructive pulmonary disease) ([facility] [address] )      Coronary artery disease      Hyperlipidemia      Hypertension      Peripheral arterial disease ([address] )      Smoking addiction           Surgical History         Past Surgical History:   Procedure Laterality Date    BYPASS FEMORAL-POPLITEAL Left 4/30/2018     Procedure: BYPASS FEMORAL-POPLITEAL, WITH INTRAOP ARTERIOGRAM;  Surgeon: Avery Lowe MD;  Location[facility];  Service: Vascular    CARDIAC SURGERY        FEMORAL ARTERY - POPLITEAL ARTERY BYPASS GRAFT Bilateral      HAND SURGERY        LEG SURGERY         Repair    OTHER SURGICAL HISTORY         Percutaneous repair nasoethmoid fx lacrimal apparatus    THROMBOENDARTERECTOMY   10/12/2010     Tibioperoneal trunk    TONSILLECTOMY        TONSILLECTOMY                Procedures Performed: S/P Left AK Pop to BK Pop bypass w/ reversed GSV, completion Agram 4/30     Discharge Medications:  See after visit summary for reconciled discharge medications provided to patient and family        Hospital Course:  69-year-old gentleman admitted electively to [facility] for treatment of left popliteal artery occlusion and PAD with claudication  He has a history of bilateral lower extremity bypass and left lower extremity bypass graft stenosis treated prior with endovascular intervention with recurrence    On the day of admission he underwent revision of prior left lower extremity bypass with left above knee pop to below-knee pop bypass with reverse greater saphenous vein and completion arteriogram on 4/30/18 by Dr Dale Vicente  He tolerated surgery well  He was noted to have acute blood loss anemia postoperatively which was monitored  This was felt to be secondary to vascular surgery and IV fluid hydration  His postoperative course was otherwise uncomplicated  On postoperative day one he was able to ambulate, void and tolerate a diet without difficulty  His pain was well controlled  He was adamant about being discharged home  After meeting criteria he was approved for discharge  He was given instructions for his discharge medications and prescription for pain medication  He was given instructions for his postoperative care and for follow-up with Dr Dale Vicente in the office  He was instructed to call with any questions concerns or changes in his condition      Condition at Discharge: stable      Provisions for Follow-Up Care:  See after visit summary for information related to follow-up care and any pertinent home health orders        Disposition: Home     Discharge instructions/Information to patient and family:   See after visit summary for information provided to patient and family

## 2018-05-01 NOTE — PLAN OF CARE
Problem: PHYSICAL THERAPY ADULT  Goal: Performs mobility at highest level of function for planned discharge setting  See evaluation for individualized goals  Treatment/Interventions: Functional transfer training, LE strengthening/ROM, Patient/family training, Equipment eval/education, Gait training, Bed mobility, Spoke to nursing, Spoke to case management  Equipment Recommended: Mar Diallo (Has his own Rw  )       See flowsheet documentation for full assessment, interventions and recommendations  Prognosis: Good  Problem List: Decreased strength, Decreased range of motion, Decreased endurance, Impaired balance, Decreased mobility, Decreased skin integrity, Pain  Assessment: Pt is 65 y o  male seen for PT evaluation s/p admit to [facility] on 4/30/2018 w/ Atherosclerosis of artery of extremity with intermittent claudication ([facility] [address] )  Pt underwent bypass surgery on 4/29  PT consulted to assess pt's functional mobility and d/c needs  Order placed for PT eval and tx, w/ ambulate in hallway order  Comorbidities affecting pt's physical performance at time of assessment include: hx of prior bypass surgeries bilaterally, DM-2, + smoker, chest pain, CAD, arthritis, anemia, anxiety, carotid A stenosis, HLD, BPH, PAD, shoulder pain, weight loss, kidney stones   PTA, pt was independent w/ all functional mobility w/ no need for an assistive device for ambulation, ambulates community distances and elevations, has 5 BRAYDEN, lives w/ his wife in split level home and retired  Personal factors affecting pt at time of IE include: lives in multi story house, ambulating w/ assistive device, stairs to enter home, inability to navigate community distances, anxiety, tobacco use, inability to perform IADLs and is currently functioning at a below baseline level of mobility     Please find objective findings from PT assessment regarding body systems outlined above with impairments and limitations including weakness, impaired balance, decreased endurance, gait deviations, pain, decreased functional mobility tolerance and decreased skin integrity  The following objective measures performed on IE also reveal limitations: Barthel Index: 75/100  Pt's clinical presentation is currently unstable/unpredictable seen in pt's presentation of having abnormal lab values, having a below baseline level of mobility, having steps entering his home and additional steps to his bed/bathroom, and having hx of CAD and DM-2  Pt to benefit from continued PT tx to address deficits as defined above and maximize level of functional independent mobility and consistency  From PT/mobility standpoint, recommendation at time of d/c would be home with home PT v/s outpt PT  Pt prefers to wait until his stitches are out to begin outpt PT rx's  pending progress in order to facilitate return to PLOF  Barriers to Discharge: None  Barriers to Discharge Comments: It is felt pt would be capable of doing 5 steps , resting, then 5 more   Having LE " discomfort" with stairs but was able to do 2 steps fairly well  Preferred not to do all 5  Recommendation: Home with family support (home v's outpt PT  Pt prefers outpt PT rx's  )     PT - OK to Discharge: Yes    See flowsheet documentation for full assessment

## 2018-05-01 NOTE — ASSESSMENT & PLAN NOTE
S/P Left AK Pop to BK Pop bypass w/ reversed GSV, completion Agram 4/30    Plan:  --Continue Plavix/Crestor on discharge  --Pain control  --Outpatient follow-up with Dr Kae Moore as scheduled

## 2018-05-01 NOTE — DISCHARGE INSTRUCTIONS
DISCHARGE INSTRUCTIONS  LEG SURGERY    Following discharge from the hospital, you may have some questions about your operation, your activities or your general condition  These instructions may answer some of your questions and help you adjust during the first few weeks following your operation  ACTIVITY:  Limit your physical activity to slow walking  Avoid climbing or heavy lifting for the first four weeks after surgery  Initially some discomfort will be felt when walking as the skin and muscle tissues heal  You may require help with walking or feel more secure with someone to lean on  Walking up steps and normal activities may be resumed, as you feel ready  You should not drive a car for one week following discharge from the hospital   You may ride in a car for short trips upon discharge  DIET:  Resume your normal diet  Try to eat low fat and low cholesterol foods  INCISION:  You may include the operated area in a shower on the fifth day following surgery  Sitting in a tub is not recommended for the first week following surgery or if you have any open wounds  It is normal to have swelling or discoloration around the incision  If increasing redness or pain develops, call our office immediately  Numbness in the region of the incision may occur following the surgery  This normally resolves in six to twelve months  If any of your incisions are open and require dressing changes, you will be given instruction for your daily incision care  If you are not able to change the dressings, a visiting nurse will be arranged  Your physician may have chosen to use a type of adhesive glue to close your incision  There are stitches present under the skin which will absorb on their own  The glue is used to cover the incision, assist in closure, and prevent contamination  This adhesive will darken and peel away on its own within one to two weeks        LEG SWELLING: Most patients have noticeable leg swelling after leg surgery  This usually disappears within a few weeks  If swelling is present, elevate the leg whenever possible  Avoid sitting with the leg hanging down for prolonged time periods  Walking is beneficial   An ACE bandage or support stocking may be helpful, but this should be discussed with your physician prior to use  FOLLOW UP STUDIES:  Doppler ultrasound studies are very important to your post-operative care  Your surgeon will arrange for them at your first postoperative visit  Repeat studies are then scheduled every three months for the first year and periodically after this  PLEASE CALL THE OFFICE IF YOU HAVE ANY QUESTIONS    [phone] [facility] [phone] [facility] FREE [phone]  [address] [facility] , [address], [place], [address]  [address], [place], [address]  [address], [facility], [address]  [address] [facility], [address]  [address], [address], [address]  [facility], [address] [facility], [address] [facility] [address]   [address] [facility], [address] [facility], [place], [address]  One [facility], [address] [facility], [facility], [address], [address]

## 2018-05-04 ENCOUNTER — OFFICE VISIT (OUTPATIENT)
Dept: INTERNAL MEDICINE CLINIC | Facility: CLINIC | Age: 78
End: 2018-05-04
Payer: MEDICARE

## 2018-05-04 DIAGNOSIS — D64.9 ANEMIA, UNSPECIFIED TYPE: Primary | ICD-10-CM

## 2018-05-04 DIAGNOSIS — E78.01 FAMILIAL HYPERCHOLESTEROLEMIA: ICD-10-CM

## 2018-05-04 PROCEDURE — 99496 TRANSJ CARE MGMT HIGH F2F 7D: CPT | Performed by: INTERNAL MEDICINE

## 2018-05-05 VITALS
SYSTOLIC BLOOD PRESSURE: 120 MMHG | DIASTOLIC BLOOD PRESSURE: 78 MMHG | HEIGHT: 68 IN | RESPIRATION RATE: 14 BRPM | HEART RATE: 72 BPM

## 2018-05-05 NOTE — PROGRESS NOTES
Assessment/Plan:   1  Peripheral arterial disease-status post left above the knee popliteal to below-the-knee a popliteal bypass-arteriogram showed disease of the left leg preop with widely patent right leg bypass graft, diffuse atherosclerosis but no AAA renal artery stenosis  There are 2 right-sided renal arteries  Appears to be stable postoperatively  2  Anemia-has a history of iron deficiency anemia -colonoscopy in April 2014 showed diverticular disease  Repeat endoscopy colonoscopy over the past year half by outside surgeon benign other than diverticular disease  CT scan of abdomen showed nephrolithiasis  Small-bowel endoscopy recommended but patient deferred  Protein electrophoresis, B12 level, TSH, methylmalonic acid level all normal   Hemoglobin had been as low as 11 7 -hemoglobin said that improved-hemoglobin prior to surgery was over 14  Hemoglobin now at 10 8 felt related to blood loss with his surgery  He will have repeat prior to his next visit  3  Coronary artery disease-Myoview shows only mildly severe reversible defect in the inferior wall  He was asymptomatic  Cardiology cleared him for surgery he had no postop issues  He is now on Plavix alone and not on aspirin  4  Right shoulder pain-there was concerned about frozen shoulder and he had been referred to orthopedic physician--review next visit-  5  General care-reviewed that preferred analgesic agent is acetaminophen  6  Previously noted weight loss -5 pounds without obvious etiology  Screening labs normal   Chest x-ray normal   May been related to metformin-weight has stabilized  7  Abnormal chest x-ray-felt related to nipple shadow-had repeat chest x-ray with nipple markers which were benign  8  General care-she [facility] pneum[facility] 13 [facility]-REVIEW NEXT VISIT  9  Tobacco abuse-stop smoking 3 months ago  Felt poorly when he took [address] felt to be a good candidate for Chantix  10  Loojannujuibumw-mswhmwvsdnlq-cd CT scan of the abdomen has nonobstructing stones  He knows to maintain high fluid intake  11  Hypertension-adequate control on current dose of ACE-inhibitor plus beta-blocker-no adjustments made in that regard  12  Diabetes mellitus-most recent A1c 7 1  Can't tolerate short-acting metformin and only tolerates 1000 milligrams of long-acting metformin  On glipizide  Again strongly recommended he monitors blood sugar     All other problems as per note September 2013        MEDICAL REGIMEN:  Paxil 40 milligrams-daily, pantoprazole 40 milligrams daily, glipizide 5 milligrams in a m  and 2 5 milligrams in the p m , metoprolol tartrate 25 milligrams b i d , metformin  1000 milligrams daily, lisinopril 20 milligrams daily, , Plavix 75 milligrams a day,, vitamin D3/2000 units a day, Colace, Crestor 5 milligrams daily    Appointment over the next several months with prior chemistry profile, cholesterol profile, A1c, CBC with diff, iron and ferritin  No problem-specific Assessment & Plan notes found for this encounter  Diagnoses and all orders for this visit:    Anemia, unspecified type  -     CBC and differential; Future  -     Comprehensive metabolic panel; Future  -     Cholesterol, total; Future  -     Triglycerides; Future  -     HDL cholesterol; Future  -     LDL cholesterol, direct; Future  -     Ferritin; Future  -     Iron; Future    Familial hypercholesterolemia  -     CBC and differential; Future  -     Comprehensive metabolic panel; Future  -     Cholesterol, total; Future  -     Triglycerides; Future  -     HDL cholesterol; Future  -     LDL cholesterol, direct; Future  -     Ferritin; Future  -     Iron; Future         Subjective:     Patient ID: Thien De Leon is a 65 y o  male  Seen today after his recent hospitalization  He went home on May 1st   He underwent left AKA popliteal to below-the-knee bypass  He tolerated the procedure well    He had mild acute blood loss anemia  He was able to ambulate void and tolerate diet without difficulty  He was sent home  He did not have any confusion postoperatively  Prior to discharge creatinine 1 06, BUN 11, hemoglobin was 10 8 with an MCV of 88  He did not have any significant elevation of his BP while in the hospital   He did not have any chest pain or pressure  He has known coronary artery disease and cardiology felt he was stable for surgery  He is now only on Paxil and off aspirin  He denies chest pain or pressure  He denies weakness of either arm and leg compared to the other  He denies numbness of either arm and leg compared to the other  He has expected postop pain    He has known hypertension  Did not have any major exacerbation of this postoperatively  Preferred blood pressure under 130/80  Medical regimen reviewed in detail  Avoid salt and decongestants  Denies hematemesis pounding of his heart sweats and headache  Note that chest x-ray preop was read as some changes of COPD but no acute issues  He is a long-term smoker although recently discontinued smoking  He understands he has baseline COPD  He has a history of anxiety with agitation-she is doing current we well on his dose of SSRI and this was not changed  He has a history of GI bleed the watching carefully for this postoperatively but at this point stable  As noted had mild blood loss anemia with discharge hemoglobin of 10 8  For the next month he will increase his intake of iron with red meat and raisins        Review of Systems   Constitutional: Negative  Respiratory: Negative  Cardiovascular: Negative  Gastrointestinal: Negative  Endocrine: Negative  Genitourinary: Negative  Nocturia x1   Musculoskeletal: Positive for arthralgias and back pain  Leg pain at site of recent surgery   Neurological: Negative  Hematological: Negative  Psychiatric/Behavioral: Negative            Objective:     Physical Exam Constitutional: He is oriented to person, place, and time  He appears well-developed and well-nourished  No distress  HENT:   Head: Normocephalic and atraumatic  Right Ear: External ear normal    Left Ear: External ear normal    Nose: Nose normal    Mouth/Throat: Oropharynx is clear and moist  No oropharyngeal exudate  Eyes: Conjunctivae and EOM are normal  Pupils are equal, round, and reactive to light  Right eye exhibits no discharge  Left eye exhibits no discharge  No scleral icterus  Neck: Normal range of motion  Neck supple  No JVD present  No tracheal deviation present  No thyromegaly present  Cardiovascular: Normal rate, regular rhythm and normal heart sounds  Exam reveals no gallop and no friction rub  No murmur heard  Decreased peripheral pulses   Pulmonary/Chest: Effort normal and breath sounds normal  No stridor  No respiratory distress  He has no wheezes  He exhibits no tenderness  Hyper resonance to percussion   Abdominal: Bowel sounds are normal  He exhibits no distension and no mass  There is no tenderness  There is no rebound and no guarding  Genitourinary: Rectal exam shows guaiac negative stool  Musculoskeletal: Normal range of motion  He exhibits no edema, tenderness or deformity  Lymphadenopathy:     He has no cervical adenopathy  Neurological: He is alert and oriented to person, place, and time  He has normal reflexes  No cranial nerve deficit  He exhibits normal muscle tone  Coordination normal    Skin: Skin is warm and dry  No rash noted  He is not diaphoretic  No erythema  No pallor  Multiple staples of the leg associated with recent surgery   Psychiatric: He has a normal mood and affect  His behavior is normal  Judgment and thought content normal    Vitals reviewed  Vitals:    05/04/18 1218   BP: 120/78   Pulse: 72   Resp: 14   Height: 5' 8" (1 727 m)       Transitional Care Management Review:  Charlaine Burkitt is a 65 y o  male here for TCM follow up  During the TCM phone call patient stated:    Hospital care reviewed:  Records reviewed  Patient was hopsitalized at:  [facility]  Date of admission:  4/30/18  Date of discharge:  5/1/18  Diagnosis:  ATHEROSCLEROSIS OF ARTERY OF EXTREMITY WITH INTERMITTENT CLAUDICATION  I have advised the patient to call PCP with any new or worsening symptoms (please type in name along with any credentials):   Josefine Skiff, MD

## 2018-05-09 ENCOUNTER — PATIENT OUTREACH (OUTPATIENT)
Dept: INTERNAL MEDICINE CLINIC | Facility: CLINIC | Age: 78
End: 2018-05-09

## 2018-05-09 NOTE — PROGRESS NOTES
Outreach TC to patient for outpatient care   no answer to call  Unable to leave a message ; no voicemail  Will reschedule comprehensive CM assessment  HPI completed by chart review  Patient did see PCP for a AUGUSTA appointment  Patient has scheduled a F/U with vascular surgery for 5/16

## 2018-05-16 ENCOUNTER — OFFICE VISIT (OUTPATIENT)
Dept: VASCULAR SURGERY | Facility: CLINIC | Age: 78
End: 2018-05-16

## 2018-05-16 VITALS
BODY MASS INDEX: 26.52 KG/M2 | HEIGHT: 68 IN | DIASTOLIC BLOOD PRESSURE: 70 MMHG | RESPIRATION RATE: 16 BRPM | HEART RATE: 76 BPM | TEMPERATURE: 97.4 F | SYSTOLIC BLOOD PRESSURE: 126 MMHG | WEIGHT: 175 LBS

## 2018-05-16 DIAGNOSIS — I70.219 ATHEROSCLEROSIS OF ARTERY OF EXTREMITY WITH INTERMITTENT CLAUDICATION (HCC): Primary | Chronic | ICD-10-CM

## 2018-05-16 DIAGNOSIS — I70.202 POPLITEAL ARTERY OCCLUSION, LEFT (HCC): Chronic | ICD-10-CM

## 2018-05-16 PROCEDURE — 99024 POSTOP FOLLOW-UP VISIT: CPT | Performed by: SURGERY

## 2018-05-16 NOTE — ASSESSMENT & PLAN NOTE
Atherosclerotic occlusive disease with popliteal artery occlusion distal to previous femoral-popliteal bypass graft treated with jump graft from the graft to the below-knee popliteal artery  There has been resolution claudication symptoms  Will plan standard follow-up with duplex imaging an office visit at approximately 6 weeks postop

## 2018-05-16 NOTE — PROGRESS NOTES
Assessment/Plan:    Atherosclerosis of artery of extremity with intermittent claudication (HCC)  Atherosclerotic occlusive disease with popliteal artery occlusion distal to previous femoral-popliteal bypass graft treated with jump graft from the graft to the below-knee popliteal artery  There has been resolution claudication symptoms  Will plan standard follow-up with duplex imaging an office visit at approximately 6 weeks postop  Diagnoses and all orders for this visit:    Atherosclerosis of artery of extremity with intermittent claudication (HCC)    Popliteal artery occlusion, left ([address] )               Patient ID: Lacey Bermudez is a 65 y o  male  Chief Complaint: Pt is PO review left leg bypass with AK POP to BK bypass done 4/30/18 by Dr Rudolph Sol  Pt states when he is trying to lean forward back of knee starts to hurt  Pt denies drainage pain or swelling to leg  Pt states walking has improved  Pt is on plavix  79-year-old status post revision left femoral to above knee popliteal artery bypass graft with a jump graft to the below knee popliteal artery performed 04/30/2018  He has been doing well for postop with no significant complaints  He has had resolution of claudication symptoms  On examination wounds are all well healed  He has easily palpable graft pulse  His foot is pink warm and well perfused  Staples are removed          The following portions of the patient's history were reviewed and updated as appropriate: allergies, current medications, past family history, past medical history, past social history, past surgical history and problem list     Review of Systems      Objective:      /70 (BP Location: Right arm, Patient Position: Sitting, Cuff Size: Adult)   Pulse 76   Temp (!) 97 4 °F (36 3 °C) (Tympanic)   Resp 16   Ht 5' 8" (1 727 m)   Wt 79 4 kg (175 lb)   BMI 26 61 kg/m²          Physical Exam

## 2018-05-16 NOTE — LETTER
May 16, 2018     Bernard Le MD  [address]  [address] [facility], [address] [place] [address]    Patient: Yessi Blanca   YOB: 1940   Date of Visit: 5/16/2018       Dear Dr Olivia Muller: Thank you for referring Debbie Carmen to me for evaluation  Below are the relevant portions of my assessment and plan of care  Atherosclerosis of artery of extremity with intermittent claudication (HCC)  Atherosclerotic occlusive disease with popliteal artery occlusion distal to previous femoral-popliteal bypass graft treated with jump graft from the graft to the below-knee popliteal artery  There has been resolution claudication symptoms  Will plan standard follow-up with duplex imaging an office visit at approximately 6 weeks postop  If you have questions, please do not hesitate to call me  I look forward to following Tyler Aschoff along with you           Sincerely,        Kemi Simental MD        CC: No Recipients

## 2018-05-16 NOTE — PATIENT INSTRUCTIONS
Atherosclerosis of artery of extremity with intermittent claudication (HCC)  Atherosclerotic occlusive disease with popliteal artery occlusion distal to previous femoral-popliteal bypass graft treated with jump graft from the graft to the below-knee popliteal artery  There has been resolution claudication symptoms  Will plan standard follow-up with duplex imaging an office visit at approximately 6 weeks postop

## 2018-06-18 ENCOUNTER — HOSPITAL ENCOUNTER (OUTPATIENT)
Dept: NON INVASIVE DIAGNOSTICS | Facility: CLINIC | Age: 78
Discharge: HOME | End: 2018-06-18
Payer: MEDICARE

## 2018-06-18 DIAGNOSIS — I70.202 POPLITEAL ARTERY OCCLUSION, LEFT (HCC): Chronic | ICD-10-CM

## 2018-06-18 DIAGNOSIS — I70.219 ATHEROSCLEROSIS OF ARTERY OF EXTREMITY WITH INTERMITTENT CLAUDICATION (HCC): Chronic | ICD-10-CM

## 2018-06-18 PROCEDURE — 93926 LOWER EXTREMITY STUDY: CPT

## 2018-06-18 PROCEDURE — 93926 LOWER EXTREMITY STUDY: CPT | Performed by: SURGERY

## 2018-06-18 PROCEDURE — 93922 UPR/L XTREMITY ART 2 LEVELS: CPT | Performed by: SURGERY

## 2018-06-20 ENCOUNTER — PATIENT OUTREACH (OUTPATIENT)
Dept: INTERNAL MEDICINE CLINIC | Facility: CLINIC | Age: 78
End: 2018-06-20

## 2018-06-20 NOTE — PROGRESS NOTES
Outreach Tc to patient for CM assessment  patient reports he is doing well post op  Patient has seen surgeon post op and has had his vascular studies completed  Patient states his incision is healed  Patient has minimal discomfort  Patient does note very minimal discomfort when patient stretches the posterior knee  Patient was educated on BPCI/CM role and contact information  Patient stated that he does not feel that additional calls are needed  Will follow chart electronically  Patient has contact information if he needs CM

## 2018-07-02 LAB
LEFT EYE DIABETIC RETINOPATHY: NORMAL
RIGHT EYE DIABETIC RETINOPATHY: NORMAL

## 2018-07-18 ENCOUNTER — OFFICE VISIT (OUTPATIENT)
Dept: VASCULAR SURGERY | Facility: CLINIC | Age: 78
End: 2018-07-18

## 2018-07-18 VITALS
BODY MASS INDEX: 27.13 KG/M2 | RESPIRATION RATE: 18 BRPM | HEART RATE: 72 BPM | HEIGHT: 68 IN | DIASTOLIC BLOOD PRESSURE: 72 MMHG | SYSTOLIC BLOOD PRESSURE: 154 MMHG | WEIGHT: 179 LBS

## 2018-07-18 DIAGNOSIS — I70.219 ATHEROSCLEROSIS OF ARTERY OF EXTREMITY WITH INTERMITTENT CLAUDICATION (HCC): Primary | Chronic | ICD-10-CM

## 2018-07-18 PROCEDURE — 99024 POSTOP FOLLOW-UP VISIT: CPT | Performed by: SURGERY

## 2018-07-18 NOTE — LETTER
July 18, 2018     Grady Guerrero MD  [address]  [address] [place] [address]    Patient: Thien De Leon   YOB: 1940   Date of Visit: 7/18/2018       Dear Dr Tona Lovell: Thank you for referring Floyd Scott to me for evaluation  Below are the relevant portions of my assessment and plan of care  Atherosclerosis of artery of extremity with intermittent claudication (HCC)  Doing well following revision of femoral to above knee popliteal bypass with extension of bypass to the below knee popliteal artery  He has resolution of his claudication symptoms  He does complain of some swelling in the foot and some numbness of the toes which was present preop  At this time we will plan standard follow-up with duplex evaluation in 3 months  If you have questions, please do not hesitate to call me  I look forward to following Yanet Dukes along with you           Sincerely,        Hilton Harada, MD        CC: No Recipients

## 2018-07-18 NOTE — PROGRESS NOTES
Assessment/Plan:    Atherosclerosis of artery of extremity with intermittent claudication (HCC)  Doing well following revision of femoral to above knee popliteal bypass with extension of bypass to the below knee popliteal artery  He has resolution of his claudication symptoms  He does complain of some swelling in the foot and some numbness of the toes which was present preop  At this time we will plan standard follow-up with duplex evaluation in 3 months  Diagnoses and all orders for this visit:    Atherosclerosis of artery of extremity with intermittent claudication (HCC)  -     VAS lower limb arterial duplex, limited/unilateral; Future          Subjective:      Patient ID: Curt Larson is a 77 y o  male  Patient had a OSMEL on 6/18/2018  Patient had L leg bypass with AK POP to BK bypass on 4/30/2018 by Dr Chamorro Loud  He has swelling in his foot of his right leg since surgery, the swelling has gone down quite a bit  He complains of numbness in his left foot by toes and toes and that it is always cold to touch  He has tingling in his toes more since surgery  He does say his walking has improved he no longer gets the pain in his L calf  He gets cramping bilateral behind knees at night but not often, that can last around 3 days, he has an exercise now that helps that  Patient has a history of Diabetes, Hypertension and Hyperlipidemia  Patient is on Plavix  59-year-old who had recurrent popliteal artery occlusion below a previous femoral-popliteal bypass graft underwent revision on 04/30/2018 with extension to the below knee popliteal artery  Since then he has had resolution of his claudication symptoms  He remains active with no limitation  He does complain of some foot swelling and some tingling in the forefoot which was present prior to the procedure  On examination his wounds are all well healed  He has an easily palpable graft pulse in the thigh and at the knee    He has a weakly palpable anterior tibial pulse with a biphasic Doppler signal which is attenuated with graft compression  Duplex evaluation 06/18/2018 with a moderate mid thigh graft stenosis with flow velocity of 282 centimeters/second  Ankle-brachial index has increased significantly to 0 67 from 0 38  The following portions of the patient's history were reviewed and updated as appropriate: allergies, current medications, past family history, past medical history, past social history, past surgical history and problem list     Review of Systems   Constitutional: Negative  HENT: Negative  Eyes: Negative  Respiratory: Negative  Cardiovascular:        Mild foot swelling   Gastrointestinal: Negative  Endocrine: Negative  Genitourinary: Negative  Musculoskeletal: Negative  Skin: Negative  Allergic/Immunologic: Negative  Neurological: Positive for numbness  Hematological: Negative  Psychiatric/Behavioral: Negative            Objective:      /72 (BP Location: Right arm, Patient Position: Sitting)   Pulse 72   Resp 18   Ht 5' 8" (1 727 m)   Wt 81 2 kg (179 lb)   BMI 27 22 kg/m²          Physical Exam

## 2018-07-18 NOTE — ASSESSMENT & PLAN NOTE
Doing well following revision of femoral to above knee popliteal bypass with extension of bypass to the below knee popliteal artery  He has resolution of his claudication symptoms  He does complain of some swelling in the foot and some numbness of the toes which was present preop  At this time we will plan standard follow-up with duplex evaluation in 3 months

## 2018-08-09 DIAGNOSIS — I65.23 BILATERAL CAROTID ARTERY STENOSIS: Primary | ICD-10-CM

## 2018-08-29 ENCOUNTER — OFFICE VISIT (OUTPATIENT)
Dept: INTERNAL MEDICINE CLINIC | Facility: CLINIC | Age: 78
End: 2018-08-29
Payer: MEDICARE

## 2018-08-29 VITALS — SYSTOLIC BLOOD PRESSURE: 120 MMHG | RESPIRATION RATE: 14 BRPM | DIASTOLIC BLOOD PRESSURE: 78 MMHG | HEART RATE: 72 BPM

## 2018-08-29 DIAGNOSIS — E78.5 HYPERLIPIDEMIA, UNSPECIFIED HYPERLIPIDEMIA TYPE: ICD-10-CM

## 2018-08-29 DIAGNOSIS — I10 HYPERTENSION, UNSPECIFIED TYPE: ICD-10-CM

## 2018-08-29 DIAGNOSIS — Z23 NEED FOR PNEUMOCOCCAL VACCINATION: ICD-10-CM

## 2018-08-29 DIAGNOSIS — R41.3 MEMORY LOSS: ICD-10-CM

## 2018-08-29 DIAGNOSIS — R53.83 FATIGUE, UNSPECIFIED TYPE: Primary | ICD-10-CM

## 2018-08-29 DIAGNOSIS — E11.9 CONTROLLED TYPE 2 DIABETES MELLITUS WITHOUT COMPLICATION, WITHOUT LONG-TERM CURRENT USE OF INSULIN (HCC): ICD-10-CM

## 2018-08-29 LAB — SL AMB POCT HEMOGLOBIN AIC: 6.7

## 2018-08-29 PROCEDURE — 83036 HEMOGLOBIN GLYCOSYLATED A1C: CPT | Performed by: INTERNAL MEDICINE

## 2018-08-29 PROCEDURE — 99213 OFFICE O/P EST LOW 20 MIN: CPT | Performed by: INTERNAL MEDICINE

## 2018-08-29 PROCEDURE — G0439 PPPS, SUBSEQ VISIT: HCPCS | Performed by: INTERNAL MEDICINE

## 2018-08-29 PROCEDURE — G0009 ADMIN PNEUMOCOCCAL VACCINE: HCPCS

## 2018-08-29 PROCEDURE — 90670 PCV13 VACCINE IM: CPT

## 2018-08-29 NOTE — PROGRESS NOTES
Assessment/Plan:  1  Health maintenance-given Prevnar 13 vaccine today  2  Health maintenance-given a prescription for Shingrix vaccine-he wants to obtain in said the [address]  3  Clinical exam consistent with some degree of neuropathy-felt related to his diabetes-symptoms worse on the left side which may be related to his recent vascular surgery  4  Concerned about memory loss-suspicious for potential small vessel disease  At this point watching carefully but has symptoms worsen he will need radiographic study of the brain  5  Peripheral arterial disease-status post left above the knee popliteal to below-the-knee a popliteal bypass-arteriogram showed disease of the left leg preop with widely patent right leg bypass graft, diffuse atherosclerosis but no AAA renal artery stenosis  There are 2 right-sided renal arteries  Appears to be stable postoperatively  6  Anemia-has a history of iron deficiency anemia -colonoscopy in April 2014 showed diverticular disease  Repeat endoscopy colonoscopy over the past year half by outside surgeon benign other than diverticular disease  CT scan of abdomen showed nephrolithiasis  Small-bowel endoscopy recommended but patient deferred  Protein electrophoresis, B12 level, TSH, methylmalonic acid level all normal   Hemoglobin had been as low as 11 7 -hemoglobin said that improved-hemoglobin prior to surgery was over 14  Hemoglobin now at 10 8 felt related to blood loss with his surgery  He will have repeat prior to his next visit  7  Coronary artery disease-Myoview shows only mildly severe reversible defect in the inferior wall  He was asymptomatic  Cardiology cleared him for surgery he had no postop issues  He is now on Plavix alone and not on aspirin  8    Right shoulder pain-there was concerned about frozen shoulder and he had been referred to orthopedic physician--review next visit-  9   Previously noted weight loss -5 pounds without obvious etiology   Screening labs normal   Chest x-ray normal   May been related to metformin-weight has stabilized-not an issue  10   Abnormal chest x-ray-felt related to nipple shadow-had repeat chest x-ray with nipple markers which were benign  11  Tobacco abuse-stop smoking 3 months ago   Felt poorly when he took [address] felt to be a good candidate for Chantix-says he is not smoking at this point  12  Zyxoovcroduzckg-xnmbdtyurzlk-lj CT scan of the abdomen has nonobstructing stones   He knows to maintain high fluid intake  13   Hypertension-adequate control on current dose of ACE-inhibitor plus beta-blocker-no adjustments made in that regard  12   Diabetes mellitus-most recent A1c 6 7 in the office today   Can't tolerate short-acting metformin and only tolerates 1000 milligrams of long-acting metformin   On glipizide   Again strongly recommended he monitors blood sugar     All other problems as per note September 2013        MEDICAL REGIMEN:  Paxil 40 milligrams-daily, pantoprazole 40 milligrams daily, glipizide 5 milligrams in a m  and 2 5 milligrams in the p m , metoprolol tartrate 25 milligrams b i d , metformin  1000 milligrams daily, lisinopril 20 milligrams daily, , Plavix 75 milligrams a day,, vitamin D3/2000 units a day, Colace, Crestor 5 milligrams daily    He will be dropping off recent labs done at the [place]  Appointment in several months with prior labs  Hopefully obtaining Shingrix vaccine at the [place]  Addendum-patient seen by vascular surgery in September-they feel E a stenosis of left femoral below-the-knee popliteal artery bypass graft status post revision  Duplex study shows progression of graft stenosis  They plan on proceeding with arteriogram and possible endovascular intervention in the near future to maintain graft Patency--patient underwent left lower extremity anastomotic PTA-specifically underwent a PTA anastomosis of jump graft from above-the-knee fem-pop to below the knee pop bypass    Claudication symptoms resolved and numbness of his foot is improving  They recommended repeat Doppler in 3 months  No problem-specific Assessment & Plan notes found for this encounter  Diagnoses and all orders for this visit:    Fatigue, unspecified type  -     CBC and differential; Future  -     Comprehensive metabolic panel; Future  -     Cholesterol, total; Future  -     HDL cholesterol; Future  -     LDL cholesterol, direct; Future  -     Triglycerides; Future  -     TSH, 3rd generation; Future  -     T4, free; Future  -     Vitamin B12; Future  -     Methylmalonic acid, serum; Future  -     Microalbumin / creatinine urine ratio  -     HEMOGLOBIN A1C W/ EAG ESTIMATION; Future    Controlled type 2 diabetes mellitus without complication, without long-term current use of insulin (HCC)  -     POCT hemoglobin A1c  -     CBC and differential; Future  -     Comprehensive metabolic panel; Future  -     Cholesterol, total; Future  -     HDL cholesterol; Future  -     LDL cholesterol, direct; Future  -     Triglycerides; Future  -     TSH, 3rd generation; Future  -     T4, free; Future  -     Vitamin B12; Future  -     Methylmalonic acid, serum; Future  -     Microalbumin / creatinine urine ratio  -     HEMOGLOBIN A1C W/ EAG ESTIMATION; Future    Hyperlipidemia, unspecified hyperlipidemia type  -     CBC and differential; Future  -     Comprehensive metabolic panel; Future  -     Cholesterol, total; Future  -     HDL cholesterol; Future  -     LDL cholesterol, direct; Future  -     Triglycerides; Future  -     TSH, 3rd generation; Future  -     T4, free; Future  -     Vitamin B12; Future  -     Methylmalonic acid, serum; Future  -     Microalbumin / creatinine urine ratio  -     HEMOGLOBIN A1C W/ EAG ESTIMATION; Future    Hypertension, unspecified type  -     CBC and differential; Future  -     Comprehensive metabolic panel; Future  -     Cholesterol, total; Future  -     HDL cholesterol; Future  -     LDL cholesterol, direct;  Future  - Triglycerides; Future  -     TSH, 3rd generation; Future  -     T4, free; Future  -     Vitamin B12; Future  -     Methylmalonic acid, serum; Future  -     Microalbumin / creatinine urine ratio  -     HEMOGLOBIN A1C W/ EAG ESTIMATION; Future    Memory loss  -     CBC and differential; Future  -     Comprehensive metabolic panel; Future  -     Cholesterol, total; Future  -     HDL cholesterol; Future  -     LDL cholesterol, direct; Future  -     Triglycerides; Future  -     TSH, 3rd generation; Future  -     T4, free; Future  -     Vitamin B12; Future  -     Methylmalonic acid, serum; Future  -     Microalbumin / creatinine urine ratio  -     HEMOGLOBIN A1C W/ EAG ESTIMATION; Future    Need for pneumococcal vaccination  -     PNEUMOCOCCAL CONJUGATE VACCINE 13-VALENT GREATER THAN 6 MONTHS          Subjective:      Patient ID: Jan Celis is a 77 y o  male  He was here for his Medicare well visit today was concerned about memory issues  He is having difficulty with names  Had questionable difficulty driving today  Denies any weakness of 1 arm and leg compared to the other  Denies any numbness of 1 arm and leg compared to the other  Denies any blurred or double vision difficulty with speech  He has not had any major head trauma  He is at high risk for small vessel disease  On mini-mental status exam done today he scored 25  He was able to draw the time of 20 after 10  He is not using any meds with any significant anticholinergic side effects  We had a long discussion today regarding this  I encouraged to be more active  I encouraged him to follow appropriate diet  Pending overall clinical course he may need radiographic study the brain as well    We talked about the new herpes zoster vaccine  He was given a prescription for this  Hopefully he can obtain this at the [place]  He understands this is a 2 part vaccine  He understands that is more effective than prior vaccine    He has known diabetes    He had recent labs via the [place] that her not yet available  He had an A1c done in the office today showing a value of 6 7  He has not had any hypoglycemia  Full mini-mental status exam was performed  The following portions of the patient's history were reviewed and updated as appropriate: current medications, past family history, past medical history, past social history, past surgical history and problem list     Review of Systems   Constitutional: Negative  Respiratory: Positive for shortness of breath  Cardiovascular: Negative  Gastrointestinal: Negative  Endocrine: Negative  Genitourinary: Negative  Nocturia x1   Musculoskeletal: Negative  Neurological: Positive for numbness  Decreased short-term memory   Hematological: Negative  Psychiatric/Behavioral: Negative  Objective:      /78   Pulse 72   Resp 14          Physical Exam   Constitutional: He is oriented to person, place, and time  He appears well-developed and well-nourished  No distress  HENT:   Head: Normocephalic and atraumatic  Right Ear: External ear normal    Left Ear: External ear normal    Nose: Nose normal    Mouth/Throat: Oropharynx is clear and moist  No oropharyngeal exudate  Eyes: Conjunctivae and EOM are normal  Pupils are equal, round, and reactive to light  Right eye exhibits no discharge  Left eye exhibits no discharge  No scleral icterus  Neck: No JVD present  No tracheal deviation present  No thyromegaly present  Cardiovascular: Normal rate, regular rhythm, normal heart sounds and intact distal pulses  Exam reveals no gallop and no friction rub  No murmur heard  Pulmonary/Chest: Effort normal and breath sounds normal  No stridor  No respiratory distress  He has no wheezes  He exhibits no tenderness  Abdominal: Bowel sounds are normal  He exhibits no distension and no mass  There is no tenderness  There is no rebound and no guarding     Genitourinary: Prostate normal  Rectal exam shows guaiac negative stool  Musculoskeletal: Normal range of motion  He exhibits no edema, tenderness or deformity  Scar for recent vascular surgery   Lymphadenopathy:     He has no cervical adenopathy  Neurological: He is alert and oriented to person, place, and time  He displays abnormal reflex  No cranial nerve deficit  He exhibits normal muscle tone  Coordination normal    Decreased ankle jerks  Decreased pinprick-left side greater than right  Skin: Skin is warm and dry  No rash noted  He is not diaphoretic  No erythema  No pallor  Psychiatric: He has a normal mood and affect   His behavior is normal  Judgment and thought content normal

## 2018-08-29 NOTE — PROGRESS NOTES
Procedures     Patient's shoes and socks removed  Right Foot/Ankle   Right Foot Inspection  Skin Exam: skin normal and skin intact no dry skin, no warmth, no callus, no erythema, no maceration, no abnormal color, no pre-ulcer, no ulcer and no callus                          Toe Exam: ROM and strength within normal limitsno swelling, no tenderness, erythema and  no right toe deformity  Sensory   Vibration: diminished  Proprioception: diminished   Monofilament testing: diminished  Vascular  Capillary refills: elevated  The right DP pulse is 1+  Left Foot/Ankle  Left Foot Inspection  Skin Exam: skin normal and skin intactno dry skin, no warmth, no erythema, no maceration, normal color, no pre-ulcer, no ulcer and no callus                         Toe Exam: ROM and strength within normal limitsno swelling and no tenderness                   Sensory   Vibration: diminished  Proprioception: diminished  Monofilament: diminished  Vascular  Capillary refills: elevated  The left DP pulse is 1+  Assign Risk Category:  No deformity present;  Loss of protective sensation; Weak pulses       Risk: 1

## 2018-08-29 NOTE — PROGRESS NOTES
Assessment and Plan:    Problem List Items Addressed This Visit     Controlled diabetes mellitus ([address] )    Relevant Orders    POCT hemoglobin A1c (Completed)    CBC and differential    Comprehensive metabolic panel    Cholesterol, total    HDL cholesterol    LDL cholesterol, direct    Triglycerides    TSH, 3rd generation    T4, free    Vitamin B12    Methylmalonic acid, serum    Microalbumin / creatinine urine ratio    HEMOGLOBIN A1C W/ EAG ESTIMATION    Hyperlipidemia    Relevant Orders    CBC and differential    Comprehensive metabolic panel    Cholesterol, total    HDL cholesterol    LDL cholesterol, direct    Triglycerides    TSH, 3rd generation    T4, free    Vitamin B12    Methylmalonic acid, serum    Microalbumin / creatinine urine ratio    HEMOGLOBIN A1C W/ EAG ESTIMATION      Other Visit Diagnoses     Fatigue, unspecified type    -  Primary    Relevant Orders    CBC and differential    Comprehensive metabolic panel    Cholesterol, total    HDL cholesterol    LDL cholesterol, direct    Triglycerides    TSH, 3rd generation    T4, free    Vitamin B12    Methylmalonic acid, serum    Microalbumin / creatinine urine ratio    HEMOGLOBIN A1C W/ EAG ESTIMATION    Hypertension, unspecified type        Relevant Orders    CBC and differential    Comprehensive metabolic panel    Cholesterol, total    HDL cholesterol    LDL cholesterol, direct    Triglycerides    TSH, 3rd generation    T4, free    Vitamin B12    Methylmalonic acid, serum    Microalbumin / creatinine urine ratio    HEMOGLOBIN A1C W/ EAG ESTIMATION    Memory loss        Relevant Orders    CBC and differential    Comprehensive metabolic panel    Cholesterol, total    HDL cholesterol    LDL cholesterol, direct    Triglycerides    TSH, 3rd generation    T4, free    Vitamin B12    Methylmalonic acid, serum    Microalbumin / creatinine urine ratio    HEMOGLOBIN A1C W/ EAG ESTIMATION    Need for pneumococcal vaccination        Relevant Orders    PNEUMOCOCCAL CONJUGATE VACCINE 13-VALENT GREATER THAN 6 MONTHS (Completed)        Health Maintenance Due   Topic Date Due    SLP PLAN OF CARE  1940    Pneumococcal PPSV23/PCV13 65+ Years / Low and Medium Risk (1 of 2 - PCV13) 06/27/2004         HPI:  Delroy Fall is a 77 y o  male here for his Subsequent Wellness Visit      Patient Active Problem List   Diagnosis    History of cigarette smoking    Atherosclerosis of artery of extremity with intermittent claudication (HCC)    Popliteal artery occlusion, left (HCC)    Preop cardiovascular exam    Essential hypertension    Abnormal nuclear stress test    Anemia    Anxiety    Arthritis    Asymptomatic carotid artery stenosis    Chest pain    Controlled diabetes mellitus ([facility] )    Coronary artery disease    Difficulty breathing    Diverticulosis of large intestine without hemorrhage    Elevated prostate specific antigen (PSA)    Enlarged prostate without lower urinary tract symptoms (luts)    Hyperlipidemia    Iron deficiency anemia    Nicotine dependence    Peripheral arterial disease (HCC)    Shoulder pain    Weight loss    Acute blood loss anemia     Past Medical History:   Diagnosis Date    Atherosclerosis     COPD (chronic obstructive pulmonary disease) ([facility] )     Coronary artery disease     Hyperlipidemia     Hypertension     Peripheral arterial disease ([facility] )     Smoking addiction      Past Surgical History:   Procedure Laterality Date    BYPASS FEMORAL-POPLITEAL Left 4/30/2018    Procedure: BYPASS FEMORAL-POPLITEAL, WITH INTRAOP ARTERIOGRAM;  Surgeon: Yanelis Costello MD;  [facility]: [facility];  Service: Vascular    CARDIAC SURGERY      FEMORAL ARTERY - POPLITEAL ARTERY BYPASS GRAFT Bilateral     HAND SURGERY      LEG SURGERY      Repair    OTHER SURGICAL HISTORY      Percutaneous repair nasoethmoid fx lacrimal apparatus    THROMBOENDARTERECTOMY  10/12/2010    Tibioperoneal trunk    TONSILLECTOMY      TONSILLECTOMY       Family History   Problem Relation Age of Onset    Arthritis Mother     Arthritis Family     Coronary artery disease Family     Hyperlipidemia Family     Peripheral vascular disease Family      History   Smoking Status    Former Smoker    Quit date: 01/2018   Smokeless Tobacco    Former User     History   Alcohol Use No     Comment: Social drinker      History   Drug Use No       Current Outpatient Prescriptions   Medication Sig Dispense Refill    acetaminophen (TYLENOL) 325 mg tablet Take 2 tablets (650 mg total) by mouth every 6 (six) hours as needed for mild pain 30 tablet 0    Cholecalciferol (VITAMIN D3) 2000 units capsule Take 1,000 Units by mouth daily      clopidogrel (PLAVIX) 75 mg tablet Take 75 mg by mouth daily      docusate sodium (COLACE) 100 mg capsule Take 1 capsule (100 mg total) by mouth 2 (two) times a day 10 capsule 0    glipiZIDE (GLUCOTROL) 5 mg tablet Take 5 mg by mouth Take 1 pill in the am and half pill at pm       lisinopril (ZESTRIL) 20 mg tablet Take 20 mg by mouth daily      metFORMIN (GLUCOPHAGE) 1000 MG tablet Take 1,000 mg by mouth daily with breakfast        metoprolol tartrate (LOPRESSOR) 25 mg tablet Take 25 mg by mouth every 12 (twelve) hours      pantoprazole (PROTONIX) 40 mg tablet Take 40 mg by mouth daily      PARoxetine (PAXIL) 40 MG tablet Take 40 mg by mouth Take 1/2 tab daily       rosuvastatin (CRESTOR) 5 mg tablet Take 1 tablet (5 mg total) by mouth daily 90 tablet 3     No current facility-administered medications for this visit        Allergies   Allergen Reactions    Etomidate Swelling     "I blew up and couldn't breath"    Penicillins      PT STATED THAT HE DOESN'T HAVE AN ALLERGY TO PENICILLINS AND CAN TAKE THEM     Immunization History   Administered Date(s) Administered    Influenza 10/01/2015, 10/10/2016, 10/07/2017    Influenza Quadrivalent Preservative Free 3 years and older IM 10/07/2014    Influenza Split High Dose Preservative Free IM 10/01/2015, 10/10/2016    Influenza TIV (IM) 12/03/2012, 09/21/2013, 10/07/2017    Pneumococcal Conjugate 13-Valent 08/29/2018    Tdap 01/13/2017       Patient Care Team:  Praful Vega MD as PCP - Rohith Silvestre, MD Amaris Wolf Render Hof Lei Budge, MD Quirino Halls, RN as Care Manager    Medicare Screening Tests and Risk Assessments:  Frankey Plant is here for his Subsequent Wellness visit  Last Medicare Wellness visit information reviewed, patient interviewed and updates made to the record today  Health Risk Assessment:  Patient rates overall health as fair  Patient feels that their physical health rating is Much better  Eyesight was rated as Slightly worse  Hearing was rated as Slightly worse  Patient feels that their emotional and mental health rating is Slightly worse  Pain experienced by patient in the last 7 days has been A lot  Patient's pain rating has been 1/10  Patient states that he has experienced no weight loss or gain in last 6 months  Emotional/Mental Health:  Patient has been feeling nervous/anxious  PHQ-9 Depression Screening:    Frequency of the following problems over the past two weeks:      1  Little interest or pleasure in doing things: 0 - not at all      2  Feeling down, depressed, or hopeless: 0 - not at all  PHQ-2 Score: 0          Broken Bones/Falls: Fall Risk Assessment:    In the past year, patient has experienced: No history of falling in past year          Bladder/Bowel:  Patient has not leaked urine accidently in the last six months  Patient reports no loss of bowel control  Immunizations:  Patient has had a flu vaccination within the last year  Patient has received a pneumonia shot  Patient has received a shingles shot  Patient has received tetanus/diphtheria shot  Date of tetanus/diphtheria shot: 1/13/2017    Home Safety:  Patient does not have trouble with stairs inside or outside of their home     Patient currently reports that there are no safety hazards present in home, working smoke alarms, working carbon monoxide detectors  Preventative Screenings:   no prostate cancer screen performed, no colon cancer screen completed, cholesterol screen completed, no glaucoma eye exam completed    Nutrition:  Current diet: Regular and Frequent junk food with servings of the following:    Medications:  Patient is currently taking over-the-counter supplements  List of OTC medications includes: Vitamin D   Patient is not able to manage medications  (Additional Comments: Wife Francine Yo) manages patient's medication)Lifestyle Choices:  Patient reports no tobacco use  Patient has smoked or used tobacco in the past   Patient has stopped his tobacco use  Patient reports no alcohol use  Patient drives a vehicle  Patient does not wear seat belt  Current level of exercise of physical activity described by patient as: None   Activities of Daily Living:  Can get out of bed by his or her self, able to dress self, able to make own meals, able to do own shopping, able to bathe self, can do own laundry/housekeeping, unable to manage own money and other related tasks    Previous Hospitalizations:  Hospitalization or ED visit in past 12 months  Number of hospitalizations within the last year: 1-2  Additional Comments: Atherosclerosis of artery of extremity with intermittent claudication ([facility] [address] )     Advanced Directives:  Patient has decided on a power of   Patient has spoken to designated power of   Patient has completed advanced directive    Additional Comments: Patient state his son has ARIANE Lynne)   Patient was made aware to bring copy of POA & Living Will  He is unsure of advance directive, states he will take a look into that     Preventative Screening/Counseling:      Cardiovascular:      General: Risks and Benefits Discussed and Screening Current          Diabetes:      General: Risks and Benefits Discussed and Screening Current      Comments: Patient has known diabetes and is on treatment        Colorectal Cancer:      General: Risks and Benefits Discussed and Screening Current      Comments: Patient has had relatively recent colonoscopy        Prostate Cancer:      General: Risks and Benefits Discussed and Screening Current          Osteoporosis:      General: Risks and Benefits Discussed and Screening Not Indicated          AAA:      General: Risks and Benefits Discussed and Screening Current          Glaucoma:      General: Risks and Benefits Discussed and Screening Current          HIV:      General: Risks and Benefits Discussed and Screening Not Indicated          Hepatitis C:      General: Risks and Benefits Discussed and Screening Not Indicated        Advanced Directives:   Patient has living will for healthcare, has durable POA for healthcare, patient does not have an advanced directive  Information on ACP and/or AD not provided  No 5 wishes given  End of life assessment reviewed with patient  Provider agrees with end of life decisions   Immunizations:  Patient reviewed and up to date      Influenza: Risks & Benefits Discussed and Influenza UTD This Year      Pneumococcal: Risks & Benefits Discussed and Lifetime Vaccine Completed      Shingrix: Risks & Benefits Discussed and Shingrix Vaccine Needed Today      Hepatitis B (Low risk patients): Series Not Indicated      Zostavax: Risks & Benefits Discussed and Zostavax Vaccine UTD      TD: Risks & Benefits Discussed and Td Vaccine UTD      TDAP: Risks & Benefits Discussed and Tdap Vaccine UTD      Other Preventative Counseling (Non-Medicare):   Increase physical activity

## 2018-09-18 ENCOUNTER — HOSPITAL ENCOUNTER (OUTPATIENT)
Dept: NON INVASIVE DIAGNOSTICS | Facility: CLINIC | Age: 78
Discharge: HOME | End: 2018-09-18
Payer: MEDICARE

## 2018-09-18 DIAGNOSIS — I65.23 BILATERAL CAROTID ARTERY STENOSIS: ICD-10-CM

## 2018-09-18 DIAGNOSIS — I70.219 ATHEROSCLEROSIS OF ARTERY OF EXTREMITY WITH INTERMITTENT CLAUDICATION (HCC): Chronic | ICD-10-CM

## 2018-09-18 PROCEDURE — 93880 EXTRACRANIAL BILAT STUDY: CPT

## 2018-09-18 PROCEDURE — 93880 EXTRACRANIAL BILAT STUDY: CPT | Performed by: SURGERY

## 2018-09-18 PROCEDURE — 93926 LOWER EXTREMITY STUDY: CPT

## 2018-09-19 PROCEDURE — 93926 LOWER EXTREMITY STUDY: CPT | Performed by: SURGERY

## 2018-09-19 PROCEDURE — 93922 UPR/L XTREMITY ART 2 LEVELS: CPT | Performed by: SURGERY

## 2018-09-25 ENCOUNTER — OFFICE VISIT (OUTPATIENT)
Dept: VASCULAR SURGERY | Facility: CLINIC | Age: 78
End: 2018-09-25
Payer: MEDICARE

## 2018-09-25 VITALS
RESPIRATION RATE: 18 BRPM | BODY MASS INDEX: 28.09 KG/M2 | TEMPERATURE: 98.4 F | DIASTOLIC BLOOD PRESSURE: 70 MMHG | WEIGHT: 179 LBS | SYSTOLIC BLOOD PRESSURE: 120 MMHG | HEART RATE: 70 BPM | HEIGHT: 67 IN

## 2018-09-25 DIAGNOSIS — T82.858A BYPASS GRAFT STENOSIS, INITIAL ENCOUNTER (HCC): Primary | Chronic | ICD-10-CM

## 2018-09-25 PROCEDURE — 99214 OFFICE O/P EST MOD 30 MIN: CPT | Performed by: SURGERY

## 2018-09-25 NOTE — ASSESSMENT & PLAN NOTE
Stenosis of left femoral-below knee popliteal artery bypass graft status post revision  We did discuss the findings on most recent duplex with significant progression of this graft stenosis  We will plan on proceeding with arteriography and possible endovascular intervention in the near future to maintain graft patency

## 2018-09-25 NOTE — LETTER
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

## 2018-09-25 NOTE — PATIENT INSTRUCTIONS
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

## 2018-09-25 NOTE — PROGRESS NOTES
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  We did discuss the findings on most recent duplex with significant progression of this graft stenosis  We will plan on proceeding with arteriography and possible endovascular intervention in the near future to maintain graft patency  Diagnoses and all orders for this visit:    Bypass graft stenosis, initial encounter [facility])          Subjective: "I am here to review my test results "     Patient ID: Lacey Bermudez is a 77 y o  male  Patient had a CV and OSMEL on 9/18/18  He denies any TIA or stroke symptoms  He denies any one sided weakness or numbness  He does not have any facial drooping  He complains of numbness and coldness in the front of his left foot that has been going on since his left fem-pop bypass 4/2018  He denies any claudication or rest pain  58-year-old with history of femoral to above knee popliteal bypass graft on the left with subsequent occlusion of the more distal popliteal artery  This was subsequently treated with a revision of the graft with a sequential jump graph using saphenous vein to the below knee popliteal artery  Following this procedure he still complains of pain in his foot which he feels is consistent with his neuropathy but on duplex follow-up he was also found to have a graft stenosis on duplex study from 06/18 with a flow velocity of 282  Repeat duplex performed within the last week shows progression of that stenosis with a flow velocity which has increased to 740 centimeters/second  Duplex evaluation 09/18/2018 with lower extremity findings as noted above with greater than 75 percent left mid graft stenosis with ankle-brachial index is 0 79 and toe pressure 73  On the left ankle-brachial index of 1 04 and toe pressure of 89      Carotid duplex 9/18 with right less than 50 percent stenosis and left low end 50-69 percent stenosis with flow velocity 142/36 centimeters/second  The following portions of the patient's history were reviewed and updated as appropriate: allergies, current medications, past family history, past medical history, past social history, past surgical history and problem list     The ROS as kayode was reviewed and changes made as indictated  Review of Systems   Constitutional: Positive for appetite change  Negative for activity change, chills, fatigue and fever  HENT: Positive for postnasal drip  Negative for congestion, ear pain, tinnitus and voice change  Eyes: Positive for itching  Negative for photophobia, discharge and visual disturbance  Respiratory: Negative for cough, chest tightness, shortness of breath and wheezing  Cardiovascular: Negative for chest pain, palpitations and leg swelling  Gastrointestinal: Negative  Endocrine: Negative  Genitourinary: Negative  Musculoskeletal: Negative for arthralgias, gait problem, myalgias, neck pain and neck stiffness  Skin: Negative for color change, rash and wound  Allergic/Immunologic: Negative  Neurological: Positive for numbness  Negative for dizziness, facial asymmetry, speech difficulty, weakness and light-headedness  Hematological: Negative  Psychiatric/Behavioral: Negative  Objective:      /70 (BP Location: Right arm, Patient Position: Sitting)   Pulse 70   Temp 98 4 °F (36 9 °C) (Tympanic)   Resp 18   Ht 5' 7" (1 702 m)   Wt 81 2 kg (179 lb)   BMI 28 04 kg/m²          Physical Exam   Constitutional: He is oriented to person, place, and time  He appears well-developed and well-nourished  Cardiovascular: Normal rate  Pulses:       Femoral pulses are 2+ on the left side  2+ graft pulse in the upper thigh with 1+ graft pulse in the distal thigh and at the knee  Nonpalpable pedal pulses on the left  The foot is ruborous on dependency  Musculoskeletal: Normal range of motion   He exhibits edema  Neurological: He is alert and oriented to person, place, and time  Skin: Skin is warm and dry  Left foot is ruborous on dependency  Psychiatric: He has a normal mood and affect

## 2018-09-26 ENCOUNTER — TELEPHONE (OUTPATIENT)
Dept: VASCULAR SURGERY | Facility: CLINIC | Age: 78
End: 2018-09-26

## 2018-09-26 ENCOUNTER — TELEPHONE (OUTPATIENT)
Dept: RADIOLOGY | Facility: HOSPITAL | Age: 78
End: 2018-09-26

## 2018-09-26 DIAGNOSIS — T82.858A BYPASS GRAFT STENOSIS, INITIAL ENCOUNTER (HCC): Primary | ICD-10-CM

## 2018-09-26 RX ORDER — SODIUM CHLORIDE 9 MG/ML
75 INJECTION, SOLUTION INTRAVENOUS CONTINUOUS
Status: CANCELLED | OUTPATIENT
Start: 2018-09-26

## 2018-09-27 ENCOUNTER — TELEPHONE (OUTPATIENT)
Dept: RADIOLOGY | Facility: HOSPITAL | Age: 78
End: 2018-09-27

## 2018-09-28 ENCOUNTER — TRANSCRIBE ORDERS (OUTPATIENT)
Dept: LAB | Facility: CLINIC | Age: 78
End: 2018-09-28

## 2018-09-28 ENCOUNTER — APPOINTMENT (OUTPATIENT)
Dept: LAB | Facility: CLINIC | Age: 78
End: 2018-09-28
Payer: MEDICARE

## 2018-09-28 DIAGNOSIS — T82.858A BYPASS GRAFT STENOSIS, INITIAL ENCOUNTER (HCC): Chronic | ICD-10-CM

## 2018-09-28 LAB
ANION GAP SERPL CALCULATED.3IONS-SCNC: 5 MMOL/L (ref 4–13)
BUN SERPL-MCNC: 15 MG/DL (ref 5–25)
CALCIUM SERPL-MCNC: 9 MG/DL (ref 8.3–10.1)
CHLORIDE SERPL-SCNC: 106 MMOL/L (ref 100–108)
CO2 SERPL-SCNC: 27 MMOL/L (ref 21–32)
CREAT SERPL-MCNC: 1.21 MG/DL (ref 0.6–1.3)
ERYTHROCYTE [DISTWIDTH] IN BLOOD BY AUTOMATED COUNT: 13.3 % (ref 11.6–15.1)
GFR SERPL CREATININE-BSD FRML MDRD: 57 ML/MIN/1.73SQ M
GLUCOSE P FAST SERPL-MCNC: 126 MG/DL (ref 65–99)
HCT VFR BLD AUTO: 44.1 % (ref 36.5–49.3)
HGB BLD-MCNC: 13.7 G/DL (ref 12–17)
INR PPP: 1.01 (ref 0.86–1.17)
MCH RBC QN AUTO: 27.9 PG (ref 26.8–34.3)
MCHC RBC AUTO-ENTMCNC: 31.1 G/DL (ref 31.4–37.4)
MCV RBC AUTO: 90 FL (ref 82–98)
PLATELET # BLD AUTO: 215 THOUSANDS/UL (ref 149–390)
PMV BLD AUTO: 11.2 FL (ref 8.9–12.7)
POTASSIUM SERPL-SCNC: 4.7 MMOL/L (ref 3.5–5.3)
PROTHROMBIN TIME: 13.4 SECONDS (ref 11.8–14.2)
RBC # BLD AUTO: 4.91 MILLION/UL (ref 3.88–5.62)
SODIUM SERPL-SCNC: 138 MMOL/L (ref 136–145)
WBC # BLD AUTO: 7.03 THOUSAND/UL (ref 4.31–10.16)

## 2018-09-28 PROCEDURE — 80048 BASIC METABOLIC PNL TOTAL CA: CPT

## 2018-09-28 PROCEDURE — 85610 PROTHROMBIN TIME: CPT

## 2018-09-28 PROCEDURE — 85027 COMPLETE CBC AUTOMATED: CPT

## 2018-09-28 PROCEDURE — 36415 COLL VENOUS BLD VENIPUNCTURE: CPT

## 2018-10-04 ENCOUNTER — HOSPITAL ENCOUNTER (OUTPATIENT)
Dept: RADIOLOGY | Facility: HOSPITAL | Age: 78
Discharge: HOME | End: 2018-10-04
Attending: SURGERY | Admitting: SURGERY
Payer: MEDICARE

## 2018-10-04 VITALS
BODY MASS INDEX: 27.13 KG/M2 | OXYGEN SATURATION: 95 % | HEART RATE: 60 BPM | HEIGHT: 68 IN | DIASTOLIC BLOOD PRESSURE: 63 MMHG | RESPIRATION RATE: 16 BRPM | TEMPERATURE: 98.2 F | SYSTOLIC BLOOD PRESSURE: 138 MMHG | WEIGHT: 179 LBS

## 2018-10-04 DIAGNOSIS — T82.858A BYPASS GRAFT STENOSIS, INITIAL ENCOUNTER (HCC): ICD-10-CM

## 2018-10-04 PROCEDURE — C1894 INTRO/SHEATH, NON-LASER: HCPCS

## 2018-10-04 PROCEDURE — C1769 GUIDE WIRE: HCPCS

## 2018-10-04 PROCEDURE — 99153 MOD SED SAME PHYS/QHP EA: CPT

## 2018-10-04 PROCEDURE — 37224 PR REVSC OPN/PRG FEM/POP W/ANGIOPLASTY UNI: CPT | Performed by: SURGERY

## 2018-10-04 PROCEDURE — C1725 CATH, TRANSLUMIN NON-LASER: HCPCS

## 2018-10-04 PROCEDURE — 75710 ARTERY X-RAYS ARM/LEG: CPT | Performed by: SURGERY

## 2018-10-04 PROCEDURE — 37224 HB FEM/POPL REVAS W/TLA: CPT

## 2018-10-04 PROCEDURE — 99152 MOD SED SAME PHYS/QHP 5/>YRS: CPT | Performed by: SURGERY

## 2018-10-04 PROCEDURE — C2623 CATH, TRANSLUMIN, DRUG-COAT: HCPCS

## 2018-10-04 PROCEDURE — 76937 US GUIDE VASCULAR ACCESS: CPT

## 2018-10-04 PROCEDURE — C1760 CLOSURE DEV, VASC: HCPCS

## 2018-10-04 PROCEDURE — 75710 ARTERY X-RAYS ARM/LEG: CPT

## 2018-10-04 PROCEDURE — C1887 CATHETER, GUIDING: HCPCS

## 2018-10-04 PROCEDURE — 99152 MOD SED SAME PHYS/QHP 5/>YRS: CPT

## 2018-10-04 RX ORDER — HEPARIN SODIUM 1000 [USP'U]/ML
INJECTION, SOLUTION INTRAVENOUS; SUBCUTANEOUS CODE/TRAUMA/SEDATION MEDICATION
Status: COMPLETED | OUTPATIENT
Start: 2018-10-04 | End: 2018-10-04

## 2018-10-04 RX ORDER — CLOPIDOGREL BISULFATE 75 MG/1
75 TABLET ORAL ONCE
Status: COMPLETED | OUTPATIENT
Start: 2018-10-04 | End: 2018-10-04

## 2018-10-04 RX ORDER — SODIUM CHLORIDE 9 MG/ML
75 INJECTION, SOLUTION INTRAVENOUS CONTINUOUS
Status: DISCONTINUED | OUTPATIENT
Start: 2018-10-04 | End: 2018-10-04 | Stop reason: HOSPADM

## 2018-10-04 RX ORDER — MIDAZOLAM HYDROCHLORIDE 1 MG/ML
INJECTION INTRAMUSCULAR; INTRAVENOUS CODE/TRAUMA/SEDATION MEDICATION
Status: COMPLETED | OUTPATIENT
Start: 2018-10-04 | End: 2018-10-04

## 2018-10-04 RX ORDER — SODIUM CHLORIDE 9 MG/ML
100 INJECTION, SOLUTION INTRAVENOUS CONTINUOUS
Status: DISCONTINUED | OUTPATIENT
Start: 2018-10-04 | End: 2018-10-04 | Stop reason: HOSPADM

## 2018-10-04 RX ORDER — FENTANYL CITRATE 50 UG/ML
INJECTION, SOLUTION INTRAMUSCULAR; INTRAVENOUS CODE/TRAUMA/SEDATION MEDICATION
Status: COMPLETED | OUTPATIENT
Start: 2018-10-04 | End: 2018-10-04

## 2018-10-04 RX ADMIN — SODIUM CHLORIDE 75 ML/HR: 0.9 INJECTION, SOLUTION INTRAVENOUS at 07:09

## 2018-10-04 RX ADMIN — FENTANYL CITRATE 50 MCG: 50 INJECTION, SOLUTION INTRAMUSCULAR; INTRAVENOUS at 08:32

## 2018-10-04 RX ADMIN — FENTANYL CITRATE 25 MCG: 50 INJECTION, SOLUTION INTRAMUSCULAR; INTRAVENOUS at 08:35

## 2018-10-04 RX ADMIN — HEPARIN SODIUM 5000 UNITS: 1000 INJECTION INTRAVENOUS; SUBCUTANEOUS at 08:44

## 2018-10-04 RX ADMIN — SODIUM CHLORIDE 100 ML/HR: 0.9 INJECTION, SOLUTION INTRAVENOUS at 10:22

## 2018-10-04 RX ADMIN — MIDAZOLAM 0.5 MG: 1 INJECTION INTRAMUSCULAR; INTRAVENOUS at 08:35

## 2018-10-04 RX ADMIN — MIDAZOLAM 1 MG: 1 INJECTION INTRAMUSCULAR; INTRAVENOUS at 08:32

## 2018-10-04 RX ADMIN — FENTANYL CITRATE 25 MCG: 50 INJECTION, SOLUTION INTRAMUSCULAR; INTRAVENOUS at 09:06

## 2018-10-04 RX ADMIN — IODIXANOL 60 ML: 320 INJECTION, SOLUTION INTRAVASCULAR at 13:31

## 2018-10-04 RX ADMIN — Medication 15 MG: at 10:00

## 2018-10-04 RX ADMIN — MIDAZOLAM 0.5 MG: 1 INJECTION INTRAMUSCULAR; INTRAVENOUS at 09:06

## 2018-10-04 RX ADMIN — CLOPIDOGREL 75 MG: 75 TABLET, FILM COATED ORAL at 12:58

## 2018-10-04 NOTE — DISCHARGE INSTRUCTIONS
DISCHARGE INSTRUCTIONS  ARTERIOGRAM/ANGIOPLASTY/STENT    Following discharge from the hospital, you may have some questions about your procedure, your activities or your general condition  These instructions may answer some of your questions and help you adjust during the first few weeks following your operation  ACTIVITY: The evening following the procedure you should be sure someone remains with you until the next morning  Rest as much as possible, sitting, lying or reclining  Use the stairs as little as possible  The following day, limit your activity to walking  Avoid stooping or heavy lifting (no more than 30 lbs) for the first three days  Walking up steps and normal activities may be resumed as you feel ready  You should not drive a car for at least two days following discharge from the hospital  You may ride in a car  If you have any questions regarding a particular activity, please discuss with your doctor or nurse before you are discharged  DIET:  Resume your normal diet  Try to eat low fat and low cholesterol foods  Drink more liquids than usual for the next 24 hours  INCISION: Your doctor may have chosen to use a type of adhesive glue, to close your incision  The glue is used to cover the incision, assist in closure, and prevent contamination  This adhesive will darken and peel away on its own within one to two weeks  You may shower after the procedure, but do not scrub the incision  Sitting in a tub is not recommended for the two days following the procedure or if you have any open wounds  It is normal to have some bruising, swelling or mild discoloration around the incision  IF increasing redness or pain develops, call our office immediately  If present, you may remove the band-aid or steri-strips over your wound after two days  If you notice any active bleeding at the site, apply pressure to the site and call our office ([phone])      FOLLOW UP STUDIES:  Your doctor will discuss whether further treatments or follow-up studies are necessary at your first post procedure visit  PLEASE CALL THE OFFICE IF YOU HAVE ANY QUESTIONS  [phone] [place] FREE [phone]  [address] [facility] , [address], [facility], [address]  99[address] [facility], [address]  [address], [place], [address]  [address] [facility]3], [facility], [facility], [address]    [address], [address]th [address], Ajit Gannon 34  [address] [facility] 97   1201 [facility], [address] [facility], [facility], [address] [facility]  One [facility], [address] [facility], Indra Stewart

## 2018-10-15 ENCOUNTER — OFFICE VISIT (OUTPATIENT)
Dept: VASCULAR SURGERY | Facility: CLINIC | Age: 78
End: 2018-10-15
Payer: MEDICARE

## 2018-10-15 VITALS
SYSTOLIC BLOOD PRESSURE: 118 MMHG | HEART RATE: 78 BPM | HEIGHT: 68 IN | DIASTOLIC BLOOD PRESSURE: 66 MMHG | TEMPERATURE: 97.4 F | BODY MASS INDEX: 26.83 KG/M2 | WEIGHT: 177 LBS | RESPIRATION RATE: 18 BRPM

## 2018-10-15 DIAGNOSIS — I73.9 PERIPHERAL ARTERIAL DISEASE (HCC): ICD-10-CM

## 2018-10-15 DIAGNOSIS — E11.9 CONTROLLED TYPE 2 DIABETES MELLITUS WITHOUT COMPLICATION, WITHOUT LONG-TERM CURRENT USE OF INSULIN (HCC): ICD-10-CM

## 2018-10-15 DIAGNOSIS — I70.219 ATHEROSCLEROSIS OF ARTERY OF EXTREMITY WITH INTERMITTENT CLAUDICATION (HCC): Primary | Chronic | ICD-10-CM

## 2018-10-15 DIAGNOSIS — I65.23 ASYMPTOMATIC BILATERAL CAROTID ARTERY STENOSIS: ICD-10-CM

## 2018-10-15 DIAGNOSIS — Z87.891 HISTORY OF CIGARETTE SMOKING: Chronic | ICD-10-CM

## 2018-10-15 DIAGNOSIS — I70.202 POPLITEAL ARTERY OCCLUSION, LEFT (HCC): Chronic | ICD-10-CM

## 2018-10-15 PROCEDURE — 99213 OFFICE O/P EST LOW 20 MIN: CPT | Performed by: SURGERY

## 2018-10-15 NOTE — PROGRESS NOTES
Assessment/Plan:    Controlled diabetes mellitus ([facility] 75 )  Discussed importance of BG control  Atherosclerosis of artery of extremity with intermittent claudication (HCC)  Claudication resolved w/o recurrence at this time after recent LLE anastomotic PTA  Cont clopidogrel/asa/statin/DM control/smoking cessation  F/u surveillance GIUSEPPE/duplex in 3 months  Patients vascular pathology discussed at length  Risks and benefits discussed as well as all treatment options  The above information discussed with patient and all questions were addressed and answered  Popliteal artery occlusion, left (HCC)  Claudication resolved w/o recurrence at this time after recent LLE anastomotic PTA  Cont clopidogrel/asa/statin/DM control/smoking cessation  F/u surveillance GIUSEPPE/duplex in 3 months      Coronary artery disease  Claudication resolved w/o recurrence at this time after recent LLE anastomotic PTA  Cont clopidogrel/asa/statin/DM control/smoking cessation  F/u surveillance GIUSEPPE/duplex in 3 months      Peripheral arterial disease ([facility] )  Claudication resolved w/o recurrence at this time after recent LLE anastomotic PTA  Cont clopidogrel/asa/statin/DM control/smoking cessation  F/u surveillance GIUSEPPE/duplex in 3 months      Hyperlipidemia  Cont statin    History of cigarette smoking  Discussed importance of continued abstinenece    Asymptomatic carotid artery stenosis  There is <50% stenosis noted in the internal carotid artery  Plaque is  heterogenous and irregular  Vertebral artery flow is antegrade  There is no significant subclavian artery  disease  LEFT:  There is 50-69% stenosis noted in the internal carotid artery  Plaque is  heterogenous and irregular  Vertebral artery flow is antegrade  There is no significant subclavian artery  Disease    Cont bi-annual surveillance testing        Diagnoses and all orders for this visit:    Atherosclerosis of artery of extremity with intermittent claudication (HCC)  -     VAS giuseppe single level; Future  -     VAS lower limb arterial duplex, limited/unilateral; Future    Popliteal artery occlusion, left (HCC)  -     VAS abdi single level; Future  -     VAS lower limb arterial duplex, limited/unilateral; Future    Peripheral arterial disease (HCC)  -     VAS abdi single level; Future  -     VAS lower limb arterial duplex, limited/unilateral; Future    Controlled type 2 diabetes mellitus without complication, without long-term current use of insulin (HCC)    History of cigarette smoking    Asymptomatic bilateral carotid artery stenosis          Subjective:      Patient ID: Evonne Pham is a 77 y o  male  Patient is s/p A-gram  Patient has less numbness and tingling in the L foot since the procedure  He denies foot coldness  He denies RLE symptoms  Patient takes Plavix and statin daily  Doing well following PTA of anastomosis of jump graft from AK fem-pop to BK pop bypass  He has resolution of his claudication symptoms and states the numbness to his plantar portion of his foot is improved  At this time we will plan standard follow-up with duplex evaluation in 3 months  The following portions of the patient's history were reviewed and updated as appropriate: allergies, current medications, past family history, past medical history, past social history, past surgical history and problem list     Review of Systems   Constitutional: Negative  HENT: Negative  Eyes: Negative  Respiratory: Negative  Cardiovascular: Negative  Gastrointestinal: Negative  Endocrine: Negative  Genitourinary: Negative  Musculoskeletal: Negative  Skin: Negative  Allergic/Immunologic: Negative  Neurological: Negative  Hematological: Negative  Psychiatric/Behavioral: Negative            Objective:      /66 (BP Location: Left arm, Patient Position: Sitting, Cuff Size: Adult)   Pulse 78   Temp (!) 97 4 °F (36 3 °C) (Tympanic)   Resp 18   Ht 5' 8" (1 727 m)   Wt 80 3 kg (177 lb)   BMI 26 91 kg/m²          Physical Exam   Constitutional: He is oriented to person, place, and time  He appears well-developed and well-nourished  HENT:   Head: Normocephalic and atraumatic  Mouth/Throat: Oropharynx is clear and moist    Eyes: Pupils are equal, round, and reactive to light  Conjunctivae and EOM are normal    Neck: Normal range of motion  Neck supple  No JVD present  Cardiovascular: Normal rate, regular rhythm and normal heart sounds  Easily palpable graft pulses b/l  Robust signals at DP b/l  Palpable PT b/l  Surgical wounds intact   Pulmonary/Chest: Effort normal and breath sounds normal  No stridor  No respiratory distress  He has no wheezes  He has no rales  He exhibits no tenderness  Abdominal: Soft  He exhibits no distension and no mass  There is no tenderness  There is no rebound and no guarding  Musculoskeletal: Normal range of motion  He exhibits no tenderness or deformity  Neurological: He is alert and oriented to person, place, and time  Patient reports numbness to the left plantar foot has significantly improved post angioplasty  Skin: Skin is warm and dry  No rash noted  No erythema  Psychiatric: He has a normal mood and affect  His behavior is normal  Thought content normal    Nursing note and vitals reviewed

## 2018-10-15 NOTE — ASSESSMENT & PLAN NOTE
Claudication resolved w/o recurrence at this time after recent LLE anastomotic PTA  Cont clopidogrel/asa/statin/DM control/smoking cessation  F/u surveillance GIUSEPPE/duplex in 3 months

## 2018-10-15 NOTE — LETTER
October 15, 2018     Rebecca Gaona MD  [address]  [address] [facility], [address] [place] [address]    Patient: Ashley Brochure   YOB: 1940   Date of Visit: 10/15/2018       Dear Dr Dariel Dill: Thank you for referring Iza Ervin to me for evaluation  Below are my notes for this consultation  If you have questions, please do not hesitate to call me  I look forward to following your patient along with you           Sincerely,        Tomasa Gregory MD        CC: No Recipients

## 2018-10-15 NOTE — ASSESSMENT & PLAN NOTE
There is <50% stenosis noted in the internal carotid artery  Plaque is  heterogenous and irregular  Vertebral artery flow is antegrade  There is no significant subclavian artery  disease  LEFT:  There is 50-69% stenosis noted in the internal carotid artery  Plaque is  heterogenous and irregular  Vertebral artery flow is antegrade  There is no significant subclavian artery  Disease    Cont bi-annual surveillance testing

## 2018-10-15 NOTE — ASSESSMENT & PLAN NOTE
Claudication resolved w/o recurrence at this time after recent LLE anastomotic PTA  Cont clopidogrel/asa/statin/DM control/smoking cessation  F/u surveillance GIUSEPPE/duplex in 3 months  Patients vascular pathology discussed at length  Risks and benefits discussed as well as all treatment options  The above information discussed with patient and all questions were addressed and answered

## 2018-10-15 NOTE — PATIENT INSTRUCTIONS
Continued smoking cessation discussed  Cont walking exercise  Importance of diet with regards to glucose control

## 2018-10-23 ENCOUNTER — TELEPHONE (OUTPATIENT)
Dept: ADMINISTRATIVE | Facility: HOSPITAL | Age: 78
End: 2018-10-23

## 2018-11-02 ENCOUNTER — TELEPHONE (OUTPATIENT)
Dept: INTERNAL MEDICINE CLINIC | Facility: CLINIC | Age: 78
End: 2018-11-02

## 2018-11-02 NOTE — TELEPHONE ENCOUNTER
Pt stated he doesn't really take them , just like to have them just in case    He also wants to know about his road rage medication  Unsure what he is talking about , but he wants to know can he up the dosage   He stated he gets the med from the [place]  ?       Please advise

## 2018-11-02 NOTE — TELEPHONE ENCOUNTER
Pt called asking for a refill his nitroglycerin  I asked him what strength he needs, and he is unsure    He just stated he picks it up     Please advise  It is not on your last note  Strength and how he should take it      Cb [phone]    Pharm [facility] on chart

## 2018-11-02 NOTE — TELEPHONE ENCOUNTER
Nitroglycerine 1/150 one tab sublingual p r n  for chest pain-dispense 30 with 4 refills-make sure the patient is not calling for this because he is having recurrent chest pain or pressure-please let me know

## 2018-11-05 NOTE — TELEPHONE ENCOUNTER
Pt came to the window  Asking about his road rage medication  Unsure what he is talking about , but he wants to know can he up the dosage   Stated he takes 1 pill , but wants to know if he can take it 1 5  He stated he gets the med from the [place]  ?

## 2018-11-05 NOTE — TELEPHONE ENCOUNTER
That depends on his current dose-the medicine he is talking about is paroxetine-please find out his current strength and how he is taking the medicine and let me know

## 2018-11-12 NOTE — TELEPHONE ENCOUNTER
I will need to discuss things with him if he is ready on that dose -have him bring all his medicinse to the office and schedule him at 7:30 a m  on November the 13 if that slot is still open-specifically have him bring all of his medicines in a bag and we will talk then about what to do

## 2018-11-19 ENCOUNTER — OFFICE VISIT (OUTPATIENT)
Dept: INTERNAL MEDICINE CLINIC | Facility: CLINIC | Age: 78
End: 2018-11-19
Payer: MEDICARE

## 2018-11-19 VITALS
BODY MASS INDEX: 28.07 KG/M2 | DIASTOLIC BLOOD PRESSURE: 80 MMHG | WEIGHT: 184.6 LBS | RESPIRATION RATE: 14 BRPM | HEART RATE: 72 BPM | SYSTOLIC BLOOD PRESSURE: 130 MMHG

## 2018-11-19 DIAGNOSIS — I73.9 PERIPHERAL ARTERIAL DISEASE (HCC): ICD-10-CM

## 2018-11-19 DIAGNOSIS — I25.10 CORONARY ARTERY DISEASE INVOLVING NATIVE HEART WITHOUT ANGINA PECTORIS, UNSPECIFIED VESSEL OR LESION TYPE: ICD-10-CM

## 2018-11-19 DIAGNOSIS — E11.9 CONTROLLED TYPE 2 DIABETES MELLITUS WITHOUT COMPLICATION, WITHOUT LONG-TERM CURRENT USE OF INSULIN (HCC): ICD-10-CM

## 2018-11-19 DIAGNOSIS — I10 ESSENTIAL HYPERTENSION: ICD-10-CM

## 2018-11-19 DIAGNOSIS — F41.9 ANXIETY: Primary | ICD-10-CM

## 2018-11-19 PROCEDURE — 99215 OFFICE O/P EST HI 40 MIN: CPT | Performed by: INTERNAL MEDICINE

## 2018-11-19 NOTE — PROGRESS NOTES
Assessment/Plan:  1  Health maintenance-she has a prescription for Shingrix vaccine-wants to obtain this via [place]  2  Anxiety with anger issues-she is on Paxil 40 milligrams daily  He will increase to 60 milligrams  I told him if unresponsive he is going to need formal psychiatric referral  3  Clinical exam consistent with some degree neuropathy-felt related to his diabetes  Continuing to monitor  4  Peripheral arterial disease-had prior left above the knee popliteal to below the knee bypass  Follow-up studies show stenosis and he underwent left lower extremity anastomotic PTH-PT anastomosis of jump graft from above-the-knee fem-pop below the knee bypass-claudication symptoms improved vascular surgery is doing another study 3 months  5  Concerned about memory loss-always worried about small vessel disease  If he has worsening issues he will need additional evaluation  6   Anemia-has a history of iron deficiency anemia -colonoscopy in April 2014 showed diverticular disease   Repeat endoscopy colonoscopy over the past year half by outside surgeon benign other than diverticular disease   CT scan of abdomen showed nephrolithiasis   Small-bowel endoscopy recommended but patient deferred   Protein electrophoresis, B12 level, TSH, methylmalonic acid level all normal   Hemoglobin had been as low as 11 7 -hemoglobin said that improved-hemoglobin prior to surgery was over 14   Hemoglobin now at 10 8 felt related to blood loss with his surgery  Jerman Dumont will have repeat prior to his next visit  7   Coronary artery disease-Myoview shows only mildly severe reversible defect in the inferior wall   He was asymptomatic   Cardiology cleared him for surgery he had no postop issues   He is now on Plavix alone and not on aspirin  8   Right shoulder pain-there was concerned about frozen shoulder and he had been referred to orthopedic physician--review next visit-  9   Previously noted weight loss -5 pounds without obvious etiology   Screening labs normal   Chest x-ray normal   May been related to metformin-weight has stabilized-not an issue  10   Abnormal chest x-ray-felt related to nipple shadow-had repeat chest x-ray with nipple markers which were benign  11  Tobacco abuse-stop smoking 3 months ago   Felt poorly when he took [address] felt to be a good candidate for Chantix-says he is not smoking at this point  12  Ndmcmbxdlozwora-cokpkzcuhziz-nw CT scan of the abdomen has nonobstructing stones   He knows to maintain high fluid intake  13   Hypertension-adequate control on current dose of ACE-inhibitor plus beta-blocker-no adjustments made in that regard  12   Diabetes mellitus-most recent A1c 6 7 in the office    Can't tolerate short-acting metformin and only tolerates 1000 milligrams of long-acting metformin   On glipizide   Again strongly recommended he monitors blood sugar     All other problems as per note September 2013        MEDICAL REGIMEN:  Paxil 60 milligrams-daily using 40 milligram dosing, pantoprazole 40 milligrams daily, glipizide 5 milligrams in a m  and 2 5 milligrams in the p m , metoprolol tartrate 25 milligrams b i d , metformin  1000 milligrams daily, lisinopril 20 milligrams daily, , Plavix 75 milligrams a day,, vitamin D3/2000 units a day, Colace, Crestor 5 milligrams daily    Appointment over the next 2 months with prior CBC, chemistry profile, cholesterol profile, hemoglobin A1c, urine for microalbumin, iron, ferritin  No problem-specific Assessment & Plan notes found for this encounter  Diagnoses and all orders for this visit:    Anxiety    Controlled type 2 diabetes mellitus without complication, without long-term current use of insulin ([facility])    Essential hypertension    Peripheral arterial disease ([facility] )    Coronary artery disease involving native heart without angina pectoris, unspecified vessel or lesion type          Subjective:      Patient ID: Valora Snellen is a 77 y o  male      He was brought in today to review his anger issues  He is on therapy with Paxil 40 milligrams daily  In the past anger was severe but was improved on Paxil  He recently has had some episodes while in public and dealing with individuals and stores  His significant other had a come in and get him out of the store in each situation  We discussed this in detail  He is not significant alcohol user  He is not using any recreational drugs  We had a long discussion today regarding the above  We talked about maximal dose of Paxil  He wanted to know I just have more of this pill  We reviewed that typically in some disorders going up to 60 milligrams is of no benefit but occasionally can be  He will try 60 milligrams but I feel if unimproved he may require formal psychiatric referral we talked about that today  We had a very long discussion today regarding this issue  This was a 40 minutes visit with more than 50% of time spent counseling the patient formulating a treatment plan    He had called the office requesting nitroglycerin  Want to confirm that he was not having any and any says he is not  He was seen by vascular surgery they felt he had stenosis of the left femoral below-the-knee popliteal artery bypass graft  Duplex study showed progression of graft stenosis  He underwent left lower extremity anastomotic PTA-specifically PTA anastomosis of jump graft from above-the-knee fem-pop to below the knee  Claudication symptoms improved numbness of the foot improved    He has known hypertension  BP adequately controlled on current regimen  Avoiding salt and decongestants  Denies hematemesis pounding of his heart sweats and headache  This patient denies any systemic symptoms  Specifically there has been no evidence of fever, night sweats, significant weight loss or significant decrease in appetite  He has known diabetes  He has not had any hypoglycemia    A have to watch carefully with his history of vascular disease  We reviewed this in detail  He tends to eat a large amount of donuts and we went over this  The following portions of the patient's history were reviewed and updated as appropriate: allergies, current medications, past family history, past medical history, past social history, past surgical history and problem list     Review of Systems   Constitutional: Negative  Respiratory: Negative  Cardiovascular: Negative  Gastrointestinal: Negative  Endocrine: Negative  Genitourinary: Negative  Musculoskeletal: Negative  Leg pain   Neurological: Negative  Hematological: Negative  Psychiatric/Behavioral: Positive for agitation and behavioral problems  The patient is nervous/anxious  Objective:       Wt 83 7 kg (184 lb 9 6 oz) Comment: PT WEARING COAT AND BOOTS BUT WOULDN'T TAKE THEM OFF  BMI 28 07 kg/m²          Physical Exam

## 2018-11-27 ENCOUNTER — TELEPHONE (OUTPATIENT)
Dept: INTERNAL MEDICINE CLINIC | Facility: CLINIC | Age: 78
End: 2018-11-27

## 2018-11-27 DIAGNOSIS — E78.01 FAMILIAL HYPERCHOLESTEROLEMIA: Primary | ICD-10-CM

## 2018-11-27 DIAGNOSIS — I10 HYPERTENSION, UNSPECIFIED TYPE: ICD-10-CM

## 2018-11-27 DIAGNOSIS — R73.03 PREDIABETES: ICD-10-CM

## 2018-11-27 DIAGNOSIS — D50.8 OTHER IRON DEFICIENCY ANEMIA: ICD-10-CM

## 2019-01-02 PROBLEM — E11.51 TYPE 2 DIABETES MELLITUS WITH DIABETIC PERIPHERAL ANGIOPATHY WITHOUT GANGRENE (HCC): Status: ACTIVE | Noted: 2019-01-02

## 2019-01-14 ENCOUNTER — APPOINTMENT (OUTPATIENT)
Dept: LAB | Facility: CLINIC | Age: 79
End: 2019-01-14
Payer: MEDICARE

## 2019-01-14 DIAGNOSIS — E78.01 FAMILIAL HYPERCHOLESTEROLEMIA: ICD-10-CM

## 2019-01-14 DIAGNOSIS — D50.8 OTHER IRON DEFICIENCY ANEMIA: ICD-10-CM

## 2019-01-14 DIAGNOSIS — D64.9 ANEMIA, UNSPECIFIED TYPE: ICD-10-CM

## 2019-01-14 DIAGNOSIS — I10 HYPERTENSION, UNSPECIFIED TYPE: ICD-10-CM

## 2019-01-14 DIAGNOSIS — R73.03 PREDIABETES: ICD-10-CM

## 2019-01-14 DIAGNOSIS — R53.83 FATIGUE, UNSPECIFIED TYPE: ICD-10-CM

## 2019-01-14 DIAGNOSIS — E11.9 CONTROLLED TYPE 2 DIABETES MELLITUS WITHOUT COMPLICATION, WITHOUT LONG-TERM CURRENT USE OF INSULIN (HCC): ICD-10-CM

## 2019-01-14 DIAGNOSIS — E78.5 HYPERLIPIDEMIA, UNSPECIFIED HYPERLIPIDEMIA TYPE: ICD-10-CM

## 2019-01-14 DIAGNOSIS — R41.3 MEMORY LOSS: ICD-10-CM

## 2019-01-14 LAB
ALBUMIN SERPL BCP-MCNC: 3.8 G/DL (ref 3.5–5)
ALP SERPL-CCNC: 96 U/L (ref 46–116)
ALT SERPL W P-5'-P-CCNC: 15 U/L (ref 12–78)
ANION GAP SERPL CALCULATED.3IONS-SCNC: 8 MMOL/L (ref 4–13)
AST SERPL W P-5'-P-CCNC: 10 U/L (ref 5–45)
BASOPHILS # BLD AUTO: 0.12 THOUSANDS/ΜL (ref 0–0.1)
BASOPHILS NFR BLD AUTO: 2 % (ref 0–1)
BILIRUB SERPL-MCNC: 0.59 MG/DL (ref 0.2–1)
BUN SERPL-MCNC: 13 MG/DL (ref 5–25)
CALCIUM SERPL-MCNC: 9.5 MG/DL (ref 8.3–10.1)
CHLORIDE SERPL-SCNC: 106 MMOL/L (ref 100–108)
CHOLEST SERPL-MCNC: 160 MG/DL (ref 50–200)
CO2 SERPL-SCNC: 25 MMOL/L (ref 21–32)
CREAT SERPL-MCNC: 1.23 MG/DL (ref 0.6–1.3)
CREAT UR-MCNC: 116 MG/DL
EOSINOPHIL # BLD AUTO: 0.36 THOUSAND/ΜL (ref 0–0.61)
EOSINOPHIL NFR BLD AUTO: 6 % (ref 0–6)
ERYTHROCYTE [DISTWIDTH] IN BLOOD BY AUTOMATED COUNT: 13.2 % (ref 11.6–15.1)
EST. AVERAGE GLUCOSE BLD GHB EST-MCNC: 186 MG/DL
FERRITIN SERPL-MCNC: 12 NG/ML (ref 8–388)
GFR SERPL CREATININE-BSD FRML MDRD: 55 ML/MIN/1.73SQ M
GLUCOSE P FAST SERPL-MCNC: 156 MG/DL (ref 65–99)
HBA1C MFR BLD: 8.1 % (ref 4.2–6.3)
HCT VFR BLD AUTO: 42.4 % (ref 36.5–49.3)
HDLC SERPL-MCNC: 57 MG/DL (ref 40–60)
HGB BLD-MCNC: 13.2 G/DL (ref 12–17)
IMM GRANULOCYTES # BLD AUTO: 0.01 THOUSAND/UL (ref 0–0.2)
IMM GRANULOCYTES NFR BLD AUTO: 0 % (ref 0–2)
IRON SERPL-MCNC: 52 UG/DL (ref 65–175)
LDLC SERPL DIRECT ASSAY-MCNC: 85 MG/DL (ref 0–100)
LYMPHOCYTES # BLD AUTO: 1.92 THOUSANDS/ΜL (ref 0.6–4.47)
LYMPHOCYTES NFR BLD AUTO: 29 % (ref 14–44)
MCH RBC QN AUTO: 27.5 PG (ref 26.8–34.3)
MCHC RBC AUTO-ENTMCNC: 31.1 G/DL (ref 31.4–37.4)
MCV RBC AUTO: 88 FL (ref 82–98)
MICROALBUMIN UR-MCNC: 30.7 MG/L (ref 0–20)
MICROALBUMIN/CREAT 24H UR: 26 MG/G CREATININE (ref 0–30)
MONOCYTES # BLD AUTO: 0.35 THOUSAND/ΜL (ref 0.17–1.22)
MONOCYTES NFR BLD AUTO: 5 % (ref 4–12)
NEUTROPHILS # BLD AUTO: 3.79 THOUSANDS/ΜL (ref 1.85–7.62)
NEUTS SEG NFR BLD AUTO: 58 % (ref 43–75)
NRBC BLD AUTO-RTO: 0 /100 WBCS
PLATELET # BLD AUTO: 234 THOUSANDS/UL (ref 149–390)
PMV BLD AUTO: 11.1 FL (ref 8.9–12.7)
POTASSIUM SERPL-SCNC: 4.7 MMOL/L (ref 3.5–5.3)
PROT SERPL-MCNC: 7.6 G/DL (ref 6.4–8.2)
RBC # BLD AUTO: 4.8 MILLION/UL (ref 3.88–5.62)
SODIUM SERPL-SCNC: 139 MMOL/L (ref 136–145)
T4 FREE SERPL-MCNC: 0.88 NG/DL (ref 0.76–1.46)
TRIGL SERPL-MCNC: 112 MG/DL
TSH SERPL DL<=0.05 MIU/L-ACNC: 1.56 UIU/ML (ref 0.36–3.74)
VIT B12 SERPL-MCNC: 294 PG/ML (ref 100–900)
WBC # BLD AUTO: 6.55 THOUSAND/UL (ref 4.31–10.16)

## 2019-01-14 PROCEDURE — 36415 COLL VENOUS BLD VENIPUNCTURE: CPT

## 2019-01-14 PROCEDURE — 85025 COMPLETE CBC W/AUTO DIFF WBC: CPT

## 2019-01-14 PROCEDURE — 83721 ASSAY OF BLOOD LIPOPROTEIN: CPT

## 2019-01-14 PROCEDURE — 83036 HEMOGLOBIN GLYCOSYLATED A1C: CPT

## 2019-01-14 PROCEDURE — 82607 VITAMIN B-12: CPT

## 2019-01-14 PROCEDURE — 83540 ASSAY OF IRON: CPT

## 2019-01-14 PROCEDURE — 80053 COMPREHEN METABOLIC PANEL: CPT

## 2019-01-14 PROCEDURE — 84439 ASSAY OF FREE THYROXINE: CPT

## 2019-01-14 PROCEDURE — 80061 LIPID PANEL: CPT

## 2019-01-14 PROCEDURE — 82570 ASSAY OF URINE CREATININE: CPT | Performed by: INTERNAL MEDICINE

## 2019-01-14 PROCEDURE — 82043 UR ALBUMIN QUANTITATIVE: CPT | Performed by: INTERNAL MEDICINE

## 2019-01-14 PROCEDURE — 82728 ASSAY OF FERRITIN: CPT

## 2019-01-14 PROCEDURE — 84443 ASSAY THYROID STIM HORMONE: CPT

## 2019-01-14 PROCEDURE — 83918 ORGANIC ACIDS TOTAL QUANT: CPT

## 2019-01-16 ENCOUNTER — APPOINTMENT (OUTPATIENT)
Dept: NON INVASIVE DIAGNOSTICS | Facility: CLINIC | Age: 79
End: 2019-01-16
Payer: MEDICARE

## 2019-01-16 ENCOUNTER — HOSPITAL ENCOUNTER (OUTPATIENT)
Dept: NON INVASIVE DIAGNOSTICS | Facility: CLINIC | Age: 79
Discharge: HOME | End: 2019-01-16
Payer: MEDICARE

## 2019-01-16 DIAGNOSIS — I70.202 POPLITEAL ARTERY OCCLUSION, LEFT (HCC): Chronic | ICD-10-CM

## 2019-01-16 DIAGNOSIS — I73.9 PERIPHERAL ARTERIAL DISEASE (HCC): ICD-10-CM

## 2019-01-16 DIAGNOSIS — I70.219 ATHEROSCLEROSIS OF ARTERY OF EXTREMITY WITH INTERMITTENT CLAUDICATION (HCC): Chronic | ICD-10-CM

## 2019-01-16 LAB
METHYLMALONATE SERPL-SCNC: 188 NMOL/L (ref 0–378)
SL AMB DISCLAIMER: NORMAL

## 2019-01-16 PROCEDURE — 93926 LOWER EXTREMITY STUDY: CPT

## 2019-01-17 PROCEDURE — 93926 LOWER EXTREMITY STUDY: CPT | Performed by: SURGERY

## 2019-01-17 PROCEDURE — 93922 UPR/L XTREMITY ART 2 LEVELS: CPT | Performed by: SURGERY

## 2019-01-23 ENCOUNTER — OFFICE VISIT (OUTPATIENT)
Dept: INTERNAL MEDICINE CLINIC | Facility: CLINIC | Age: 79
End: 2019-01-23
Payer: MEDICARE

## 2019-01-23 VITALS
BODY MASS INDEX: 27.92 KG/M2 | HEART RATE: 72 BPM | DIASTOLIC BLOOD PRESSURE: 78 MMHG | HEIGHT: 68 IN | SYSTOLIC BLOOD PRESSURE: 128 MMHG | RESPIRATION RATE: 14 BRPM | WEIGHT: 184.2 LBS

## 2019-01-23 DIAGNOSIS — I73.9 PERIPHERAL ARTERIAL DISEASE (HCC): ICD-10-CM

## 2019-01-23 DIAGNOSIS — I10 ESSENTIAL HYPERTENSION: Chronic | ICD-10-CM

## 2019-01-23 DIAGNOSIS — I25.10 CORONARY ARTERY DISEASE INVOLVING NATIVE HEART WITHOUT ANGINA PECTORIS, UNSPECIFIED VESSEL OR LESION TYPE: Chronic | ICD-10-CM

## 2019-01-23 DIAGNOSIS — F41.9 ANXIETY: Chronic | ICD-10-CM

## 2019-01-23 DIAGNOSIS — F17.210 CIGARETTE NICOTINE DEPENDENCE WITHOUT COMPLICATION: ICD-10-CM

## 2019-01-23 DIAGNOSIS — E11.51 TYPE 2 DIABETES MELLITUS WITH DIABETIC PERIPHERAL ANGIOPATHY WITHOUT GANGRENE, WITHOUT LONG-TERM CURRENT USE OF INSULIN (HCC): Primary | ICD-10-CM

## 2019-01-23 PROCEDURE — 99215 OFFICE O/P EST HI 40 MIN: CPT | Performed by: INTERNAL MEDICINE

## 2019-01-23 NOTE — PROGRESS NOTES
Assessment/Plan:  1  Health maintenance-again recommended he obtain Shingrix vaccine-he wants to obtain this via the [facility]  2  Anxiety with anger issues  Was on Paxil 40 milligrams a day and I suggested he go to 60 milligrams  [facility] and has him on the 40 milligrams daily  He does have a history of tobacco abuse but felt poorly in the past when he used Wellbutrin  Await their opinion  3  Tobacco abuse-smoking 4 cigarettes per day  Felt poorly when he took Wellbutrin  Not felt to be a good candidate for Chantix  Drawing encouraged him to trying decrease smoking  4  Diabetes mellitus-without adequate control  A1c 8 1  Previously was 6 7  He does not follow diet at all  He is not checking her sugars and I encouraged him to do  He cannot tolerate short-acting metformin and only tolerates 1000 milligrams a day of long-acting metformin  On glipizide but I am worried about hypoglycemia with his habit of skipping breakfast and/or lunch  I recommended he not skip meals  We reviewed appropriate diet  [facility] with [facility] regarding [facility] use of [place] Decent would substitute for glipizide as I am concerned about the potential for hypoglycemia-she is at high risk for complications with his vascular disease  5  Clinical exam consistent with some degree neuropathy-felt related to his diabetes  Continuing to monitor  6  Peripheral arterial disease-had prior left above the knee popliteal to below the knee bypass  Follow-up studies show stenosis and he underwent left lower extremity anastomotic PTH-PT anastomosis of jump graft from above-the-knee fem-pop below the knee bypass-claudication symptoms improved vascular surgery-follow-up Doppler study done shows definite improvement in GIUSEPPE on surgery side  7  Concerned about memory loss-always worried about small vessel disease    If he has worsening issues he will need additional evaluation  8   Anemia-has a history of iron deficiency anemia -colonoscopy in April 2014 showed diverticular disease   Repeat endoscopy colonoscopy over the past year half by outside surgeon benign other than diverticular disease   CT scan of abdomen showed nephrolithiasis   Small-bowel endoscopy recommended but patient deferred   Protein electrophoresis, B12 level, TSH, methylmalonic acid level all normal   Hemoglobin had been as low as 11 7 -hemoglobin said that improved-hemoglobin prior to surgery was over 14   Hemoglobin at 10 8 postop but now on increased to a value of 13 2  He will need follow-up CBC iron ferritin in the future  9   Coronary artery disease-Myoview shows only mildly severe reversible defect in the inferior wall   He was asymptomatic   Cardiology cleared him for surgery he had no postop issues   He is now on Plavix alone and not on aspirin  10   Right shoulder pain-there was concerned about frozen shoulder and he had been referred to orthopedic physician--review next visit-  11   Abnormal chest x-ray-felt related to nipple shadow-had repeat chest x-ray with nipple markers which were benign  12   Vlonzdmkxnquqfk-xrdysrblhbxy-gr CT scan of the abdomen has nonobstructing stones   He knows to maintain high fluid intake  13   Hypertension-adequate control on current dose of ACE-inhibitor plus beta-blocker-no adjustments made in that regard  14   Diabetes mellitus-most recent A1c 6 7 in the office    Can't tolerate short-acting metformin and only tolerates 1000 milligrams of long-acting metformin   On glipizide   Again strongly recommended he monitors blood sugar     All other problems as per note September 2013        MEDICAL REGIMEN:  Paxil 40 milligrams daily, pantoprazole 40 milligrams daily, glipizide 5 milligrams in a m  and 2 5 milligrams in the p m , metoprolol tartrate 25 milligrams b i d , metformin  1000 milligrams daily, lisinopril 20 milligrams daily, , Plavix 75 milligrams a day,, vitamin D3/2000 units a day, Colace, Crestor 5 milligrams daily-hoping in the future to DC glipizide and switch to Jardiance    Returning to the office in 3 months with hemoglobin A1c in the office that day  Await results of evaluation by Psychiatry     Addendum -patient's significant other was in for appointment on February the 50 thin said the patient had a severe fall and is unable to move without extreme pain  She will be having him go by ambulance to the ER- she says he is falling frequently- REVIEW REGARDING FALLS NEXT VISIT     No problem-specific Assessment & Plan notes found for this encounter  Diagnoses and all orders for this visit:    Type 2 diabetes mellitus with diabetic peripheral angiopathy without gangrene, without long-term current use of insulin (HCC)    Peripheral arterial disease ([facility] [address] )    Essential hypertension    Coronary artery disease involving native heart without angina pectoris, unspecified vessel or lesion type    Cigarette nicotine dependence without complication    Anxiety          Subjective:      Patient ID: Meron Solsi is a 77 y o  male  We reviewed multiple issues today  Labs done as an outpatient show urine for microalbumin elevated at 31 with normal up to 20 A1c is 8 1 methylmalonic acid level normal B12 level normal free T4 and TSH normal, iron low at 52 and ferritin low at 12  LDL is 85 HDL 57 triglycerides 112 cholesterol 160 BUN 13 creatinine 1 23 fasting sugar that day 156 hemoglobin is climbed to 13 2 with an MCV of 88  Unfortunately he is not checking his blood sugars  I recommended that he check a fasting blood sugar at least twice per week and postprandial blood sugar once per week  Tells me that he often skips breakfast or lunch  I reviewed that he does not want to do this  I reviewed that not eating can elevate his blood sugars  He says his breakfast when he does he does often coffee and donuts  He has poor diet  I am concerned about his use of glipizide and not eating    I am worried about the risk of hypoglycemia with his vascular disease  I recommended that we trying change agents  He gets many of his meds through the [place]  I recommended he speak with them about obtaining Jardiance so that we can get him off glipizide  As noted he cannot tolerate more than a gram of metformin daily  With his diffuse vascular disease preferred hemoglobin A1c is under 7 but potentially level up to 8  Unfortunately he is still smoking 4 cigarettes per day  This patient has a known history of vascular disease  We discussed again optimal control of risk factors including hyperlipidemia, hypertension, and diabetes mellitus  This patient denies any episodes of weakness of one arm or leg compared to the other, numbness of one arm or leg compared to the other, blurred or double vision, or difficulty with speech  This patient denies any claudication symptoms of arms or legs  This patient denies any chest pain or pressure with activity  There has been no recent evidence of abrupt onset of back pain to suggest potential abdominal aortic aneurysm  We also again reviewed contributing factors of atherosclerosis-including tobacco abuse, hypertension, diabetes mellitus, hyperlipidemia, family history and age  This patient understands the whole concept of risk factors and the need for more aggressive treatment of them as compared to patients who do not have significant underlying atherosclerosis  His major risk factors are hypertension hyperlipidemia diabetes and tobacco abuse  Prior visit we have talked about obtain the new zoster vaccine at the [place] but he forgot to do this  He is meeting with the [place] and I again recommended he speak with them  Prior visit also we talked about increasing his dose of Paxil  [facility] not [facility] and he [facility] the [facility] there  They recommended the meet with a psychiatrist there    I feel this is an excellent idea and he will keep me posted as to how he is doing there    As noted he has a history of iron deficiency anemia  Recently had further drop in his hemoglobin associated with surgery  Hemoglobin after her surgery was 10 8 but is noted has a climbed to 13 2  He felt poorly in the past when he took Wellbutrin and is not felt to be a good candidate for Chantix  We talked about his tobacco use  He had a follow-up Doppler of his legs done which showed increase of the GIUSEPPE-normal range on the left after procedure  No change in GIUSEPPE on the right  GIUSEPPE on the right is now 1 11 in on the left 0 99  He says his claudication symptoms are markedly improved  This patient denies any systemic symptoms  Specifically there has been no evidence of fever, night sweats, significant weight loss or significant decrease in appetite  This was a 40 minutes visit with more than 50% of the time spent counseling the patient formulating a treatment plan  Multiple questions were answered  Patient arrive 20 minutes early for his appointment we initiated conversation the treatment that point  The following portions of the patient's history were reviewed and updated as appropriate: allergies, current medications, past family history, past medical history, past social history, past surgical history and problem list     Review of Systems   Constitutional: Negative  Respiratory: Negative  Cardiovascular: Negative  Gastrointestinal: Negative  Scars from prior surgery   Endocrine: Negative  Genitourinary: Negative  Nocturia x1   Musculoskeletal: Negative  Neurological: Negative  Hematological: Negative  Psychiatric/Behavioral: The patient is nervous/anxious  Objective:      Ht 5' 8" (1 727 m)   Wt 83 6 kg (184 lb 3 2 oz)   BMI 28 01 kg/m²          Physical Exam   Constitutional: He is oriented to person, place, and time  He appears well-developed and well-nourished  No distress     HENT: Head: Normocephalic and atraumatic  Right Ear: External ear normal    Left Ear: External ear normal    Nose: Nose normal    Mouth/Throat: Oropharynx is clear and moist  No oropharyngeal exudate  Eyes: Pupils are equal, round, and reactive to light  Conjunctivae and EOM are normal  Right eye exhibits no discharge  Left eye exhibits no discharge  No scleral icterus  Neck: Normal range of motion  Neck supple  No JVD present  No tracheal deviation present  No thyromegaly present  Cardiovascular: Normal rate, regular rhythm and normal heart sounds  Exam reveals no gallop and no friction rub  No murmur heard  DP and PT pulses 0 to 1+ bilaterally   Pulmonary/Chest: Effort normal and breath sounds normal  No stridor  No respiratory distress  He has no wheezes  He has no rales  He exhibits no tenderness  Abdominal: Soft  Bowel sounds are normal  He exhibits no distension and no mass  There is no tenderness  There is no rebound and no guarding  Scar from prior surgery   Genitourinary: Rectal exam shows guaiac negative stool  Musculoskeletal: Normal range of motion  He exhibits no edema, tenderness or deformity  Lymphadenopathy:     He has no cervical adenopathy  Neurological: He is alert and oriented to person, place, and time  He has normal reflexes  No cranial nerve deficit  He exhibits normal muscle tone  Coordination normal    Skin: Skin is warm and dry  No rash noted  He is not diaphoretic  No erythema  No pallor  Psychiatric: He has a normal mood and affect  His behavior is normal  Judgment and thought content normal    Vitals reviewed

## 2019-02-15 ENCOUNTER — HOSPITAL ENCOUNTER (EMERGENCY)
Facility: HOSPITAL | Age: 79
Discharge: HOME | End: 2019-02-15
Attending: EMERGENCY MEDICINE | Admitting: EMERGENCY MEDICINE
Payer: MEDICARE

## 2019-02-15 ENCOUNTER — APPOINTMENT (EMERGENCY)
Dept: RADIOLOGY | Facility: HOSPITAL | Age: 79
End: 2019-02-15
Payer: MEDICARE

## 2019-02-15 VITALS
SYSTOLIC BLOOD PRESSURE: 148 MMHG | TEMPERATURE: 98.2 F | HEART RATE: 76 BPM | BODY MASS INDEX: 27.37 KG/M2 | RESPIRATION RATE: 17 BRPM | WEIGHT: 180 LBS | DIASTOLIC BLOOD PRESSURE: 91 MMHG | OXYGEN SATURATION: 95 %

## 2019-02-15 DIAGNOSIS — R10.9 ACUTE LEFT FLANK PAIN: ICD-10-CM
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
## 2019-02-15 DIAGNOSIS — R09.89 ABNORMAL FINDING OF LUNG: ICD-10-CM

## 2019-02-15 LAB
ALBUMIN SERPL BCP-MCNC: 3.6 G/DL (ref 3.5–5)
ALP SERPL-CCNC: 100 U/L (ref 46–116)
ALT SERPL W P-5'-P-CCNC: 14 U/L (ref 12–78)
ANION GAP SERPL CALCULATED.3IONS-SCNC: 5 MMOL/L (ref 4–13)
AST SERPL W P-5'-P-CCNC: 9 U/L (ref 5–45)
BASOPHILS # BLD AUTO: 0.08 THOUSANDS/ΜL (ref 0–0.1)
BASOPHILS NFR BLD AUTO: 1 % (ref 0–1)
BILIRUB SERPL-MCNC: 0.6 MG/DL (ref 0.2–1)
BUN SERPL-MCNC: 15 MG/DL (ref 5–25)
CALCIUM SERPL-MCNC: 9 MG/DL (ref 8.3–10.1)
CHLORIDE SERPL-SCNC: 108 MMOL/L (ref 100–108)
CO2 SERPL-SCNC: 25 MMOL/L (ref 21–32)
CREAT SERPL-MCNC: 1.14 MG/DL (ref 0.6–1.3)
EOSINOPHIL # BLD AUTO: 0.2 THOUSAND/ΜL (ref 0–0.61)
EOSINOPHIL NFR BLD AUTO: 3 % (ref 0–6)
ERYTHROCYTE [DISTWIDTH] IN BLOOD BY AUTOMATED COUNT: 13.2 % (ref 11.6–15.1)
GFR SERPL CREATININE-BSD FRML MDRD: 61 ML/MIN/1.73SQ M
GLUCOSE SERPL-MCNC: 176 MG/DL (ref 65–140)
HCT VFR BLD AUTO: 39.1 % (ref 36.5–49.3)
HGB BLD-MCNC: 12.6 G/DL (ref 12–17)
IMM GRANULOCYTES # BLD AUTO: 0.03 THOUSAND/UL (ref 0–0.2)
IMM GRANULOCYTES NFR BLD AUTO: 0 % (ref 0–2)
LYMPHOCYTES # BLD AUTO: 1.22 THOUSANDS/ΜL (ref 0.6–4.47)
LYMPHOCYTES NFR BLD AUTO: 17 % (ref 14–44)
MCH RBC QN AUTO: 27.8 PG (ref 26.8–34.3)
MCHC RBC AUTO-ENTMCNC: 32.2 G/DL (ref 31.4–37.4)
MCV RBC AUTO: 86 FL (ref 82–98)
MONOCYTES # BLD AUTO: 0.46 THOUSAND/ΜL (ref 0.17–1.22)
MONOCYTES NFR BLD AUTO: 6 % (ref 4–12)
NEUTROPHILS # BLD AUTO: 5.25 THOUSANDS/ΜL (ref 1.85–7.62)
NEUTS SEG NFR BLD AUTO: 73 % (ref 43–75)
NRBC BLD AUTO-RTO: 0 /100 WBCS
PLATELET # BLD AUTO: 200 THOUSANDS/UL (ref 149–390)
PMV BLD AUTO: 11.2 FL (ref 8.9–12.7)
POTASSIUM SERPL-SCNC: 4.3 MMOL/L (ref 3.5–5.3)
PROT SERPL-MCNC: 7.2 G/DL (ref 6.4–8.2)
RBC # BLD AUTO: 4.53 MILLION/UL (ref 3.88–5.62)
SODIUM SERPL-SCNC: 138 MMOL/L (ref 136–145)
WBC # BLD AUTO: 7.24 THOUSAND/UL (ref 4.31–10.16)

## 2019-02-15 PROCEDURE — 71260 CT THORAX DX C+: CPT

## 2019-02-15 PROCEDURE — 74177 CT ABD & PELVIS W/CONTRAST: CPT

## 2019-02-15 PROCEDURE — 36415 COLL VENOUS BLD VENIPUNCTURE: CPT | Performed by: EMERGENCY MEDICINE

## 2019-02-15 PROCEDURE — 85025 COMPLETE CBC W/AUTO DIFF WBC: CPT | Performed by: EMERGENCY MEDICINE

## 2019-02-15 PROCEDURE — 80053 COMPREHEN METABOLIC PANEL: CPT | Performed by: EMERGENCY MEDICINE

## 2019-02-15 PROCEDURE — 99285 EMERGENCY DEPT VISIT HI MDM: CPT

## 2019-02-15 PROCEDURE — 96374 THER/PROPH/DIAG INJ IV PUSH: CPT

## 2019-02-15 PROCEDURE — 70450 CT HEAD/BRAIN W/O DYE: CPT

## 2019-02-15 PROCEDURE — 96375 TX/PRO/DX INJ NEW DRUG ADDON: CPT

## 2019-02-15 RX ORDER — MORPHINE SULFATE 4 MG/ML
4 INJECTION, SOLUTION INTRAMUSCULAR; INTRAVENOUS ONCE
Status: COMPLETED | OUTPATIENT
Start: 2019-02-15 | End: 2019-02-15

## 2019-02-15 RX ORDER — FENTANYL CITRATE 50 UG/ML
50 INJECTION, SOLUTION INTRAMUSCULAR; INTRAVENOUS ONCE AS NEEDED
Status: COMPLETED | OUTPATIENT
Start: 2019-02-15 | End: 2019-02-15

## 2019-02-15 RX ORDER — LIDOCAINE 50 MG/G
1 PATCH TOPICAL ONCE
Status: DISCONTINUED | OUTPATIENT
Start: 2019-02-15 | End: 2019-02-15 | Stop reason: HOSPADM

## 2019-02-15 RX ORDER — LIDOCAINE 50 MG/G
1 PATCH TOPICAL DAILY
Qty: 30 PATCH | Refills: 0 | Status: SHIPPED | OUTPATIENT
Start: 2019-02-15 | End: 2020-03-30

## 2019-02-15 RX ORDER — OXYCODONE HYDROCHLORIDE AND ACETAMINOPHEN 5; 325 MG/1; MG/1
1 TABLET ORAL EVERY 4 HOURS PRN
Qty: 12 TABLET | Refills: 0 | Status: SHIPPED | OUTPATIENT
Start: 2019-02-15 | End: 2022-04-11

## 2019-02-15 RX ORDER — ACETAMINOPHEN 325 MG/1
975 TABLET ORAL ONCE
Status: COMPLETED | OUTPATIENT
Start: 2019-02-15 | End: 2019-02-15

## 2019-02-15 RX ADMIN — IOHEXOL 100 ML: 350 INJECTION, SOLUTION INTRAVENOUS at 11:47

## 2019-02-15 RX ADMIN — LIDOCAINE 1 PATCH: 50 PATCH CUTANEOUS at 13:28

## 2019-02-15 RX ADMIN — ACETAMINOPHEN 975 MG: 325 TABLET, FILM COATED ORAL at 13:24

## 2019-02-15 RX ADMIN — FENTANYL CITRATE 50 MCG: 50 INJECTION, SOLUTION INTRAMUSCULAR; INTRAVENOUS at 11:07

## 2019-02-15 RX ADMIN — MORPHINE SULFATE 4 MG: 4 INJECTION INTRAVENOUS at 13:25

## 2019-02-15 NOTE — ED PROVIDER NOTES
History  Chief Complaint   Patient presents with    Fall     Pt slipped on the steps outside yesterday striking his L flank area  Pt was unable to get out of bed today due to the pain  76 yo male who presents following a fall  Eduar Fan yesterday after slipping on ice and fell backwards onto steps  Struck L back/flank  Denies head strike or LOC  Denies symptoms prior to fall  Currently complaining of pain over left flank  Denies pain anywhere else, denies other symptoms  Pt on plavix, no other thinners  Prior to Admission Medications   Prescriptions Last Dose Informant Patient Reported? Taking?    Cholecalciferol (VITAMIN D3) 2000 units capsule  Self Yes Yes   Sig: Take 1,000 Units by mouth daily   PARoxetine (PAXIL) 40 MG tablet  Self Yes Yes   Sig: Take 40 mg by mouth Take 1/2 tab daily    acetaminophen (TYLENOL) 325 mg tablet  Self No Yes   Sig: Take 2 tablets (650 mg total) by mouth every 6 (six) hours as needed for mild pain   clopidogrel (PLAVIX) 75 mg tablet  Self Yes Yes   Sig: Take 75 mg by mouth daily   glipiZIDE (GLUCOTROL) 5 mg tablet  Self Yes Yes   Sig: Take 5 mg by mouth Take 1 pill in the am and half pill at pm    lisinopril (ZESTRIL) 20 mg tablet  Self Yes Yes   Sig: Take 20 mg by mouth daily   metFORMIN (GLUCOPHAGE) 1000 MG tablet  Self Yes Yes   Sig: Take 1,000 mg by mouth daily with breakfast     metoprolol tartrate (LOPRESSOR) 25 mg tablet  Self Yes Yes   Sig: Take 25 mg by mouth every 12 (twelve) hours   pantoprazole (PROTONIX) 40 mg tablet  Self Yes Yes   Sig: Take 40 mg by mouth daily   rosuvastatin (CRESTOR) 5 mg tablet  Self No Yes   Sig: Take 1 tablet (5 mg total) by mouth daily      Facility-Administered Medications: None       Past Medical History:   Diagnosis Date    Atherosclerosis     Cardiac disease     COPD (chronic obstructive pulmonary disease) (HCC)     Coronary artery disease     Hyperlipidemia     Hypertension     Peripheral arterial disease (HCC)     Smoking addiction        Past Surgical History:   Procedure Laterality Date    BYPASS FEMORAL-POPLITEAL Left 2018    Procedure: BYPASS FEMORAL-POPLITEAL, WITH INTRAOP ARTERIOGRAM;  Surgeon: Heather Monique MD;  [facility];  Service: Vascular    CARDIAC SURGERY      FEMORAL ARTERY - POPLITEAL ARTERY BYPASS GRAFT Bilateral     HAND SURGERY      IR ABDOMINAL ANGIOGRAPHY / INTERVENTION  10/4/2018    LEG SURGERY      Repair    OTHER SURGICAL HISTORY      Percutaneous repair nasoethmoid fx lacrimal apparatus    THROMBOENDARTERECTOMY  10/12/2010    Tibioperoneal trunk    TONSILLECTOMY      TONSILLECTOMY         Family History   Problem Relation Age of Onset    Arthritis Mother     Arthritis Family     Coronary artery disease Family     Hyperlipidemia Family     Peripheral vascular disease Family      I have reviewed and agree with the history as documented  Social History     Tobacco Use    Smoking status: Current Some Day Smoker     Types: Cigarettes     Last attempt to quit: 2018     Years since quittin 1    Smokeless tobacco: Former User   Substance Use Topics    Alcohol use: No     Comment: Social drinker    Drug use: No        Review of Systems   Constitutional: Negative for chills and fever  HENT: Negative for congestion and sore throat  Eyes: Negative for photophobia and visual disturbance  Respiratory: Negative for cough, chest tightness and shortness of breath  Cardiovascular: Negative for chest pain and leg swelling  Gastrointestinal: Negative for abdominal pain, blood in stool, diarrhea, nausea and vomiting  Genitourinary: Positive for flank pain  Negative for dysuria and hematuria  Musculoskeletal: Negative for arthralgias, back pain, neck pain and neck stiffness  Skin: Negative for rash and wound  Neurological: Negative for syncope, weakness and numbness  Psychiatric/Behavioral: Negative for behavioral problems and confusion     All other systems reviewed and are negative  Physical Exam  ED Triage Vitals [02/15/19 0937]   Temperature Pulse Respirations Blood Pressure SpO2   98 2 °F (36 8 °C) 79 18 145/91 95 %      Temp Source Heart Rate Source Patient Position - Orthostatic VS BP Location FiO2 (%)   Oral Monitor Sitting Right arm --      Pain Score       7           Orthostatic Vital Signs  Vitals:    02/15/19 0937 02/15/19 1153 02/15/19 1330   BP: 145/91 (!) 174/76 148/91   Pulse: 79 67 76   Patient Position - Orthostatic VS: Sitting Lying Lying       Physical Exam   Constitutional: He is oriented to person, place, and time  He appears well-developed  No distress  HENT:   Head: Normocephalic and atraumatic  Right Ear: External ear normal    Left Ear: External ear normal    Nose: Nose normal    Mouth/Throat: Oropharynx is clear and moist    Eyes: Pupils are equal, round, and reactive to light  Conjunctivae and EOM are normal  Right eye exhibits no discharge  Left eye exhibits no discharge  Neck: Normal range of motion  Neck supple  Cardiovascular: Normal rate, regular rhythm, normal heart sounds and intact distal pulses  Exam reveals no gallop and no friction rub  No murmur heard  Pulmonary/Chest: Effort normal and breath sounds normal  No stridor  No respiratory distress  He has no wheezes  He has no rales  He exhibits no tenderness  Abdominal: Soft  Bowel sounds are normal  He exhibits no distension  There is no tenderness  There is no rebound and no guarding  Musculoskeletal: Normal range of motion  He exhibits no edema, tenderness or deformity  TTP over left flank  No deformity, crepitus, or bruising appreciated  No C/T/L spine tenderness, upper and lower extremities nontender to palpation with full ROM and intact motor and sensation bilaterally  Full ROM of neck  Neurological: He is alert and oriented to person, place, and time     CN II-XII intact, 5/5 motor and sensation in upper and lower extremities bilaterally, intact finger to nose coordination, no pronator drift     Skin: Skin is warm and dry  Capillary refill takes less than 2 seconds  No rash noted  He is not diaphoretic  Psychiatric: He has a normal mood and affect  His behavior is normal    Nursing note and vitals reviewed  ED Medications  Medications    EMS REPLENISHMENT MED (has no administration in time range)   lidocaine (LIDODERM) 5 % patch 1 patch (1 patch Topical Medication Applied 2/15/19 1328)   fentanyl citrate (PF) 100 MCG/2ML 50 mcg (50 mcg Intravenous Given 2/15/19 1107)   iohexol (OMNIPAQUE) 350 MG/ML injection (MULTI-DOSE) 100 mL (100 mL Intravenous Given 2/15/19 1147)   morphine (PF) 4 mg/mL injection 4 mg (4 mg Intravenous Given 2/15/19 1325)   acetaminophen (TYLENOL) tablet 975 mg (975 mg Oral Given 2/15/19 1324)       Diagnostic Studies  Results Reviewed     Procedure Component Value Units Date/Time    Comprehensive metabolic panel [[id number]]  (Abnormal) Collected:  02/15/19 1028    Lab Status:  Final result Specimen:  Blood from Arm, Right Updated:  02/15/19 1100     Sodium 138 mmol/L      Potassium 4 3 mmol/L      Chloride 108 mmol/L      CO2 25 mmol/L      ANION GAP 5 mmol/L      BUN 15 mg/dL      Creatinine 1 14 mg/dL      Glucose 176 mg/dL      Calcium 9 0 mg/dL      AST 9 U/L      ALT 14 U/L      Alkaline Phosphatase 100 U/L      Total Protein 7 2 g/dL      Albumin 3 6 g/dL      Total Bilirubin 0 60 mg/dL      eGFR 61 ml/min/1 73sq m     Narrative:       National Kidney Disease Education Program recommendations are as follows:  GFR calculation is accurate only with a steady state creatinine  Chronic Kidney disease less than 60 ml/min/1 73 sq  meters  Kidney failure less than 15 ml/min/1 73 sq  meters      CBC and differential [[id number]] Collected:  02/15/19 1028    Lab Status:  Final result Specimen:  Blood from Arm, Right Updated:  02/15/19 1044     WBC 7 24 Thousand/uL      RBC 4 53 Million/uL      Hemoglobin 12 6 g/dL      Hematocrit 39 1 % MCV 86 fL      MCH 27 8 pg      MCHC 32 2 g/dL      RDW 13 2 %      MPV 11 2 fL      Platelets 986 Thousands/uL      nRBC 0 /100 WBCs      Neutrophils Relative 73 %      Immat GRANS % 0 %      Lymphocytes Relative 17 %      Monocytes Relative 6 %      Eosinophils Relative 3 %      Basophils Relative 1 %      Neutrophils Absolute 5 25 Thousands/µL      Immature Grans Absolute 0 03 Thousand/uL      Lymphocytes Absolute 1 22 Thousands/µL      Monocytes Absolute 0 46 Thousand/µL      Eosinophils Absolute 0 20 Thousand/µL      Basophils Absolute 0 08 Thousands/µL                  CT chest abdomen pelvis w contrast   Final Result by Minh Acosta MD (02/15 1250)   No solid visceral injury seen      No pleural effusion or pneumothorax seen      A focal density seen at the right lung base along the [facility] of diaphragm in image 41 series 2, may represent atelectasis, less likely May represent infiltrate   Follow-up suggested at 3 months to demonstrate resolution      The study was marked in [facility] for significant notification  Workstation performed: [id number]         CT head without contrast   Final Result by Minh Acosta MD (02/15 1219)      No acute intracranial hemorrhage seen      No mass effect or midline shift seen      Opacification the right maxillary sinus with the hyperdensity within it  Probably sequela of  chronic sinus disease /allergic fungal sinusitis  If concern for injury in the maxillofacial region CT of the facial bones can be performed                  Workstation performed: [id number]               Procedures  Procedures      Phone Consults  ED Phone Contact    ED Course  ED Course as of Feb 15 1520   Fri Feb 15, 2019   1425 No acute injuries on final read of CT head and CT c/a/p  Pt able to ambulate, pain controlled  Will discharge to home with rx of percocet and lidoderm patch as well as incentive spirometry   Pt counseled to follow up  with PCP for recheck and to follow up on incidental lung finding  Identification of Seniors at Risk      Most Recent Value   (ISAR) Identification of Seniors at Risk   Before the illness or injury that brought you to the Emergency, did you need someone to help you on a regular basis? 0 Filed at: 02/15/2019 0940   In the last 24 hours, have you needed more help than usual?  1 Filed at: 02/15/2019 0940   Have you been hospitalized for one or more nights during the past 6 months? 0 Filed at: 02/15/2019 0940   In general, do you see well?  0 Filed at: 02/15/2019 0940   In general, do you have serious problems with your memory? 0 Filed at: 02/15/2019 0940   Do you take more than three different medications every day? 1 Filed at: 02/15/2019 0940   ISAR Score  2 Filed at: 02/15/2019 0940                          [facility]  Number of Diagnoses or Management Options  Abnormal finding of lung:   Acute left flank pain:   Fall, initial encounter:   Diagnosis management comments: 78 yo male who presents s/p fall  Likely mechanical in nature  Pt stable, normal vitals, no distress  Has pain with ttp over L flank, will obtain CT c/a/p to rule out rib fractures and retroperitoneal injury  Head to toe exam reveals no other injuries and nonfocal neuro exam  Pt denies head strike but is on plavix and so will obtain CT head to rule out injury  Will give fentanyl for pain  Disposition  Final diagnoses:   Fall, initial encounter   Acute left flank pain   Abnormal finding of lung - R lung density on chest CT     Time reflects when diagnosis was documented in both MDM as applicable and the Disposition within this note     Time User Action Codes Description Comment    2/15/2019  2:07 PM Jovan Escobar Nose Add [B90  FGUT] Fall, initial encounter     2/15/2019  2:07 PM Jovan Escobar Nose Add [M54 9] Back pain     2/15/2019  2:07 PM Jovan Escobar Nose Remove [M54 9] Back pain     2/15/2019  2:07 PM Jovan Escobar Nose Add [R10 9] Acute left flank pain     2/15/2019  2:10 PM Angy Simmons Add [J18 1] Lung consolidation ([address] )     2/15/2019  2:10 PM Lobo Escobar Remove [J18 1] Lung consolidation ([address] )     2/15/2019  2:11 PM Lobo Escobar Add [J98 4] Lung density on x-ray     2/15/2019  2:11 PM Lobo Escobar Remove [J98 4] Lung density on x-ray     2/15/2019  2:11 PM Lobo Escobar Add [R09 89] Abnormal finding of lung     2/15/2019  2:12 PM Lobo Escobar Modify [R09 89] Abnormal finding of lung R lung density on chest CT      ED Disposition     ED Disposition Condition Date/Time Comment    Discharge Stable Fri Feb 15, 2019  2:07 PM Salma Olivarez discharge to home/self care              Follow-up Information     Follow up With Specialties Details Why Contact Info Additional 636 Don Raygoza MD Internal Medicine In 1 week Recheck following fall, and to follow up for incidental right lung finding on chest CT scan [address]  [address], [address] [facility] [address]  [phone]       [address] [place] [address] Emergency Department Emergency Medicine Go to  If you develop cough or shortness of breath [address] [facility] ED, [address], [address]          Discharge Medication List as of 2/15/2019  2:18 PM      START taking these medications    Details   lidocaine (LIDODERM) 5 % Apply 1 patch topically daily Remove & Discard patch within 12 hours or as directed by MD, Starting Fri 2/15/2019, Print      oxyCODONE-acetaminophen (PERCOCET) 5-325 mg per tablet Take 1 tablet by mouth every 4 (four) hours as needed for moderate pain for up to 12 dosesMax Daily Amount: 6 tablets, Starting Fri 2/15/2019, Print         CONTINUE these medications which have NOT CHANGED    Details   acetaminophen (TYLENOL) 325 mg tablet Take 2 tablets (650 mg total) by mouth every 6 (six) hours as needed for mild pain, Starting Tue 5/1/2018, OTC      Cholecalciferol (VITAMIN D3) 2000 units capsule Take 1,000 Units by mouth daily, Historical Med      clopidogrel (PLAVIX) 75 mg tablet Take 75 mg by mouth daily, Historical Med      glipiZIDE (GLUCOTROL) 5 mg tablet Take 5 mg by mouth Take 1 pill in the am and half pill at pm , Historical Med      lisinopril (ZESTRIL) 20 mg tablet Take 20 mg by mouth daily, Historical Med      metFORMIN (GLUCOPHAGE) 1000 MG tablet Take 1,000 mg by mouth daily with breakfast  , Historical Med      metoprolol tartrate (LOPRESSOR) 25 mg tablet Take 25 mg by mouth every 12 (twelve) hours, Historical Med      pantoprazole (PROTONIX) 40 mg tablet Take 40 mg by mouth daily, Historical Med      PARoxetine (PAXIL) 40 MG tablet Take 40 mg by mouth Take 1/2 tab daily , Historical Med      rosuvastatin (CRESTOR) 5 mg tablet Take 1 tablet (5 mg total) by mouth daily, Starting Fri 2/2/2018, Normal           No discharge procedures on file  ED Provider  Attending physically available and evaluated Roberto Le  ANGY managed the patient along with the ED Attending      Electronically Signed by         Nj Glez MD  02/15/19 0730       Nj Glez MD  02/15/19 2466

## 2019-02-15 NOTE — ED ATTENDING ATTESTATION
Berenice Parada MD, saw and evaluated the patient  I have discussed the patient with the resident/non-physician practitioner and agree with the resident's/non-physician practitioner's findings, Plan of Care, and MDM as documented in the resident's/non-physician practitioner's note, except where noted  All available labs and Radiology studies were reviewed  At this point I agree with the current assessment done in the Emergency Department  I have conducted an independent evaluation of this patient a history and physical is as follows:   Pt slipped on ice fell backward onto steps hit back co lower flank pain  Hurts to take a deep breath  Pt deneis head strike no co pain in neck or back pain  PE alert neck nontender spine nontender heart regular lungs clear abdomen soft nondistended nontender tender over left flank and the lower ribs  Neuro intact motor 5/5 sensation within normal limits cranial nerves 2-12 MDM:  Will do CT of abdomen and chest   Treat pain      Critical Care Time  Procedures

## 2019-02-16 ENCOUNTER — TELEPHONE (OUTPATIENT)
Dept: OTHER | Facility: OTHER | Age: 79
End: 2019-02-16

## 2019-02-16 NOTE — TELEPHONE ENCOUNTER
Daren Manuel 1940  CONFIDENTIALTY NOTICE: This fax transmission is intended only for the addressee  It contains information that is legally privileged,  confidential or otherwise protected from use or disclosure  If you are not the intended recipient, you are strictly prohibited from reviewing,  disclosing, copying using or disseminating any of this information or taking any action in reliance on or regarding this information  If you have  received this fax in error, please notify us immediately by telephone so that we can arrange for its return to us  Page:   Call Id: [id number]  Health Call  Standard Call Report  Health Call  Patient Name: Daren Manuel  Gender: Male  : 1940  Age: 77 Y 9 M 21 D  Return Phone  Number: [phone] (Home)  Address: [address]   City/State/Zip: [address] [place] [address]  Practice Name: [address]:  Physician:  [facility] Name:  Relationship To  Patient: Self  Return Phone Number: [phone] (Home)  Presenting Problem: "I had a fall yesteray and injured my  rib  I am in a lot of pain still and would  really like a muscle relaxer called in "  Service Type: Triage  Charged Service 1: Sarah GANNON  37  Name and  Number:  Nurse Assessment  Nurse: Molly Greenberg Date/Time: 2019 3:06:26 PM  Type of assessment required:  ---General (Adult or Child)  Duration of Current S/S  ---Since yesterday  Location/Radiation  ---Left rib area  Temperature (F) and route:  ---Denies fever  Symptom Specific Meds (Dose/Time):  ---Percocet LD: right now  Other S/S  ---Patient fell on ice yesterday and injured his rib area  Denies any difficulty breathing  States he has pain when moving  Pain Scale on scale of 1-10, 10 being the worst:  ---Rated his pain at 0/10 stating that he wasn't moving so he is not having pain at the  moment  States the percocet only provides mild relief    Symptom progression:  ---same  Intake and Output  Daren Manuel 1940  CONFIDENTIALTY NOTICE: This fax transmission is intended only for the addressee  It contains information that is legally privileged,  confidential or otherwise protected from use or disclosure  If you are not the intended recipient, you are strictly prohibited from reviewing,  disclosing, copying using or disseminating any of this information or taking any action in reliance on or regarding this information  If you have  received this fax in error, please notify us immediately by telephone so that we can arrange for its return to us  Page: 2 of 2  Call Id: [id number]  Nurse Assessment  ---Drinking normally / WNL  Last Exam/Treatment:  ---[facility] yesterday for fall  Protocols  Protocol Title Nurse Date/Time  Chest Injury Martine Miners 2/16/2019 3:10:51 PM  Question Caller Affirmed  Disp  Time Disposition Final User  2/16/2019 3:21:30 PM See Physician within [facility]ikh [facility]  2/16/2019 3:23:25 PM RN Triaged Yes Rickie Saenz RN, [facility] Advice Given Per Protocol  SEE PHYSICIAN WITHIN 24 HOURS: * IF OFFICE WILL BE CLOSED AND NO PCP TRIAGE: You need to be seen within the  next 24 hours  An urgent care center is often a good source of care if your doctor's office is closed  PAIN MEDICINES: IBUPROFEN  (E G , MOTRIN, ADVIL): * Take 400 mg (two 200 mg pills) by mouth every 6 hours as needed  * Another choice is to take 600 mg  (three 200 mg pills) by mouth every 8 hours as needed  CAUTION - NSAIDS (E G , IBUPROFEN, NAPROXEN): * Do not take  nonsteroidal anti-inflammatory drugs (NSAIDs) if you have stomach problems, kidney disease, heart failure, or other contraindications  to using this type of medication  * Do not take NSAID medications for over 7 days without consulting your PCP  * Do not take NSAID  medications if you are pregnant  * You may take this medicine with or without food  Taking it with food or milk may lessen the chance  the drug will upset your stomach   * GASTROINTESTINAL RISK: There is an increased risk of stomach ulcers, GI bleeding, perforation  * CARDIOVASCULAR RISK: There may be an increased risk of heart attack and stroke  CALL BACK IF: * You become worse  CARE  ADVICE given per Chest Injury (Adult) guideline  Caller Understands: Yes  Caller Disagree/Comply: Comply  PreDisposition: Unsure  Comments  User: Miguelina Forman RN Date/Time: 2/16/2019 3:22:42 PM  Patient very upset and requesting a muscle relaxer  Explained that his office will not fill this request after hours

## 2019-02-17 ENCOUNTER — TELEPHONE (OUTPATIENT)
Dept: OTHER | Facility: OTHER | Age: 79
End: 2019-02-17

## 2019-02-18 ENCOUNTER — TELEPHONE (OUTPATIENT)
Dept: INTERNAL MEDICINE CLINIC | Facility: CLINIC | Age: 79
End: 2019-02-18

## 2019-02-18 NOTE — TELEPHONE ENCOUNTER
I will need to evaluate him to decide what to do - schedule him for tomorrow morning - February 19th at 6:30 a m   As same day appointment also I will need all evaluation from the ER including labs and x-rays to be available for that appointment

## 2019-02-18 NOTE — TELEPHONE ENCOUNTER
Patients son - Sharifa Comment called with Tamiko Rees at his side  Wanted to know if there was a possibility the provider would prescribe a muscle relaxer for the discomfort he is having from recent fall  Patient was triaged on 2/16  Recommendation based off of protocol was for patient to be reevaluated within 24 hrs  Patient just wanted to leave this message with the provider  Stated he would be follow up with the office tomorrow morning

## 2019-02-18 NOTE — TELEPHONE ENCOUNTER
Pt fell on his left side on Thursday night [place]  Ambulance picked him up the next morning, took a CAT scan, gave him morphine while admitted, and oxycontin upon discharge but he said it's not cutting through the muscle pain  He said every time his hiccups or sneezes or moves quickly in any way, a sharp pain shoots up his back and takes his breath away  Asked if there was anything you could prescribe him to help him with the pain  A muscle relaxer maybe  Please advise

## 2019-02-19 ENCOUNTER — OFFICE VISIT (OUTPATIENT)
Dept: INTERNAL MEDICINE CLINIC | Facility: CLINIC | Age: 79
End: 2019-02-19
Payer: MEDICARE

## 2019-02-19 VITALS
RESPIRATION RATE: 14 BRPM | SYSTOLIC BLOOD PRESSURE: 130 MMHG | WEIGHT: 184 LBS | BODY MASS INDEX: 27.89 KG/M2 | HEART RATE: 72 BPM | DIASTOLIC BLOOD PRESSURE: 80 MMHG | HEIGHT: 68 IN

## 2019-02-19 DIAGNOSIS — R07.89 OTHER CHEST PAIN: ICD-10-CM

## 2019-02-19 DIAGNOSIS — R45.4 EXCESSIVE ANGER: ICD-10-CM

## 2019-02-19 DIAGNOSIS — I10 ESSENTIAL HYPERTENSION: Chronic | ICD-10-CM
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
## 2019-02-19 PROCEDURE — 99214 OFFICE O/P EST MOD 30 MIN: CPT | Performed by: INTERNAL MEDICINE

## 2019-02-19 NOTE — PROGRESS NOTES
Assessment/Plan:   1  status post mechanical fall new 9 2  Chest wall pain -as noted CT scan of chest abdomen pelvis benign other than a focal density at the right lung base -possible atelectasis -suspect significant bruising of ribs and/or intercostal injury -patient stopped oxycodone  He is using ibuprofen 400 milligrams every 6 hours  Added Flexeril generic 5 milligrams twice daily  Expect slow but sure improvement  2  Abnormal CT scan of the chest with area of density at the right lung base -probably atelectasis -rule out other -Radiology recommended repeat CT in 3 months which WILL BE DUE IN MAY OF 2019-REVIEW NEXT VISIT  3  Anxiety with anger issues -was on Paxil 40 milligrams daily in [facility] now wants to go up to 60 milligrams  He does have a history of tobacco abuse but felt poorly in the past 20 use Wellbutrin  4  Tobacco abuse -smoking several cigarettes per day  Felt poorly when he took Wellbutrin  Not felt to be a good candidate for Chantix  Again encouraged him to discontinue smoking  5  Diabetes mellitus -A1c was 8  1-previously 6 7  Not following diet  Again encouraged to check her sugars  Cannot tolerate short-acting metformin and only tolerates 1000 milligrams of long-acting metformin  On glipizide but I am very concerned about hypoglycemia with his habit of skipping breakfast and/or lunch  I recommended he not skip meals  Reviewed appropriate diet  I wanted him to speak with the [facility] regarding potential use of Jardiance  Which she would substitute for glipizide as I am concerned about the potential for hypoglycemia - she knows he is at higher risk for complications with his vascular disease   6  Constipation -likely predisposed by his fall plus use of a-adding over-the-counter docusate 100 milligrams daily and over-the-counter Senokot 1 p  O  B i d  P r n      All other problems as per note of January 2019      MEDICAL REGIMEN:      Ibuprofen 400 milligrams q 6 hours over the next 2 weeks, cyclobenzaprine 5 milligrams b i d  Over the next 2 weeks, Paxil 60 milligrams daily using 40 milligram dosing, glipizide 5 milligrams in a m  And 2 5 milligrams in the p m , metoprolol tartrate 25 milligrams b i d , metformin a 1000 milligrams daily, lisinopril 20 milligrams daily, Plavix 75 milligrams daily, vitamin D3/ 2000 units a day, Colace 100 milligrams daily, Crestor 5 milligrams daily -hoping in the future to DC glipizide and switch to Jardiance      This patient will be seen at previously scheduled appointment with previously scheduled labs  Addendum- we again call patient in March of 2019 remind him to speak to the [employer] to see if he is approved for Jardiance- he says he will do this and then call us with results        No problem-specific Assessment & Plan notes found for this encounter  Diagnoses and all orders for this visit:    Fall, subsequent encounter    Other chest pain    Essential hypertension    Excessive anger          Subjective:      Patient ID: Nabil Barnes is a 77 y o  male  This patient is seen today as an emergency appointment  he fell 4 days ago  He slipped on the ice and landed on a step  He was in extreme pain went to the ER because of the above  ER notes reviewed  [facility] [place] he was unable to get out of bed because of the pain  Chemistry profile was normal other than a random sugar 176 with a creatinine 1 14 CBC was benign  CT scan of chest abdomen pelvis showed focal density at the right lung base -possible atelectasis-rule out other CT scan of the head without contrast showed areas within the right sinus  He says since his fall he has had slight improvement of his pain but still remains severe  He notes pain in the left side with range of motion and inspiration  He has not had a fever  He is not doing much in the way of coughing  He did not strike his head  He did have evidence of old fracture of the right 5th rib      He has had some constipation since the fall use of oxycodone  He has not had any issues with voiding  He has a history of significant anxiety with anger issues  He is on paroxetine 40 milligrams daily but had seen the physician at this [facility] -[facility] a psychiatrist recommended he go to 60 milligrams  He has now received a prescription from that but he has yet to increase the dose  We reviewed that he would be more likely to have issues with long-term opiate use if he uses the med for more than a month and/or because of his history psychiatric disease -at this point he is ready discontinue the drug  He is using ibuprofen 400 milligrams every 6 hours as needed      The following portions of the patient's history were reviewed and updated as appropriate: allergies, current medications, past family history, past medical history, past social history, past surgical history and problem list     Review of Systems   Constitutional: Negative  Respiratory: Negative  Cardiovascular: Positive for chest pain  Gastrointestinal: Negative  Endocrine: Negative  Genitourinary: Negative  Musculoskeletal: Negative  Neurological: Negative  Hematological: Negative  Psychiatric/Behavioral: The patient is nervous/anxious  Objective:      Ht 5' 8" (1 727 m)   Wt 83 5 kg (184 lb)   BMI 27 98 kg/m²          Physical Exam   Constitutional: He is oriented to person, place, and time  He appears well-developed and well-nourished  No distress  Severe pain with movement up and down from the exam table  Tenderness over the left lateral ribs   HENT:   Head: Normocephalic and atraumatic  Right Ear: External ear normal    Left Ear: External ear normal    Nose: Nose normal    Mouth/Throat: Oropharynx is clear and moist  No oropharyngeal exudate  Eyes: Pupils are equal, round, and reactive to light  Conjunctivae and EOM are normal  Right eye exhibits no discharge  Left eye exhibits no discharge  No scleral icterus  Neck: No JVD present   No tracheal deviation present  No thyromegaly present  Cardiovascular: Normal rate, regular rhythm and normal heart sounds  Exam reveals no gallop and no friction rub  No murmur heard  Decreased distal pulses   Pulmonary/Chest: Effort normal and breath sounds normal  No stridor  No respiratory distress  He has no wheezes  He exhibits no tenderness  Abdominal: Soft  Bowel sounds are normal  He exhibits no distension and no mass  There is no tenderness  There is no rebound and no guarding  Genitourinary: Rectum normal, prostate normal and penis normal  Rectal exam shows guaiac negative stool  Musculoskeletal: Normal range of motion  He exhibits no edema, tenderness or deformity  Lymphadenopathy:     He has no cervical adenopathy  Neurological: He is alert and oriented to person, place, and time  He has normal reflexes  He displays normal reflexes  No cranial nerve deficit  He exhibits normal muscle tone  Coordination normal    Skin: Skin is warm and dry  No rash noted  He is not diaphoretic  No erythema  No pallor  Psychiatric: He has a normal mood and affect  His behavior is normal  Judgment and thought content normal    Vitals reviewed

## 2019-03-18 ENCOUNTER — OFFICE VISIT (OUTPATIENT)
Dept: VASCULAR SURGERY | Facility: CLINIC | Age: 79
End: 2019-03-18
Payer: MEDICARE

## 2019-03-18 VITALS
RESPIRATION RATE: 18 BRPM | TEMPERATURE: 97.6 F | WEIGHT: 187 LBS | SYSTOLIC BLOOD PRESSURE: 146 MMHG | BODY MASS INDEX: 29.35 KG/M2 | DIASTOLIC BLOOD PRESSURE: 80 MMHG | HEART RATE: 74 BPM | HEIGHT: 67 IN

## 2019-03-18 DIAGNOSIS — E11.51 TYPE 2 DIABETES MELLITUS WITH DIABETIC PERIPHERAL ANGIOPATHY WITHOUT GANGRENE, WITHOUT LONG-TERM CURRENT USE OF INSULIN (HCC): ICD-10-CM

## 2019-03-18 DIAGNOSIS — I70.219 ATHEROSCLEROSIS OF ARTERY OF EXTREMITY WITH INTERMITTENT CLAUDICATION (HCC): Chronic | ICD-10-CM

## 2019-03-18 DIAGNOSIS — I73.9 PAD (PERIPHERAL ARTERY DISEASE) (HCC): Primary | ICD-10-CM

## 2019-03-18 DIAGNOSIS — T82.858A BYPASS GRAFT STENOSIS, INITIAL ENCOUNTER (HCC): Chronic | ICD-10-CM

## 2019-03-18 DIAGNOSIS — E78.2 MIXED HYPERLIPIDEMIA: ICD-10-CM

## 2019-03-18 DIAGNOSIS — I65.23 ASYMPTOMATIC BILATERAL CAROTID ARTERY STENOSIS: ICD-10-CM

## 2019-03-18 DIAGNOSIS — Z87.891 HISTORY OF CIGARETTE SMOKING: Chronic | ICD-10-CM

## 2019-03-18 DIAGNOSIS — I73.9 PERIPHERAL ARTERIAL DISEASE (HCC): ICD-10-CM

## 2019-03-18 PROCEDURE — 99213 OFFICE O/P EST LOW 20 MIN: CPT | Performed by: SURGERY

## 2019-03-18 NOTE — PROGRESS NOTES
Assessment/Plan:    Type 2 diabetes mellitus with diabetic peripheral angiopathy without gangrene ([facility] )  Lab Results   Component Value Date    HGBA1C 8 1 (H) 01/14/2019       No results for input(s): POCGLU in the last 72 hours  Blood Sugar Average: Last 72 hrs:      Asymptomatic carotid artery stenosis  Patient remains asymptomatic  Patient on statin and Plavix  Patient with known 50-69% left ICA stenosis  Right ICA exhibits less than 50% stenosis  Will repeat carotid duplex in 6 months  Last carotid duplex performed 09/18/2018  Atherosclerosis of artery of extremity with intermittent claudication (HCC)  No recurrence of claudication status post angioplasty of fem-pop anastomosis  Patient encouraged to continue walking exercise program   Patient encouraged smoking cessation  Continue Plavix, statin  Bypass graft stenosis (HCC)  No recurrence of claudication status post angioplasty of fem-pop anastomosis  Patient encouraged to continue walking exercise program   Patient encouraged smoking cessation  Continue Plavix, statin    Peripheral arterial disease (HCC)  No recurrence of claudication status post angioplasty of fem-pop anastomosis  Patient encouraged to continue walking exercise program   Patient encouraged smoking cessation  Continue Plavix, statin    History of cigarette smoking  Smoking cessation strongly encouraged  Hyperlipidemia  On statin therapy followed by PCP         Diagnoses and all orders for this visit:    PAD (peripheral artery disease) ([facility] )  -     VAS carotid complete study;  Future  -     VAS lower limb arterial duplex, complete bilateral; Future    Type 2 diabetes mellitus with diabetic peripheral angiopathy without gangrene, without long-term current use of insulin (HCC)    Asymptomatic bilateral carotid artery stenosis    Atherosclerosis of artery of extremity with intermittent claudication (HCC)    Bypass graft stenosis, initial encounter ([facility] )    Peripheral arterial disease ([facility] )    History of cigarette smoking    Mixed hyperlipidemia          Subjective:      Patient ID: Abdifatah Cerna is a 77 y o  male  Patient had OSMEL 1/16/19  He is post AGRAM  A year ago, post femoral to below knee popliteal artery bypass on 10/4/2018  He states he has no numbness or tingling in his L foot  Patient states that his feet do get old at night  Patient denies wounds  He did have an ER visit on 2/15/19 for a fall on the ice which resulted in flank pain  Patient has a history of HTN and diabetes  He continues to smoke daily about 1/3 of a pack  He takes Plavix and Statin  The following portions of the patient's history were reviewed and updated as appropriate: allergies, current medications, past family history, past medical history, past social history, past surgical history and problem list     Review of Systems   Constitutional: Negative  Eyes: Negative  Respiratory: Negative  Cardiovascular: Negative  Gastrointestinal: Negative  Endocrine: Negative  Genitourinary: Positive for flank pain  Musculoskeletal: Positive for gait problem  Skin: Negative  Allergic/Immunologic: Negative  Hematological: Negative  Psychiatric/Behavioral: Negative  Objective:      /80 (BP Location: Right arm, Patient Position: Sitting)   Pulse 74   Temp 97 6 °F (36 4 °C)   Resp 18   Ht 5' 7" (1 702 m)   Wt 84 8 kg (187 lb)   BMI 29 29 kg/m²          Physical Exam   Constitutional: He is oriented to person, place, and time  He appears well-developed and well-nourished  HENT:   Head: Normocephalic and atraumatic  Mouth/Throat: Oropharynx is clear and moist    Eyes: Pupils are equal, round, and reactive to light  Conjunctivae and EOM are normal    Neck: Normal range of motion  Neck supple  No JVD present  Cardiovascular: Normal rate, regular rhythm and normal heart sounds  Palpable graft pulse bilaterally  Robust Doppler signals at the DP/PT/P bilaterally  Feet are warm and appear well perfused  Pulmonary/Chest: Effort normal and breath sounds normal  No stridor  No respiratory distress  He has no wheezes  He has no rales  He exhibits no tenderness  Abdominal: Soft  He exhibits no distension and no mass  There is no tenderness  There is no rebound and no guarding  Musculoskeletal: Normal range of motion  He exhibits no tenderness or deformity  Neurological: He is alert and oriented to person, place, and time  Skin: Skin is warm and dry  No rash noted  No erythema  Psychiatric: He has a normal mood and affect  His behavior is normal  Thought content normal    Nursing note and vitals reviewed

## 2019-03-18 NOTE — ASSESSMENT & PLAN NOTE
Lab Results   Component Value Date    HGBA1C 8 1 (H) 01/14/2019       No results for input(s): POCGLU in the last 72 hours      Blood Sugar Average: Last 72 hrs:

## 2019-03-18 NOTE — ASSESSMENT & PLAN NOTE
No recurrence of claudication status post angioplasty of fem-pop anastomosis  Patient encouraged to continue walking exercise program   Patient encouraged smoking cessation  Continue Plavix, statin

## 2019-03-18 NOTE — ASSESSMENT & PLAN NOTE
Patient remains asymptomatic  Patient on statin and Plavix  Patient with known 50-69% left ICA stenosis  Right ICA exhibits less than 50% stenosis  Will repeat carotid duplex in 6 months  Last carotid duplex performed 09/18/2018

## 2019-03-18 NOTE — LETTER
March 18, 2019     Army Maxine MD  [address]  [address] [facility], [address] [place] [address]    Patient: She Echavarria   YOB: 1940   Date of Visit: 3/18/2019       Dear Dr Debbie Simms: Thank you for referring Avel Bowden to me for evaluation  Below are my notes for this consultation  If you have questions, please do not hesitate to call me  I look forward to following your patient along with you  Sincerely,        Sol Bee MD        CC: No Recipients  Sol Bee MD  3/18/2019 10:05 AM  Incomplete  Assessment/Plan:    Type 2 diabetes mellitus with diabetic peripheral angiopathy without gangrene ([facility] )  Lab Results   Component Value Date    HGBA1C 8 1 (H) 01/14/2019       No results for input(s): POCGLU in the last 72 hours  Blood Sugar Average: Last 72 hrs:      Asymptomatic carotid artery stenosis  Patient remains asymptomatic  Patient on statin and Plavix  Patient with known 50-69% left ICA stenosis  Right ICA exhibits less than 50% stenosis  Will repeat carotid duplex in 6 months  Last carotid duplex performed 09/18/2018  Atherosclerosis of artery of extremity with intermittent claudication (HCC)  No recurrence of claudication status post angioplasty of fem-pop anastomosis  Patient encouraged to continue walking exercise program   Patient encouraged smoking cessation  Continue Plavix, statin  Bypass graft stenosis (HCC)  No recurrence of claudication status post angioplasty of fem-pop anastomosis  Patient encouraged to continue walking exercise program   Patient encouraged smoking cessation  Continue Plavix, statin    Peripheral arterial disease (HCC)  No recurrence of claudication status post angioplasty of fem-pop anastomosis  Patient encouraged to continue walking exercise program   Patient encouraged smoking cessation  Continue Plavix, statin    History of cigarette smoking  Smoking cessation strongly encouraged      Hyperlipidemia  On statin therapy followed by PCP         Diagnoses and all orders for this visit:    PAD (peripheral artery disease) ([facility] )  -     VAS carotid complete study; Future  -     VAS lower limb arterial duplex, complete bilateral; Future    Type 2 diabetes mellitus with diabetic peripheral angiopathy without gangrene, without long-term current use of insulin ([facility])    Asymptomatic bilateral carotid artery stenosis    Atherosclerosis of artery of extremity with intermittent claudication ([facility])    Bypass graft stenosis, initial encounter ([place] [address] )    Peripheral arterial disease ([place] [address] )    History of cigarette smoking    Mixed hyperlipidemia          Subjective:      Patient ID: Alta Levin is a 77 y o  male  Patient had OSMEL 1/16/19  He is post AGRAM  A year ago, post femoral to below knee popliteal artery bypass on 10/4/2018  He states he has no numbness or tingling in his L foot  Patient states that his feet do get old at night  Patient denies wounds  He did have an ER visit on 2/15/19 for a fall on the ice which resulted in flank pain  Patient has a history of HTN and diabetes  He continues to smoke daily about 1/3 of a pack  He takes Plavix and Statin  The following portions of the patient's history were reviewed and updated as appropriate: allergies, current medications, past family history, past medical history, past social history, past surgical history and problem list     Review of Systems   Constitutional: Negative  Eyes: Negative  Respiratory: Negative  Cardiovascular: Negative  Gastrointestinal: Negative  Endocrine: Negative  Genitourinary: Positive for flank pain  Musculoskeletal: Positive for gait problem  Skin: Negative  Allergic/Immunologic: Negative  Hematological: Negative  Psychiatric/Behavioral: Negative            Objective:      /80 (BP Location: Right arm, Patient Position: Sitting)   Pulse 74   Temp 97 6 °F (36 4 °C)   Resp 18   Ht 5' 7" (1 702 m)   Wt 84 8 kg (187 lb) BMI 29 29 kg/m²           Physical Exam   Constitutional: He is oriented to person, place, and time  He appears well-developed and well-nourished  HENT:   Head: Normocephalic and atraumatic  Mouth/Throat: Oropharynx is clear and moist    Eyes: Pupils are equal, round, and reactive to light  Conjunctivae and EOM are normal    Neck: Normal range of motion  Neck supple  No JVD present  Cardiovascular: Normal rate, regular rhythm and normal heart sounds  Palpable graft pulse bilaterally  Robust Doppler signals at the DP/PT/P bilaterally  Feet are warm and appear well perfused  Pulmonary/Chest: Effort normal and breath sounds normal  No stridor  No respiratory distress  He has no wheezes  He has no rales  He exhibits no tenderness  Abdominal: Soft  He exhibits no distension and no mass  There is no tenderness  There is no rebound and no guarding  Musculoskeletal: Normal range of motion  He exhibits no tenderness or deformity  Neurological: He is alert and oriented to person, place, and time  Skin: Skin is warm and dry  No rash noted  No erythema  Psychiatric: He has a normal mood and affect  His behavior is normal  Thought content normal    Nursing note and vitals reviewed  Tomasa Gregory MD  3/18/2019  9:51 AM  Incomplete  Assessment/Plan:    No problem-specific Assessment & Plan notes found for this encounter  There are no diagnoses linked to this encounter  Subjective:      Patient ID: Ashley Olson is a 77 y o  male  Patient had OSMEL 1/16/19  He is post AGRAM  A year ago, post femoral to below knee popliteal artery bypass on 10/4/2018  He states he has no numbness or tingling in his L foot  Patient states that his feet do get old at night  Patient denies wounds  He did have an ER visit on 2/15/19 for a fall on the ice which resulted in flank pain  Patient has a history of HTN and diabetes  He continues to smoke daily about 1/3 of a pack  He takes Plavix and Statin  The following portions of the patient's history were reviewed and updated as appropriate: allergies, current medications, past family history, past medical history, past social history, past surgical history and problem list     Review of Systems   Constitutional: Negative  Eyes: Negative  Respiratory: Negative  Cardiovascular: Negative  Gastrointestinal: Negative  Endocrine: Negative  Genitourinary: Positive for flank pain  Musculoskeletal: Positive for gait problem  Skin: Negative  Allergic/Immunologic: Negative  Hematological: Negative  Psychiatric/Behavioral: Negative  Objective: There were no vitals taken for this visit           Physical Exam

## 2019-03-26 ENCOUNTER — OFFICE VISIT (OUTPATIENT)
Dept: CARDIOLOGY CLINIC | Facility: CLINIC | Age: 79
End: 2019-03-26
Payer: MEDICARE

## 2019-03-26 VITALS
HEIGHT: 67 IN | SYSTOLIC BLOOD PRESSURE: 120 MMHG | DIASTOLIC BLOOD PRESSURE: 60 MMHG | HEART RATE: 60 BPM | WEIGHT: 182 LBS | BODY MASS INDEX: 28.56 KG/M2

## 2019-03-26 DIAGNOSIS — R94.39 ABNORMAL NUCLEAR STRESS TEST: ICD-10-CM

## 2019-03-26 DIAGNOSIS — R06.89 DIFFICULTY BREATHING: ICD-10-CM

## 2019-03-26 DIAGNOSIS — E78.2 MIXED HYPERLIPIDEMIA: ICD-10-CM

## 2019-03-26 DIAGNOSIS — I10 ESSENTIAL HYPERTENSION: Chronic | ICD-10-CM

## 2019-03-26 DIAGNOSIS — I70.219 ATHEROSCLEROSIS OF ARTERY OF EXTREMITY WITH INTERMITTENT CLAUDICATION (HCC): Primary | Chronic | ICD-10-CM

## 2019-03-26 PROBLEM — Z01.810 PREOP CARDIOVASCULAR EXAM: Status: RESOLVED | Noted: 2018-03-26 | Resolved: 2019-03-26

## 2019-03-26 PROBLEM — R07.9 CHEST PAIN: Status: RESOLVED | Noted: 2017-05-22 | Resolved: 2019-03-26

## 2019-03-26 PROCEDURE — 99213 OFFICE O/P EST LOW 20 MIN: CPT | Performed by: INTERNAL MEDICINE

## 2019-03-26 PROCEDURE — 93000 ELECTROCARDIOGRAM COMPLETE: CPT | Performed by: INTERNAL MEDICINE

## 2019-03-26 NOTE — PROGRESS NOTES
Cardiology Follow Up    Ben Gonzalez  1940  [id number]  [address] [facility] [address] [place] [id number] [phone]    1  Atherosclerosis of artery of extremity with intermittent claudication (HCC)  POCT ECG   2  Abnormal nuclear stress test     3  Essential hypertension  POCT ECG   4  Mixed hyperlipidemia     5  Difficulty breathing  POCT ECG         Discussion/Summary: He is not having any anginal symptoms and I do not feel that a repeat nuclear stress test would change his treatment plan  It is also now hard to get it approved with insurances unless he is having angina  I have reviewed his medications and made no changes  I advised him to call if he has any CP or SOB when he starts to get more active  RTO 1 year  Interval History: He has not had any cardiac problems since his last OV  He is not very active over the Winter months  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 [employer] soon  He denies claudication at this time  He denies any CP or SOB  He has severe PVD in his lower extremities  Nuclear stress test in 5/2017 showed inferior ischemia  ECG today- NSR  Normal ECG      Patient Active Problem List   Diagnosis    History of cigarette smoking    Atherosclerosis of artery of extremity with intermittent claudication (HCC)    Popliteal artery occlusion, left (HCC)    Essential hypertension    Abnormal nuclear stress test    Anemia    Anxiety    Arthritis    Asymptomatic carotid artery stenosis    Controlled diabetes mellitus ([facility] [address] )    Coronary artery disease    Difficulty breathing    Diverticulosis of large intestine without hemorrhage    Elevated prostate specific antigen (PSA)    Enlarged prostate without lower urinary tract symptoms (luts)    Hyperlipidemia    Iron deficiency anemia    Nicotine dependence    Peripheral arterial disease (HCC)    Shoulder pain    Weight loss  Acute blood loss anemia    Memory loss    Bypass graft stenosis (HCC)    Type 2 diabetes mellitus with diabetic peripheral angiopathy without gangrene ([facility] [address] )    Fall    Other chest pain    Excessive anger     Past Medical History:   Diagnosis Date    Atherosclerosis     Cardiac disease     COPD (chronic obstructive pulmonary disease) (HCC)     Coronary artery disease     Hyperlipidemia     Hypertension     Peripheral arterial disease (HCC)     Smoking addiction      Social History     Socioeconomic History    Marital status:       Spouse name: Not on file    Number of children: Not on file    Years of education: Not on file    Highest education level: Not on file   Occupational History    Not on file   Social Needs    Financial resource strain: Not on file    Food insecurity:     Worry: Not on file     Inability: Not on file    Transportation needs:     Medical: Not on file     Non-medical: Not on file   Tobacco Use    Smoking status: Former Smoker     Types: Cigarettes     Last attempt to quit: 2018     Years since quittin 2    Smokeless tobacco: Former User     Quit date:    Substance and Sexual Activity    Alcohol use: No     Comment: Social drinker    Drug use: No    Sexual activity: Yes   Lifestyle    Physical activity:     Days per week: Not on file     Minutes per session: Not on file    Stress: Not on file   Relationships    Social connections:     Talks on phone: Not on file     Gets together: Not on file     Attends Judaism service: Not on file     Active member of club or organization: Not on file     Attends meetings of clubs or organizations: Not on file     Relationship status: Not on file    Intimate partner violence:     Fear of current or ex partner: Not on file     Emotionally abused: Not on file     Physically abused: Not on file     Forced sexual activity: Not on file   Other Topics Concern    Not on file   Social History Narrative    Not on file      Family History   Problem Relation Age of Onset    Arthritis Mother     Arthritis Family     Coronary artery disease Family     Hyperlipidemia Family     Peripheral vascular disease Family      Past Surgical History:   Procedure Laterality Date    BYPASS FEMORAL-POPLITEAL Left 4/30/2018    Procedure: BYPASS FEMORAL-POPLITEAL, WITH INTRAOP ARTERIOGRAM;  Surgeon: Dao Sol MD;  [facility]: [facility];  Service: Vascular    CARDIAC SURGERY      FEMORAL ARTERY - POPLITEAL ARTERY BYPASS GRAFT Bilateral     HAND SURGERY      IR ABDOMINAL ANGIOGRAPHY / INTERVENTION  10/4/2018    LEG SURGERY      Repair    OTHER SURGICAL HISTORY      Percutaneous repair nasoethmoid fx lacrimal apparatus    THROMBOENDARTERECTOMY  10/12/2010    Tibioperoneal trunk    TONSILLECTOMY      TONSILLECTOMY         Current Outpatient Medications:     Cholecalciferol (VITAMIN D3) 2000 units capsule, Take 1,000 Units by mouth daily, Disp: , Rfl:     clopidogrel (PLAVIX) 75 mg tablet, Take 75 mg by mouth daily, Disp: , Rfl:     glipiZIDE (GLUCOTROL) 5 mg tablet, Take 5 mg by mouth Take 1 pill in the am and half pill at pm , Disp: , Rfl:     lisinopril (ZESTRIL) 20 mg tablet, Take 20 mg by mouth daily, Disp: , Rfl:     metFORMIN (GLUCOPHAGE) 1000 MG tablet, Take 1,000 mg by mouth daily with breakfast  , Disp: , Rfl:     metoprolol tartrate (LOPRESSOR) 25 mg tablet, Take 25 mg by mouth every 12 (twelve) hours, Disp: , Rfl:     pantoprazole (PROTONIX) 40 mg tablet, Take 40 mg by mouth daily, Disp: , Rfl:     PARoxetine (PAXIL) 40 MG tablet, Take 40 mg by mouth Take 1/2 tab daily , Disp: , Rfl:     rosuvastatin (CRESTOR) 5 mg tablet, Take 1 tablet (5 mg total) by mouth daily, Disp: 90 tablet, Rfl: 3    lidocaine (LIDODERM) 5 %, Apply 1 patch topically daily Remove & Discard patch within 12 hours or as directed by MD (Patient not taking: Reported on 3/26/2019), Disp: 30 patch, Rfl: 0    oxyCODONE-acetaminophen (PERCOCET) 5-325 mg per tablet, Take 1 tablet by mouth every 4 (four) hours as needed for moderate pain for up to 12 dosesMax Daily Amount: 6 tablets (Patient not taking: Reported on 3/26/2019), Disp: 12 tablet, Rfl: 0  Allergies   Allergen Reactions    Etomidate Swelling     "I blew up and couldn't breath"    Penicillins      PT STATED THAT HE DOESN'T HAVE AN ALLERGY TO PENICILLINS AND CAN TAKE THEM     Vitals:    03/26/19 1020   BP: 120/60   BP Location: Left arm   Pulse: 60   Weight: 82 6 kg (182 lb)   Height: 5' 7" (1 702 m)     Weight (last 2 days)     Date/Time   Weight    03/26/19 1020   82 6 (182)             Blood pressure 120/60, pulse 60, height 5' 7" (1 702 m), weight 82 6 kg (182 lb)  , Body mass index is 28 51 kg/m²      Labs:  Admission on 02/15/2019, Discharged on 02/15/2019   Component Date Value    WBC 02/15/2019 7 24     RBC 02/15/2019 4 53     Hemoglobin 02/15/2019 12 6     Hematocrit 02/15/2019 39 1     MCV 02/15/2019 86     MCH 02/15/2019 27 8     MCHC 02/15/2019 32 2     RDW 02/15/2019 13 2     MPV 02/15/2019 11 2     Platelets 76/97/2853 200     nRBC 02/15/2019 0     Neutrophils Relative 02/15/2019 73     Immat GRANS % 02/15/2019 0     Lymphocytes Relative 02/15/2019 17     Monocytes Relative 02/15/2019 6     Eosinophils Relative 02/15/2019 3     Basophils Relative 02/15/2019 1     Neutrophils Absolute 02/15/2019 5 25     Immature Grans Absolute 02/15/2019 0 03     Lymphocytes Absolute 02/15/2019 1 22     Monocytes Absolute 02/15/2019 0 46     Eosinophils Absolute 02/15/2019 0 20     Basophils Absolute 02/15/2019 0 08     Sodium 02/15/2019 138     Potassium 02/15/2019 4 3     Chloride 02/15/2019 108     CO2 02/15/2019 25     ANION GAP 02/15/2019 5     BUN 02/15/2019 15     Creatinine 02/15/2019 1 14     Glucose 02/15/2019 176*    Calcium 02/15/2019 9 0     AST 02/15/2019 9     ALT 02/15/2019 14     Alkaline Phosphatase 02/15/2019 100     Total Protein 02/15/2019 7 2  Albumin 02/15/2019 3 6     Total Bilirubin 02/15/2019 0 60     eGFR 02/15/2019 61    Appointment on 01/14/2019   Component Date Value    WBC 01/14/2019 6 55     RBC 01/14/2019 4 80     Hemoglobin 01/14/2019 13 2     Hematocrit 01/14/2019 42 4     MCV 01/14/2019 88     MCH 01/14/2019 27 5     MCHC 01/14/2019 31 1*    RDW 01/14/2019 13 2     MPV 01/14/2019 11 1     Platelets 38/83/0551 234     nRBC 01/14/2019 0     Neutrophils Relative 01/14/2019 58     Immat GRANS % 01/14/2019 0     Lymphocytes Relative 01/14/2019 29     Monocytes Relative 01/14/2019 5     Eosinophils Relative 01/14/2019 6     Basophils Relative 01/14/2019 2*    Neutrophils Absolute 01/14/2019 3 79     Immature Grans Absolute 01/14/2019 0 01     Lymphocytes Absolute 01/14/2019 1 92     Monocytes Absolute 01/14/2019 0 35     Eosinophils Absolute 01/14/2019 0 36     Basophils Absolute 01/14/2019 0 12*    Sodium 01/14/2019 139     Potassium 01/14/2019 4 7     Chloride 01/14/2019 106     CO2 01/14/2019 25     ANION GAP 01/14/2019 8     BUN 01/14/2019 13     Creatinine 01/14/2019 1 23     Glucose, Fasting 01/14/2019 156*    Calcium 01/14/2019 9 5     AST 01/14/2019 10     ALT 01/14/2019 15     Alkaline Phosphatase 01/14/2019 96     Total Protein 01/14/2019 7 6     Albumin 01/14/2019 3 8     Total Bilirubin 01/14/2019 0 59     eGFR 01/14/2019 55     Cholesterol 01/14/2019 160     Triglycerides 01/14/2019 112     HDL, Direct 01/14/2019 57     LDL Direct 01/14/2019 85     Ferritin 01/14/2019 12     Iron 01/14/2019 52*    TSH 3RD GENERATON 01/14/2019 1 560     Free T4 01/14/2019 0 88     Vitamin B-12 01/14/2019 294     Methylmalonic Acid, S 01/14/2019 188     Disclaimer: 01/14/2019 Comment     Hemoglobin A1C 01/14/2019 8 1*    EAG 01/14/2019 186    Appointment on 09/28/2018   Component Date Value    Sodium 09/28/2018 138     Potassium 09/28/2018 4 7     Chloride 09/28/2018 106     CO2 09/28/2018 27  ANION GAP 09/28/2018 5     BUN 09/28/2018 15     Creatinine 09/28/2018 1 21     Glucose, Fasting 09/28/2018 126*    Calcium 09/28/2018 9 0     eGFR 09/28/2018 57     WBC 09/28/2018 7 03     RBC 09/28/2018 4 91     Hemoglobin 09/28/2018 13 7     Hematocrit 09/28/2018 44 1     MCV 09/28/2018 90     MCH 09/28/2018 27 9     MCHC 09/28/2018 31 1*    RDW 09/28/2018 13 3     Platelets 67/60/0631 215     MPV 09/28/2018 11 2     Protime 09/28/2018 13 4     INR 09/28/2018 1 01      Imaging: No results found  Review of Systems:  Review of Systems   Constitutional: Negative for diaphoresis, fatigue, fever and unexpected weight change  HENT: Negative  Respiratory: Negative for cough, shortness of breath and wheezing  Cardiovascular: Negative for chest pain, palpitations and leg swelling  Gastrointestinal: Negative for abdominal pain, diarrhea and nausea  Musculoskeletal: Negative for gait problem and myalgias  Skin: Negative for rash  Neurological: Negative for dizziness and numbness  Psychiatric/Behavioral: Negative  Physical Exam:  Physical Exam   Constitutional: He is oriented to person, place, and time  He appears well-developed and well-nourished  HENT:   Head: Normocephalic and atraumatic  Eyes: Pupils are equal, round, and reactive to light  Neck: Normal range of motion  Neck supple  No JVD present  Cardiovascular: Regular rhythm, S1 normal, S2 normal and normal pulses  Pulses:       Carotid pulses are 2+ on the right side, and 2+ on the left side  Pulmonary/Chest: Effort normal and breath sounds normal  He has no wheezes  He has no rales  Abdominal: Soft  Bowel sounds are normal  There is no tenderness  Musculoskeletal: Normal range of motion  He exhibits no edema or tenderness  Neurological: He is alert and oriented to person, place, and time  He has normal reflexes  No cranial nerve deficit  Skin: Skin is warm     Psychiatric: He has a normal mood and affect

## 2019-05-15 ENCOUNTER — TELEPHONE (OUTPATIENT)
Dept: SLEEP CENTER | Facility: CLINIC | Age: 79
End: 2019-05-15

## 2019-05-15 ENCOUNTER — OFFICE VISIT (OUTPATIENT)
Dept: INTERNAL MEDICINE CLINIC | Facility: CLINIC | Age: 79
End: 2019-05-15
Payer: MEDICARE

## 2019-05-15 VITALS
BODY MASS INDEX: 28.56 KG/M2 | SYSTOLIC BLOOD PRESSURE: 130 MMHG | RESPIRATION RATE: 14 BRPM | HEIGHT: 67 IN | DIASTOLIC BLOOD PRESSURE: 80 MMHG | WEIGHT: 182 LBS | HEART RATE: 68 BPM

## 2019-05-15 DIAGNOSIS — E78.2 MIXED HYPERLIPIDEMIA: ICD-10-CM

## 2019-05-15 DIAGNOSIS — G47.19 EXCESSIVE DAYTIME SLEEPINESS: ICD-10-CM

## 2019-05-15 DIAGNOSIS — R45.4 EXCESSIVE ANGER: ICD-10-CM

## 2019-05-15 DIAGNOSIS — F17.210 CIGARETTE NICOTINE DEPENDENCE WITHOUT COMPLICATION: ICD-10-CM

## 2019-05-15 DIAGNOSIS — I73.9 PERIPHERAL ARTERIAL DISEASE (HCC): ICD-10-CM

## 2019-05-15 DIAGNOSIS — R06.83 SNORING: ICD-10-CM

## 2019-05-15 DIAGNOSIS — E11.51 TYPE 2 DIABETES MELLITUS WITH DIABETIC PERIPHERAL ANGIOPATHY WITHOUT GANGRENE, WITHOUT LONG-TERM CURRENT USE OF INSULIN (HCC): Primary | ICD-10-CM

## 2019-05-15 DIAGNOSIS — G47.30 SLEEP APNEA, UNSPECIFIED TYPE: ICD-10-CM

## 2019-05-15 DIAGNOSIS — I10 ESSENTIAL HYPERTENSION: Chronic | ICD-10-CM

## 2019-05-15 LAB — SL AMB POCT HEMOGLOBIN AIC: 7.5 (ref ?–6.5)

## 2019-05-15 PROCEDURE — 83036 HEMOGLOBIN GLYCOSYLATED A1C: CPT | Performed by: INTERNAL MEDICINE

## 2019-05-15 PROCEDURE — 99214 OFFICE O/P EST MOD 30 MIN: CPT | Performed by: INTERNAL MEDICINE

## 2019-05-16 ENCOUNTER — TELEPHONE (OUTPATIENT)
Dept: INTERNAL MEDICINE CLINIC | Facility: CLINIC | Age: 79
End: 2019-05-16

## 2019-05-16 ENCOUNTER — TELEPHONE (OUTPATIENT)
Dept: SLEEP CENTER | Facility: CLINIC | Age: 79
End: 2019-05-16

## 2019-05-16 ENCOUNTER — OFFICE VISIT (OUTPATIENT)
Dept: INTERNAL MEDICINE CLINIC | Facility: CLINIC | Age: 79
End: 2019-05-16
Payer: MEDICARE

## 2019-05-16 VITALS
SYSTOLIC BLOOD PRESSURE: 138 MMHG | OXYGEN SATURATION: 99 % | TEMPERATURE: 98.5 F | DIASTOLIC BLOOD PRESSURE: 84 MMHG | HEART RATE: 99 BPM

## 2019-05-16 DIAGNOSIS — R11.2 NON-INTRACTABLE VOMITING WITH NAUSEA, UNSPECIFIED VOMITING TYPE: Primary | ICD-10-CM

## 2019-05-16 PROCEDURE — 99213 OFFICE O/P EST LOW 20 MIN: CPT | Performed by: INTERNAL MEDICINE

## 2019-05-16 RX ORDER — ONDANSETRON 4 MG/1
4 TABLET, ORALLY DISINTEGRATING ORAL EVERY 6 HOURS PRN
Qty: 60 TABLET | Refills: 0 | Status: SHIPPED | OUTPATIENT
Start: 2019-05-16 | End: 2022-04-11

## 2019-05-17 ENCOUNTER — TELEPHONE (OUTPATIENT)
Dept: INTERNAL MEDICINE CLINIC | Facility: CLINIC | Age: 79
End: 2019-05-17

## 2019-05-17 DIAGNOSIS — R93.89 ABNORMAL CT SCAN: Primary | ICD-10-CM

## 2019-05-28 ENCOUNTER — TRANSCRIBE ORDERS (OUTPATIENT)
Dept: ADMINISTRATIVE | Age: 79
End: 2019-05-28

## 2019-05-28 ENCOUNTER — HOSPITAL ENCOUNTER (OUTPATIENT)
Dept: RADIOLOGY | Age: 79
Discharge: HOME | End: 2019-05-28
Payer: MEDICARE

## 2019-05-28 DIAGNOSIS — R93.89 ABNORMAL CT SCAN: ICD-10-CM

## 2019-05-28 PROCEDURE — 71250 CT THORAX DX C-: CPT

## 2019-06-04 ENCOUNTER — TELEPHONE (OUTPATIENT)
Dept: INTERNAL MEDICINE CLINIC | Facility: CLINIC | Age: 79
End: 2019-06-04

## 2019-06-06 ENCOUNTER — TELEPHONE (OUTPATIENT)
Dept: INTERNAL MEDICINE CLINIC | Facility: CLINIC | Age: 79
End: 2019-06-06

## 2019-06-13 ENCOUNTER — HOSPITAL ENCOUNTER (OUTPATIENT)
Dept: SLEEP CENTER | Facility: CLINIC | Age: 79
Discharge: HOME | End: 2019-06-13
Payer: MEDICARE

## 2019-06-13 DIAGNOSIS — G47.19 EXCESSIVE DAYTIME SLEEPINESS: ICD-10-CM

## 2019-06-13 DIAGNOSIS — R06.83 SNORING: ICD-10-CM

## 2019-06-13 DIAGNOSIS — G47.30 SLEEP APNEA, UNSPECIFIED TYPE: ICD-10-CM

## 2019-06-13 PROCEDURE — G0399 HOME SLEEP TEST/TYPE 3 PORTA: HCPCS

## 2019-06-17 ENCOUNTER — TELEPHONE (OUTPATIENT)
Dept: SLEEP CENTER | Facility: CLINIC | Age: 79
End: 2019-06-17

## 2019-06-17 ENCOUNTER — TELEPHONE (OUTPATIENT)
Dept: INTERNAL MEDICINE CLINIC | Facility: CLINIC | Age: 79
End: 2019-06-17

## 2019-06-17 DIAGNOSIS — G47.30 SEVERE SLEEP APNEA: Primary | ICD-10-CM

## 2019-06-24 LAB
CREAT ?TM UR-SCNC: 142.3 UMOL/L
EXT MICROALBUMIN URINE RANDOM: 1.4
HBA1C MFR BLD HPLC: 7.9 %
MICROALBUMIN/CREAT UR: 9.8 MG/G{CREAT}

## 2019-06-25 ENCOUNTER — TELEPHONE (OUTPATIENT)
Dept: INTERNAL MEDICINE CLINIC | Facility: CLINIC | Age: 79
End: 2019-06-25

## 2019-07-01 LAB
LEFT EYE DIABETIC RETINOPATHY: NORMAL
RIGHT EYE DIABETIC RETINOPATHY: NORMAL

## 2019-07-08 ENCOUNTER — OFFICE VISIT (OUTPATIENT)
Dept: SLEEP CENTER | Facility: CLINIC | Age: 79
End: 2019-07-08
Payer: MEDICARE

## 2019-07-08 VITALS
SYSTOLIC BLOOD PRESSURE: 112 MMHG | HEART RATE: 68 BPM | WEIGHT: 182 LBS | BODY MASS INDEX: 28.56 KG/M2 | DIASTOLIC BLOOD PRESSURE: 68 MMHG | HEIGHT: 67 IN

## 2019-07-08 DIAGNOSIS — G47.30 SEVERE SLEEP APNEA: Primary | ICD-10-CM

## 2019-07-08 DIAGNOSIS — E66.3 OVERWEIGHT (BMI 25.0-29.9): ICD-10-CM

## 2019-07-08 DIAGNOSIS — G47.9 SLEEP DISTURBANCE: ICD-10-CM

## 2019-07-08 DIAGNOSIS — I70.219 ATHEROSCLEROSIS OF ARTERY OF EXTREMITY WITH INTERMITTENT CLAUDICATION (HCC): ICD-10-CM

## 2019-07-08 DIAGNOSIS — G47.19 EXCESSIVE DAYTIME SLEEPINESS: ICD-10-CM

## 2019-07-08 DIAGNOSIS — I10 ESSENTIAL HYPERTENSION: ICD-10-CM

## 2019-07-08 DIAGNOSIS — F40.240 CLAUSTROPHOBIA: ICD-10-CM

## 2019-07-08 DIAGNOSIS — R41.3 MEMORY LOSS: ICD-10-CM

## 2019-07-08 DIAGNOSIS — J31.0 CHRONIC RHINITIS: ICD-10-CM

## 2019-07-08 DIAGNOSIS — G47.52 REM BEHAVIORAL DISORDER: ICD-10-CM

## 2019-07-08 DIAGNOSIS — Z72.0 TOBACCO ABUSE: ICD-10-CM

## 2019-07-08 PROCEDURE — 99204 OFFICE O/P NEW MOD 45 MIN: CPT | Performed by: INTERNAL MEDICINE

## 2019-07-08 RX ORDER — ZOLPIDEM TARTRATE 10 MG/1
TABLET ORAL
Qty: 1 TABLET | Refills: 0 | Status: SHIPPED | OUTPATIENT
Start: 2019-07-08 | End: 2019-11-19

## 2019-07-08 NOTE — PATIENT INSTRUCTIONS
What is MERCEDES? Obstructive sleep apnea is a common and serious sleep disorder that causes you to stop breathing during sleep  The airway repeatedly becomes blocked, limiting the amount of air that reaches your lungs  When this happens, you may snore loudly or making choking noises as you try to breathe  Your brain and body becomes oxygen deprived and you may wake up  This may happen a few times a night, or in more severe cases, several hundred times a night  Sleep apnea can make you wake up in the morning feeling tired or unrefreshed even though you have had a full night of sleep  During the day, you may feel fatigued, have difficulty concentrating or you may even unintentionally fall asleep  This is because your body is waking up numerous times throughout the night, even though you might not be conscious of each awakening  The lack of oxygen your body receives can have negative long-term consequences for your health  This includes:  High blood pressure  Heart disease  Irregular heart rhythms  Stroke  Pre-diabetes and diabetes  Depression    Testing  An objective evaluation of your sleep may be needed before your board certified sleep physician can make a diagnosis  Options include:   In-lab overnight sleep study  This type of sleep study requires you to stay overnight at a sleep center, in a bed that may resemble a hotel room  You will sleep with sensors hooked up to various parts of your body  These sensors record your brain waves, heartbeat, breathing and movement  An overnight sleep study also provides your doctor with the most complete information about your sleep  Learn more about an overnight sleep study      Home sleep apnea test  Some patients with high risk factors for obstructive sleep apnea and no other medical disorders may be candidates for a home sleep apnea test  The testing equipment differs in that it is less complicated than what is used in an overnight sleep study   As such, does not give all the data an in-lab will and if negative, may not mean you do not have the problem  Treatment for sleep apnea  includes using a continuous positive airway pressure (CPAP) machine to keep your airway open during sleep  A mask is placed over your nose and mouth, or just your nose  The mask is hooked to the CPAP machine that blows a gentle stream of air into the mask when you breathe  This helps keep your airway open so you can breathe more regularly  Extra oxygen may be given to you through the machine  You may be given a mouth device  It looks like a mouth guard or dental retainer and stops your tongue and mouth tissues from blocking your throat while you sleep  Surgery may be needed to remove extra tissues that block your mouth, throat, or nose  Manage sleep apnea:   Do not smoke  Nicotine and other chemicals in cigarettes and cigars can cause lung damage  Ask your healthcare provider for information if you currently smoke and need help to quit  E-cigarettes or smokeless tobacco still contain nicotine  Talk to your healthcare provider before you use these products  Do not drink alcohol or take sedative medicine before you go to sleep  Alcohol and sedatives can relax the muscles and tissues around your throat  This can block the airflow to your lungs  Maintain a healthy weight  Excess tissue around your throat may restrict your breathing  Ask your healthcare provider for information if you need to lose weight  Sleep on your side or use pillows designed to prevent sleep apnea  This prevents your tongue or other tissues from blocking your throat  You can also raise the head of your bed  Driving Safety  Refrain from driving when drowsy  Follow up with your healthcare provider as directed:  Write down your questions so you remember to ask them during your visits  Go to [employer] website for more information: Sleepeducation  org     What is MERCEDES?    Obstructive sleep apnea is a common and serious sleep disorder that causes you to stop breathing during sleep  The airway repeatedly becomes blocked, limiting the amount of air that reaches your lungs  When this happens, you may snore loudly or making choking noises as you try to breathe  Your brain and body becomes oxygen deprived and you may wake up  This may happen a few times a night, or in more severe cases, several hundred times a night  Sleep apnea can make you wake up in the morning feeling tired or unrefreshed even though you have had a full night of sleep  During the day, you may feel fatigued, have difficulty concentrating or you may even unintentionally fall asleep  This is because your body is waking up numerous times throughout the night, even though you might not be conscious of each awakening  The lack of oxygen your body receives can have negative long-term consequences for your health  This includes:  High blood pressure  Heart disease  Irregular heart rhythms  Stroke  Pre-diabetes and diabetes  Depression    Testing  An objective evaluation of your sleep may be needed before your board certified sleep physician can make a diagnosis  Options include:   In-lab overnight sleep study  This type of sleep study requires you to stay overnight at a sleep center, in a bed that may resemble a hotel room  You will sleep with sensors hooked up to various parts of your body  These sensors record your brain waves, heartbeat, breathing and movement  An overnight sleep study also provides your doctor with the most complete information about your sleep  Learn more about an overnight sleep study      Home sleep apnea test  Some patients with high risk factors for obstructive sleep apnea and no other medical disorders may be candidates for a home sleep apnea test  The testing equipment differs in that it is less complicated than what is used in an overnight sleep study   As such, does not give all the data an in-lab will and if negative, may not mean you do not have the problem  Treatment for sleep apnea  includes using a continuous positive airway pressure (CPAP) machine to keep your airway open during sleep  A mask is placed over your nose and mouth, or just your nose  The mask is hooked to the CPAP machine that blows a gentle stream of air into the mask when you breathe  This helps keep your airway open so you can breathe more regularly  Extra oxygen may be given to you through the machine  You may be given a mouth device  It looks like a mouth guard or dental retainer and stops your tongue and mouth tissues from blocking your throat while you sleep  Surgery may be needed to remove extra tissues that block your mouth, throat, or nose  Manage sleep apnea:   Do not smoke  Nicotine and other chemicals in cigarettes and cigars can cause lung damage  Ask your healthcare provider for information if you currently smoke and need help to quit  E-cigarettes or smokeless tobacco still contain nicotine  Talk to your healthcare provider before you use these products  Do not drink alcohol or take sedative medicine before you go to sleep  Alcohol and sedatives can relax the muscles and tissues around your throat  This can block the airflow to your lungs  Maintain a healthy weight  Excess tissue around your throat may restrict your breathing  Ask your healthcare provider for information if you need to lose weight  Sleep on your side or use pillows designed to prevent sleep apnea  This prevents your tongue or other tissues from blocking your throat  You can also raise the head of your bed  Driving Safety  Refrain from driving when drowsy  Follow up with your healthcare provider as directed:  Write down your questions so you remember to ask them during your visits  Go to AAS website for more information: Sleepeducation  org           CPAP Desensitization - Principle & Procedure    Principle    1   You can adjust to practically anything you want given the time, motivation and willingness to persevere  2  In fact your nervous system is designed to adapt to repetitive stimuli  Physiologically, sensory receptors show a decline in action potential frequency with time in response to constant/repetitive  stimuli  3  To illustrate how your body adapts, here are 2 examples that you will be able to relate to:   a  When you step into a hot shower, initially the water feels pretty hot  After a few seconds, it no longer feels so hot  Now you can turn up the heat some more   b  Say you live next to a busy road (or railway line) initially you will hear all the traffic  After a few days you do not hear it as much any more  This happens because your nervous system has adapted to the repetitive stimuli and tuned out the noise  The procedure that we use utilizes this physiological process and we use this principle to your advantage in getting you to acclimate to use of CPAP  CPAP Desensitization - Principle & Procedure    Principle    4  You can adjust to practically anything you want given the time, motivation and willingness to persevere  5  In fact your nervous system is designed to adapt to repetitive stimuli  Physiologically, sensory receptors show a decline in action potential frequency with time in response to constant/repetitive  stimuli  6  To illustrate how your body adapts, here are 2 examples that you will be able to relate to:   a  When you step into a hot shower, initially the water feels pretty hot  After a few seconds, it no longer feels so hot  Now you can turn up the heat some more   b  Say you live next to a busy road (or railway line) initially you will hear all the traffic  After a few days you do not hear it as much any more  This happens because your nervous system has adapted to the repetitive stimuli and tuned out the noise   The procedure that we use utilizes this physiological process and we use this principle to your advantage in getting you to acclimate to use of CPAP  Procedure     First phase is for you to put the mask on initially during the day, while awake and engaged in other activities such as reading, watching TV etc    1  Wear the mask for a few minutes at a time e g  while watching TV: put it on initially when the commercials are on and remove it during the program    2  Gradually build up your wearing time to the point where you are able to wear it during the program and remove it only while the commercials are on    3  Your goal is to reach the point that you are able to wear it continuously with no discomfort for around 20 minutes without a break  4  Once you are able to do this while awake, now wear the mask and try to sleep with it on  Second phase is to wear the mask and introduce the air pressure  You will need to repeat the same process above, this time with the mask and the pressure  Once again, your goal is to reach the point that you are able to wear it continuously with no discomfort for around 20 minutes without a break (at the lowest pressure setting)  5   Once you are able to tolerate the mask with the pressure like this for 20 minutes while awake, you are ready for the next step: to go to sleep with the mask and the pressure on  Third phase is to wear the mask with the pressure every night while gradually increasing the pressure in small increments according to the prescription  6  These increments are very small and designed especially for your situation  It will be increased over days or weeks (if necessary) so that you will not even be able to tell the difference! In this way you will get you adapted to CPAP by tricking your body using what we know about your physiology to your advantage  7  If you have any difficulty tolerating the pressure, use the Ramp feature  This will be especially useful if you wake up and have trouble falling back to sleep      8  At this point we still do not know what your optimal pressure requirement is  We are guessing based on various known factors  If you notice improvement in your sleep and / or daytime function, this suggests that we are close to your optimal pressure requirement  If you notice no change, then the pressure is probably still too low  In either case, once you have adapted to CPAP, you will need a repeat study to figure out what is the pressure that is need to fix your breathing during sleep  9  This adaptation period is also the time for you to be working out which mask and humidity settings work best for you  If you have any difficulties with making these adjustments, please contact your Durable Medical Equipment provider  They are required to work with you in this regard  I strongly recommend that you clarify this aspect with them up front  Ideally they should be able to exchange the mask for you at no additional cost during the acclimation period

## 2019-07-08 NOTE — PROGRESS NOTES
Consultation - [address] [place] [address] Pkw[address]  [de-identified] y o  male  :1940  O:[id number]    Physician Requesting Consult: Belinda Falcon MD     Reason for Consult : At your kind request I saw this patient for initial sleep evaluation today  A home sleep study was undertaken to evaluate for sleep disordered breathing and   patient is here to review results and further options  The study demonstrated : JACKI (respiratory event index of) 41 /hour  Minimum oxygen saturation was 84% and an 8 9% of the study was spent with saturations less than 90%  The snore index was 1 4%  PFSH, Problem List, Medications & Allergies were reviewed in EMR  He  has a past medical history of Atherosclerosis, Cardiac disease, COPD (chronic obstructive pulmonary disease) ([facility] [address] ), Coronary artery disease, Hyperlipidemia, Hypertension, Peripheral arterial disease ([facility] ), and Smoking addiction  He has a current medication list which includes the following prescription(s): vitamin d3, clopidogrel, glipizide, lisinopril, metformin, metoprolol tartrate, pantoprazole, paroxetine, rosuvastatin, lidocaine, ondansetron, oxycodone-acetaminophen, and zolpidem  HPI:  Study was undertaken because of snoring ongoing for decades  It is loud and disturbs others to the point that he now has to sleep separately  He is not aware of snoring, breathing difficulties during sleep or modifying factors  Other Complaints:  Disrupted sleep  Restless Leg Syndrome: reports no suggestive symptoms   Parasomnia:  In the past year and a half he reports acting out dream content  He does not sleep walk  He is unsure of the frequency  Fortunately, he reports no injury to himself or others  Sleep Routine:   Typical Bedtime:  10:30 p m  Gets OOB:  Between 5 and 9:00 a m  Based on his work schedule  TIB:>6 5 hrs  Sleep latency:< 15 minutes Sleep Interruptions:>2 x/night and has difficulty falling back asleep  Awakens: spontaneously   Upon awakening:never feels rested He estimates getting >6 5 hrs sleep  He has Excessive Daytime Sleepiness and takes planned naps of an hour and a half to 3 hours  Farrell Sleepiness Scale rated at Total score: 6 /24  Habits: reports that he has been smoking cigarettes  He quit smokeless tobacco use about 59 years ago  , reports that he does not drink alcohol ,  reports that he does not use drugs  ,Caffeine use:excessive until 9:00 p m , Exercise routine: none but is physically active doing chores  Family History: Negative for sleep disturbance  ROS: reviewed & as attached  Significant for weight has been stable  He has postnasal drip and frequent cough  He has difficulty with memory  He feels anxiety is controlled on Paxil  He is claustrophobic  EXAM:    Vitals /68   Pulse 68   Ht 5' 7" (1 702 m)   Wt 82 6 kg (182 lb)   BMI 28 51 kg/m²     General  Well groomed male; appears stated age; no apparent distress  Psychiatric   Speech:clear and coherent; Cooperative  Normal mood, affect & thought    Head   Craniofacial anatomy:normal Sinuses: non- tender  TMJ: Normal     Eyes   EOM's intact;  conjunctiva/corneas clear         Nasal Airway  is patent Septum:central, Mucous membranes:appear normal     Turbinates: normal ;  No Rhinorrhea; No PND  Oral   Airway  crowded Tongue:  ; Modified Mallampati class IV (only hard palate visible)  Hard Palate:normal ;Soft Palate:  redundant soft palate  Tonsils: no hypertrophy  Teeth: edentulous and dentures - upper  Neck  appears thick; Neck Circumference: 41cm; Supple; no abnormal masses; Thyroid:normal  Trachea:central      Lymph    No Cervical or Submandibular Lymhadenopathy   Heart:   S1,S2 normal;RRR  Soft; no gallop; nomurmurs     Lungs   Respiratory Effort:normal  Air entry reduced bilaterally  Mild wheezes  No rales   Abdomen   Obese, Soft & non-tender     Extremities   No pedal edema  No clubbing or cyanosis       Skin   Skin is warm and dry; Color& Hydration good; no facial rashes or lesions    Neurologic  Alert and orientated; CNII-XII intact; Motor normal; Sensation normal    Muscskeltl    Muscle bulk, tone and power WNL Gait:normal           IMPRESSION: Primary Sleep/Secondary(to Medical or Psych conditions) & comorbidities   1  Severe sleep apnea  Ambulatory referral to Sleep Medicine    CPAP Study    Cpap DME   2  Sleep disturbance  zolpidem (AMBIEN) 10 mg tablet   3  REM behavioral disorder     4  Excessive daytime sleepiness     5  Claustrophobia     6  Essential hypertension     7  Atherosclerosis of artery of extremity with intermittent claudication ([address] )     8  Memory loss     9  Tobacco abuse     10  Chronic rhinitis     11  Overweight (BMI 25 0-29  9)          PLAN:   1  I reviewed results of the Sleep study with the patient  2  With respect to above conditions, I counseled on pathophysiology, diagnosis, treatment options, risks and benefits; inter-relationship and effects on symptoms and comorbidities; risks of no treatment; costs and insurance aspects  3  I advised on safety precaution with respect to his parasomnia activity  I also advised smoking cessation and weight reduction  4  Patient elected positive airway pressure therapy and is to be scheduled for a titration study  5  Because of his claustrophobia, I recommended formal desensitization and provided a prescription to initiate CPAP prior to the study  I also gave him a prescription for Ambien to be used on the study night  5  Follow-up to be scheduled after the study to review results and to initiate therapy           Sincerely,     Authenticated electronically by Jhonatan Farley MD   on 34/62/49   Board Certified Specialist

## 2019-07-08 NOTE — PROGRESS NOTES
Review of Systems      Genitourinary none   Cardiology none   Gastrointestinal none   Neurology difficulty with memory and balance problems   Constitutional fatigue   Integumentary rash or dry skin and itching   Psychiatry none   Musculoskeletal leg cramps   Pulmonary wheezing, frequent cough and snoring   ENT throat clearing   Endocrine none   Hematological none

## 2019-07-15 ENCOUNTER — TELEPHONE (OUTPATIENT)
Dept: INTERNAL MEDICINE CLINIC | Facility: CLINIC | Age: 79
End: 2019-07-15

## 2019-09-18 ENCOUNTER — HOSPITAL ENCOUNTER (OUTPATIENT)
Dept: NON INVASIVE DIAGNOSTICS | Facility: CLINIC | Age: 79
Discharge: HOME | End: 2019-09-18
Payer: MEDICARE

## 2019-09-18 DIAGNOSIS — I73.9 PAD (PERIPHERAL ARTERY DISEASE) (HCC): ICD-10-CM

## 2019-09-18 PROCEDURE — 93880 EXTRACRANIAL BILAT STUDY: CPT

## 2019-09-18 PROCEDURE — 93925 LOWER EXTREMITY STUDY: CPT

## 2019-09-18 PROCEDURE — 93923 UPR/LXTR ART STDY 3+ LVLS: CPT

## 2019-09-19 PROCEDURE — 93922 UPR/L XTREMITY ART 2 LEVELS: CPT | Performed by: SURGERY

## 2019-09-19 PROCEDURE — 93925 LOWER EXTREMITY STUDY: CPT | Performed by: SURGERY

## 2019-09-19 PROCEDURE — 93880 EXTRACRANIAL BILAT STUDY: CPT | Performed by: SURGERY

## 2019-09-23 ENCOUNTER — APPOINTMENT (OUTPATIENT)
Dept: LAB | Facility: CLINIC | Age: 79
End: 2019-09-23
Payer: MEDICARE

## 2019-09-23 DIAGNOSIS — E78.2 MIXED HYPERLIPIDEMIA: ICD-10-CM

## 2019-09-23 DIAGNOSIS — E11.51 TYPE 2 DIABETES MELLITUS WITH DIABETIC PERIPHERAL ANGIOPATHY WITHOUT GANGRENE, WITHOUT LONG-TERM CURRENT USE OF INSULIN (HCC): ICD-10-CM

## 2019-09-23 DIAGNOSIS — I10 ESSENTIAL HYPERTENSION: Chronic | ICD-10-CM

## 2019-09-23 LAB
ALBUMIN SERPL BCP-MCNC: 3.6 G/DL (ref 3.5–5)
ALP SERPL-CCNC: 100 U/L (ref 46–116)
ALT SERPL W P-5'-P-CCNC: 16 U/L (ref 12–78)
ANION GAP SERPL CALCULATED.3IONS-SCNC: 1 MMOL/L (ref 4–13)
AST SERPL W P-5'-P-CCNC: 9 U/L (ref 5–45)
BASOPHILS # BLD AUTO: 0.14 THOUSANDS/ΜL (ref 0–0.1)
BASOPHILS NFR BLD AUTO: 2 % (ref 0–1)
BILIRUB SERPL-MCNC: 0.52 MG/DL (ref 0.2–1)
BUN SERPL-MCNC: 14 MG/DL (ref 5–25)
CALCIUM SERPL-MCNC: 9.6 MG/DL (ref 8.3–10.1)
CHLORIDE SERPL-SCNC: 107 MMOL/L (ref 100–108)
CHOLEST SERPL-MCNC: 146 MG/DL (ref 50–200)
CO2 SERPL-SCNC: 29 MMOL/L (ref 21–32)
CREAT SERPL-MCNC: 1.29 MG/DL (ref 0.6–1.3)
CREAT UR-MCNC: 95.2 MG/DL
EOSINOPHIL # BLD AUTO: 0.42 THOUSAND/ΜL (ref 0–0.61)
EOSINOPHIL NFR BLD AUTO: 6 % (ref 0–6)
ERYTHROCYTE [DISTWIDTH] IN BLOOD BY AUTOMATED COUNT: 14.3 % (ref 11.6–15.1)
EST. AVERAGE GLUCOSE BLD GHB EST-MCNC: 183 MG/DL
GFR SERPL CREATININE-BSD FRML MDRD: 52 ML/MIN/1.73SQ M
GLUCOSE P FAST SERPL-MCNC: 144 MG/DL (ref 65–99)
HBA1C MFR BLD: 8 % (ref 4.2–6.3)
HCT VFR BLD AUTO: 41.4 % (ref 36.5–49.3)
HDLC SERPL-MCNC: 47 MG/DL (ref 40–60)
HGB BLD-MCNC: 12.6 G/DL (ref 12–17)
IMM GRANULOCYTES # BLD AUTO: 0.01 THOUSAND/UL (ref 0–0.2)
IMM GRANULOCYTES NFR BLD AUTO: 0 % (ref 0–2)
LDLC SERPL DIRECT ASSAY-MCNC: 82 MG/DL (ref 0–100)
LYMPHOCYTES # BLD AUTO: 2.51 THOUSANDS/ΜL (ref 0.6–4.47)
LYMPHOCYTES NFR BLD AUTO: 36 % (ref 14–44)
MCH RBC QN AUTO: 26.6 PG (ref 26.8–34.3)
MCHC RBC AUTO-ENTMCNC: 30.4 G/DL (ref 31.4–37.4)
MCV RBC AUTO: 87 FL (ref 82–98)
MICROALBUMIN UR-MCNC: 15.2 MG/L (ref 0–20)
MICROALBUMIN/CREAT 24H UR: 16 MG/G CREATININE (ref 0–30)
MONOCYTES # BLD AUTO: 0.56 THOUSAND/ΜL (ref 0.17–1.22)
MONOCYTES NFR BLD AUTO: 8 % (ref 4–12)
NEUTROPHILS # BLD AUTO: 3.39 THOUSANDS/ΜL (ref 1.85–7.62)
NEUTS SEG NFR BLD AUTO: 48 % (ref 43–75)
NRBC BLD AUTO-RTO: 0 /100 WBCS
PLATELET # BLD AUTO: 234 THOUSANDS/UL (ref 149–390)
PMV BLD AUTO: 11.4 FL (ref 8.9–12.7)
POTASSIUM SERPL-SCNC: 5.3 MMOL/L (ref 3.5–5.3)
PROT SERPL-MCNC: 7.5 G/DL (ref 6.4–8.2)
RBC # BLD AUTO: 4.74 MILLION/UL (ref 3.88–5.62)
SODIUM SERPL-SCNC: 137 MMOL/L (ref 136–145)
TRIGL SERPL-MCNC: 153 MG/DL
WBC # BLD AUTO: 7.03 THOUSAND/UL (ref 4.31–10.16)

## 2019-09-23 PROCEDURE — 83036 HEMOGLOBIN GLYCOSYLATED A1C: CPT

## 2019-09-23 PROCEDURE — 80053 COMPREHEN METABOLIC PANEL: CPT

## 2019-09-23 PROCEDURE — 82043 UR ALBUMIN QUANTITATIVE: CPT | Performed by: INTERNAL MEDICINE

## 2019-09-23 PROCEDURE — 85025 COMPLETE CBC W/AUTO DIFF WBC: CPT

## 2019-09-23 PROCEDURE — 80061 LIPID PANEL: CPT

## 2019-09-23 PROCEDURE — 36415 COLL VENOUS BLD VENIPUNCTURE: CPT

## 2019-09-23 PROCEDURE — 83721 ASSAY OF BLOOD LIPOPROTEIN: CPT

## 2019-09-23 PROCEDURE — 82570 ASSAY OF URINE CREATININE: CPT | Performed by: INTERNAL MEDICINE

## 2019-09-30 ENCOUNTER — OFFICE VISIT (OUTPATIENT)
Dept: INTERNAL MEDICINE CLINIC | Facility: CLINIC | Age: 79
End: 2019-09-30
Payer: MEDICARE

## 2019-09-30 VITALS
SYSTOLIC BLOOD PRESSURE: 150 MMHG | RESPIRATION RATE: 14 BRPM | WEIGHT: 183 LBS | HEART RATE: 72 BPM | DIASTOLIC BLOOD PRESSURE: 80 MMHG | HEIGHT: 67 IN | BODY MASS INDEX: 28.72 KG/M2

## 2019-09-30 DIAGNOSIS — E78.2 MIXED HYPERLIPIDEMIA: ICD-10-CM

## 2019-09-30 DIAGNOSIS — Z23 ENCOUNTER FOR IMMUNIZATION: ICD-10-CM

## 2019-09-30 DIAGNOSIS — I73.9 PERIPHERAL ARTERIAL DISEASE (HCC): ICD-10-CM

## 2019-09-30 DIAGNOSIS — I10 ESSENTIAL HYPERTENSION: Primary | Chronic | ICD-10-CM

## 2019-09-30 DIAGNOSIS — E11.51 TYPE 2 DIABETES MELLITUS WITH DIABETIC PERIPHERAL ANGIOPATHY WITHOUT GANGRENE, WITHOUT LONG-TERM CURRENT USE OF INSULIN (HCC): ICD-10-CM

## 2019-09-30 DIAGNOSIS — R91.1 PULMONARY NODULE: ICD-10-CM

## 2019-09-30 PROCEDURE — 99213 OFFICE O/P EST LOW 20 MIN: CPT | Performed by: INTERNAL MEDICINE

## 2019-09-30 PROCEDURE — 90662 IIV NO PRSV INCREASED AG IM: CPT

## 2019-09-30 PROCEDURE — G0439 PPPS, SUBSEQ VISIT: HCPCS | Performed by: INTERNAL MEDICINE

## 2019-09-30 PROCEDURE — G0008 ADMIN INFLUENZA VIRUS VAC: HCPCS

## 2019-09-30 NOTE — PROGRESS NOTES
Assessment/Plan:   1  Health maintenance -receiving influenza vaccine today  2  Health maintenance- we had previously talked about him receiving Shingrix vaccine- at the [place]- REVIEW NEXT VISIT  3  Diabetes mellitus -without adequate control -A1c is 8 0  He is trying hurd diet more aggressively  Cannot tolerate short-acting metformin only tolerates a 1000 milligrams long-acting metformin  He skips meals and was concerned about hypoglycemia with glipizide and this was discontinued  Again strongly recommended he initiate therapy with Jarsdiance- letter dictated to the [place] regarding this   4  Severe sleep apnea -noted on screening study-she is scheduled for formal testing in the sleep lab  He understands the importance of following through on this   5  Pulmonary nodules - follow-up CT scan done showed resolution of area thought to represent atelectasis previously but a new 3 millimeter nodule the left lower lobe not seen on prior study-this was done in May 2019-scheduled for repeat CT scan of the chest 6 months after his last which due in November 2019  6  Anxiety with anger issues  On Paxil 40 milligrams daily  He felt poorly on Wellbutrin  7  Tobacco use-continues to smoke on a daily basis  Not felt to be a good candidate for Chantix  He felt poorly when he took Wellbutrin  8   Clinical exam consistent with some degree neuropathy-felt related to his diabetes  9   Peripheral arterial disease-had prior left above the knee popliteal to below the knee bypass   Follow-up studies show stenosis and he underwent left lower extremity anastomotic PTH-PT anastomosis of jump graft from above-the-knee fem-pop below the knee bypass-claudication symptoms improved vascular surgery-follow-up Doppler study done shows  Slight decline compared to before with ankle brachial index on the right 0 84 and on the left 0 95- he is having studies every 6 months to 9 months  10   Concerned about memory loss-always worried about small vessel disease  Saroj Moraes he has worsening issues he will need additional evaluation  6   Anemia-has a history of iron deficiency anemia -colonoscopy in April 2014 showed diverticular disease   Repeat endoscopy colonoscopy over the past year half by outside surgeon benign other than diverticular disease   CT scan of abdomen showed nephrolithiasis   Small-bowel endoscopy recommended but patient deferred   Protein electrophoresis, B12 level, TSH, methylmalonic acid level all normal   Hemoglobin had been as low as 11 7 -hemoglobin said that improved-hemoglobin prior to surgery was over 14   Hemoglobin at 10 8 postop but now on increased to a value of 12 6   He will need follow-up CBC iron ferritin in the future SCHEDULE NEXT VISIT  12   Coronary artery disease-Myoview shows only mildly severe reversible defect in the inferior wall   He was asymptomatic   Cardiology cleared him for surgery he had no postop issues   He is now on Plavix alone and not on aspirin   13  Onglqbporfzcydt-atwheohzduiw-sw CT scan of the abdomen has nonobstructing stones   He knows to maintain high fluid intake  14   Hypertension- BP elevated today the patient was talking about the recent death of his friend- current dose of ACE-inhibitor plus beta-blocker-no adjustments made in that regard PENDING RESULTS NEXT VISIT WILL NEED INCREASE IN DOSE     All other problems as per note September 2013        MEDICAL REGIMEN:                                                  Paxil 40 milligrams daily using 40 milligram dosing, glipizide 5 milligrams in a m  And 2 5 milligrams in the p m   But hoping to switch to Jaardiance- 25 milligrams daily, metoprolol tartrate 25 milligrams b i d , metformin a 1000 milligrams daily, lisinopril 20 milligrams daily, Plavix 75 milligrams daily, vitamin D3/ 2000 units a day, Crestor 5 milligrams daily     Await results of repeat sleep study, await evaluation at the [place] and potential initiation of therapy with Jardiance- appointment in several months with prior chemistry profile cholesterol profile hemoglobin A1c urine for microalbumin     No problem-specific Assessment & Plan notes found for this encounter  Diagnoses and all orders for this visit:    Essential hypertension  -     Comprehensive metabolic panel; Future  -     HEMOGLOBIN A1C W/ EAG ESTIMATION; Future  -     Cholesterol, total; Future  -     Triglycerides; Future  -     HDL cholesterol; Future  -     LDL cholesterol, direct; Future  -     Microalbumin / creatinine urine ratio    Type 2 diabetes mellitus with diabetic peripheral angiopathy without gangrene, without long-term current use of insulin (HCC)  -     Comprehensive metabolic panel; Future  -     HEMOGLOBIN A1C W/ EAG ESTIMATION; Future  -     Cholesterol, total; Future  -     Triglycerides; Future  -     HDL cholesterol; Future  -     LDL cholesterol, direct; Future  -     Microalbumin / creatinine urine ratio    Mixed hyperlipidemia  -     Comprehensive metabolic panel; Future  -     HEMOGLOBIN A1C W/ EAG ESTIMATION; Future  -     Cholesterol, total; Future  -     Triglycerides; Future  -     HDL cholesterol; Future  -     LDL cholesterol, direct; Future  -     Microalbumin / creatinine urine ratio    Pulmonary nodule  -     CT chest wo contrast; Future    Peripheral arterial disease (HCC)          Subjective:      Patient ID: Joan Samaniego is a [de-identified] y o  male  He was here for his Medicare wellness wanted to go over couple of other issues  We reviewed that had a follow-up CT scan done in May 2019 showing resolution of previously noted nodularity in the right lung base -was likely on the basis of atelectasis  However there was a new 3 millimeter nodule left lower lobe not seen on prior study    Radiology recommended 12 month follow-up noncontrast CT -IV 0 ever because of his high risk group that we should do repeat CT 6 months after his last and this is scheduled for November 2019     He had a home sleep study showing severe sleep apnea  He is scheduled for formal study for titration in the sleep lab over the next week  He relates chronic snoring  He has chronic fatigue  He often awakens with gasping  He had labs done in June at the [facility]  Urine for microalbumin normal   Vitamin B12 level low 183  Free T4, TSH normal, vitamin-D therapeutic at 55, BUN 10 with a creatinine 1 37, cholesterol 155 triglycerides 153 HDL 46 LDL 87, A1c 7 9  Hemoglobin was 12 6 with an MCV of 85, white count normal platelet count normal   Creatinine previously had been normal   He had labs done prior to this visit which show LDL of 82 HDL 47 triglycerides 153 cholesterol 146 creatinine 1 29 with a BUN of 14 fasting sugar 144 an A1c of 8 a CBC is normal urine for microalbumin is normal     We had a long discussion today regarding his diabetes  He still does have adequate control  I again recommended therapy with Jardiance- this had been recommended previously the patient was introducing the stated to the [facility]  I recommended at this point that I dictated other formal letter for him to take to the [address] [place] [address] the next visit so we can start on this medication  He remains on current dose of metformin  He cannot take higher dose because of diarrhea  He remains on a statin  He understands the difference between status associated myalgias and arthralgias from degenerative arthritis  This patient denies any systemic symptoms  Specifically there has been no evidence of fever, night sweats, significant weight loss or significant decrease in appetite  The following portions of the patient's history were reviewed and updated as appropriate: allergies, current medications, past family history, past medical history, past social history, past surgical history and problem list     Review of Systems   Constitutional: Negative  Respiratory: Negative  Cardiovascular: Negative           Claudication symptoms of the legs with any incline Gastrointestinal: Negative  Endocrine: Negative  Genitourinary: Negative  Musculoskeletal: Negative  Neurological: Negative  Hematological: Negative  Psychiatric/Behavioral: Negative  Objective:      Ht 5' 7" (1 702 m)   Wt 83 kg (183 lb)   BMI 28 66 kg/m²          Physical Exam   Constitutional: He is oriented to person, place, and time  He appears well-developed and well-nourished  No distress  HENT:   Head: Normocephalic and atraumatic  Right Ear: External ear normal    Left Ear: External ear normal    Nose: Nose normal    Mouth/Throat: Oropharynx is clear and moist  No oropharyngeal exudate  Eyes: Pupils are equal, round, and reactive to light  Conjunctivae and EOM are normal  Right eye exhibits no discharge  Left eye exhibits no discharge  No scleral icterus  Neck: No JVD present  No tracheal deviation present  No thyromegaly present  Cardiovascular: Normal rate, regular rhythm and normal heart sounds  Exam reveals no gallop and no friction rub  No murmur heard  Decreased peripheral pulses -DP and PT  pulses bilaterally 0 to 1+   Pulmonary/Chest: Effort normal and breath sounds normal  No stridor  No respiratory distress  He has no wheezes  He exhibits no tenderness  Abdominal: Bowel sounds are normal  He exhibits no distension and no mass  There is no tenderness  There is no rebound and no guarding  Genitourinary: Penis normal  Rectal exam shows guaiac negative stool  Musculoskeletal: Normal range of motion  He exhibits no edema, tenderness or deformity  Lymphadenopathy:     He has no cervical adenopathy  Neurological: He is alert and oriented to person, place, and time  He has normal reflexes  He displays normal reflexes  No cranial nerve deficit  He exhibits normal muscle tone  Coordination normal    Skin: Skin is warm and dry  No rash noted  He is not diaphoretic  No erythema  No pallor  Psychiatric: He has a normal mood and affect   His behavior is normal  Judgment and thought content normal

## 2019-09-30 NOTE — PROGRESS NOTES
Assessment and Plan:     Problem List Items Addressed This Visit     None           Preventive health issues were discussed with patient, and age appropriate screening tests were ordered as noted in patient's After Visit Summary  Personalized health advice and appropriate referrals for health education or preventive services given if needed, as noted in patient's After Visit Summary       History of Present Illness:     Patient presents for Medicare Annual Wellness visit    Patient Care Team:  Elva Alvarado MD as PCP - FLORENCIA Marie MD Lorren Helms, MD     Problem List:     Patient Active Problem List   Diagnosis    History of cigarette smoking    Atherosclerosis of artery of extremity with intermittent claudication ([place] [address] )    Popliteal artery occlusion, left ([place] [address] )    Essential hypertension    Abnormal nuclear stress test    Anemia    Anxiety    Arthritis    Asymptomatic carotid artery stenosis    Controlled diabetes mellitus ([place] [address] )    Coronary artery disease    Difficulty breathing    Diverticulosis of large intestine without hemorrhage    Elevated prostate specific antigen (PSA)    Enlarged prostate without lower urinary tract symptoms (luts)    Hyperlipidemia    Iron deficiency anemia    Nicotine dependence    Peripheral arterial disease ([place] [address] )    Shoulder pain    Weight loss    Acute blood loss anemia    Memory loss    Bypass graft stenosis (HCC)    Type 2 diabetes mellitus with diabetic peripheral angiopathy without gangrene [facility]  )    Fall    Other chest pain    Excessive anger    Snoring    MERCEDES (obstructive sleep apnea)    Excessive daytime sleepiness      Past Medical and Surgical History:     Past Medical History:   Diagnosis Date    Atherosclerosis     Cardiac disease     COPD (chronic obstructive pulmonary disease) ([place] [address] )     Coronary artery disease     Hyperlipidemia     Hypertension     Peripheral arterial disease ([place] [address] )     Smoking addiction      Past Surgical History:   Procedure Laterality Date    BYPASS FEMORAL-POPLITEAL Left 2018    Procedure: BYPASS FEMORAL-POPLITEAL, WITH INTRAOP ARTERIOGRAM;  Surgeon: Yanelis Costello MD;  [facility]: [facility];  Service: Vascular    CARDIAC SURGERY      FEMORAL ARTERY - POPLITEAL ARTERY BYPASS GRAFT Bilateral     HAND SURGERY      IR ABDOMINAL ANGIOGRAPHY / INTERVENTION  10/4/2018    LEG SURGERY      Repair    OTHER SURGICAL HISTORY      Percutaneous repair nasoethmoid fx lacrimal apparatus    THROMBOENDARTERECTOMY  10/12/2010    Tibioperoneal trunk    TONSILLECTOMY      TONSILLECTOMY        Family History:     Family History   Problem Relation Age of Onset    Arthritis Mother     Arthritis Family     Coronary artery disease Family     Hyperlipidemia Family     Peripheral vascular disease Family       Social History:     Social History     Socioeconomic History    Marital status:       Spouse name: Not on file    Number of children: Not on file    Years of education: Not on file    Highest education level: Not on file   Occupational History    Not on file   Social Needs    Financial resource strain: Not on file    Food insecurity:     Worry: Not on file     Inability: Not on file    Transportation needs:     Medical: Not on file     Non-medical: Not on file   Tobacco Use    Smoking status: Current Every Day Smoker     Types: Cigarettes     Last attempt to quit: 2018     Years since quittin 7    Smokeless tobacco: Former User     Quit date:     Tobacco comment: 5 sticks/day   Substance and Sexual Activity    Alcohol use: No     Comment: Social drinker    Drug use: No    Sexual activity: Yes   Lifestyle    Physical activity:     Days per week: Not on file     Minutes per session: Not on file    Stress: Not on file   Relationships    Social connections:     Talks on phone: Not on file     Gets together: Not on file     Attends Evangelical service: Not on file     Active member of club or organization: Not on file     Attends meetings of clubs or organizations: Not on file     Relationship status: Not on file    Intimate partner violence:     Fear of current or ex partner: Not on file     Emotionally abused: Not on file     Physically abused: Not on file     Forced sexual activity: Not on file   Other Topics Concern    Not on file   Social History Narrative    Not on file       Medications and Allergies:     Current Outpatient Medications   Medication Sig Dispense Refill    Cholecalciferol (VITAMIN D3) 2000 units capsule Take 1,000 Units by mouth daily      clopidogrel (PLAVIX) 75 mg tablet Take 75 mg by mouth daily      glipiZIDE (GLUCOTROL) 5 mg tablet Take 5 mg by mouth Take 1/2 tab daily      lidocaine (LIDODERM) 5 % Apply 1 patch topically daily Remove & Discard patch within 12 hours or as directed by MD (Patient not taking: Reported on 3/26/2019) 30 patch 0    lisinopril (ZESTRIL) 20 mg tablet Take 20 mg by mouth daily      metFORMIN (GLUCOPHAGE) 1000 MG tablet Take 1,000 mg by mouth daily with breakfast        metoprolol tartrate (LOPRESSOR) 25 mg tablet Take 25 mg by mouth every 12 (twelve) hours      ondansetron (ZOFRAN-ODT) 4 mg disintegrating tablet Take 1 tablet (4 mg total) by mouth every 6 (six) hours as needed for nausea or vomiting for up to 30 days 60 tablet 0    oxyCODONE-acetaminophen (PERCOCET) 5-325 mg per tablet Take 1 tablet by mouth every 4 (four) hours as needed for moderate pain for up to 12 dosesMax Daily Amount: 6 tablets (Patient not taking: Reported on 3/26/2019) 12 tablet 0    pantoprazole (PROTONIX) 40 mg tablet Take 40 mg by mouth daily      PARoxetine (PAXIL) 40 MG tablet Take 40 mg by mouth Take one tab and 1/2 tab daily total 60mg      rosuvastatin (CRESTOR) 5 mg tablet Take 1 tablet (5 mg total) by mouth daily 90 tablet 3    zolpidem (AMBIEN) 10 mg tablet Take 1 tablet 30 minutes prior to lights out on night of sleep study  1 tablet 0     No current facility-administered medications for this visit  Allergies   Allergen Reactions    Etomidate Swelling     "I blew up and couldn't breath"      Immunizations:     Immunization History   Administered Date(s) Administered    INFLUENZA 10/01/2015, 10/10/2016, 10/07/2017, 10/15/2018    Influenza Quadrivalent Preservative Free 3 years and older IM 10/07/2014    Influenza Split High Dose Preservative Free IM 10/01/2015, 10/10/2016, 10/15/2018    Influenza TIV (IM) 12/03/2012, 09/21/2013, 10/07/2017    Pneumococcal Conjugate 13-Valent 08/29/2018    Tdap 01/13/2017      Health Maintenance: There are no preventive care reminders to display for this patient  Topic Date Due    HEPATITIS B VACCINES (1 of 3 - Risk 3-dose series) 06/27/1959    INFLUENZA VACCINE  07/01/2019    Pneumococcal Vaccine: 65+ Years (2 of 2 - PPSV23) 08/29/2019      Medicare Health Risk Assessment:     Ht 5' 7" (1 702 m)   Wt 83 kg (183 lb)   BMI 28 66 kg/m²      Berenice Erazo is here for his Subsequent Wellness visit  Last Medicare Wellness visit information reviewed, patient interviewed and updates made to the record today  Health Risk Assessment:   Patient rates overall health as good  Patient feels that their physical health rating is same  Eyesight was rated as much worse  Hearing was rated as much worse  Patient feels that their emotional and mental health rating is same  Pain experienced in the last 7 days has been none  Patient states that he has experienced no weight loss or gain in last 6 months  Depression Screening:   PHQ-2 Score: 2      Fall Risk Screening: In the past year, patient has experienced: no history of falling in past year      Home Safety:  Patient does not have trouble with stairs inside or outside of their home  Patient has working smoke alarms and has working carbon monoxide detector  Home safety hazards include: none       Nutrition:   Current diet is Regular  Medications:   Patient is currently taking over-the-counter supplements  OTC medications include: see medication list  Patient is able to manage medications  Activities of Daily Living (ADLs)/Instrumental Activities of Daily Living (IADLs):   Walk and transfer into and out of bed and chair?: Yes  Dress and groom yourself?: Yes    Bathe or shower yourself?: Yes    Feed yourself? Yes  Do your laundry/housekeeping?: Yes  Manage your money, pay your bills and track your expenses?: Yes  Make your own meals?: Yes    Do your own shopping?: Yes    Previous Hospitalizations:   Any hospitalizations or ED visits within the last 12 months?: No      Advance Care Planning:   Living will: Yes    Durable POA for healthcare:  Yes    Advanced directive: Yes      Cognitive Screening:   Provider or family/friend/caregiver concerned regarding cognition?: No    PREVENTIVE SCREENINGS      Cardiovascular Screening:    General: History Lipid Disorder, Risks and Benefits Discussed and Screening Current      Diabetes Screening:     General: History Diabetes, Risks and Benefits Discussed and Screening Current      Colorectal Cancer Screening:     General: Risks and Benefits Discussed and Screening Current      Prostate Cancer Screening:    General: Risks and Benefits Discussed and Screening Current      Osteoporosis Screening:    General: Screening Not Indicated      Abdominal Aortic Aneurysm (AAA) Screening:    Risk factors include: tobacco use        General: Risks and Benefits Discussed and Screening Current      Lung Cancer Screening:     General: Risks and Benefits Discussed and Screening Current      Hepatitis C Screening:    General: Screening Not Indicated      Eufemia Blackburn MD

## 2019-10-02 ENCOUNTER — HOSPITAL ENCOUNTER (OUTPATIENT)
Dept: SLEEP CENTER | Facility: CLINIC | Age: 79
Discharge: HOME | End: 2019-10-02
Payer: MEDICARE

## 2019-10-02 DIAGNOSIS — G47.30 SEVERE SLEEP APNEA: ICD-10-CM

## 2019-10-02 PROCEDURE — 95811 POLYSOM 6/>YRS CPAP 4/> PARM: CPT

## 2019-10-03 DIAGNOSIS — G47.30 SEVERE SLEEP APNEA: Primary | ICD-10-CM

## 2019-10-03 NOTE — PROGRESS NOTES
Sleep Study Documentation    Pre-Sleep Study       Sleep testing procedure explained to patient:YES    Patient napped prior to study:NO    Caffeine:Dayshift worker after 12PM   Caffeine use:YES- coffee  6 ounces    Alcohol:Dayshift workers after 5PM: Alcohol use:NO    Typical day for patient:YES       Study Documentation    Sleep Study Indications: Hypertension, MERCEDES, RBD     Sleep Study: Treatment   Optimal PAP pressure:  Possibly 8 cm - undetermined due to persistent centrals   Leak:Small  Snore:Eliminated  REM Obtained:yes  Supplemental O2: no    Minimum SaO2 90%  Baseline SaO2  95%  PAP mask tried (list all) ResMed AirFit N20 nasal mask and P10 nasal pillows   PAP mask choice (final) Size medium ResMed AirFit N20 nasal mask   PAP mask type:nasal  PAP pressure at which snoring was eliminated 7 cm   Minimum SaO2 at final PAP pressure 91%  Mode of Therapy:CPAP  ETCO2:No  CPAP changed to BiPAP:Yes  If yes why strong central component     Mode of Therapy:CPAP    EKG abnormalities: no     EEG abnormalities: no    No abnormal behaviors were seen  However, severe PLMS and leg movements during quiet wake were noted  Sleep Study Recorded < 2 hours: N/A    Sleep Study Recorded > 2 hours but incomplete study: N/A    Sleep Study Recorded 6 hours but no sleep obtained: NO    Patient classification: retired       Post-Sleep Study    Medication used at bedtime or during sleep study:YES prescription sleep aid    Patient reports time it took to fall asleep:20 to 30 minutes    Patient reports waking up during study:Denied    Patient reports sleeping 4 to 6 hours with dreaming  Patient reports sleep during study:typical    Patient rated sleepiness: Not sleepy or tired    PAP treatment:yes: Post PAP treatment patient reports feeling unsure if a change is noted and  would wear PAP mask at home

## 2019-10-04 ENCOUNTER — TELEPHONE (OUTPATIENT)
Dept: SLEEP CENTER | Facility: CLINIC | Age: 79
End: 2019-10-04

## 2019-10-04 NOTE — TELEPHONE ENCOUNTER
Left message for the patient to call back for sleep study results  CPAP ordered in [facility]    Patient needs to schedule DME set up and compliance appointment

## 2019-10-08 NOTE — TELEPHONE ENCOUNTER
Discussed study results  Scheduled follow-up, DME set-up & compliance follow-up   Paperwork to [employer]

## 2019-10-16 ENCOUNTER — TELEPHONE (OUTPATIENT)
Dept: INTERNAL MEDICINE CLINIC | Facility: CLINIC | Age: 79
End: 2019-10-16

## 2019-10-16 DIAGNOSIS — M25.562 PAIN IN BOTH KNEES, UNSPECIFIED CHRONICITY: Primary | ICD-10-CM

## 2019-10-16 DIAGNOSIS — M25.561 PAIN IN BOTH KNEES, UNSPECIFIED CHRONICITY: Primary | ICD-10-CM

## 2019-10-16 NOTE — TELEPHONE ENCOUNTER
Pt in office at son's appt  He would like a script for physical therapy for swimming just like his wife does as he is having knee pain weakness    I put in referral   Please call pt as he would like to  later on today

## 2019-10-21 ENCOUNTER — TELEPHONE (OUTPATIENT)
Dept: SLEEP CENTER | Facility: CLINIC | Age: 79
End: 2019-10-21

## 2019-11-04 ENCOUNTER — HOSPITAL ENCOUNTER (OUTPATIENT)
Dept: RADIOLOGY | Age: 79
Discharge: HOME | End: 2019-11-04
Payer: MEDICARE

## 2019-11-04 DIAGNOSIS — R91.1 PULMONARY NODULE: ICD-10-CM

## 2019-11-04 PROCEDURE — 71250 CT THORAX DX C-: CPT

## 2019-11-05 ENCOUNTER — TELEPHONE (OUTPATIENT)
Dept: INTERNAL MEDICINE CLINIC | Facility: CLINIC | Age: 79
End: 2019-11-05

## 2019-11-05 NOTE — TELEPHONE ENCOUNTER
Call patient and let him know CT scan shows no evidence of lung cancer but shows some inflammation at the bases- he needs an appointment to go over this-try and schedule him for 11 30 or noon tomorrow

## 2019-11-06 ENCOUNTER — OFFICE VISIT (OUTPATIENT)
Dept: INTERNAL MEDICINE CLINIC | Facility: CLINIC | Age: 79
End: 2019-11-06
Payer: MEDICARE

## 2019-11-06 ENCOUNTER — TELEPHONE (OUTPATIENT)
Dept: INTERNAL MEDICINE CLINIC | Facility: CLINIC | Age: 79
End: 2019-11-06

## 2019-11-06 VITALS
SYSTOLIC BLOOD PRESSURE: 130 MMHG | RESPIRATION RATE: 14 BRPM | WEIGHT: 182.8 LBS | DIASTOLIC BLOOD PRESSURE: 80 MMHG | HEIGHT: 67 IN | BODY MASS INDEX: 28.69 KG/M2 | HEART RATE: 72 BPM

## 2019-11-06 DIAGNOSIS — E11.51 TYPE 2 DIABETES MELLITUS WITH DIABETIC PERIPHERAL ANGIOPATHY WITHOUT GANGRENE, WITHOUT LONG-TERM CURRENT USE OF INSULIN (HCC): ICD-10-CM

## 2019-11-06 DIAGNOSIS — R06.00 DYSPNEA, UNSPECIFIED TYPE: Primary | ICD-10-CM

## 2019-11-06 DIAGNOSIS — I71.2 THORACIC AORTIC ANEURYSM WITHOUT RUPTURE (HCC): ICD-10-CM

## 2019-11-06 DIAGNOSIS — G47.30 SEVERE SLEEP APNEA: ICD-10-CM

## 2019-11-06 DIAGNOSIS — R91.8 ABNORMAL CT SCAN OF LUNG: ICD-10-CM

## 2019-11-06 PROBLEM — I71.20 THORACIC AORTIC ANEURYSM WITHOUT RUPTURE: Status: RESOLVED | Noted: 2019-11-06 | Resolved: 2019-11-06

## 2019-11-06 PROBLEM — I71.20 THORACIC AORTIC ANEURYSM WITHOUT RUPTURE: Status: ACTIVE | Noted: 2019-11-06

## 2019-11-06 PROCEDURE — 99214 OFFICE O/P EST MOD 30 MIN: CPT | Performed by: INTERNAL MEDICINE

## 2019-11-06 RX ORDER — NITROGLYCERIN 0.4 MG/1
TABLET SUBLINGUAL
COMMUNITY
Start: 2015-07-31 | End: 2022-04-22 | Stop reason: SDUPTHER

## 2019-11-06 NOTE — TELEPHONE ENCOUNTER
[facility] called to say they need a script sent to them for pt's Jardiance  I will fax it to them once you give it to me  Thank you     Fax#: [phone]  Phone#: [phone]

## 2019-11-06 NOTE — PROGRESS NOTES
Assessment/Plan:   1  Abnormal CT scan of the lung-peripheral interstitial thickening -rule out developing interstitial lung disease -again strongly recommend he not smoke  Formal PFTs ordered expecting to find some underlying COPD associated with his long-term tobacco use  He will need follow-up CT scan in approximately 6 months which will be due in May of 2020   2--  4 1 centimeter ascending aortic aneurysm -again recommended aggressive control of risk factors  Again recommended he has not smoked  He is ready on a beta-blocker  Remind him not to lift more than 40 pounds  3  Pulmonary nodule -unchanged over 5 months- 3 millimeter left lower lobe nodule -will need repeat in 6 months which will be due in May of 2020   4  Health maintenance -we had previously talked about the new zoster vaccine any was hoping to receive that at the [place]- REVIEW NEXT VISIT  5  Diabetes mellitus -most recent A1c 8 0  Cannot tolerate short-acting metformin only tolerates a 1000 milligrams a day of long-acting metformin  He skips meals and I recommended he DC his glipizide and begin Jardiance  Which she now has available via the [place]  6  Severe sleep apnea - on CPAP study 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  Pulmonary was considering CPAP at 8 centimeters hoping central events with beta over time  Unfortunately he did not wait for follow-up visit and we tried to reschedule him with Pulmonary again    All other problems as per noted September 2019 and September 2013       MEDICAL REGIMEN:      Paxil 40 milligrams daily using 40 milligram dosing,Jardiance- 25 milligrams daily, metoprolol tartrate 25 milligrams b i d , metformin a 1000 milligrams daily, lisinopril 20 milligrams daily, Plavix 75 milligrams daily, vitamin D3 / 2000 units a day, Crestor 5 milligrams daily       This patient will be seen at previously scheduled appointment with previously scheduled labs    Await results of PFTs    Addendum -PFT showed mild obstruction with normal spirometry decrease lung volumes dictated of air trapping  Diffusing capacity normal    Addendum- patient seen by thoracic surgery-they feel he has stable aneurysm at 41 millimeters unchanged times years in the does not meet surgical criteria as he will never reached surgical size of 55 millimeters-they feel any CT scans performed for pulmonary nodule surveillance will be adequate for followng the aneurysm            No problem-specific Assessment & Plan notes found for this encounter  Diagnoses and all orders for this visit:    Dyspnea, unspecified type  -     Complete PFT without post bronchodilator; Future    Abnormal CT scan of lung    Type 2 diabetes mellitus with diabetic peripheral angiopathy without gangrene, without long-term current use of insulin ([facility])    Severe sleep apnea    Thoracic aortic aneurysm without rupture ([facility] [address] )    Other orders  -     nitroglycerin (NITROSTAT) 0 4 mg SL tablet          Subjective:      Patient ID: Anant Fowler is a [de-identified] y o  male  He was brought in today as a special appointment  He had a recent CT scan of his chest done as follow-up a pulmonary nodule that shows several abnormalities  It was felt that this pulmonary nodule is unchanged although he remains at high risk with his smoking history  Specifically there is again a 3 millimeter left lower lobe nodule  He also has peripheral reticular interstitial thickening with associated ground-glass- this may represent usual interstitial pneumonia by Radiology appearance- he states that he has prominent allergy symptoms in the spring and fall that tends to improve once there is frost   He says 0 ever he has had some recent increase in his purulent sputum  A a also has a 4 1 centimeter ascending aortic aneurysm  Unfortunately he is a long-term smoker and continues to smoke  He smokes 1/2 pack per day  I made a drawing of the anatomy    We reviewed the difference between interstitial lung disease and other causes of lung disease  He does not have any adventitious breath sounds on exam   We elected to proceed with pulmonary function studies and a empiric course of doxycycline  He will have follow-up CT in 4-6 months  This patient denies any systemic symptoms  Specifically there has been no evidence of fever, night sweats, significant weight loss or significant decrease in appetite  He has known diabetes  I had been hoping that the [place] would proceed with giving him a prescription for Jardiance- he is currently using metformin and glipizide  He will intermittently 99 concerned about the potential for hypoglycemia with sulfonylurea  We elected to DC sulfonylurea  and initiate therapy with Jardiance    He evaluation for sleep apnea  Home sleep study showed severe sleep apnea  He then had a titration study which was suboptimal with treatment emergent central apnea  They wanted to consider CPAP at 8 centimeters and hoping that central events with beta over time  They commented that of central events persist he may warrant  Assisted Servo ventilation-       This patient denies any systemic symptoms  Specifically there has been no evidence of fever, night sweats, significant weight loss or significant decrease in appetite  We had a long discussion today regarding the above  He was company to the visit by his significant other  Reviewed thoracic aneurysms, potential for interstitial lung disease      The following portions of the patient's history were reviewed and updated as appropriate: allergies, current medications, past family history, past medical history, past social history, past surgical history and problem list     Review of Systems   Constitutional: Positive for fatigue  Respiratory: Positive for shortness of breath  Cardiovascular: Negative  Gastrointestinal: Negative  Endocrine: Negative  Genitourinary: Negative  Musculoskeletal: Negative      Neurological: Negative  Hematological: Negative  Psychiatric/Behavioral: Negative  Objective:      Ht 5' 7" (1 702 m)   Wt 82 9 kg (182 lb 12 8 oz)   BMI 28 63 kg/m²          Physical Exam   Constitutional: He is oriented to person, place, and time  He appears well-developed and well-nourished  No distress  HENT:   Head: Normocephalic and atraumatic  Right Ear: External ear normal    Left Ear: External ear normal    Nose: Nose normal    Mouth/Throat: Oropharynx is clear and moist  No oropharyngeal exudate  Eyes: Pupils are equal, round, and reactive to light  Conjunctivae and EOM are normal  Right eye exhibits no discharge  Left eye exhibits no discharge  No scleral icterus  Neck: No JVD present  No tracheal deviation present  No thyromegaly present  Cardiovascular: Normal rate, regular rhythm and normal heart sounds  Exam reveals no gallop and no friction rub  No murmur heard  Decreased peripheral pulses   Pulmonary/Chest: Effort normal and breath sounds normal  No stridor  No respiratory distress  He has no wheezes  He exhibits no tenderness  Abdominal: Bowel sounds are normal  He exhibits no distension and no mass  There is no tenderness  There is no rebound and no guarding  Genitourinary: Rectum normal, prostate normal and penis normal  Rectal exam shows guaiac negative stool  Musculoskeletal: Normal range of motion  He exhibits no edema, tenderness or deformity  Lymphadenopathy:     He has no cervical adenopathy  Neurological: He is alert and oriented to person, place, and time  He has normal reflexes  He displays normal reflexes  No cranial nerve deficit  He exhibits normal muscle tone  Coordination normal    Skin: Skin is warm and dry  No rash noted  He is not diaphoretic  No erythema  No pallor  Psychiatric: He has a normal mood and affect  His behavior is normal  Judgment and thought content normal    Vitals reviewed

## 2019-11-07 ENCOUNTER — TELEPHONE (OUTPATIENT)
Dept: INTERNAL MEDICINE CLINIC | Facility: CLINIC | Age: 79
End: 2019-11-07

## 2019-11-07 NOTE — TELEPHONE ENCOUNTER
----- Message from Linda Negro MD sent at 11/6/2019  5:15 PM EST -----  He was here for visit on Wednesday  And we reviewed his thoracic aneurysm - please make sure he is aware because of this he should not lift more than 40 pounds

## 2019-11-11 ENCOUNTER — TELEPHONE (OUTPATIENT)
Dept: INTERNAL MEDICINE CLINIC | Facility: CLINIC | Age: 79
End: 2019-11-11

## 2019-11-11 NOTE — TELEPHONE ENCOUNTER
PT'S SON CALLED, HE IS WONDERING ABOUT THE AORTIC ANEURYSM VINCPETR HAS  I GAVE HIM THE INFORMATION FROM YOUR LAST VISIT WHEN YOU DISCUSSED THIS WITH PT BUT HIS SON IS WONDERING IF HIS FATHER SHOULD HAVE SURGERY AND IF NOT, WHY NOT   PLEASE ADVISE    KATHIA'S WORK NUMBER [phone]

## 2019-11-11 NOTE — TELEPHONE ENCOUNTER
SPOKE WITH KATHIA, GAVE MESSAGE AND DR MUÑOZ'S OFFICE PHONE NUMBER   HE STATED HE'D TALK IT OVER WITH HIS SISTER TO SEE WHAT THEY WANT TO DO  I TOLD HIM IF THEY END UP CALLING TO MAKE AN APPT AND HAVE ANY PROBLEMS SCHEDULING, TO CALL ME BACK AND I'D CALL HIS OFFICE

## 2019-11-11 NOTE — TELEPHONE ENCOUNTER
Surgery is not done until these are larger -if they have large amounts of concerns about this he can be set up at the thoracic aneurysm clinic with Dr Gurwinder Thomson- they will also monitor along with me

## 2019-11-11 NOTE — TELEPHONE ENCOUNTER
With Mr Aniceto Wilkes it is better if we just make it happen a- help them schedule with Dr Lucretia Boggs please-- asymptomatic throacic aneurysm

## 2019-11-11 NOTE — TELEPHONE ENCOUNTER
Pt's daughter Robe Skelton called and asked to speak with you regarding the pt's recent diagnosis of aortic aneurysm  She said she had some questions for you  Please advise       Robe Skelton CB#: [phone]

## 2019-11-18 ENCOUNTER — HOSPITAL ENCOUNTER (OUTPATIENT)
Dept: PULMONOLOGY | Facility: HOSPITAL | Age: 79
Discharge: HOME | End: 2019-11-18
Payer: MEDICARE

## 2019-11-18 ENCOUNTER — TELEPHONE (OUTPATIENT)
Dept: SLEEP CENTER | Facility: CLINIC | Age: 79
End: 2019-11-18

## 2019-11-18 DIAGNOSIS — R06.00 DYSPNEA, UNSPECIFIED TYPE: ICD-10-CM

## 2019-11-18 PROCEDURE — 94010 BREATHING CAPACITY TEST: CPT

## 2019-11-18 PROCEDURE — 94726 PLETHYSMOGRAPHY LUNG VOLUMES: CPT | Performed by: INTERNAL MEDICINE

## 2019-11-18 PROCEDURE — 94726 PLETHYSMOGRAPHY LUNG VOLUMES: CPT

## 2019-11-18 PROCEDURE — 94729 DIFFUSING CAPACITY: CPT | Performed by: INTERNAL MEDICINE

## 2019-11-18 PROCEDURE — 94729 DIFFUSING CAPACITY: CPT

## 2019-11-18 PROCEDURE — 94010 BREATHING CAPACITY TEST: CPT | Performed by: INTERNAL MEDICINE

## 2019-11-18 PROCEDURE — 94760 N-INVAS EAR/PLS OXIMETRY 1: CPT

## 2019-11-19 ENCOUNTER — OFFICE VISIT (OUTPATIENT)
Dept: CARDIAC SURGERY | Facility: CLINIC | Age: 79
End: 2019-11-19
Payer: MEDICARE

## 2019-11-19 VITALS
TEMPERATURE: 97.5 F | HEART RATE: 63 BPM | WEIGHT: 180 LBS | HEIGHT: 67 IN | DIASTOLIC BLOOD PRESSURE: 62 MMHG | RESPIRATION RATE: 14 BRPM | SYSTOLIC BLOOD PRESSURE: 130 MMHG | BODY MASS INDEX: 28.25 KG/M2 | OXYGEN SATURATION: 99 %

## 2019-11-19 DIAGNOSIS — I71.2 THORACIC AORTIC ANEURYSM WITHOUT RUPTURE (HCC): Primary | ICD-10-CM

## 2019-11-19 PROCEDURE — 99204 OFFICE O/P NEW MOD 45 MIN: CPT | Performed by: NURSE PRACTITIONER

## 2019-11-19 NOTE — LETTER
November 19, 2019     Misty Branch MD  [address]  [address]  [address] [place] [address]    Patient: Kiara Escobar   YOB: 1940   Date of Visit: 11/19/2019       Dear Dr Shane Davidson:    Neo Zimmerman was seen in our aortic clinic today, accompanied by his son, seeking a second opinion regarding his ascending aortic aneurysm  Upon review of his records, this aneurysm is stable at 41 mm and has been present since 2009  He does not meet surgical criteria, and most likely will never reach a surgical size of 55 mm  Any future CT scans performed for pulmonary nodule surveillance should be sufficient for following the aneurysm  If you have questions, please do not hesitate to call me  Sincerely,        FLORENCIA Villanueva        CC: No Recipients  FLORENCIA Villanueva  11/19/2019  2:20 PM  Sign at close encounter  Aortic Clinic  Kiara Escobar [de-identified] y o  male MRN: [id number]    Patient self-refereed for second opinion    Reason for Consult / Principal Problem: Ascending aortic aneurysm    History of Present Illness: Kiara Escobar is a [de-identified]y o  year old male who presents today for initial out patient consultation in our aortic clinic for an ascending aortic aneurysm  This finding was noted on recent chest CT scan performed for pulmonary nodule surveillance  Maximal ascending aortic diameter is measured at 41 mm  In comparison to a CT scan dating back to 2009, there is no change in the aneurysm size  Patient was counseled by his PCP regarding the aneurysm but his family requested a second opinion in our office  Patient is accompanied by his son today  He denies chest pain, back pain, abdominal pain  He has a h/o HTN, treated with Metoprolol & Lisinopril  He is on Statin therapy  He is a type 2 diabetic currently on Metformin & Glipizide but will be switching to Jardiance  He is an active smoker, struggling with quitting  He has known PAD with claudication   He has chronic RUIZ with walking up an incline  He denies FH of aneurysms or premature SCD  Past Medical History:  Past Medical History:   Diagnosis Date    Atherosclerosis     Cardiac disease     [facility]ronic obstructive pulmonary disease) [facility] [address] [facility]     Coronary artery disease     Hyperlipidemia     Hypertension     Peripheral arterial disease ([facility] [address] )     Smoking addiction          Past Surgical History:   Past Surgical History:   Procedure Laterality Date    BYPASS FEMORAL-POPLITEAL Left 2018    Procedure: BYPASS FEMORAL-POPLITEAL, WITH INTRAOP ARTERIOGRAM;  Surgeon: Lindsay Fontaine MD;  [facility];  Service: Vascular    CARDIAC SURGERY      FEMORAL ARTERY - POPLITEAL ARTERY BYPASS GRAFT Bilateral     HAND SURGERY      IR ABDOMINAL ANGIOGRAPHY / INTERVENTION  10/4/2018    LEG SURGERY      Repair    OTHER SURGICAL HISTORY      Percutaneous repair nasoethmoid fx lacrimal apparatus    THROMBOENDARTERECTOMY  10/12/2010    Tibioperoneal trunk    TONSILLECTOMY      TONSILLECTOMY           Family History:  Family History   Problem Relation Age of Onset    Arthritis Mother     Arthritis Family     Coronary artery disease Family     Hyperlipidemia Family     Peripheral vascular disease Family          Social History:    Social History     Substance and Sexual Activity   Alcohol Use No    Comment: Social drinker     Social History     Substance and Sexual Activity   Drug Use No     Social History     Tobacco Use   Smoking Status Current Every Day Smoker    Types: Cigarettes    Last attempt to quit: 2018    Years since quittin 8   Smokeless Tobacco Current User    Last attempt to quit: 1960   Tobacco Comment    5 sticks/day         Home Medications:   Prior to Admission medications    Medication Sig Start Date End Date Taking?  Authorizing Provider   Cholecalciferol (VITAMIN D3) 2000 units capsule Take 1,000 Units by mouth daily    Historical Provider, MD   clopidogrel (PLAVIX) 75 mg tablet Take 75 mg by mouth daily Historical Provider, MD   glipiZIDE (GLUCOTROL) 5 mg tablet Take 5 mg by mouth Take 1/2 tab daily    Historical Provider, MD   lidocaine (LIDODERM) 5 % Apply 1 patch topically daily Remove & Discard patch within 12 hours or as directed by MD  Patient not taking: Reported on 3/26/2019 2/15/19   Haja Sanchez MD   lisinopril (ZESTRIL) 20 mg tablet Take 20 mg by mouth daily    Historical Provider, MD   metFORMIN (GLUCOPHAGE) 1000 MG tablet Take 1,000 mg by mouth daily with breakfast      Historical Provider, MD   metoprolol tartrate (LOPRESSOR) 25 mg tablet Take 25 mg by mouth every 12 (twelve) hours    Historical Provider, MD   nitroglycerin (NITROSTAT) 0 4 mg SL tablet  7/31/15   Historical Provider, MD   ondansetron (ZOFRAN-ODT) 4 mg disintegrating tablet Take 1 tablet (4 mg total) by mouth every 6 (six) hours as needed for nausea or vomiting for up to 30 days 5/16/19 6/15/19  Mino Lafleur DO   oxyCODONE-acetaminophen (PERCOCET) 5-325 mg per tablet Take 1 tablet by mouth every 4 (four) hours as needed for moderate pain for up to 12 dosesMax Daily Amount: 6 tablets  Patient not taking: Reported on 3/26/2019 2/15/19   Haja Sanchez MD   pantoprazole (PROTONIX) 40 mg tablet Take 40 mg by mouth daily    Historical Provider, MD   PARoxetine (PAXIL) 40 MG tablet Take 40 mg by mouth Take one tab and 1/2 tab daily total 60mg    Historical Provider, MD   rosuvastatin (CRESTOR) 5 mg tablet Take 1 tablet (5 mg total) by mouth daily 2/2/18   Niranjan Jon MD   zolpidem (AMBIEN) 10 mg tablet Take 1 tablet 30 minutes prior to lights out on night of sleep study  7/8/19   Dontae Levy MD       Allergies:   Allergies   Allergen Reactions    Etomidate Swelling     "I blew up and couldn't breath"       Review of Systems:   Review of Systems - History obtained from chart review and the patient  General ROS: negative  Psychological ROS: negative  Ophthalmic ROS: positive for - uses glasses  ENT ROS: negative  Hematological and Lymphatic ROS: negative for - bleeding problems, blood clots, bruising or jaundice  Respiratory ROS: positive for - shortness of breath  negative for - cough, hemoptysis or orthopnea  Cardiovascular ROS: positive for - dyspnea on exertion  negative for - chest pain, irregular heartbeat, loss of consciousness, murmur, orthopnea, palpitations or paroxysmal nocturnal dyspnea  Gastrointestinal ROS: no abdominal pain, change in bowel habits, or black or bloody stools  Genito-Urinary ROS: no dysuria, trouble voiding, or hematuria  Musculoskeletal ROS: positive for LE claudication  No weakness  Neurological ROS: no TIA or stroke symptoms  Dermatological ROS: negative    Vital Signs:   Vitals:    11/19/19 1300 11/19/19 1330   BP: 130/62 130/62   BP Location: Left arm Right arm   Cuff Size: Adult Adult   Pulse: 63    Resp: 14    Temp: 97 5 °F (36 4 °C)    TempSrc: Oral    SpO2: 99%    Weight: 81 6 kg (180 lb)    Height: 5' 7" (1 702 m)        Physical Exam:  General: Alert, oriented, well developed, no acute distress  HEENT/NECK:  PERRLA  No jugular venous distention  Cardiac:Regular rate and rhythm, No murmurs rubs or gallops  Carotid arteries: 2+ pulses, no bruits  Pulmonary:  Breath sounds clear bilaterally  Abdomen:  Non-tender, Non-distended  Positive bowel sounds  Upper extremities: 2+ radial pulses; brisk capillary refill  Lower extremities: Extremities warm/dry  PT/DP pulses 1+ bilaterally  No edema B/L  Neuro: Alert and oriented X 3  Sensation is grossly intact  No focal deficits  Musculoskeletal: MAEE, stable gait  Skin: Warm/Dry, without rashes or lesions      Lab Results:   Lab Results   Component Value Date    WBC 7 03 09/23/2019    HGB 12 6 09/23/2019    HCT 41 4 09/23/2019    MCV 87 09/23/2019     09/23/2019     Lab Results   Component Value Date     12/10/2015    SODIUM 137 09/23/2019    K 5 3 09/23/2019     09/23/2019    CO2 29 09/23/2019 ANIONGAP 7 12/10/2015    AGAP 1 (L) 09/23/2019    BUN 14 09/23/2019    CREATININE 1 29 09/23/2019    GLUC 176 (H) 02/15/2019    GLUF 144 (H) 09/23/2019    CALCIUM 9 6 09/23/2019    AST 9 09/23/2019    ALT 16 09/23/2019    ALKPHOS 100 09/23/2019    PROT 7 3 12/10/2015    PROT 7 1 12/10/2015    TP 7 5 09/23/2019    BILITOT 0 50 12/10/2015    TBILI 0 52 09/23/2019    EGFR 52 09/23/2019     Lab Results   Component Value Date    CHOLESTEROL 146 09/23/2019    CHOLESTEROL 160 01/14/2019    CHOLESTEROL 132 04/20/2018     Lab Results   Component Value Date    HDL 47 09/23/2019    HDL 57 01/14/2019    HDL 51 04/20/2018     Lab Results   Component Value Date    TRIG 153 (H) 09/23/2019    TRIG 112 01/14/2019    TRIG 86 04/20/2018     No results found for: [facility]  Lab Results   Component Value Date    HGBA1C 8 0 (H) 09/23/2019       Imaging Studies:     CT Chest:   11/4/2019: Ascending aortic aneurysm  41 mm  5/28/2019: 41 mm  2/15/2019: 40-41 mm  11/11/2009: 40-41 mm      I have personally reviewed pertinent films in PACS    Assessment:  Patient Active Problem List    Diagnosis Date Noted    Abnormal CT scan of lung 11/06/2019    Thoracic aortic aneurysm without rupture ([facility] [address] ) 11/06/2019    Pulmonary nodule 09/30/2019    Severe sleep apnea     Excessive daytime sleepiness     Snoring 05/15/2019    Fall 02/19/2019    Other chest pain 02/19/2019    Excessive anger 02/19/2019    Type 2 diabetes mellitus with diabetic peripheral angiopathy without gangrene ([facility] [address] ) 01/02/2019    Bypass graft stenosis ([facility] [address] ) 09/25/2018    Memory loss 08/29/2018    Acute blood loss anemia 05/01/2018    Essential hypertension 03/26/2018    Abnormal nuclear stress test 03/26/2018    Atherosclerosis of artery of extremity with intermittent claudication ([facility] [address] ) 03/13/2018    Popliteal artery occlusion, left ([facility] [address] ) 03/13/2018    History of cigarette smoking     Shoulder pain 06/12/2017    Weight loss 10/10/2016    Iron deficiency anemia 06/27/2016    Diverticulosis of large intestine without hemorrhage 10/21/2015    Anemia 09/14/2015    Elevated prostate specific antigen (PSA) 04/04/2015    Anxiety 02/21/2015    Nicotine dependence 02/21/2015    Arthritis 12/03/2012    Asymptomatic carotid artery stenosis 12/03/2012    Coronary artery disease 12/03/2012    Difficulty breathing 12/03/2012    Enlarged prostate without lower urinary tract symptoms (luts) 12/03/2012    Hyperlipidemia 12/03/2012    [facility] mellitus [facility] 12/02/2012    Peripheral arterial disease ([facility] ) 12/02/2012       Plan:    CT imaging performed prior to this visit demonstrates the ascending aorta measuring 41 mm in size at its greatest diameter  This is a stable size when compared to CT scans dated 11/2009, 2/2019 and 5/2019  The size does not meet surgical criteria  These findings were confirmed and shared with the patient and son today  Patient is asymptomatic from an aneurysm standpoint  Family history is non-contributory  Based on current imaging and CT scan from 2009, the aneurysm is stable and unchanged  The aneurysm will most likely never reach a surgical size of 5 5 cm and as patient ages, along with his comobidities, surgery would pose high risk  He does not need to return to our office for follow up  Any CT scans ordered by his PCP in the future for pulmonary nodule surveillance is adequate for surveillance of his ascending aortic aneurysm  Itzel Monique was comfortable with our recommendations, and his questions were answered to his satisfaction  Thank you for allowing us to participate in the care of this patient  Aortic Aneurysm Instructions were provided to the patient as follows:    1  No lifting more than 50 pounds  Regular aerobic exercise permitted and recommended  2  Maintain a controlled blood pressure with a goal of less than 140/80    3  Follow up in Aortic Clinic as recommended with radiology follow up as instructed  4  Report to the ER or call 911 immediately with the following signs / symptoms: sudden onset of back pain, chest pain or shortness of breath  5  Tobacco cessation discussed  The patient recently had a screening colonoscopy in 9/2015  Therefore GI referral is not indicated at this time       SIGNATURE: FLORENCIA Contreras  DATE: November 19, 2019  TIME: 2:13 PM

## 2019-11-19 NOTE — PROGRESS NOTES
[facility] Clinic  Lisbeth Ruiz [de-identified] y o  male MRN: [id number]    Patient self-refereed for second opinion    Reason for Consult / Principal Problem: Ascending aortic aneurysm    History of Present Illness: Lisbeth Ruiz is a [de-identified]y o  year old male who presents today for initial out patient consultation in our aortic clinic for an ascending aortic aneurysm  This finding was noted on recent chest CT scan performed for pulmonary nodule surveillance  Maximal ascending aortic diameter is measured at 41 mm  In comparison to a CT scan dating back to 2009, there is no change in the aneurysm size  Patient was counseled by his PCP regarding the aneurysm but his family requested a second opinion in our office  Patient is accompanied by his son today  He denies chest pain, back pain, abdominal pain  He has a h/o HTN, treated with Metoprolol & Lisinopril  He is on Statin therapy  He is a type 2 diabetic currently on Metformin & Glipizide but will be switching to Jardiance  He is an active smoker, struggling with quitting  He has known PAD with claudication  He has chronic RUIZ with walking up an incline  He denies FH of aneurysms or premature SCD         Past Medical History:  Past Medical History:   Diagnosis Date    Atherosclerosis     Cardiac disease     [facility]     Coronary artery disease     Hyperlipidemia     Hypertension     Peripheral arterial disease [facility]     Smoking addiction          Past Surgical History:   Past Surgical History:   Procedure Laterality Date    BYPASS FEMORAL-POPLITEAL Left 4/30/2018    Procedure: BYPASS FEMORAL-POPLITEAL, WITH INTRAOP ARTERIOGRAM;  Surgeon: Marcus Ochoa MD;  [facility];  Service: Vascular    CARDIAC SURGERY      FEMORAL ARTERY - POPLITEAL ARTERY BYPASS GRAFT Bilateral     HAND SURGERY      IR ABDOMINAL ANGIOGRAPHY / INTERVENTION  10/4/2018    LEG SURGERY      Repair    OTHER SURGICAL HISTORY      Percutaneous repair nasoethmoid fx lacrimal apparatus    THROMBOENDARTERECTOMY  10/12/2010    Tibioperoneal trunk    TONSILLECTOMY      TONSILLECTOMY           Family History:  Family History   Problem Relation Age of Onset    Arthritis Mother     Arthritis Family     Coronary artery disease Family     Hyperlipidemia Family     Peripheral vascular disease Family          Social History:    Social History     Substance and Sexual Activity   Alcohol Use No    Comment: Social drinker     Social History     Substance and Sexual Activity   Drug Use No     Social History     Tobacco Use   Smoking Status Current Every Day Smoker    Types: Cigarettes    Last attempt to quit: 2018    Years since quittin 8   Smokeless Tobacco Current User    Last attempt to quit: [place]    5 sticks/day         Home Medications:   Prior to Admission medications    Medication Sig Start Date End Date Taking?  Authorizing Provider   Cholecalciferol (VITAMIN D3) 2000 units capsule Take 1,000 Units by mouth daily    Historical Provider, MD   clopidogrel (PLAVIX) 75 mg tablet Take 75 mg by mouth daily    Historical Provider, MD   glipiZIDE (GLUCOTROL) 5 mg tablet Take 5 mg by mouth Take 1/2 tab daily    Historical Provider, MD   lidocaine (LIDODERM) 5 % Apply 1 patch topically daily Remove & Discard patch within 12 hours or as directed by MD  Patient not taking: Reported on 3/26/2019 2/15/19   Parish Santana MD   lisinopril (ZESTRIL) 20 mg tablet Take 20 mg by mouth daily    Historical Provider, MD   metFORMIN (GLUCOPHAGE) 1000 MG tablet Take 1,000 mg by mouth daily with breakfast      Historical Provider, MD   metoprolol tartrate (LOPRESSOR) 25 mg tablet Take 25 mg by mouth every 12 (twelve) hours    Historical Provider, MD   nitroglycerin (NITROSTAT) 0 4 mg SL tablet  7/31/15   Historical Provider, MD   ondansetron (ZOFRAN-ODT) 4 mg disintegrating tablet Take 1 tablet (4 mg total) by mouth every 6 (six) hours as needed for nausea or vomiting for up to 30 days 5/16/19 6/15/19  Yoli Seo DO   oxyCODONE-acetaminophen (PERCOCET) 5-325 mg per tablet Take 1 tablet by mouth every 4 (four) hours as needed for moderate pain for up to 12 dosesMax Daily Amount: 6 tablets  Patient not taking: Reported on 3/26/2019 2/15/19   Ning Gill MD   pantoprazole (PROTONIX) 40 mg tablet Take 40 mg by mouth daily    Historical Provider, MD   PARoxetine (PAXIL) 40 MG tablet Take 40 mg by mouth Take one tab and 1/2 tab daily total 60mg    Historical Provider, MD   rosuvastatin (CRESTOR) 5 mg tablet Take 1 tablet (5 mg total) by mouth daily 2/2/18   Gary Anguiano MD   zolpidem (AMBIEN) 10 mg tablet Take 1 tablet 30 minutes prior to lights out on night of sleep study  7/8/19   Jennifer Silva MD       Allergies: Allergies   Allergen Reactions    Etomidate Swelling     "I blew up and couldn't breath"       Review of Systems:   Review of Systems - History obtained from chart review and the patient  General ROS: negative  Psychological ROS: negative  Ophthalmic ROS: positive for - uses glasses  ENT ROS: negative  Hematological and Lymphatic ROS: negative for - bleeding problems, blood clots, bruising or jaundice  Respiratory ROS: positive for - shortness of breath  negative for - cough, hemoptysis or orthopnea  Cardiovascular ROS: positive for - dyspnea on exertion  negative for - chest pain, irregular heartbeat, loss of consciousness, murmur, orthopnea, palpitations or paroxysmal nocturnal dyspnea  Gastrointestinal ROS: no abdominal pain, change in bowel habits, or black or bloody stools  Genito-Urinary ROS: no dysuria, trouble voiding, or hematuria  Musculoskeletal ROS: positive for LE claudication  No weakness     Neurological ROS: no TIA or stroke symptoms  Dermatological ROS: negative    Vital Signs:   Vitals:    11/19/19 1300 11/19/19 1330   BP: 130/62 130/62   BP Location: Left arm Right arm   Cuff Size: Adult Adult   Pulse: 63    Resp: 14    Temp: 97 5 °F (36 4 °C)    TempSrc: Oral    SpO2: 99%    Weight: 81 6 kg (180 lb)    Height: 5' 7" (1 702 m)        Physical Exam:  General: Alert, oriented, well developed, no acute distress  HEENT/NECK:  PERRLA  No jugular venous distention  Cardiac:Regular rate and rhythm, No murmurs rubs or gallops  Carotid arteries: 2+ pulses, no bruits  Pulmonary:  Breath sounds clear bilaterally  Abdomen:  Non-tender, Non-distended  Positive bowel sounds  Upper extremities: 2+ radial pulses; brisk capillary refill  Lower extremities: Extremities warm/dry  PT/DP pulses 1+ bilaterally  No edema B/L  Neuro: Alert and oriented X 3  Sensation is grossly intact  No focal deficits  Musculoskeletal: MAEE, stable gait  Skin: Warm/Dry, without rashes or lesions      Lab Results:   Lab Results   Component Value Date    WBC 7 03 09/23/2019    HGB 12 6 09/23/2019    HCT 41 4 09/23/2019    MCV 87 09/23/2019     09/23/2019     Lab Results   Component Value Date     12/10/2015    SODIUM 137 09/23/2019    K 5 3 09/23/2019     09/23/2019    CO2 29 09/23/2019    ANIONGAP 7 12/10/2015    AGAP 1 (L) 09/23/2019    BUN 14 09/23/2019    CREATININE 1 29 09/23/2019    GLUC 176 (H) 02/15/2019    GLUF 144 (H) 09/23/2019    CALCIUM 9 6 09/23/2019    AST 9 09/23/2019    ALT 16 09/23/2019    ALKPHOS 100 09/23/2019    PROT 7 3 12/10/2015    PROT 7 1 12/10/2015    TP 7 5 09/23/2019    BILITOT 0 50 12/10/2015    TBILI 0 52 09/23/2019    EGFR 52 09/23/2019     Lab Results   Component Value Date    CHOLESTEROL 146 09/23/2019    CHOLESTEROL 160 01/14/2019    CHOLESTEROL 132 04/20/2018     Lab Results   Component Value Date    HDL 47 09/23/2019    HDL 57 01/14/2019    HDL 51 04/20/2018     Lab Results   Component Value Date    TRIG 153 (H) 09/23/2019    TRIG 112 01/14/2019    TRIG 86 04/20/2018     No results found for: Tam  Lab Results   Component Value Date    HGBA1C 8 0 (H) 09/23/2019       Imaging Studies:     CT Chest:   11/4/2019: Ascending aortic aneurysm  41 mm  5/28/2019: 41 mm  2/15/2019: 40-41 mm  11/11/2009: 40-41 mm      I have personally reviewed pertinent films in PACS    Assessment:  Patient Active Problem List    Diagnosis Date Noted    Abnormal CT scan of lung 11/06/2019    [facility] without rupture [email or web address] 11/06/2019    Pulmonary nodule 09/30/2019    Severe sleep apnea     Excessive daytime sleepiness     Snoring 05/15/2019    Fall 02/19/2019    Other chest pain 02/19/2019    Excessive anger 02/19/2019    Type 2 diabetes mellitus with diabetic peripheral angiopathy without gangrene ([facility] [address] ) 01/02/2019    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    Memory loss 08/29/2018    Acute blood loss anemia 05/01/2018    Essential hypertension 03/26/2018    Abnormal nuclear stress test 03/26/2018    Atherosclerosis of artery of extremity with intermittent claudication ([email or web address] 03/13/2018    Popliteal artery occlusion, 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    History of cigarette smoking     Shoulder pain 06/12/2017    Weight loss 10/10/2016    Iron deficiency anemia 06/27/2016    Diverticulosis of large intestine without hemorrhage 10/21/2015    Anemia 09/14/2015    Elevated prostate specific antigen (PSA) 04/04/2015    Anxiety 02/21/2015    Nicotine dependence 02/21/2015    Arthritis 12/03/2012    Asymptomatic carotid artery stenosis 12/03/2012    Coronary artery disease 12/03/2012    Difficulty breathing 12/03/2012    Enlarged prostate without lower urinary tract symptoms (luts) 12/03/2012    Hyperlipidemia 12/03/2012    Controlled diabetes mellitus ([facility] ) 12/02/2012    Peripheral arterial disease ([facility] ) 12/02/2012       Plan:    CT imaging performed prior to this visit demonstrates the ascending aorta measuring 41 mm in size at its greatest diameter  This is a stable size when compared to CT scans dated 11/2009, 2/2019 and 5/2019  The size does not meet surgical criteria   These findings were confirmed and shared with the patient and son today  Patient is asymptomatic from an aneurysm standpoint  Family history is non-contributory  Based on current imaging and CT scan from 2009, the aneurysm is stable and unchanged  The aneurysm will most likely never reach a surgical size of 5 5 cm and as patient ages, along with his comobidities, surgery would pose high risk  He does not need to return to our office for follow up  Any CT scans ordered by his PCP in the future for pulmonary nodule surveillance is adequate for surveillance of his ascending aortic aneurysm  Jakob Otoole was comfortable with our recommendations, and his questions were answered to his satisfaction  Thank you for allowing us to participate in the care of this patient  Aortic Aneurysm Instructions were provided to the patient as follows:    1  No lifting more than 50 pounds  Regular aerobic exercise permitted and recommended  2  Maintain a controlled blood pressure with a goal of less than 140/80  3  Follow up in Aortic Clinic as recommended with radiology follow up as instructed  4  Report to the ER or call 911 immediately with the following signs / symptoms: sudden onset of back pain, chest pain or shortness of breath  5  Tobacco cessation discussed  The patient recently had a screening colonoscopy in 9/2015  Therefore GI referral is not indicated at this time       SIGNATURE: FLORENCIA Perez  DATE: November 19, 2019  TIME: 2:13 PM

## 2019-11-20 ENCOUNTER — TELEPHONE (OUTPATIENT)
Dept: INTERNAL MEDICINE CLINIC | Facility: CLINIC | Age: 79
End: 2019-11-20

## 2019-11-20 NOTE — TELEPHONE ENCOUNTER
----- Message from Anca Robin MD sent at 11/19/2019  1:27 PM EST -----   Please call patient -breathing test show mild  COPD associated with his smoking history

## 2019-12-09 ENCOUNTER — TELEPHONE (OUTPATIENT)
Dept: SLEEP CENTER | Facility: CLINIC | Age: 79
End: 2019-12-09

## 2019-12-09 NOTE — TELEPHONE ENCOUNTER
Patient was scheduled to see Brenda Collins for his follow up today  Returned machine to the   Stated he could not sleep with it  Did not have a chance to talk with patient  Was in with a patient

## 2020-01-29 ENCOUNTER — APPOINTMENT (OUTPATIENT)
Dept: LAB | Facility: CLINIC | Age: 80
End: 2020-01-29
Payer: MEDICARE

## 2020-01-29 DIAGNOSIS — E11.51 TYPE 2 DIABETES MELLITUS WITH DIABETIC PERIPHERAL ANGIOPATHY WITHOUT GANGRENE, WITHOUT LONG-TERM CURRENT USE OF INSULIN (HCC): ICD-10-CM

## 2020-01-29 DIAGNOSIS — E78.2 MIXED HYPERLIPIDEMIA: ICD-10-CM

## 2020-01-29 DIAGNOSIS — I10 ESSENTIAL HYPERTENSION: Chronic | ICD-10-CM

## 2020-01-29 LAB
ALBUMIN SERPL BCP-MCNC: 3.6 G/DL (ref 3.5–5)
ALP SERPL-CCNC: 102 U/L (ref 46–116)
ALT SERPL W P-5'-P-CCNC: 15 U/L (ref 12–78)
ANION GAP SERPL CALCULATED.3IONS-SCNC: 2 MMOL/L (ref 4–13)
AST SERPL W P-5'-P-CCNC: 7 U/L (ref 5–45)
BILIRUB SERPL-MCNC: 0.53 MG/DL (ref 0.2–1)
BUN SERPL-MCNC: 12 MG/DL (ref 5–25)
CALCIUM SERPL-MCNC: 9.7 MG/DL (ref 8.3–10.1)
CHLORIDE SERPL-SCNC: 107 MMOL/L (ref 100–108)
CHOLEST SERPL-MCNC: 154 MG/DL (ref 50–200)
CO2 SERPL-SCNC: 28 MMOL/L (ref 21–32)
CREAT SERPL-MCNC: 1.08 MG/DL (ref 0.6–1.3)
CREAT UR-MCNC: 118 MG/DL
EST. AVERAGE GLUCOSE BLD GHB EST-MCNC: 194 MG/DL
GFR SERPL CREATININE-BSD FRML MDRD: 64 ML/MIN/1.73SQ M
GLUCOSE P FAST SERPL-MCNC: 151 MG/DL (ref 65–99)
HBA1C MFR BLD: 8.4 % (ref 4.2–6.3)
HDLC SERPL-MCNC: 50 MG/DL
LDLC SERPL DIRECT ASSAY-MCNC: 85 MG/DL (ref 0–100)
MICROALBUMIN UR-MCNC: 44.2 MG/L (ref 0–20)
MICROALBUMIN/CREAT 24H UR: 37 MG/G CREATININE (ref 0–30)
POTASSIUM SERPL-SCNC: 5.1 MMOL/L (ref 3.5–5.3)
PROT SERPL-MCNC: 7.7 G/DL (ref 6.4–8.2)
SODIUM SERPL-SCNC: 137 MMOL/L (ref 136–145)
TRIGL SERPL-MCNC: 124 MG/DL

## 2020-01-29 PROCEDURE — 80061 LIPID PANEL: CPT

## 2020-01-29 PROCEDURE — 83036 HEMOGLOBIN GLYCOSYLATED A1C: CPT

## 2020-01-29 PROCEDURE — 83721 ASSAY OF BLOOD LIPOPROTEIN: CPT

## 2020-01-29 PROCEDURE — 80053 COMPREHEN METABOLIC PANEL: CPT

## 2020-01-29 PROCEDURE — 36415 COLL VENOUS BLD VENIPUNCTURE: CPT

## 2020-01-29 PROCEDURE — 82043 UR ALBUMIN QUANTITATIVE: CPT | Performed by: INTERNAL MEDICINE

## 2020-01-29 PROCEDURE — 82570 ASSAY OF URINE CREATININE: CPT | Performed by: INTERNAL MEDICINE

## 2020-02-03 ENCOUNTER — OFFICE VISIT (OUTPATIENT)
Dept: INTERNAL MEDICINE CLINIC | Facility: CLINIC | Age: 80
End: 2020-02-03
Payer: MEDICARE

## 2020-02-03 VITALS
RESPIRATION RATE: 14 BRPM | WEIGHT: 179 LBS | BODY MASS INDEX: 28.09 KG/M2 | SYSTOLIC BLOOD PRESSURE: 120 MMHG | HEART RATE: 66 BPM | HEIGHT: 67 IN | DIASTOLIC BLOOD PRESSURE: 70 MMHG | TEMPERATURE: 97.6 F | OXYGEN SATURATION: 97 %

## 2020-02-03 DIAGNOSIS — I10 ESSENTIAL HYPERTENSION: Chronic | ICD-10-CM

## 2020-02-03 DIAGNOSIS — R45.4 EXCESSIVE ANGER: ICD-10-CM

## 2020-02-03 DIAGNOSIS — R06.02 SOB (SHORTNESS OF BREATH): Primary | ICD-10-CM

## 2020-02-03 DIAGNOSIS — R11.2 NON-INTRACTABLE VOMITING WITH NAUSEA, UNSPECIFIED VOMITING TYPE: ICD-10-CM

## 2020-02-03 DIAGNOSIS — E78.2 MIXED HYPERLIPIDEMIA: ICD-10-CM

## 2020-02-03 DIAGNOSIS — E11.51 TYPE 2 DIABETES MELLITUS WITH DIABETIC PERIPHERAL ANGIOPATHY WITHOUT GANGRENE, WITHOUT LONG-TERM CURRENT USE OF INSULIN (HCC): ICD-10-CM

## 2020-02-03 PROBLEM — R11.10 NON-INTRACTABLE VOMITING: Status: ACTIVE | Noted: 2020-02-03

## 2020-02-03 PROCEDURE — 2022F DILAT RTA XM EVC RTNOPTHY: CPT | Performed by: INTERNAL MEDICINE

## 2020-02-03 PROCEDURE — 4040F PNEUMOC VAC/ADMIN/RCVD: CPT | Performed by: INTERNAL MEDICINE

## 2020-02-03 PROCEDURE — 93000 ELECTROCARDIOGRAM COMPLETE: CPT | Performed by: INTERNAL MEDICINE

## 2020-02-03 PROCEDURE — 3078F DIAST BP <80 MM HG: CPT | Performed by: INTERNAL MEDICINE

## 2020-02-03 PROCEDURE — 3008F BODY MASS INDEX DOCD: CPT | Performed by: INTERNAL MEDICINE

## 2020-02-03 PROCEDURE — 99215 OFFICE O/P EST HI 40 MIN: CPT | Performed by: INTERNAL MEDICINE

## 2020-02-03 PROCEDURE — 1160F RVW MEDS BY RX/DR IN RCRD: CPT | Performed by: INTERNAL MEDICINE

## 2020-02-03 PROCEDURE — 3074F SYST BP LT 130 MM HG: CPT | Performed by: INTERNAL MEDICINE

## 2020-02-03 PROCEDURE — 3060F POS MICROALBUMINURIA REV: CPT | Performed by: INTERNAL MEDICINE

## 2020-02-03 NOTE — PROGRESS NOTES
Assessment/Plan:   1  Belching with vomiting and diarrhea- the likely related to gastroenteritis multiple was watching for recurrent obstruction  He will minimize caffeine, minimize alcohol,  Avoid fried or greasy foods and see how he does over the next 24 hours-if he has significant vomiting or pain he will require evaluation in the ER with CT scan to rule out obstruction  Further therapy based on overall clinical course  2  General care -went over his medical regimen in detail  3  Diabetes mellitus -most recent A1c now 8 4  Cannot tolerate short-acting metformin only tolerates a 1000 milligrams a day long-acting metformin  He intermittently skips meals and because of this previously I told with DC glipizide we started Jardiance  He knows of unimproved we will likely need to initiate insulin therapy  He is returning to the office for follow-up exam over the next couple months with a hemoglobin A1c in the office that day  4  Abnormal CT the lung with questionable interstitial thickening - PFT's show mild obstruction with normal spirometry other than decreased lung volumes and diffusing capacity normal -monitoring at present -again recommended he not smoke  5  --41 millimeter ascending aortic aneurysm -surgery recommends we monitor  He is on a beta-blocker  He knows not to lift more than 40 pounds  They feel surgical criteria will be 55 millimeters  Will continue to monitor when he has surveillance for his pulmonary nodules  6  Pulmonary nodule-unchanged over 5 months-3 millimeter left lower lobe nodule -needs repeat 6 months after his last which will be due in May of 2020- SCHEDULE NEXT VISIT  7  Health maintenance -we previously talked about the new zoster vaccine he was hoping obtain that at the [place]- REVIEW NEXT VISIT  8  Severe sleep apnea- on CPAP study 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    Pulmonary was considering CPAP at 8 centimeters hoping that his central events with clear over time-  The patient returns his machine to the [facility] stating he could not sleep with it- REVIEW NEXT VISIT the  9  Hypertension - adequately controlled on current dose of ACE-inhibitor plus beta-blocker    All problems as per note September 2019 and September 2013       MEDICAL REGIMEN:      Paroxetine 40 milligrams daily using 40 milligram dosing, pantoprazole 40 milligrams daily, metoprolol tartrate 25 milligrams - half tablet b i d , metformin long-acting a 1000 milligrams daily, lisinopril 20 milligrams daily, clopidogrel gel 75 milligrams daily, Crestor 5 milligrams daily using 10 milligram dosing, Jardiance 25 milligrams daily, vitamin D3/ 2000 units a day    Returning to the office over the next couple months for an A1c  He will call if he notes ongoing or worse GI symptoms  No problem-specific Assessment & Plan notes found for this encounter  Diagnoses and all orders for this visit:    SOB (shortness of breath)  -     POCT ECG    Type 2 diabetes mellitus with diabetic peripheral angiopathy without gangrene, without long-term current use of insulin (HCC)  -     CBC and differential; Future  -     Comprehensive metabolic panel; Future  -     Cholesterol, total; Future  -     HDL cholesterol; Future  -     LDL cholesterol, direct; Future  -     Triglycerides; Future  -     HEMOGLOBIN A1C W/ EAG ESTIMATION; Future  -     Microalbumin / creatinine urine ratio    Mixed hyperlipidemia  -     CBC and differential; Future  -     Comprehensive metabolic panel; Future  -     Cholesterol, total; Future  -     HDL cholesterol; Future  -     LDL cholesterol, direct; Future  -     Triglycerides; Future  -     HEMOGLOBIN A1C W/ EAG ESTIMATION; Future  -     Microalbumin / creatinine urine ratio    Essential hypertension  -     CBC and differential; Future  -     Comprehensive metabolic panel; Future  -     Cholesterol, total; Future  -     HDL cholesterol; Future  -     LDL cholesterol, direct;  Future  - Triglycerides; Future  -     HEMOGLOBIN A1C W/ EAG ESTIMATION; Future  -     Microalbumin / creatinine urine ratio    Non-intractable vomiting with nausea, unspecified vomiting type    Excessive anger          Subjective:      Patient ID: Jennifer Darling is a [de-identified] y o  male  Reviewed multiple issues including a major new issue  He began last evening with a large amount of belching  He had eaten at a [employer] and several hours later began with large amounts of belching and then with upper abdominal discomfort  He notes bloating  He had 1-2 episodes of vomiting  He has had 2 episodes of diarrhea  He had an episode of vomiting and diarrhea the office in any said he felt better  He has not noted any bloody stool  He has not had any definite fever  His wife ate similar foods without any difficulty  He says he is not having 2 severe abdominal pain but is having abdominal bloating -belching  He denies any definite chest pain or pressure though he has known diffuse vascular disease as well as coronary artery disease  EKG done today shows no evidence of any acute ischemia      Results for orders placed or performed in visit on 01/29/20  -Comprehensive metabolic panel       Result                      Value             Ref Range           Sodium                      137               136 - 145 mm*       Potassium                   5 1               3 5 - 5 3 mm*       Chloride                    107               100 - 108 mm*       CO2                         28                21 - 32 mmol*       ANION GAP                   2 (L)             4 - 13 mmol/L       BUN                         12                5 - 25 mg/dL        Creatinine                  1 08              0 60 - 1 30 *       Glucose, Fasting            151 (H)           65 - 99 mg/dL       Calcium                     9 7               8 3 - 10 1 m*       AST                         7                 5 - 45 U/L          ALT 15                12 - 78 U/L         Alkaline Phosphatase        102               46 - 116 U/L        Total Protein               7 7               6 4 - 8 2 g/*       Albumin                     3 6               3 5 - 5 0 g/*       Total Bilirubin             0 53              0 20 - 1 00 *       eGFR                        64                ml/min/1 73s*  -HEMOGLOBIN A1C W/ EAG ESTIMATION       Result                      Value             Ref Range           Hemoglobin A1C              8 4 (H)           4 2 - 6 3 %         EAG                         194               mg/dl          -Cholesterol, total       Result                      Value             Ref Range           Cholesterol                 154               50 - 200 mg/*  -Triglycerides       Result                      Value             Ref Range           Triglycerides               124               <=150 mg/dL    -HDL cholesterol       Result                      Value             Ref Range           HDL, Direct                 50                >=40 mg/dL     -LDL cholesterol, direct       Result                      Value             Ref Range           LDL Direct                  85                0 - 100 mg/dl    His A1c has climbed 8 4  He clearly admits to much dietary indiscretion and lack of activity  He cannot tolerate short-acting metformin only tolerates a 1000 milligrams a day of long-acting metformin  He skips meals of because of this I previously discontinued his glipizide  He has been treated with Jardiance  A1c has climb for made up to 8 4  He may require initiation of insulin therapy and he is aware this  We went over this in detail    He had PFTs done showing mild obstruction with normal spirometry a-some decrease in lung volumes  Diffusing capacity normal   He was seen by thoracic surgery and they felt he had a stable aneurysm at 41 millimeters unchanged times years and that he does not meet surgical criteria    They feel size for intervention would be 55 millimeters  CT scans performed for his pulmonary nodule surveillance will be adequate following his aneurysm     He is a long-term smoker and continues to smoke  He says he is currently smoking half pack per day  He has known peripheral arterial disease  He has had prior intervention in that regard  LDL is nearly adequate at its current value of 85 triglycerides 124 with HDL of 50  He is aware we expect further improvement is triglycerides with better control his diabetes  He has a history of hypertension  BP adequately controlled on current regimen  Avoiding salt and decongestants  We went over his medical regimen in detail  There was some confusion on the list he brought in  He had a list that listed to Ace inhibitors  We went over each medication I wrote out prescriptions that he will be taking to the [place] as he has she per medicine there  The following portions of the patient's history were reviewed and updated as appropriate: allergies, current medications, past family history, past medical history, past social history, past surgical history and problem list     Review of Systems   Constitutional: Negative  Respiratory: Positive for shortness of breath  Cardiovascular: Negative  Gastrointestinal: Positive for abdominal distention, diarrhea and vomiting  Endocrine: Negative  Genitourinary: Negative  Musculoskeletal: Negative  Neurological: Negative  Hematological: Negative  Psychiatric/Behavioral: Negative  Objective:      Pulse 65   Temp 97 6 °F (36 4 °C) (Oral)   Ht 5' 7" (1 702 m)   Wt 81 2 kg (179 lb)   SpO2 97%   BMI 28 04 kg/m²          Physical Exam   Constitutional: He is oriented to person, place, and time  He appears well-developed and well-nourished  No distress  HENT:   Head: Normocephalic and atraumatic     Right Ear: External ear normal    Left Ear: External ear normal    Nose: Nose normal  Mouth/Throat: Oropharynx is clear and moist  No oropharyngeal exudate  Eyes: Pupils are equal, round, and reactive to light  Conjunctivae and EOM are normal  Right eye exhibits no discharge  Left eye exhibits no discharge  No scleral icterus  Neck: No JVD present  No tracheal deviation present  No thyromegaly present  Cardiovascular: Normal rate, regular rhythm and normal heart sounds  Exam reveals no gallop and no friction rub  No murmur heard  Decreased peripheral pulses   Pulmonary/Chest: Effort normal and breath sounds normal  No stridor  No respiratory distress  He has no wheezes  He exhibits no tenderness  Abdominal: Bowel sounds are normal  He exhibits no distension and no mass  There is no tenderness  There is no rebound and no guarding  Some distension with scars from prior surgery -some  Increased tympany to percussion   Genitourinary: Rectal exam shows guaiac negative stool  Musculoskeletal: Normal range of motion  He exhibits no edema, tenderness or deformity  Lymphadenopathy:     He has no cervical adenopathy  Neurological: He is alert and oriented to person, place, and time  He has normal reflexes  He displays normal reflexes  No cranial nerve deficit  He exhibits normal muscle tone  Coordination normal    Skin: Skin is warm and dry  No rash noted  He is not diaphoretic  No erythema  No pallor  Psychiatric: He has a normal mood and affect  His behavior is normal  Judgment and thought content normal    Vitals reviewed

## 2020-02-04 ENCOUNTER — TELEPHONE (OUTPATIENT)
Dept: INTERNAL MEDICINE CLINIC | Facility: CLINIC | Age: 80
End: 2020-02-04

## 2020-02-04 RX ORDER — METFORMIN HYDROCHLORIDE EXTENDED-RELEASE TABLETS 1000 MG/1
1000 TABLET, FILM COATED, EXTENDED RELEASE ORAL
Qty: 90 TABLET | Refills: 3 | Status: CANCELLED | OUTPATIENT
Start: 2020-02-04

## 2020-02-04 NOTE — TELEPHONE ENCOUNTER
1  I called pharmacy [facility] to  Find out if patient filled the metformin doc prescribed yesterday and they confirmed he did not  2  Left message on cell advising not to fill hand written script for metformin that Dr Mccracken gave yesterday     Doc gave new hand written script showing that he would like patient to be on metformin ER 1000mg- take 1 tab daily   Disp- 90  Refill- 3

## 2020-02-04 NOTE — TELEPHONE ENCOUNTER
Torri Bojorquez- he takes these medications to the [address] [facility] - he needs  written script to take with him -please give him the new prescription

## 2020-02-24 ENCOUNTER — TELEPHONE (OUTPATIENT)
Dept: INTERNAL MEDICINE CLINIC | Facility: CLINIC | Age: 80
End: 2020-02-24

## 2020-03-30 ENCOUNTER — TELEMEDICINE (OUTPATIENT)
Dept: CARDIOLOGY CLINIC | Facility: CLINIC | Age: 80
End: 2020-03-30
Payer: MEDICARE

## 2020-03-30 DIAGNOSIS — Z72.0 TOBACCO ABUSE: ICD-10-CM

## 2020-03-30 DIAGNOSIS — E78.2 MIXED HYPERLIPIDEMIA: ICD-10-CM

## 2020-03-30 DIAGNOSIS — R06.00 DOE (DYSPNEA ON EXERTION): ICD-10-CM

## 2020-03-30 DIAGNOSIS — R94.39 ABNORMAL NUCLEAR STRESS TEST: Primary | ICD-10-CM

## 2020-03-30 DIAGNOSIS — I10 ESSENTIAL HYPERTENSION: Chronic | ICD-10-CM

## 2020-03-30 PROBLEM — R06.09 DOE (DYSPNEA ON EXERTION): Status: ACTIVE | Noted: 2020-03-30

## 2020-03-30 PROCEDURE — 99443 PR PHYS/QHP TELEPHONE EVALUATION 21-30 MIN: CPT | Performed by: INTERNAL MEDICINE

## 2020-03-30 NOTE — PROGRESS NOTES
Virtual Brief Visit    Problem List Items Addressed This Visit        Cardiovascular and Mediastinum    Essential hypertension (Chronic)       Other    Abnormal nuclear stress test - Primary    Hyperlipidemia    Tobacco abuse    RUIZ (dyspnea on exertion)                Reason for visit is HTN, Abnormal nuclear stress test    Encounter provider Chelsey Sanches MD    Provider located at [address]      Recent Visits  No visits were found meeting these conditions  Showing recent visits within past 7 days and meeting all other requirements     Today's Visits  Date Type Provider Dept   03/30/20 Telemedicine Chelsey Sanches MD [address] today's visits and meeting all other requirements     Future Appointments  No visits were found meeting these conditions  Showing future appointments within next 150 days and meeting all other requirements        After connecting through telephone, the patient was identified by name and date of birth  Claudia Medrano was informed that this is a telemedicine visit and that the visit is being conducted through telephone  My office door was closed  No one else was in the room  He acknowledged consent and understanding of privacy and security of the video platform  The patient has agreed to participate and understands they can discontinue the visit at any time  Patient is aware this is a billable service  Subjective  Claudia Medrano is a [de-identified] y o  male   He has not had any cardiac problems since his last OV  He is not very active due to his severe PVD  He continues to smoke at least 1/2 packs a day  BP is controlled  LDL is 85 on statin therapy  He denies CP  He does get SOB at higher levels of activity  His nuclear stress test in 5/2017 - inferior ischemia  Plan :   All of his assessed cardiac problems appear to be stable   I have reviewed his medications and made no changes  No further cardiac testing is needed at this time  I stressed to him that he must stop smoking  RTO 1 year        Past Medical History:   Diagnosis Date    Atherosclerosis     Cardiac disease     [facility] [address]     Coronary artery disease     Hyperlipidemia     Hypertension     Peripheral arterial disease [facility] [address] [facility]     Smoking addiction        Past Surgical History:   Procedure Laterality Date    BYPASS FEMORAL-POPLITEAL Left 4/30/2018    Procedure: BYPASS FEMORAL-POPLITEAL, WITH INTRAOP ARTERIOGRAM;  Surgeon: Elio Abbasi MD;  [facility];  Service: Vascular    CARDIAC SURGERY      FEMORAL ARTERY - POPLITEAL ARTERY BYPASS GRAFT Bilateral     HAND SURGERY      IR ABDOMINAL ANGIOGRAPHY / INTERVENTION  10/4/2018    LEG SURGERY      Repair    OTHER SURGICAL HISTORY      Percutaneous repair nasoethmoid fx lacrimal apparatus    THROMBOENDARTERECTOMY  10/12/2010    Tibioperoneal trunk    TONSILLECTOMY      TONSILLECTOMY         Current Outpatient Medications   Medication Sig Dispense Refill    Cholecalciferol (VITAMIN D3) 2000 units capsule Take 1,000 Units by mouth daily      clopidogrel (PLAVIX) 75 mg tablet Take 75 mg by mouth daily      glipiZIDE (GLUCOTROL) 5 mg tablet Take 5 mg by mouth Take 1/2 tab daily      lisinopril (ZESTRIL) 20 mg tablet Take 20 mg by mouth daily      metFORMIN (GLUCOPHAGE) 1000 MG tablet Take 1,000 mg by mouth daily with breakfast        metoprolol tartrate (LOPRESSOR) 25 mg tablet Take 25 mg by mouth every 12 (twelve) hours      pantoprazole (PROTONIX) 40 mg tablet Take 40 mg by mouth daily      PARoxetine (PAXIL) 40 MG tablet Take 40 mg by mouth Take one tab and 1/2 tab daily total 60mg      rosuvastatin (CRESTOR) 5 mg tablet Take 1 tablet (5 mg total) by mouth daily 90 tablet 3    nitroglycerin (NITROSTAT) 0 4 mg SL tablet       ondansetron (ZOFRAN-ODT) 4 mg disintegrating tablet Take 1 tablet (4 mg total) by mouth every 6 (six) hours as needed for nausea or vomiting for up to 30 days (Patient not taking: Reported on 3/30/2020) 60 tablet 0    oxyCODONE-acetaminophen (PERCOCET) 5-325 mg per tablet Take 1 tablet by mouth every 4 (four) hours as needed for moderate pain for up to 12 dosesMax Daily Amount: 6 tablets (Patient not taking: Reported on 3/30/2020) 12 tablet 0     No current facility-administered medications for this visit  Allergies   Allergen Reactions    Etomidate Swelling     "I blew up and couldn't breath"       Review of Systems      I spent 45 minutes with the patient during this visit

## 2020-05-19 ENCOUNTER — TELEPHONE (OUTPATIENT)
Dept: INTERNAL MEDICINE CLINIC | Facility: CLINIC | Age: 80
End: 2020-05-19

## 2020-05-20 DIAGNOSIS — I65.23 CAROTID STENOSIS, BILATERAL: Primary | ICD-10-CM

## 2020-05-21 ENCOUNTER — TELEPHONE (OUTPATIENT)
Dept: INTERNAL MEDICINE CLINIC | Facility: CLINIC | Age: 80
End: 2020-05-21

## 2020-05-21 ENCOUNTER — OFFICE VISIT (OUTPATIENT)
Dept: INTERNAL MEDICINE CLINIC | Facility: CLINIC | Age: 80
End: 2020-05-21
Payer: MEDICARE

## 2020-05-21 ENCOUNTER — HOSPITAL ENCOUNTER (OUTPATIENT)
Dept: RADIOLOGY | Age: 80
Discharge: HOME | End: 2020-05-21
Payer: MEDICARE

## 2020-05-21 VITALS
WEIGHT: 173.2 LBS | HEIGHT: 67 IN | DIASTOLIC BLOOD PRESSURE: 78 MMHG | SYSTOLIC BLOOD PRESSURE: 122 MMHG | RESPIRATION RATE: 14 BRPM | BODY MASS INDEX: 27.18 KG/M2 | HEART RATE: 72 BPM

## 2020-05-21 DIAGNOSIS — L29.9 PRURITUS: ICD-10-CM

## 2020-05-21 DIAGNOSIS — R91.1 PULMONARY NODULE: ICD-10-CM

## 2020-05-21 DIAGNOSIS — R53.83 OTHER FATIGUE: ICD-10-CM

## 2020-05-21 DIAGNOSIS — H91.91 HEARING DIFFICULTY OF RIGHT EAR: ICD-10-CM

## 2020-05-21 DIAGNOSIS — D64.9 ANEMIA, UNSPECIFIED TYPE: Primary | ICD-10-CM

## 2020-05-21 DIAGNOSIS — R06.00 DYSPNEA, UNSPECIFIED TYPE: ICD-10-CM

## 2020-05-21 PROCEDURE — 99214 OFFICE O/P EST MOD 30 MIN: CPT | Performed by: INTERNAL MEDICINE

## 2020-05-21 PROCEDURE — 69210 REMOVE IMPACTED EAR WAX UNI: CPT | Performed by: INTERNAL MEDICINE

## 2020-05-21 PROCEDURE — 1160F RVW MEDS BY RX/DR IN RCRD: CPT | Performed by: INTERNAL MEDICINE

## 2020-05-21 PROCEDURE — 3060F POS MICROALBUMINURIA REV: CPT | Performed by: INTERNAL MEDICINE

## 2020-05-21 PROCEDURE — 3008F BODY MASS INDEX DOCD: CPT | Performed by: INTERNAL MEDICINE

## 2020-05-21 PROCEDURE — 4040F PNEUMOC VAC/ADMIN/RCVD: CPT | Performed by: INTERNAL MEDICINE

## 2020-05-21 PROCEDURE — 71250 CT THORAX DX C-: CPT

## 2020-05-21 PROCEDURE — 3052F HG A1C>EQUAL 8.0%<EQUAL 9.0%: CPT | Performed by: INTERNAL MEDICINE

## 2020-05-21 PROCEDURE — 2022F DILAT RTA XM EVC RTNOPTHY: CPT | Performed by: INTERNAL MEDICINE

## 2020-05-21 PROCEDURE — 3078F DIAST BP <80 MM HG: CPT | Performed by: INTERNAL MEDICINE

## 2020-05-21 PROCEDURE — 3074F SYST BP LT 130 MM HG: CPT | Performed by: INTERNAL MEDICINE

## 2020-05-22 ENCOUNTER — TELEPHONE (OUTPATIENT)
Dept: INTERNAL MEDICINE CLINIC | Facility: CLINIC | Age: 80
End: 2020-05-22

## 2020-05-26 ENCOUNTER — TRANSCRIBE ORDERS (OUTPATIENT)
Dept: ADMINISTRATIVE | Age: 80
End: 2020-05-26

## 2020-05-26 ENCOUNTER — APPOINTMENT (OUTPATIENT)
Dept: LAB | Age: 80
End: 2020-05-26
Payer: MEDICARE

## 2020-05-26 DIAGNOSIS — L29.9 PRURITUS: ICD-10-CM

## 2020-05-26 DIAGNOSIS — R06.00 DYSPNEA, UNSPECIFIED TYPE: ICD-10-CM

## 2020-05-26 DIAGNOSIS — D64.9 ANEMIA, UNSPECIFIED TYPE: ICD-10-CM

## 2020-05-26 DIAGNOSIS — R53.83 OTHER FATIGUE: ICD-10-CM

## 2020-05-26 LAB
ALBUMIN SERPL BCP-MCNC: 3.2 G/DL (ref 3.5–5)
ALP SERPL-CCNC: 114 U/L (ref 46–116)
ALT SERPL W P-5'-P-CCNC: 14 U/L (ref 12–78)
ANION GAP SERPL CALCULATED.3IONS-SCNC: 6 MMOL/L (ref 4–13)
AST SERPL W P-5'-P-CCNC: <5 U/L (ref 5–45)
BASOPHILS # BLD AUTO: 0.1 THOUSANDS/ΜL (ref 0–0.1)
BASOPHILS NFR BLD AUTO: 1 % (ref 0–1)
BILIRUB SERPL-MCNC: 0.38 MG/DL (ref 0.2–1)
BUN SERPL-MCNC: 17 MG/DL (ref 5–25)
CALCIUM SERPL-MCNC: 9 MG/DL (ref 8.3–10.1)
CHLORIDE SERPL-SCNC: 105 MMOL/L (ref 100–108)
CO2 SERPL-SCNC: 26 MMOL/L (ref 21–32)
CREAT SERPL-MCNC: 1.12 MG/DL (ref 0.6–1.3)
CREAT UR-MCNC: 56.6 MG/DL
CRP SERPL QL: <3 MG/L
EOSINOPHIL # BLD AUTO: 0.33 THOUSAND/ΜL (ref 0–0.61)
EOSINOPHIL NFR BLD AUTO: 5 % (ref 0–6)
ERYTHROCYTE [DISTWIDTH] IN BLOOD BY AUTOMATED COUNT: 15 % (ref 11.6–15.1)
ERYTHROCYTE [SEDIMENTATION RATE] IN BLOOD: 21 MM/HOUR (ref 0–10)
GFR SERPL CREATININE-BSD FRML MDRD: 62 ML/MIN/1.73SQ M
GLUCOSE P FAST SERPL-MCNC: 233 MG/DL (ref 65–99)
HCT VFR BLD AUTO: 38.6 % (ref 36.5–49.3)
HGB BLD-MCNC: 11.4 G/DL (ref 12–17)
IMM GRANULOCYTES # BLD AUTO: 0.01 THOUSAND/UL (ref 0–0.2)
IMM GRANULOCYTES NFR BLD AUTO: 0 % (ref 0–2)
LYMPHOCYTES # BLD AUTO: 2.31 THOUSANDS/ΜL (ref 0.6–4.47)
LYMPHOCYTES NFR BLD AUTO: 33 % (ref 14–44)
MCH RBC QN AUTO: 24.4 PG (ref 26.8–34.3)
MCHC RBC AUTO-ENTMCNC: 29.5 G/DL (ref 31.4–37.4)
MCV RBC AUTO: 83 FL (ref 82–98)
MICROALBUMIN UR-MCNC: 8.1 MG/L (ref 0–20)
MICROALBUMIN/CREAT 24H UR: 14 MG/G CREATININE (ref 0–30)
MONOCYTES # BLD AUTO: 0.49 THOUSAND/ΜL (ref 0.17–1.22)
MONOCYTES NFR BLD AUTO: 7 % (ref 4–12)
NEUTROPHILS # BLD AUTO: 3.73 THOUSANDS/ΜL (ref 1.85–7.62)
NEUTS SEG NFR BLD AUTO: 54 % (ref 43–75)
NRBC BLD AUTO-RTO: 0 /100 WBCS
PLATELET # BLD AUTO: 243 THOUSANDS/UL (ref 149–390)
PMV BLD AUTO: 11.3 FL (ref 8.9–12.7)
POTASSIUM SERPL-SCNC: 4.6 MMOL/L (ref 3.5–5.3)
PROT SERPL-MCNC: 6.9 G/DL (ref 6.4–8.2)
RBC # BLD AUTO: 4.67 MILLION/UL (ref 3.88–5.62)
SODIUM SERPL-SCNC: 137 MMOL/L (ref 136–145)
WBC # BLD AUTO: 6.97 THOUSAND/UL (ref 4.31–10.16)

## 2020-05-26 PROCEDURE — 82043 UR ALBUMIN QUANTITATIVE: CPT | Performed by: INTERNAL MEDICINE

## 2020-05-26 PROCEDURE — 84165 PROTEIN E-PHORESIS SERUM: CPT | Performed by: PATHOLOGY

## 2020-05-26 PROCEDURE — 82570 ASSAY OF URINE CREATININE: CPT | Performed by: INTERNAL MEDICINE

## 2020-05-26 PROCEDURE — 80053 COMPREHEN METABOLIC PANEL: CPT

## 2020-05-26 PROCEDURE — 85025 COMPLETE CBC W/AUTO DIFF WBC: CPT

## 2020-05-26 PROCEDURE — 84165 PROTEIN E-PHORESIS SERUM: CPT

## 2020-05-26 PROCEDURE — 36415 COLL VENOUS BLD VENIPUNCTURE: CPT

## 2020-05-26 PROCEDURE — 86140 C-REACTIVE PROTEIN: CPT

## 2020-05-26 PROCEDURE — 85652 RBC SED RATE AUTOMATED: CPT

## 2020-05-27 ENCOUNTER — TELEPHONE (OUTPATIENT)
Dept: INTERNAL MEDICINE CLINIC | Facility: CLINIC | Age: 80
End: 2020-05-27

## 2020-05-27 DIAGNOSIS — D64.9 ANEMIA, UNSPECIFIED TYPE: Primary | ICD-10-CM

## 2020-05-27 DIAGNOSIS — R53.83 OTHER FATIGUE: ICD-10-CM

## 2020-05-27 LAB
ALBUMIN SERPL ELPH-MCNC: 3.6 G/DL (ref 3.5–5)
ALBUMIN SERPL ELPH-MCNC: 56.3 % (ref 52–65)
ALPHA1 GLOB SERPL ELPH-MCNC: 0.32 G/DL (ref 0.1–0.4)
ALPHA1 GLOB SERPL ELPH-MCNC: 5 % (ref 2.5–5)
ALPHA2 GLOB SERPL ELPH-MCNC: 0.86 G/DL (ref 0.4–1.2)
ALPHA2 GLOB SERPL ELPH-MCNC: 13.5 % (ref 7–13)
BETA GLOB ABNORMAL SERPL ELPH-MCNC: 0.47 G/DL (ref 0.4–0.8)
BETA1 GLOB SERPL ELPH-MCNC: 7.4 % (ref 5–13)
BETA2 GLOB SERPL ELPH-MCNC: 5.9 % (ref 2–8)
BETA2+GAMMA GLOB SERPL ELPH-MCNC: 0.38 G/DL (ref 0.2–0.5)
GAMMA GLOB ABNORMAL SERPL ELPH-MCNC: 0.76 G/DL (ref 0.5–1.6)
GAMMA GLOB SERPL ELPH-MCNC: 11.9 % (ref 12–22)
IGG/ALB SER: 1.29 {RATIO} (ref 1.1–1.8)
PROT PATTERN SERPL ELPH-IMP: ABNORMAL
PROT SERPL-MCNC: 6.4 G/DL (ref 6.4–8.2)

## 2020-05-28 ENCOUNTER — APPOINTMENT (OUTPATIENT)
Dept: LAB | Age: 80
End: 2020-05-28
Payer: MEDICARE

## 2020-05-28 DIAGNOSIS — R53.83 OTHER FATIGUE: ICD-10-CM

## 2020-05-28 DIAGNOSIS — D64.9 ANEMIA, UNSPECIFIED TYPE: ICD-10-CM

## 2020-05-28 LAB
BASOPHILS # BLD AUTO: 0.09 THOUSANDS/ΜL (ref 0–0.1)
BASOPHILS NFR BLD AUTO: 2 % (ref 0–1)
EOSINOPHIL # BLD AUTO: 0.26 THOUSAND/ΜL (ref 0–0.61)
EOSINOPHIL NFR BLD AUTO: 4 % (ref 0–6)
ERYTHROCYTE [DISTWIDTH] IN BLOOD BY AUTOMATED COUNT: 15.2 % (ref 11.6–15.1)
FERRITIN SERPL-MCNC: 8 NG/ML (ref 8–388)
HCT VFR BLD AUTO: 39.9 % (ref 36.5–49.3)
HGB BLD-MCNC: 11.9 G/DL (ref 12–17)
IMM GRANULOCYTES # BLD AUTO: 0.01 THOUSAND/UL (ref 0–0.2)
IMM GRANULOCYTES NFR BLD AUTO: 0 % (ref 0–2)
IRON SATN MFR SERPL: 7 %
IRON SERPL-MCNC: 30 UG/DL (ref 65–175)
LYMPHOCYTES # BLD AUTO: 1.39 THOUSANDS/ΜL (ref 0.6–4.47)
LYMPHOCYTES NFR BLD AUTO: 23 % (ref 14–44)
MCH RBC QN AUTO: 24.6 PG (ref 26.8–34.3)
MCHC RBC AUTO-ENTMCNC: 29.8 G/DL (ref 31.4–37.4)
MCV RBC AUTO: 82 FL (ref 82–98)
MONOCYTES # BLD AUTO: 0.4 THOUSAND/ΜL (ref 0.17–1.22)
MONOCYTES NFR BLD AUTO: 7 % (ref 4–12)
NEUTROPHILS # BLD AUTO: 4 THOUSANDS/ΜL (ref 1.85–7.62)
NEUTS SEG NFR BLD AUTO: 64 % (ref 43–75)
NRBC BLD AUTO-RTO: 0 /100 WBCS
PLATELET # BLD AUTO: 247 THOUSANDS/UL (ref 149–390)
PMV BLD AUTO: 10.8 FL (ref 8.9–12.7)
RBC # BLD AUTO: 4.84 MILLION/UL (ref 3.88–5.62)
RETICS # AUTO: NORMAL 10*3/UL (ref 14356–105094)
RETICS # CALC: 1.74 % (ref 0.37–1.87)
TIBC SERPL-MCNC: 408 UG/DL (ref 250–450)
TRANSFERRIN SERPL-MCNC: 299 MG/DL (ref 200–400)
TSH SERPL DL<=0.05 MIU/L-ACNC: 1.26 UIU/ML (ref 0.36–3.74)
WBC # BLD AUTO: 6.15 THOUSAND/UL (ref 4.31–10.16)

## 2020-05-28 PROCEDURE — 82728 ASSAY OF FERRITIN: CPT

## 2020-05-28 PROCEDURE — 85045 AUTOMATED RETICULOCYTE COUNT: CPT

## 2020-05-28 PROCEDURE — 83540 ASSAY OF IRON: CPT

## 2020-05-28 PROCEDURE — 84443 ASSAY THYROID STIM HORMONE: CPT

## 2020-05-28 PROCEDURE — 83550 IRON BINDING TEST: CPT

## 2020-05-28 PROCEDURE — 36415 COLL VENOUS BLD VENIPUNCTURE: CPT

## 2020-05-28 PROCEDURE — 84466 ASSAY OF TRANSFERRIN: CPT

## 2020-05-28 PROCEDURE — 85025 COMPLETE CBC W/AUTO DIFF WBC: CPT

## 2020-05-29 ENCOUNTER — TELEPHONE (OUTPATIENT)
Dept: INTERNAL MEDICINE CLINIC | Facility: CLINIC | Age: 80
End: 2020-05-29

## 2020-06-03 ENCOUNTER — HOSPITAL ENCOUNTER (OUTPATIENT)
Dept: NON INVASIVE DIAGNOSTICS | Facility: CLINIC | Age: 80
Discharge: HOME | End: 2020-06-03
Payer: MEDICARE

## 2020-06-03 DIAGNOSIS — I65.23 CAROTID STENOSIS, BILATERAL: ICD-10-CM

## 2020-06-03 PROCEDURE — 93880 EXTRACRANIAL BILAT STUDY: CPT | Performed by: SURGERY

## 2020-06-03 PROCEDURE — 93880 EXTRACRANIAL BILAT STUDY: CPT

## 2020-06-09 ENCOUNTER — TELEMEDICINE (OUTPATIENT)
Dept: VASCULAR SURGERY | Facility: CLINIC | Age: 80
End: 2020-06-09
Payer: MEDICARE

## 2020-06-09 VITALS — BODY MASS INDEX: 27.94 KG/M2 | HEIGHT: 67 IN | WEIGHT: 178 LBS

## 2020-06-09 DIAGNOSIS — I65.23 ASYMPTOMATIC BILATERAL CAROTID ARTERY STENOSIS: Primary | ICD-10-CM

## 2020-06-09 DIAGNOSIS — I70.219 ATHEROSCLEROSIS OF ARTERY OF EXTREMITY WITH INTERMITTENT CLAUDICATION (HCC): Chronic | ICD-10-CM

## 2020-06-09 PROCEDURE — 99442 PR PHYS/QHP TELEPHONE EVALUATION 11-20 MIN: CPT | Performed by: SURGERY

## 2020-06-17 ENCOUNTER — OFFICE VISIT (OUTPATIENT)
Dept: INTERNAL MEDICINE CLINIC | Facility: CLINIC | Age: 80
End: 2020-06-17
Payer: MEDICARE

## 2020-06-17 VITALS
HEART RATE: 68 BPM | DIASTOLIC BLOOD PRESSURE: 70 MMHG | BODY MASS INDEX: 26.27 KG/M2 | RESPIRATION RATE: 14 BRPM | SYSTOLIC BLOOD PRESSURE: 120 MMHG | WEIGHT: 167.4 LBS | HEIGHT: 67 IN

## 2020-06-17 DIAGNOSIS — Z72.0 TOBACCO ABUSE: ICD-10-CM

## 2020-06-17 DIAGNOSIS — E11.51 TYPE 2 DIABETES MELLITUS WITH DIABETIC PERIPHERAL ANGIOPATHY WITHOUT GANGRENE, WITHOUT LONG-TERM CURRENT USE OF INSULIN (HCC): Primary | ICD-10-CM

## 2020-06-17 DIAGNOSIS — L29.9 PRURITUS: ICD-10-CM

## 2020-06-17 DIAGNOSIS — I25.10 CORONARY ARTERY DISEASE INVOLVING NATIVE HEART WITHOUT ANGINA PECTORIS, UNSPECIFIED VESSEL OR LESION TYPE: Chronic | ICD-10-CM

## 2020-06-17 DIAGNOSIS — I10 ESSENTIAL HYPERTENSION: Chronic | ICD-10-CM

## 2020-06-17 DIAGNOSIS — D50.8 OTHER IRON DEFICIENCY ANEMIA: ICD-10-CM

## 2020-06-17 DIAGNOSIS — E78.2 MIXED HYPERLIPIDEMIA: ICD-10-CM

## 2020-06-17 PROBLEM — D62 ACUTE BLOOD LOSS ANEMIA: Status: RESOLVED | Noted: 2018-05-01 | Resolved: 2020-06-17

## 2020-06-17 LAB — SL AMB POCT HEMOGLOBIN AIC: 10.8 (ref ?–6.5)

## 2020-06-17 PROCEDURE — 99215 OFFICE O/P EST HI 40 MIN: CPT | Performed by: INTERNAL MEDICINE

## 2020-06-17 PROCEDURE — 4040F PNEUMOC VAC/ADMIN/RCVD: CPT | Performed by: INTERNAL MEDICINE

## 2020-06-17 PROCEDURE — 3078F DIAST BP <80 MM HG: CPT | Performed by: INTERNAL MEDICINE

## 2020-06-17 PROCEDURE — 2022F DILAT RTA XM EVC RTNOPTHY: CPT | Performed by: INTERNAL MEDICINE

## 2020-06-17 PROCEDURE — 3008F BODY MASS INDEX DOCD: CPT | Performed by: INTERNAL MEDICINE

## 2020-06-17 PROCEDURE — 83036 HEMOGLOBIN GLYCOSYLATED A1C: CPT | Performed by: INTERNAL MEDICINE

## 2020-06-17 PROCEDURE — 3046F HEMOGLOBIN A1C LEVEL >9.0%: CPT | Performed by: INTERNAL MEDICINE

## 2020-06-17 PROCEDURE — 1160F RVW MEDS BY RX/DR IN RCRD: CPT | Performed by: INTERNAL MEDICINE

## 2020-06-17 PROCEDURE — 3074F SYST BP LT 130 MM HG: CPT | Performed by: INTERNAL MEDICINE

## 2020-06-17 PROCEDURE — 99354 PR PROLONGED SVC OUTPATIENT SETTING 1ST HOUR: CPT | Performed by: INTERNAL MEDICINE

## 2020-06-29 ENCOUNTER — TELEPHONE (OUTPATIENT)
Dept: INTERNAL MEDICINE CLINIC | Facility: CLINIC | Age: 80
End: 2020-06-29

## 2020-07-06 ENCOUNTER — TELEPHONE (OUTPATIENT)
Dept: INTERNAL MEDICINE CLINIC | Facility: CLINIC | Age: 80
End: 2020-07-06

## 2020-07-06 NOTE — TELEPHONE ENCOUNTER
Pt called back sp/rosalba Moreau,  I called pt back and confirms he is needing lancets for accucheck to be send to the [place]

## 2020-07-06 NOTE — TELEPHONE ENCOUNTER
Doc please hand write script for both   Lancet- checking sugars bid   And BD ULTRAFINE needle for Senthil [id number] unt/ 3ml

## 2020-07-06 NOTE — TELEPHONE ENCOUNTER
Pt needs a script for Senthil [id number] unt/ 3ml   Rx - [id number]    To be faxed to the [facility] instead of providing hand written script    He bought in his solorstar which marks the following pharmacy listed [facility] lolly ana [phone]         2   Testing sugars twice daily - he will call me with the name of the machine he uses so we can send the needle

## 2020-07-07 NOTE — TELEPHONE ENCOUNTER
Thank you received scripts from Srinath Terry and faxed to [phone] [facility] with fax confirmation    1st script reads    Lancets to check blood sugars twice daily   Disp- 180   Refill- 3    2nd script -Bd ultrafine needle for use with 3 ml lantus solostar pen   One injection daily  Disp- 90  Refill- 3

## 2020-07-13 NOTE — TELEPHONE ENCOUNTER
Pt called back and asked for a script for Accucheck test strips be faxed to the [place]  He tests twice daily  Please advise

## 2020-07-17 DIAGNOSIS — Z11.59 SPECIAL SCREENING EXAMINATION FOR VIRAL DISEASE: ICD-10-CM

## 2020-07-17 PROCEDURE — U0003 INFECTIOUS AGENT DETECTION BY NUCLEIC ACID (DNA OR RNA); SEVERE ACUTE RESPIRATORY SYNDROME CORONAVIRUS 2 (SARS-COV-2) (CORONAVIRUS DISEASE [COVID-19]), AMPLIFIED PROBE TECHNIQUE, MAKING USE OF HIGH THROUGHPUT TECHNOLOGIES AS DESCRIBED BY CMS-2020-01-R: HCPCS

## 2020-07-18 LAB
INPATIENT: NORMAL
SARS-COV-2 RNA SPEC QL NAA+PROBE: NOT DETECTED

## 2020-07-24 ENCOUNTER — HOSPITAL ENCOUNTER (OUTPATIENT)
Dept: GASTROENTEROLOGY | Facility: HOSPITAL | Age: 80
Setting detail: OUTPATIENT SURGERY
Discharge: HOME | End: 2020-07-24
Attending: INTERNAL MEDICINE | Admitting: INTERNAL MEDICINE
Payer: MEDICARE

## 2020-07-24 ENCOUNTER — ANESTHESIA EVENT (OUTPATIENT)
Dept: GASTROENTEROLOGY | Facility: HOSPITAL | Age: 80
End: 2020-07-24

## 2020-07-24 ENCOUNTER — ANESTHESIA (OUTPATIENT)
Dept: GASTROENTEROLOGY | Facility: HOSPITAL | Age: 80
End: 2020-07-24

## 2020-07-24 VITALS
WEIGHT: 170 LBS | OXYGEN SATURATION: 96 % | DIASTOLIC BLOOD PRESSURE: 78 MMHG | RESPIRATION RATE: 16 BRPM | TEMPERATURE: 96.7 F | BODY MASS INDEX: 26.63 KG/M2 | HEART RATE: 84 BPM | SYSTOLIC BLOOD PRESSURE: 129 MMHG

## 2020-07-24 DIAGNOSIS — D50.9 IRON DEFICIENCY ANEMIA, UNSPECIFIED: ICD-10-CM

## 2020-07-24 PROCEDURE — 88305 TISSUE EXAM BY PATHOLOGIST: CPT | Performed by: PATHOLOGY

## 2020-07-24 RX ORDER — PROPOFOL 10 MG/ML
INJECTION, EMULSION INTRAVENOUS AS NEEDED
Status: DISCONTINUED | OUTPATIENT
Start: 2020-07-24 | End: 2020-07-24 | Stop reason: SURG

## 2020-07-24 RX ORDER — PROPOFOL 10 MG/ML
INJECTION, EMULSION INTRAVENOUS CONTINUOUS PRN
Status: DISCONTINUED | OUTPATIENT
Start: 2020-07-24 | End: 2020-07-24 | Stop reason: SURG

## 2020-07-24 RX ORDER — SODIUM CHLORIDE 9 MG/ML
INJECTION, SOLUTION INTRAVENOUS CONTINUOUS PRN
Status: DISCONTINUED | OUTPATIENT
Start: 2020-07-24 | End: 2020-07-24 | Stop reason: SURG

## 2020-07-24 RX ORDER — GLYCOPYRROLATE 0.2 MG/ML
INJECTION INTRAMUSCULAR; INTRAVENOUS AS NEEDED
Status: DISCONTINUED | OUTPATIENT
Start: 2020-07-24 | End: 2020-07-24 | Stop reason: SURG

## 2020-07-24 RX ORDER — LIDOCAINE HYDROCHLORIDE 10 MG/ML
INJECTION, SOLUTION EPIDURAL; INFILTRATION; INTRACAUDAL; PERINEURAL AS NEEDED
Status: DISCONTINUED | OUTPATIENT
Start: 2020-07-24 | End: 2020-07-24 | Stop reason: SURG

## 2020-07-24 RX ADMIN — GLYCOPYRROLATE 0.1 MG: 0.2 INJECTION, SOLUTION INTRAMUSCULAR; INTRAVENOUS at 13:40

## 2020-07-24 RX ADMIN — PROPOFOL 20 MG: 10 INJECTION, EMULSION INTRAVENOUS at 13:55

## 2020-07-24 RX ADMIN — SODIUM CHLORIDE: 0.9 INJECTION, SOLUTION INTRAVENOUS at 13:38

## 2020-07-24 RX ADMIN — PROPOFOL 80 MG: 10 INJECTION, EMULSION INTRAVENOUS at 13:40

## 2020-07-24 RX ADMIN — PROPOFOL 80 MCG/KG/MIN: 10 INJECTION, EMULSION INTRAVENOUS at 13:41

## 2020-07-24 RX ADMIN — PHENYLEPHRINE HYDROCHLORIDE 100 MCG: 10 INJECTION INTRAVENOUS at 13:54

## 2020-07-24 RX ADMIN — LIDOCAINE HYDROCHLORIDE 80 MG: 10 INJECTION, SOLUTION EPIDURAL; INFILTRATION; INTRACAUDAL; PERINEURAL at 13:40

## 2020-07-24 RX ADMIN — PROPOFOL 30 MG: 10 INJECTION, EMULSION INTRAVENOUS at 14:03

## 2020-07-24 NOTE — ANESTHESIA POSTPROCEDURE EVALUATION
Post-Op Assessment Note    CV Status:  Stable    Pain management: adequate     Mental Status:  Sleepy   Hydration Status:  Euvolemic   PONV Controlled:  Controlled   Airway Patency:  Patent   Post Op Vitals Reviewed: Yes      Staff: Anesthesiologist, CRNA           BP 90/58 (07/24/20 1425)    Temp     Pulse 86 (07/24/20 1425)   Resp 16 (07/24/20 1425)    SpO2 99 % (07/24/20 1425)

## 2020-07-24 NOTE — H&P
History and Physical -  [facility]  Susanna Calvo 79 y o  male MRN: [id number]                  HPI: Susanna Calvo is a 79y o  year old male who presents for EGD and colonoscopy  Indications for the procedure:  Iron deficiency  REVIEW OF SYSTEMS: Per the HPI, and otherwise unremarkable      Historical Information   Past Medical History:   Diagnosis Date    Atherosclerosis     Cardiac disease     [facility] [address] [facility]     Coronary artery disease     Hyperlipidemia     Hypertension     Per[facility] disease ([facility]     Smoking addiction      Past Surgical History:   Procedure Laterality Date    BYPASS FEMORAL-POPLITEAL Left 2018    Procedure: BYPASS FEMORAL-POPLITEAL, WITH INTRAOP ARTERIOGRAM;  Surgeon: Roxann Morales MD;  [facility];  Service: Vascular    CARDIAC SURGERY      FEMORAL ARTERY - POPLITEAL ARTERY BYPASS GRAFT Bilateral     HAND SURGERY      IR ABDOMINAL ANGIOGRAPHY / INTERVENTION  10/4/2018    LEG SURGERY      Repair    OTHER SURGICAL HISTORY      Percutaneous repair nasoethmoid fx lacrimal apparatus    THROMBOENDARTERECTOMY  10/12/2010    Tibioperoneal trunk    TONSILLECTOMY      TONSILLECTOMY       Social History   Social History     Substance and Sexual Activity   Alcohol Use No    Comment: Social drinker     Social History     Substance and Sexual Activity   Drug Use No     Social History     Tobacco Use   Smoking Status Current Every Day Smoker    Packs/day: 0 25    Types: Cigarettes    Last attempt to quit: 2018    Years since quittin 5   Smokeless Tobacco Current User    Last attempt to quit: 1960   Tobacco Comment    10 sticks/day     Family History   Problem Relation Age of Onset    Arthritis Mother     Arthritis Family     Coronary artery disease Family     Hyperlipidemia Family     Peripheral vascular disease Family        Meds/Allergies       (Not in a hospital admission)    Allergies Allergen Reactions    Etomidate Swelling     "I blew up and couldn't breath"       Objective     /80   Pulse 91   Temp (!) 96 7 °F (35 9 °C)   Resp 18   Wt 77 1 kg (170 lb)   SpO2 99%   BMI 26 63 kg/m²       PHYSICAL EXAM    Gen: NAD  CV: RRR  CHEST: Clear  ABD: soft, NT/ND  EXT: no edema      ASSESSMENT/PLAN:  This is a 79y o  year old male here for EGD and colonoscopy, and he is stable and optimized for his procedure

## 2020-07-24 NOTE — ANESTHESIA PREPROCEDURE EVALUATION
Review of Systems/Medical History  Patient summary reviewed  Chart reviewed  History of anesthetic complications ([id number] Etomidate for induction--pt swelled up , difficulty breathing, case cx)     Cardiovascular  Hyperlipidemia, Hypertension , CAD (Mildly severe reversible inferior wall defect) , RUIZ,   Comment: PVD    CAD, inf ischemia on 2017 stress; recent telemedicine cardiology visit, cardiac stable,  Pulmonary  Smoker (D/c 1/2018, Prev 1/2 ppd) , COPD , Sleep apnea ,        GI/Hepatic      Comment: Hx diverticulosis     Kidney stones, Prostatic disorder, benign prostatic hyperplasia       Endo/Other  Diabetes (A1C 7 1) type 2 Oral agent,      GYN       Hematology  Anemia iron deficiency anemia,     Musculoskeletal  Osteoarthritis,   Comment: Right shoulder pain      Neurology   Psychology   Anxiety,              Physical Exam    Airway    Mallampati score: II  TM Distance: >3 FB       Dental   upper dentures and lower dentures,     Cardiovascular  Rhythm: regular,     Pulmonary  Breath sounds clear to auscultation,     Other Findings        Anesthesia Plan  ASA Score- 3     Anesthesia Type- IV sedation with anesthesia with ASA Monitors  Additional Monitors:   Airway Plan:     Comment: IV sedation,  standard ASA monitors  Risks and benefits discussed with patient; patient consented and agrees to proceed  I saw and evaluated the patient  If seen with CRNA, we have discussed the anesthetic plan and I am in agreement that the plan is appropriate for the patient  COVID precautions, neg 7/17/2020  Plan Factors-    Induction- intravenous  Postoperative Plan-     Informed Consent- Anesthetic plan and risks discussed with patient  I personally reviewed this patient with the CRNA  Discussed and agreed on the Anesthesia Plan with the CRNA  Dottie Dooley

## 2020-08-07 ENCOUNTER — TELEPHONE (OUTPATIENT)
Dept: INTERNAL MEDICINE CLINIC | Facility: CLINIC | Age: 80
End: 2020-08-07

## 2020-08-07 NOTE — TELEPHONE ENCOUNTER
I called pt, he confirms he is on solorstar Lantus 5 units sub q at 8pm daily  He started taking this pen on Tuesday   He thinks it may be off because his glucometer is old and wants to know if can prescribe a new one  I will fax it to the [place]     Name of the machine he uses to test his sugars is called Accu check henri

## 2020-08-07 NOTE — TELEPHONE ENCOUNTER
Pt thinks his blood sugar meter is not working right because his sugars should be going down and they're going up  Pt IS taking his insulin faithfully  Please advise what he should do        Tues- 231 AM, 241 PM  Wed- 196 AM, 203 PM  Thurs- 143 AM, 286 PM  Fri- 239 Am

## 2020-08-26 ENCOUNTER — APPOINTMENT (OUTPATIENT)
Dept: LAB | Age: 80
End: 2020-08-26
Payer: MEDICARE

## 2020-08-26 DIAGNOSIS — D50.8 OTHER IRON DEFICIENCY ANEMIA: ICD-10-CM

## 2020-08-26 DIAGNOSIS — E11.51 TYPE 2 DIABETES MELLITUS WITH DIABETIC PERIPHERAL ANGIOPATHY WITHOUT GANGRENE, WITHOUT LONG-TERM CURRENT USE OF INSULIN (HCC): ICD-10-CM

## 2020-08-26 DIAGNOSIS — E78.2 MIXED HYPERLIPIDEMIA: ICD-10-CM

## 2020-08-26 DIAGNOSIS — I10 ESSENTIAL HYPERTENSION: Chronic | ICD-10-CM

## 2020-08-26 LAB
ALBUMIN SERPL BCP-MCNC: 3.6 G/DL (ref 3.5–5)
ALP SERPL-CCNC: 96 U/L (ref 46–116)
ALT SERPL W P-5'-P-CCNC: 12 U/L (ref 12–78)
ANION GAP SERPL CALCULATED.3IONS-SCNC: 4 MMOL/L (ref 4–13)
AST SERPL W P-5'-P-CCNC: 6 U/L (ref 5–45)
BASOPHILS # BLD AUTO: 0.08 THOUSANDS/ΜL (ref 0–0.1)
BASOPHILS NFR BLD AUTO: 1 % (ref 0–1)
BILIRUB SERPL-MCNC: 0.53 MG/DL (ref 0.2–1)
BUN SERPL-MCNC: 19 MG/DL (ref 5–25)
CALCIUM SERPL-MCNC: 9.7 MG/DL (ref 8.3–10.1)
CHLORIDE SERPL-SCNC: 107 MMOL/L (ref 100–108)
CO2 SERPL-SCNC: 28 MMOL/L (ref 21–32)
CREAT SERPL-MCNC: 1.23 MG/DL (ref 0.6–1.3)
EOSINOPHIL # BLD AUTO: 0.3 THOUSAND/ΜL (ref 0–0.61)
EOSINOPHIL NFR BLD AUTO: 5 % (ref 0–6)
ERYTHROCYTE [DISTWIDTH] IN BLOOD BY AUTOMATED COUNT: 20.3 % (ref 11.6–15.1)
EST. AVERAGE GLUCOSE BLD GHB EST-MCNC: 209 MG/DL
GFR SERPL CREATININE-BSD FRML MDRD: 55 ML/MIN/1.73SQ M
GLUCOSE P FAST SERPL-MCNC: 174 MG/DL (ref 65–99)
HBA1C MFR BLD: 8.9 %
HCT VFR BLD AUTO: 45.6 % (ref 36.5–49.3)
HGB BLD-MCNC: 13.7 G/DL (ref 12–17)
IMM GRANULOCYTES # BLD AUTO: 0.01 THOUSAND/UL (ref 0–0.2)
IMM GRANULOCYTES NFR BLD AUTO: 0 % (ref 0–2)
LYMPHOCYTES # BLD AUTO: 1.35 THOUSANDS/ΜL (ref 0.6–4.47)
LYMPHOCYTES NFR BLD AUTO: 21 % (ref 14–44)
MCH RBC QN AUTO: 26.2 PG (ref 26.8–34.3)
MCHC RBC AUTO-ENTMCNC: 30 G/DL (ref 31.4–37.4)
MCV RBC AUTO: 87 FL (ref 82–98)
MONOCYTES # BLD AUTO: 0.43 THOUSAND/ΜL (ref 0.17–1.22)
MONOCYTES NFR BLD AUTO: 7 % (ref 4–12)
NEUTROPHILS # BLD AUTO: 4.22 THOUSANDS/ΜL (ref 1.85–7.62)
NEUTS SEG NFR BLD AUTO: 66 % (ref 43–75)
NRBC BLD AUTO-RTO: 0 /100 WBCS
PLATELET # BLD AUTO: 215 THOUSANDS/UL (ref 149–390)
PMV BLD AUTO: 11.1 FL (ref 8.9–12.7)
POTASSIUM SERPL-SCNC: 4.9 MMOL/L (ref 3.5–5.3)
PROT SERPL-MCNC: 7.5 G/DL (ref 6.4–8.2)
RBC # BLD AUTO: 5.22 MILLION/UL (ref 3.88–5.62)
SODIUM SERPL-SCNC: 139 MMOL/L (ref 136–145)
WBC # BLD AUTO: 6.39 THOUSAND/UL (ref 4.31–10.16)

## 2020-08-26 PROCEDURE — 83516 IMMUNOASSAY NONANTIBODY: CPT

## 2020-08-26 PROCEDURE — 36415 COLL VENOUS BLD VENIPUNCTURE: CPT

## 2020-08-26 PROCEDURE — 83036 HEMOGLOBIN GLYCOSYLATED A1C: CPT

## 2020-08-26 PROCEDURE — 82784 ASSAY IGA/IGD/IGG/IGM EACH: CPT

## 2020-08-26 PROCEDURE — 80053 COMPREHEN METABOLIC PANEL: CPT

## 2020-08-26 PROCEDURE — 86255 FLUORESCENT ANTIBODY SCREEN: CPT

## 2020-08-26 PROCEDURE — 85025 COMPLETE CBC W/AUTO DIFF WBC: CPT

## 2020-08-27 LAB
ENDOMYSIUM IGA SER QL: NEGATIVE
GLIADIN PEPTIDE IGA SER-ACNC: 6 UNITS (ref 0–19)
GLIADIN PEPTIDE IGG SER-ACNC: 3 UNITS (ref 0–19)
IGA SERPL-MCNC: 270 MG/DL (ref 61–437)
TTG IGA SER-ACNC: <2 U/ML (ref 0–3)
TTG IGG SER-ACNC: 4 U/ML (ref 0–5)

## 2020-08-28 ENCOUNTER — TELEPHONE (OUTPATIENT)
Dept: INTERNAL MEDICINE CLINIC | Facility: CLINIC | Age: 80
End: 2020-08-28

## 2020-09-15 ENCOUNTER — TELEPHONE (OUTPATIENT)
Dept: INTERNAL MEDICINE CLINIC | Facility: CLINIC | Age: 80
End: 2020-09-15

## 2020-09-15 NOTE — TELEPHONE ENCOUNTER
Pt called the office needing his scripts to be faxed to the [address] [place] [address] were verified from the bottle  Please hand write script and give back to me to fax to the [facility]EN:Ferrous sulfate 325 milligrams every other day, betamethasone cream 0 05% b i d  P r n  New inflamed keratoses, over-the-counter loratadine 10 milligrams daily as needed, paroxetine 40 milligrams daily using 40 milligram dosing, pantoprazole 40 milligrams daily, metoprolol tartrate 25 milligrams b i d , long-acting metformin 500 milligrams b i d   Verses a 1000 milligrams once daily -review next visit, lisinopril 20 milligrams daily, clopidogrel gel 75 milligrams daily, Crestor 5 milligrams daily using 10 milligram dosing, vitamin D3/ 2000 units a day, beginning Lantus 5 units subcu daily       He needs refill for the following according to what the bottle reads:     Clopidogrel 75mg- one tab daily  empagliflozin 25mg- once daily   Crestor 10mg- Take 1/2 tab daily  Metformin 500mg- take twice daily but takes only one tab   Metoprolol tartrate 25mg- one tab daily   Pantoprazole 40mg- one tab daily  Paroxetine 40mg- one tab daily every morning     Please handwrite these scripts so I can fax to the [place] @ [phone]

## 2020-09-18 NOTE — TELEPHONE ENCOUNTER
Sp/w patient to advised script was faxed and per doc he should not be on generic Jardiance this caused itching  Also he is too take metformin & metoprolol bid  Patient went to his bottles and threw away Jardiance and saw both metformin and metoprolol and is aware to take them BID      Scripts that I faxed to the [place] have now been scanned in the Media tab

## 2020-09-21 ENCOUNTER — CLINICAL SUPPORT (OUTPATIENT)
Dept: INTERNAL MEDICINE CLINIC | Facility: CLINIC | Age: 80
End: 2020-09-21
Payer: MEDICARE

## 2020-09-21 DIAGNOSIS — Z23 NEEDS FLU SHOT: Primary | ICD-10-CM

## 2020-09-21 PROCEDURE — 90662 IIV NO PRSV INCREASED AG IM: CPT

## 2020-09-21 PROCEDURE — G0008 ADMIN INFLUENZA VIRUS VAC: HCPCS

## 2020-09-25 ENCOUNTER — OFFICE VISIT (OUTPATIENT)
Dept: INTERNAL MEDICINE CLINIC | Facility: CLINIC | Age: 80
End: 2020-09-25
Payer: MEDICARE

## 2020-09-25 ENCOUNTER — TELEPHONE (OUTPATIENT)
Dept: INTERNAL MEDICINE CLINIC | Facility: CLINIC | Age: 80
End: 2020-09-25

## 2020-09-25 DIAGNOSIS — I10 ESSENTIAL HYPERTENSION: Chronic | ICD-10-CM

## 2020-09-25 DIAGNOSIS — D50.8 OTHER IRON DEFICIENCY ANEMIA: Chronic | ICD-10-CM

## 2020-09-25 DIAGNOSIS — E11.51 TYPE 2 DIABETES MELLITUS WITH DIABETIC PERIPHERAL ANGIOPATHY WITHOUT GANGRENE, WITHOUT LONG-TERM CURRENT USE OF INSULIN (HCC): Primary | ICD-10-CM

## 2020-09-25 DIAGNOSIS — E78.2 MIXED HYPERLIPIDEMIA: ICD-10-CM

## 2020-09-25 DIAGNOSIS — R63.4 WEIGHT LOSS: ICD-10-CM

## 2020-09-25 DIAGNOSIS — R91.1 PULMONARY NODULE: ICD-10-CM

## 2020-09-25 DIAGNOSIS — I73.9 PERIPHERAL ARTERIAL DISEASE (HCC): ICD-10-CM

## 2020-09-25 PROCEDURE — 99215 OFFICE O/P EST HI 40 MIN: CPT | Performed by: INTERNAL MEDICINE

## 2020-09-25 NOTE — TELEPHONE ENCOUNTER
Situation resolved  Pt was here for office appt today and was saying that the [facility] sent him solarstar pen instead of the needles for the solarstar injections     I called the [facility] and sp/w Jai Lombardo, she confirms it was mailed on 9/22 "Needle 31 gauge- Use one pen needle daily" Qty - 100

## 2020-09-25 NOTE — TELEPHONE ENCOUNTER
I called pt back and made aware that they have mailed out the needles and to wait a few days for it to be delivered to his home  [facility] to make sure his medications are on their way

## 2020-09-26 VITALS
RESPIRATION RATE: 14 BRPM | HEART RATE: 78 BPM | BODY MASS INDEX: 25.52 KG/M2 | WEIGHT: 162.6 LBS | HEIGHT: 67 IN | TEMPERATURE: 98.7 F | SYSTOLIC BLOOD PRESSURE: 130 MMHG | DIASTOLIC BLOOD PRESSURE: 84 MMHG

## 2020-09-26 PROBLEM — R06.83 SNORING: Status: RESOLVED | Noted: 2019-05-15 | Resolved: 2020-09-26

## 2020-09-26 NOTE — PROGRESS NOTES
BMI Counseling: Body mass index is 25 47 kg/m²  Follow-up plan was not completed due to patient being in urgent or emergent medical situation  Assessment/Plan:    1  Uncontrolled diabetes -prior A1c of 10 8  He supposedly can tolerate more than a 1000 milligrams a day of metformin and only tolerates long-acting metformin -as noted he skips meals and was concerned about hypoglycemia and glipizide was discontinued  Recently basal insulin was introduce  A1c has dropped from 10 8 down 8 5  He is currently on 12 units of Lantus  He will increase to 14 units  He will adjust his insulin as noted -fasting sugar over 130 will increase by 1 unit-fasting sugar  he will keep insulin same- if fasting sugar between 80 and 90 he will decrease by 2 units in a fasting sugar below 80 will decrease by 4 units  There is some confusion about his dose of metformin and whether not it is short-acting or long-acting  He supposedly was Jardiance because of potential relationship to itching but his med sheet says he may be using this  We are trying to confirm this 1 where the other by having his family members look at his medications  There was constant confusion with this patient as to what meds he is taking we trying to do our best to straight now  He will be reassessed over the next couple months  2  Abnormal weight loss -10 pounds in several months with decrease in appetite  Recent endoscopy done in the summer of 2020 normal   Recent colonoscopy done in the summer of 2020 shows diverticular disease but no other abnormalities  Relatively recent CT the chest shows unchanged pulmonary nodule  This may be related to his uncontrolled diabetes  If he continues with weight loss he will need additional evaluation including CT scan of abdomen and pelvis  Recent TSH normal   Inflammatory markers ordered prior to next visit inflammatory markers ordered prior to next visit  3   Pruritus -was present over the last year plus   CT scan of the chest done because of other issues shows mild fibrosis, early emphysema unchanged pulmonary nodule  Protein electrophoresis normal   Was felt this may been related to his meds-specifically Jardiance  This is all resolved  4  Iron deficiency anemia -this was noted in the past prior negative endoscopy and colonoscopy  Recently hemoglobin 11 4 with normal white count and platelet count  Protein electrophoresis normal   Ferritin low at 8  TSH normal  Celiac profile recently done and normal   Had repeat endoscopy and colonoscopy in July 2020 showing only diverticular disease otherwise all normal   Remains on iron supplement  May need to consider some capsule endoscopy of the small intestine in the future  5  Pulmonary nodule -CT the chest on May 2020 shows mild fibrosis, early emphysema and unchanged pulmonary nodule- needs repeat CT in a year which would be due in May of 2021  6  Abnormal CT lung with questionable interstitial thickening  PFT showed mild obstruction with normal spirometry of the decreased lung volumes and diffusing capacity normal- monitoring at present  Again recommended he not smoke    7  41 millimeter ascending aortic aneurysm  Surgery recommended we monitor  He is on a beta-blocker  He knows not to lift more than 40 pounds  They feel surgical criteria would  Be 55 millimeters  Will continue to monitor when he has surveillance for his pulmonary nodule  8  GENERAL CARE -SHE WAS SUPPOSED TO RECEIVE [facility]-REVIEW NEXT VISIT  9  Hypertension -adequately controlled on current dose of ACE-inhibitor plus beta-blocker  10  Severe sleep apnea -on CPAP study 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  Pulmonary was considering CPAP at 8 centimeters open that is central events with clear were time    The patient returns machine to the sleep group stating he would not sleep with it- REVIEW NEXT VISIT     All other problems as per note of September 2013        MEDICAL REGIMEN:Ferrous sulfate 325 milligrams every other day, betamethasone cream 0 05% b i d  P r n  New inflamed keratoses, over-the-counter loratadine 10 milligrams daily as needed, paroxetine 40 milligrams daily using 40 milligram dosing, pantoprazole 40 milligrams daily, metoprolol tartrate 25 milligrams b i d , long-acting metformin 500 milligrams b i d  -review next visit, lisinopril 20 milligrams daily, clopidogrel gel 75 milligrams daily, Crestor 5 milligrams daily using 10 milligram dosing, vitamin D3/ 2000 units a day, Lantus insulin 14 units daily and adjusting dose pending blood sugars -if fasting sugar over 130 increase by 1 unit- fasting sugar  keep the same- a fasting sugar 80-90 decreased by 2 units-fasting sugar below 80 decreased by 4 units     Appointment over the next 2 months with prior chemistry profile CBC with diff iron ferritin cholesterol profile sed rate C-reactive protein awaiting review this patient's meds    Addendum-  Went over in detail medical regimen with patient and his son -he is not taking Jardiance  He is not taking glipizide  He is on paroxetine 40 milligrams every morning, pantoprazole 40 milligrams every morning, metoprolol tartrate 25 milligrams half tablet twice a day, metformin 500 milligrams 1 p o  b i d , lisinopril 20 milligrams daily, clopidogrel 75 milligrams daily, generic Crestor 10 milligrams a half tablet in the evening he is currently on Lantus insulin 16 units and will increase the insulin as per instructions at prior visit    Addendum-patient seen by ENT feel he may have yeast infection of his canal with canal aligned with whitish coating as well as his TM  All drainage not removed from the drum    He was given the option of using clotrimazole drops versus instilling Lotrisone cream into the ear but then he would have block caring for 3 days-she chose to do the latter and the cream was instilled without problem -he is following up within a week    Addendum- patient again evaluated by ENT on November 5th and his fungal issue here is 95% better  He had repeat instillation of the clean and will again be evaluated in a week    Addendum -patient seen by ENT again on November 12th-they commented that his right fungal otitis responded nicely to treatment and they are instilling additional Mycostatin -boric acid powder  He had an audiogram showing asymmetric hearing loss on the right -they reviewed this can be viral, vascular mass effect from acoustic neuroma  He is scheduled for MRI the brain and internal auditory canal with gadolinium  They also ordered BUN creatinine Lyme titer  They will be seeing him again in a month     No problem-specific Assessment & Plan notes found for this encounter  Diagnoses and all orders for this visit:    Type 2 diabetes mellitus with diabetic peripheral angiopathy without gangrene, without long-term current use of insulin (HCC)  -     CBC and differential; Future  -     Comprehensive metabolic panel; Future  -     Cholesterol, total; Future  -     HDL cholesterol; Future  -     LDL cholesterol, direct; Future  -     Triglycerides; Future  -     HEMOGLOBIN A1C W/ EAG ESTIMATION; Future  -     Microalbumin / creatinine urine ratio; Future  -     Iron; Future  -     Ferritin; Future    Mixed hyperlipidemia  -     CBC and differential; Future  -     Comprehensive metabolic panel; Future  -     Cholesterol, total; Future  -     HDL cholesterol; Future  -     LDL cholesterol, direct; Future  -     Triglycerides; Future  -     HEMOGLOBIN A1C W/ EAG ESTIMATION; Future  -     Microalbumin / creatinine urine ratio; Future  -     Iron; Future  -     Ferritin; Future    Essential hypertension  -     CBC and differential; Future  -     Comprehensive metabolic panel; Future  -     Cholesterol, total; Future  -     HDL cholesterol; Future  -     LDL cholesterol, direct; Future  -     Triglycerides;  Future  -     HEMOGLOBIN A1C W/ EAG ESTIMATION; Future  -     Microalbumin / creatinine urine ratio; Future  -     Iron; Future  -     Ferritin; Future    Peripheral arterial disease (HCC)    Weight loss    Pulmonary nodule    Other iron deficiency anemia    Other orders  -     insulin glargine (LANTUS SOLOSTAR) 100 units/mL injection pen; INJECT 20 UNITS SUBCUTANEOUSLY AT BEDTIME FOR DIABETES  TO BE TRITRATED FROM 5 UNITS, EVERY THREE  DAYS AS PER NON [facility], DR Hector Mejía          Subjective:      Patient ID: Izzy Armenta is a 79 y o  male  We reviewed multiple issues  He is now on basal insulin  He initially had some difficulty with getting his insulin supply from the [facility] who provides all this  There was also an issue with proper needles  Eventually all this was completed and he is now using basal insulin  He says he occasionally has trouble with the "needle been doing"  We reviewed proper insulin administration  His most recent blood sugars showed a fasting 171  Then 142 and 173  On his device he can monitor this  He wanted to know about continues blood glucose monitoring and reviewed this possibility  [facility] and we will try and help with that  He has not been checking any postprandial blood sugars  He said he had some illness after his flu shot and on his own held his insulin for 2 days  We reviewed that with illness often blood sugar will climb in the future he should notify the office if there was an issue with this  We introduced today how to adjust his insulin based on his blood sugar reading in the morning  He was accompanied to the visit today by his son who will assist with this  If fasting sugar over 130 he will increase by 1 unit  The fasting sugar  he will keep insulin the same  Fasting sugar between 80 and 90 he will decrease by 2 units in the below 80 he will decreased by 4 units    His son will keep us posted regarding his insulin status  Note the prior hemoglobin A1c was 10 8  He is now at 8 5  We reviewed that this is definite progress  We also reviewed dietary consumption  It is not uncommon for him to skip both breakfast and lunch  Past he would often have 6 donuts for breakfast etcetera  We reviewed proper diet  I recommended he not skip any meals and at least have piece of fruit for a breakfast bar etcetera  He voiced an understanding of this     We have had recent weight loss  His weight was 173 in May and is now 162  His appetite is decreased  He had a recent endoscopy colonoscopy  This was done by Dr Abby Mullins colonoscopy showed left-sided diverticuli -otherwise normal   Endoscopy was normal   He remains on his iron supplement  He has had relatively recent CT of his chest tracing his pulmonary nodule that showed this to be unchanged without any evidence of lung cancer  His appetite is decreased to some degree  We reviewed that we have to watch this carefully  He will be reassessed over the next several weeks and if he still has significant weight loss he will also need CT scan of abdomen pelvis etcetera  Thyroid function ordered prior to next visit as well as inflammatory markers  Long discussion held today regarding the above  This was a 40 minutes visit with more than 50% of the time spent counseling the patient formulating a treatment plan  Multiple questions were answered  This patient wanted to know their preferred analgesic agent  Because of their various comorbidities I recommended that this be acetaminophen  This patient has no history of chronic liver disease that would put them at greater risk for use of acetaminophen   This patient may use up to 500-650 mg of acetaminophen at a time and no more than 3 g a day total  Nonsteroidal anti-inflammatory agents have the potential to exacerbate hypertension, hypercoagulability, chronic renal failure, congestive heart failure, and various allergic tendencies  we reviewed his tobacco use  Unfortunately he continues to smoke  He has known vascular disease  The following portions of the patient's history were reviewed and updated as appropriate: allergies, current medications, past family history, past medical history, past social history, past surgical history and problem list     Review of Systems   Constitutional: Positive for unexpected weight change  HENT: Negative  Respiratory: Negative  Cardiovascular: Negative  Gastrointestinal: Negative  Endocrine: Negative  Genitourinary: Negative  Musculoskeletal: Positive for back pain  Skin: Negative  Neurological: Negative  Hematological: Negative  Psychiatric/Behavioral: Negative  Objective:      Temp 98 7 °F (37 1 °C) (Oral)   Ht 5' 7" (1 702 m)   Wt 73 8 kg (162 lb 9 6 oz)   BMI 25 47 kg/m²          Physical Exam  Vitals signs reviewed  Constitutional:       General: He is not in acute distress  Appearance: Normal appearance  He is not ill-appearing, toxic-appearing or diaphoretic  HENT:      Head: Normocephalic and atraumatic  Right Ear: Tympanic membrane, ear canal and external ear normal       Left Ear: Tympanic membrane, ear canal and external ear normal       Nose: Nose normal  No congestion or rhinorrhea  Mouth/Throat:      Mouth: Mucous membranes are moist       Pharynx: Oropharynx is clear  No oropharyngeal exudate or posterior oropharyngeal erythema  Eyes:      General: No scleral icterus  Right eye: No discharge  Left eye: No discharge  Extraocular Movements: Extraocular movements intact  Pupils: Pupils are equal, round, and reactive to light  Neck:      Musculoskeletal: Normal range of motion and neck supple  No neck rigidity or muscular tenderness  Vascular: No carotid bruit  Cardiovascular:      Rate and Rhythm: Normal rate and regular rhythm        Heart sounds: Normal heart sounds  No murmur  No friction rub  No gallop  Comments: Decreased DP and PT pulse bilaterally  Pulmonary:      Effort: Pulmonary effort is normal  No respiratory distress  Breath sounds: Normal breath sounds  No stridor  No wheezing, rhonchi or rales  Chest:      Chest wall: No tenderness  Abdominal:      General: Abdomen is flat  Bowel sounds are normal  There is no distension  Palpations: Abdomen is soft  There is no mass  Tenderness: There is no abdominal tenderness  There is no right CVA tenderness, left CVA tenderness, guarding or rebound  Hernia: No hernia is present  Musculoskeletal: Normal range of motion  General: No swelling, tenderness, deformity or signs of injury  Right lower leg: No edema  Left lower leg: No edema  Comments:  Some crepitance on range of motion of the knees bilaterally   Lymphadenopathy:      Cervical: No cervical adenopathy  Skin:     General: Skin is warm and dry  Coloration: Skin is not jaundiced or pale  Findings: No bruising, erythema, lesion or rash  Neurological:      General: No focal deficit present  Mental Status: He is alert and oriented to person, place, and time  Mental status is at baseline  Cranial Nerves: No cranial nerve deficit  Sensory: No sensory deficit  Motor: No weakness  Coordination: Coordination normal       Gait: Gait normal       Deep Tendon Reflexes: Reflexes normal    Psychiatric:         Mood and Affect: Mood normal          Behavior: Behavior normal          Thought Content:  Thought content normal          Judgment: Judgment normal

## 2020-09-29 ENCOUNTER — TELEPHONE (OUTPATIENT)
Dept: INTERNAL MEDICINE CLINIC | Facility: CLINIC | Age: 80
End: 2020-09-29

## 2020-09-29 NOTE — TELEPHONE ENCOUNTER
Per Dr Mccracken, contact patient's son that doesn't live with him to arrange a visit to go over patient's medications directly from bottles

## 2020-09-29 NOTE — TELEPHONE ENCOUNTER
Sp/w pt, he gave me the number to his name same name as him Ruth Ann Yanez-  [phone]  Left message for son to contact me when he is at his dad's house to read my all his meds directly from the bottle as there is some confusion with the patient

## 2020-10-01 ENCOUNTER — TELEPHONE (OUTPATIENT)
Dept: INTERNAL MEDICINE CLINIC | Facility: CLINIC | Age: 80
End: 2020-10-01

## 2020-10-14 ENCOUNTER — TELEPHONE (OUTPATIENT)
Dept: INTERNAL MEDICINE CLINIC | Facility: CLINIC | Age: 80
End: 2020-10-14

## 2020-11-24 ENCOUNTER — LAB (OUTPATIENT)
Dept: LAB | Age: 80
End: 2020-11-24
Payer: MEDICARE

## 2020-11-24 ENCOUNTER — TRANSCRIBE ORDERS (OUTPATIENT)
Dept: ADMINISTRATIVE | Age: 80
End: 2020-11-24

## 2020-11-24 DIAGNOSIS — E11.51 TYPE 2 DIABETES MELLITUS WITH DIABETIC PERIPHERAL ANGIOPATHY WITHOUT GANGRENE, WITHOUT LONG-TERM CURRENT USE OF INSULIN (HCC): ICD-10-CM

## 2020-11-24 DIAGNOSIS — R26.89 SCISSOR GAIT: ICD-10-CM

## 2020-11-24 DIAGNOSIS — R63.4 WEIGHT LOSS: ICD-10-CM

## 2020-11-24 DIAGNOSIS — R26.89 SCISSOR GAIT: Primary | ICD-10-CM

## 2020-11-24 DIAGNOSIS — I10 ESSENTIAL HYPERTENSION: Chronic | ICD-10-CM

## 2020-11-24 DIAGNOSIS — IMO0001 ASYMMETRICAL LEFT SENSORINEURAL HEARING LOSS: ICD-10-CM

## 2020-11-24 DIAGNOSIS — R26.89 IMBALANCE: ICD-10-CM

## 2020-11-24 DIAGNOSIS — E78.2 MIXED HYPERLIPIDEMIA: ICD-10-CM

## 2020-11-24 LAB
ALBUMIN SERPL BCP-MCNC: 3.6 G/DL (ref 3.5–5)
ALP SERPL-CCNC: 94 U/L (ref 46–116)
ALT SERPL W P-5'-P-CCNC: 13 U/L (ref 12–78)
ANION GAP SERPL CALCULATED.3IONS-SCNC: 6 MMOL/L (ref 4–13)
AST SERPL W P-5'-P-CCNC: <5 U/L (ref 5–45)
BASOPHILS # BLD AUTO: 0.12 THOUSANDS/ΜL (ref 0–0.1)
BASOPHILS NFR BLD AUTO: 2 % (ref 0–1)
BILIRUB SERPL-MCNC: 0.63 MG/DL (ref 0.2–1)
BUN SERPL-MCNC: 14 MG/DL (ref 5–25)
CALCIUM SERPL-MCNC: 9.7 MG/DL (ref 8.3–10.1)
CHLORIDE SERPL-SCNC: 105 MMOL/L (ref 100–108)
CHOLEST SERPL-MCNC: 157 MG/DL (ref 50–200)
CO2 SERPL-SCNC: 28 MMOL/L (ref 21–32)
CREAT SERPL-MCNC: 1.04 MG/DL (ref 0.6–1.3)
CREAT UR-MCNC: 130 MG/DL
CRP SERPL QL: <3 MG/L
EOSINOPHIL # BLD AUTO: 0.33 THOUSAND/ΜL (ref 0–0.61)
EOSINOPHIL NFR BLD AUTO: 6 % (ref 0–6)
ERYTHROCYTE [DISTWIDTH] IN BLOOD BY AUTOMATED COUNT: 13.4 % (ref 11.6–15.1)
ERYTHROCYTE [SEDIMENTATION RATE] IN BLOOD: 19 MM/HOUR (ref 0–19)
EST. AVERAGE GLUCOSE BLD GHB EST-MCNC: 186 MG/DL
FERRITIN SERPL-MCNC: 22 NG/ML (ref 8–388)
GFR SERPL CREATININE-BSD FRML MDRD: 67 ML/MIN/1.73SQ M
GLUCOSE P FAST SERPL-MCNC: 138 MG/DL (ref 65–99)
HBA1C MFR BLD: 8.1 %
HCT VFR BLD AUTO: 45.1 % (ref 36.5–49.3)
HDLC SERPL-MCNC: 51 MG/DL
HGB BLD-MCNC: 14.4 G/DL (ref 12–17)
IMM GRANULOCYTES # BLD AUTO: 0.01 THOUSAND/UL (ref 0–0.2)
IMM GRANULOCYTES NFR BLD AUTO: 0 % (ref 0–2)
IRON SERPL-MCNC: 70 UG/DL (ref 65–175)
LDLC SERPL DIRECT ASSAY-MCNC: 88 MG/DL (ref 0–100)
LYMPHOCYTES # BLD AUTO: 1.53 THOUSANDS/ΜL (ref 0.6–4.47)
LYMPHOCYTES NFR BLD AUTO: 29 % (ref 14–44)
MCH RBC QN AUTO: 28.4 PG (ref 26.8–34.3)
MCHC RBC AUTO-ENTMCNC: 31.9 G/DL (ref 31.4–37.4)
MCV RBC AUTO: 89 FL (ref 82–98)
MICROALBUMIN UR-MCNC: 24.2 MG/L (ref 0–20)
MICROALBUMIN/CREAT 24H UR: 19 MG/G CREATININE (ref 0–30)
MONOCYTES # BLD AUTO: 0.37 THOUSAND/ΜL (ref 0.17–1.22)
MONOCYTES NFR BLD AUTO: 7 % (ref 4–12)
NEUTROPHILS # BLD AUTO: 2.99 THOUSANDS/ΜL (ref 1.85–7.62)
NEUTS SEG NFR BLD AUTO: 56 % (ref 43–75)
NRBC BLD AUTO-RTO: 0 /100 WBCS
PLATELET # BLD AUTO: 220 THOUSANDS/UL (ref 149–390)
PMV BLD AUTO: 11.4 FL (ref 8.9–12.7)
POTASSIUM SERPL-SCNC: 4 MMOL/L (ref 3.5–5.3)
PROT SERPL-MCNC: 7.3 G/DL (ref 6.4–8.2)
RBC # BLD AUTO: 5.07 MILLION/UL (ref 3.88–5.62)
SODIUM SERPL-SCNC: 139 MMOL/L (ref 136–145)
TRIGL SERPL-MCNC: 121 MG/DL
WBC # BLD AUTO: 5.35 THOUSAND/UL (ref 4.31–10.16)

## 2020-11-24 PROCEDURE — 86140 C-REACTIVE PROTEIN: CPT

## 2020-11-24 PROCEDURE — 82570 ASSAY OF URINE CREATININE: CPT

## 2020-11-24 PROCEDURE — 80053 COMPREHEN METABOLIC PANEL: CPT

## 2020-11-24 PROCEDURE — 83721 ASSAY OF BLOOD LIPOPROTEIN: CPT

## 2020-11-24 PROCEDURE — 85652 RBC SED RATE AUTOMATED: CPT

## 2020-11-24 PROCEDURE — 82043 UR ALBUMIN QUANTITATIVE: CPT

## 2020-11-24 PROCEDURE — 82728 ASSAY OF FERRITIN: CPT

## 2020-11-24 PROCEDURE — 83540 ASSAY OF IRON: CPT

## 2020-11-24 PROCEDURE — 80061 LIPID PANEL: CPT

## 2020-11-24 PROCEDURE — 86618 LYME DISEASE ANTIBODY: CPT

## 2020-11-24 PROCEDURE — 36415 COLL VENOUS BLD VENIPUNCTURE: CPT

## 2020-11-24 PROCEDURE — 85025 COMPLETE CBC W/AUTO DIFF WBC: CPT

## 2020-11-24 PROCEDURE — 83036 HEMOGLOBIN GLYCOSYLATED A1C: CPT

## 2020-11-25 LAB — B BURGDOR IGG+IGM SER-ACNC: 3

## 2020-12-09 ENCOUNTER — HOSPITAL ENCOUNTER (OUTPATIENT)
Dept: NON INVASIVE DIAGNOSTICS | Facility: CLINIC | Age: 80
Discharge: HOME | End: 2020-12-09
Payer: MEDICARE

## 2020-12-09 DIAGNOSIS — I70.219 ATHEROSCLEROSIS OF ARTERY OF EXTREMITY WITH INTERMITTENT CLAUDICATION (HCC): Chronic | ICD-10-CM

## 2020-12-09 DIAGNOSIS — I65.23 ASYMPTOMATIC BILATERAL CAROTID ARTERY STENOSIS: ICD-10-CM

## 2020-12-09 PROCEDURE — 93923 UPR/LXTR ART STDY 3+ LVLS: CPT

## 2020-12-09 PROCEDURE — 93925 LOWER EXTREMITY STUDY: CPT

## 2020-12-09 PROCEDURE — 93880 EXTRACRANIAL BILAT STUDY: CPT

## 2020-12-10 PROCEDURE — 93880 EXTRACRANIAL BILAT STUDY: CPT | Performed by: SURGERY

## 2020-12-10 PROCEDURE — 93925 LOWER EXTREMITY STUDY: CPT | Performed by: SURGERY

## 2020-12-10 PROCEDURE — 93922 UPR/L XTREMITY ART 2 LEVELS: CPT | Performed by: SURGERY

## 2020-12-11 ENCOUNTER — OFFICE VISIT (OUTPATIENT)
Dept: INTERNAL MEDICINE CLINIC | Facility: CLINIC | Age: 80
End: 2020-12-11
Payer: MEDICARE

## 2020-12-11 ENCOUNTER — TELEPHONE (OUTPATIENT)
Dept: INTERNAL MEDICINE CLINIC | Facility: CLINIC | Age: 80
End: 2020-12-11

## 2020-12-11 VITALS
TEMPERATURE: 97.9 F | DIASTOLIC BLOOD PRESSURE: 80 MMHG | BODY MASS INDEX: 27.62 KG/M2 | HEART RATE: 72 BPM | SYSTOLIC BLOOD PRESSURE: 130 MMHG | HEIGHT: 67 IN | WEIGHT: 176 LBS | RESPIRATION RATE: 14 BRPM

## 2020-12-11 DIAGNOSIS — E11.51 TYPE 2 DIABETES MELLITUS WITH DIABETIC PERIPHERAL ANGIOPATHY WITHOUT GANGRENE, WITHOUT LONG-TERM CURRENT USE OF INSULIN (HCC): ICD-10-CM

## 2020-12-11 DIAGNOSIS — I73.9 PERIPHERAL ARTERIAL DISEASE (HCC): Chronic | ICD-10-CM

## 2020-12-11 DIAGNOSIS — I10 ESSENTIAL HYPERTENSION: Chronic | ICD-10-CM

## 2020-12-11 DIAGNOSIS — E78.2 MIXED HYPERLIPIDEMIA: ICD-10-CM

## 2020-12-11 DIAGNOSIS — R63.4 WEIGHT LOSS: ICD-10-CM

## 2020-12-11 DIAGNOSIS — R07.9 CHEST PAIN, UNSPECIFIED TYPE: Primary | ICD-10-CM

## 2020-12-11 DIAGNOSIS — I65.23 ASYMPTOMATIC BILATERAL CAROTID ARTERY STENOSIS: ICD-10-CM

## 2020-12-11 PROCEDURE — 99214 OFFICE O/P EST MOD 30 MIN: CPT | Performed by: INTERNAL MEDICINE

## 2020-12-11 PROCEDURE — 93000 ELECTROCARDIOGRAM COMPLETE: CPT | Performed by: INTERNAL MEDICINE

## 2020-12-11 PROCEDURE — G0439 PPPS, SUBSEQ VISIT: HCPCS | Performed by: INTERNAL MEDICINE

## 2020-12-11 PROCEDURE — 1123F ACP DISCUSS/DSCN MKR DOCD: CPT | Performed by: INTERNAL MEDICINE

## 2020-12-12 ENCOUNTER — HOSPITAL ENCOUNTER (OUTPATIENT)
Dept: RADIOLOGY | Facility: HOSPITAL | Age: 80
Discharge: HOME | End: 2020-12-12
Payer: MEDICARE

## 2020-12-12 DIAGNOSIS — IMO0001 ASYMMETRICAL LEFT SENSORINEURAL HEARING LOSS: ICD-10-CM

## 2020-12-12 DIAGNOSIS — R26.89 IMBALANCE: ICD-10-CM

## 2020-12-12 PROCEDURE — 70553 MRI BRAIN STEM W/O & W/DYE: CPT

## 2020-12-12 PROCEDURE — G1004 CDSM NDSC: HCPCS

## 2020-12-12 PROCEDURE — A9585 GADOBUTROL INJECTION: HCPCS | Performed by: PHYSICIAN ASSISTANT

## 2020-12-12 RX ADMIN — GADOBUTROL 7 ML: 604.72 INJECTION INTRAVENOUS at 12:09

## 2020-12-21 ENCOUNTER — HOSPITAL ENCOUNTER (OUTPATIENT)
Dept: NON INVASIVE DIAGNOSTICS | Facility: CLINIC | Age: 80
Discharge: HOME | End: 2020-12-21
Payer: MEDICARE

## 2020-12-21 DIAGNOSIS — R07.9 CHEST PAIN, UNSPECIFIED TYPE: ICD-10-CM

## 2020-12-21 LAB
CHEST PAIN STATEMENT: NORMAL
MAX DIASTOLIC BP: 72 MMHG
MAX HEART RATE: 81 BPM
MAX PREDICTED HEART RATE: 139 BPM
MAX. SYSTOLIC BP: 142 MMHG
PROTOCOL NAME: NORMAL
REASON FOR TERMINATION: NORMAL
TARGET HR FORMULA: NORMAL
TEST INDICATION: NORMAL
TIME IN EXERCISE PHASE: NORMAL

## 2020-12-21 PROCEDURE — G1004 CDSM NDSC: HCPCS

## 2020-12-21 PROCEDURE — 93016 CV STRESS TEST SUPVJ ONLY: CPT | Performed by: INTERNAL MEDICINE

## 2020-12-21 PROCEDURE — A9502 TC99M TETROFOSMIN: HCPCS

## 2020-12-21 PROCEDURE — 78452 HT MUSCLE IMAGE SPECT MULT: CPT | Performed by: INTERNAL MEDICINE

## 2020-12-21 PROCEDURE — 93017 CV STRESS TEST TRACING ONLY: CPT

## 2020-12-21 PROCEDURE — 93018 CV STRESS TEST I&R ONLY: CPT | Performed by: INTERNAL MEDICINE

## 2020-12-21 PROCEDURE — 78452 HT MUSCLE IMAGE SPECT MULT: CPT

## 2020-12-21 RX ADMIN — REGADENOSON 0.4 MG: 0.08 INJECTION, SOLUTION INTRAVENOUS at 09:05

## 2020-12-22 ENCOUNTER — TELEPHONE (OUTPATIENT)
Dept: INTERNAL MEDICINE CLINIC | Facility: CLINIC | Age: 80
End: 2020-12-22

## 2021-01-20 DIAGNOSIS — Z23 ENCOUNTER FOR IMMUNIZATION: ICD-10-CM

## 2021-01-25 ENCOUNTER — IMMUNIZATIONS (OUTPATIENT)
Dept: FAMILY MEDICINE CLINIC | Facility: HOSPITAL | Age: 81
End: 2021-01-25

## 2021-01-25 DIAGNOSIS — Z23 ENCOUNTER FOR IMMUNIZATION: Primary | ICD-10-CM

## 2021-01-25 PROCEDURE — 0001A SARS-COV-2 / COVID-19 MRNA VACCINE (PFIZER-BIONTECH) 30 MCG: CPT

## 2021-01-25 PROCEDURE — 91300 SARS-COV-2 / COVID-19 MRNA VACCINE (PFIZER-BIONTECH) 30 MCG: CPT

## 2021-02-15 ENCOUNTER — IMMUNIZATIONS (OUTPATIENT)
Dept: FAMILY MEDICINE CLINIC | Facility: HOSPITAL | Age: 81
End: 2021-02-15

## 2021-02-15 DIAGNOSIS — Z23 ENCOUNTER FOR IMMUNIZATION: Primary | ICD-10-CM

## 2021-02-15 PROCEDURE — 0002A SARS-COV-2 / COVID-19 MRNA VACCINE (PFIZER-BIONTECH) 30 MCG: CPT

## 2021-02-15 PROCEDURE — 91300 SARS-COV-2 / COVID-19 MRNA VACCINE (PFIZER-BIONTECH) 30 MCG: CPT

## 2021-03-12 ENCOUNTER — TELEPHONE (OUTPATIENT)
Dept: INTERNAL MEDICINE CLINIC | Facility: CLINIC | Age: 81
End: 2021-03-12

## 2021-03-12 ENCOUNTER — OFFICE VISIT (OUTPATIENT)
Dept: INTERNAL MEDICINE CLINIC | Facility: CLINIC | Age: 81
End: 2021-03-12
Payer: MEDICARE

## 2021-03-12 VITALS
SYSTOLIC BLOOD PRESSURE: 128 MMHG | HEART RATE: 72 BPM | WEIGHT: 172.8 LBS | RESPIRATION RATE: 14 BRPM | BODY MASS INDEX: 27.12 KG/M2 | DIASTOLIC BLOOD PRESSURE: 78 MMHG | TEMPERATURE: 96.8 F | HEIGHT: 67 IN

## 2021-03-12 DIAGNOSIS — E55.9 VITAMIN D DEFICIENCY: ICD-10-CM

## 2021-03-12 DIAGNOSIS — E53.8 B12 DEFICIENCY: ICD-10-CM

## 2021-03-12 DIAGNOSIS — D50.8 OTHER IRON DEFICIENCY ANEMIA: Chronic | ICD-10-CM

## 2021-03-12 DIAGNOSIS — I10 ESSENTIAL HYPERTENSION: Chronic | ICD-10-CM

## 2021-03-12 DIAGNOSIS — E11.9 CONTROLLED TYPE 2 DIABETES MELLITUS WITHOUT COMPLICATION, WITHOUT LONG-TERM CURRENT USE OF INSULIN (HCC): Primary | Chronic | ICD-10-CM

## 2021-03-12 DIAGNOSIS — E78.2 MIXED HYPERLIPIDEMIA: ICD-10-CM

## 2021-03-12 DIAGNOSIS — R91.1 PULMONARY NODULE: ICD-10-CM

## 2021-03-12 LAB — SL AMB POCT HEMOGLOBIN AIC: 7.5 (ref ?–6.5)

## 2021-03-12 PROCEDURE — 83036 HEMOGLOBIN GLYCOSYLATED A1C: CPT | Performed by: INTERNAL MEDICINE

## 2021-03-12 PROCEDURE — 99214 OFFICE O/P EST MOD 30 MIN: CPT | Performed by: INTERNAL MEDICINE

## 2021-03-12 NOTE — PROGRESS NOTES
Tobacco Cessation Counseling: Tobacco cessation counseling was provided  The patient is sincerely urged to quit consumption of tobacco  He is not ready to quit tobacco         Assessment/Plan:   1  Health maintenance-patient recently completed COVID vaccine  2  Health maintenance -again encouraged him to obtain Shingrix vaccine which she is trying to do through the [place]   3  Visual symptoms-this may represent Ankush Gonzalez  -I strongly recommended evaluation by his ophthalmologist-Dr Duncan  -She says he will proceed with this  4  Uncontrolled diabetes -A1c had been as high as 10 8  With skipping meals and because of this we discontinue glipizide  Does not tolerate short-acting metformin only on 500 milligrams b i d   On Lantus insulin 20 units with A1c down to 7 5  I recommended we consider Jardiance 25 milligrams daily if he does that will decrease his insulin 15 units  5  Abnormal weight loss noted previously -endoscopy in the summer 2020 normal   Colonoscopy in the summer 2020 shows diverticular disease but no other abnormalities  CT scan of the chest shows unchanged pulmonary nodule  May been related to his diabetes as his weight loss has plateaued with better control of diabetes  6  Previously noted chest discomfort -possibly from his known CAD  Cardiac catheterization in the remote past showed moderate disease with some disease of the LAD  Has diffuse vascular disease with ongoing risk factors and is a smoker  Pharmacologic stress test done in December 2021 shows he went 3 minutes with maximal heart rate 81, no perfusion abnormalities of the low level exercise -chest symptoms have not recurred  7  Recent noted asymmetric hearing loss on the right-Lyme titer normal-ENT ordered an MRI of his brain to rule out acoustic which shows small-vessel disease but no evidence of acoustic  8   Right fungal otitis-treated instillation by ENT with successful treatment-they have used installation of Lotrisone cream into the ear96  Pruritus -was present over the last year plus   CT scan of the chest done because of other issues shows mild fibrosis, early emphysema unchanged pulmonary nodule   Protein electrophoresis normal   Was felt this may been related to his meds-specifically Jardiance   This is all resolved  9  Iron deficiency anemia -this was noted in the past prior negative endoscopy and colonoscopy   Recently hemoglobin 11 4 with normal white count and platelet count   Protein electrophoresis normal   Ferritin low at 8   TSH normal  Celiac profile recently done and normal   Had repeat endoscopy and colonoscopy in July 2020 showing only diverticular disease otherwise all normal   Remains on iron supplement   May need to consider some capsule endoscopy of the small intestine in the future -hemoglobin prior to this visit has improved to 14 4 from prior value of 11 -CBC with diff iron ferritin ordered prior to next visit  10  Pulmonary nodule -CT the chest on May 2020 shows mild fibrosis, early emphysema and unchanged pulmonary nodule- needs repeat CT in a year which would be due in May of 2021  11  Abnormal CT lung with questionable interstitial thickening   PFT showed mild obstruction with normal spirometry of the decreased lung volumes and diffusing capacity normal- monitoring at present   Again recommended he not smoke   repeat CT scan ordered for the spring of 2021  12  41 millimeter ascending aortic aneurysm   Surgery recommended we monitor  [facility] is on a beta-blocker  [facility] knows not to lift more than 40 pounds  Andrés Hopkins feel surgical criteria would  Be 55 millimeters   Will continue to monitor when he has surveillance for his pulmonary nodule  13  Hypertension -adequately controlled on current dose of ACE-inhibitor plus beta-blocker  14  Severe sleep apnea -on CPAP study 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   Pulmonary was considering CPAP at 8 centimeters open that is central events with clear were time   The patient returns machine to the sleep group stating he would not sleep with it-reviewed again today he states he is not using his device-urged to sleep on his side and he knows not to nuse alcohol closer than 4 hours before bed     All other problems as per note of September 2013        MEDICAL REGIMEN:  Paroxetine 40 milligrams daily, pantoprazole 40 milligrams daily, metoprolol tartrate 25 milligrams-half tablet b i d , metformin 500 milligrams b i d , lisinopril 20 milligrams daily, generic Plavix 75 milligrams daily, generic Crestor 10 milligrams-half tablet in the evening, Lantus currently on 20-21 units in adjusting his insulin as per prior note, a injection of Lotrisone into the year per ENT, nitroglycerin p r n  For chest pain and I instructed him on  use today, potential use of Jardiance 25 milligrams daily and would then decrease his insulin to 15 units     Appointment in several months prior chemistry profile cholesterol profile A1c iron and ferritin    Addendum -CT scan of the chest done in March 2021 shows stable benign pulmonary nodule  - 3 millimeters in the left lower lobe unchanged dating back to 2019 and felt to be benign  He again has paraseptal emphysema -no evidence of thoracic aneurysm and  UNCHANGED MILD PULMONARY FIBROSIS there was a small amount of debris in the trachea- he will need follow-up CT scan in 1 year with his ongoing smoking    Addendum- patient called the office on April 4th to let us know cardiology has increased his Crestor from 5 milligrams daily to 10 milligram daily  He did obtain Jardiance and will be starting that and decreasing his insulin down to 15 units      No problem-specific Assessment & Plan notes found for this encounter         Diagnoses and all orders for this visit:    Controlled type 2 diabetes mellitus without complication, without long-term current use of insulin (HCC)  -     POCT hemoglobin A1c  -     CBC and differential; Future  -     Comprehensive metabolic panel; Future  -     Hemoglobin A1C; Future  -     Triglycerides; Future  -     LDL cholesterol, direct; Future  -     HDL cholesterol; Future  -     Cholesterol, total; Future  -     Vitamin B12; Future  -     Vitamin D 25 hydroxy; Future  -     Microalbumin / creatinine urine ratio; Future    Mixed hyperlipidemia  -     CBC and differential; Future  -     Comprehensive metabolic panel; Future  -     Hemoglobin A1C; Future  -     Triglycerides; Future  -     LDL cholesterol, direct; Future  -     HDL cholesterol; Future  -     Cholesterol, total; Future  -     Vitamin B12; Future  -     Vitamin D 25 hydroxy; Future  -     Microalbumin / creatinine urine ratio; Future    B12 deficiency  -     CBC and differential; Future  -     Comprehensive metabolic panel; Future  -     Hemoglobin A1C; Future  -     Triglycerides; Future  -     LDL cholesterol, direct; Future  -     HDL cholesterol; Future  -     Cholesterol, total; Future  -     Vitamin B12; Future  -     Vitamin D 25 hydroxy; Future  -     Microalbumin / creatinine urine ratio; Future    Vitamin D deficiency  -     CBC and differential; Future  -     Comprehensive metabolic panel; Future  -     Hemoglobin A1C; Future  -     Triglycerides; Future  -     LDL cholesterol, direct; Future  -     HDL cholesterol; Future  -     Cholesterol, total; Future  -     Vitamin B12; Future  -     Vitamin D 25 hydroxy; Future  -     Microalbumin / creatinine urine ratio; Future    Pulmonary nodule  -     CT chest wo contrast; Future    Essential hypertension    Other iron deficiency anemia  -     Iron; Future  -     Ferritin; Future          Subjective:      Patient ID: Burke Peters is a 79 y o  male  A1c done in the office today is improved to 7 5  He is on metformin a gram a day can tolerate more than that  He is on Lantus insulin 20 units daily  I feel he is a good candidate for SGOT inhibitor    We reviewed the data showing that that would lower his A1c and also potentially decrease his incidence of vascular disease  We elected to try an add Jardiance 25 milligrams daily if he can obtain that through the [place]  He understands that if he begins that he would then dropped his insulin 15 units  We reviewed potential side effects including higher incidence urinary tract infections, acidosis and that he would need to hold the drug 1 ill or having surgery etcetera  We reviewed the rare side effect of [place] gangrene  He has known hypertension  BP adequately controlled on current regimen  Avoiding salt and decongestants  He knows not use frequent nonsteroidals     Vaccine status reviewed  He did receive COVID vaccine x2  He has yet to receive Shingrix and I again recommended he obtain this  He was trying do this through the [place]  He understands this is a 2 part series with 2 doses 2-6 months apart    He has not had any recurrence of the previously noted chest pain  He had a walking pharmacologic stress test- duration was 3 minutes with a maximal heart rate of 81  Normal study without significant perfusion abnormalities although only low-level exercise  LV function was normal       This patient denies any systemic symptoms  Specifically there has been no evidence of fever, night sweats, significant weight loss or significant decrease in appetite  This patient has a known history of vascular disease  We discussed again optimal control of risk factors including hyperlipidemia, hypertension, and diabetes mellitus  This patient denies any episodes of weakness of one arm or leg compared to the other, numbness of one arm or leg compared to the other, blurred or double vision, or difficulty with speech  This patient denies any claudication symptoms of arms or legs  This patient denies any chest pain or pressure with activity  There has been no recent evidence of abrupt onset of back pain to suggest potential abdominal aortic aneurysm  We also again reviewed contributing factors of atherosclerosis-including tobacco abuse, hypertension, diabetes mellitus, hyperlipidemia, family history and age  This patient understands the whole concept of risk factors and the need for more aggressive treatment of them as compared to patients who do not have significant underlying atherosclerosis  His risk factors are hypertension, hyperlipidemia, diabetes and ongoing tobacco use  He is smoking half pack per day and is not interested in quitting  He has a history of pulmonary nodule  He is due for follow-up CT scan and this was scheduled  He denies worsening shortness of breath  He denies cough or hemoptysis  He has a history of a iron deficiency anemia  Follow-up iron ferritin ordered prior to next visit with CBC with diff    We talked about new visual symptoms  He said he was sitting there in the chair across room appeared to be next to min there is questionably somebody sitting it  This then recurred  He had no associated headache  He had no associated weakness of 1 arm and leg compared to the other  He had no associated numbness of 1 arm and leg compared to the other      The following portions of the patient's history were reviewed and updated as appropriate: allergies, current medications, past family history, past medical history, past social history, past surgical history and problem list     Review of Systems   Constitutional: Negative  HENT: Negative  Respiratory: Positive for shortness of breath  Cardiovascular: Negative  Gastrointestinal: Negative  Endocrine: Negative  Genitourinary: Negative  Musculoskeletal: Positive for arthralgias  Skin: Negative  Neurological: Negative  Hematological: Negative  Psychiatric/Behavioral: Negative            Objective:      Temp (!) 96 8 °F (36 °C) (Oral)   Ht 5' 7" (1 702 m)   Wt 78 4 kg (172 lb 12 8 oz)   BMI 27 06 kg/m²          Physical Exam  Vitals signs reviewed  Constitutional:       General: He is not in acute distress  Appearance: Normal appearance  He is not ill-appearing, toxic-appearing or diaphoretic  HENT:      Head: Normocephalic and atraumatic  Right Ear: External ear normal       Left Ear: External ear normal       Nose: Nose normal  No congestion or rhinorrhea  Mouth/Throat:      Mouth: Mucous membranes are moist       Pharynx: Oropharynx is clear  No oropharyngeal exudate or posterior oropharyngeal erythema  Eyes:      General: No scleral icterus  Right eye: No discharge  Left eye: No discharge  Extraocular Movements: Extraocular movements intact  Pupils: Pupils are equal, round, and reactive to light  Neck:      Musculoskeletal: Normal range of motion and neck supple  No neck rigidity or muscular tenderness  Vascular: No carotid bruit  Cardiovascular:      Rate and Rhythm: Normal rate and regular rhythm  Pulses: Normal pulses  Heart sounds: Normal heart sounds  No murmur  No friction rub  No gallop  Comments:  Decreased DP and PT pulses bilaterally  Pulmonary:      Effort: Pulmonary effort is normal  No respiratory distress  Breath sounds: Normal breath sounds  No stridor  No wheezing, rhonchi or rales  Chest:      Chest wall: No tenderness  Abdominal:      General: Abdomen is flat  Bowel sounds are normal  There is no distension  Palpations: Abdomen is soft  There is no mass  Tenderness: There is no abdominal tenderness  There is no right CVA tenderness, left CVA tenderness, guarding or rebound  Hernia: No hernia is present  Comments:  Scar from prior surgery   Musculoskeletal: Normal range of motion  General: No swelling, tenderness, deformity or signs of injury  Right lower leg: No edema  Left lower leg: No edema  Lymphadenopathy:      Cervical: No cervical adenopathy  Skin:     General: Skin is warm and dry  Coloration: Skin is not jaundiced or pale  Findings: No bruising, erythema, lesion or rash  Neurological:      General: No focal deficit present  Mental Status: He is alert and oriented to person, place, and time  Mental status is at baseline  Cranial Nerves: No cranial nerve deficit  Sensory: No sensory deficit  Motor: No weakness  Coordination: Coordination normal       Gait: Gait normal       Deep Tendon Reflexes: Reflexes normal    Psychiatric:         Mood and Affect: Mood normal          Behavior: Behavior normal          Thought Content:  Thought content normal          Judgment: Judgment normal

## 2021-03-12 NOTE — TELEPHONE ENCOUNTER
Pt in the office today  -Faxed script for Jardiance 25mg 1 po qam  Disp-90  Refill-3    Faxed to [place] @ [phone] and pt verified this is the correct script to be sent to [place]

## 2021-03-20 ENCOUNTER — HOSPITAL ENCOUNTER (OUTPATIENT)
Dept: RADIOLOGY | Facility: HOSPITAL | Age: 81
Discharge: HOME | End: 2021-03-20
Payer: MEDICARE

## 2021-03-20 ENCOUNTER — TRANSCRIBE ORDERS (OUTPATIENT)
Dept: RADIOLOGY | Facility: HOSPITAL | Age: 81
End: 2021-03-20

## 2021-03-20 DIAGNOSIS — R91.1 PULMONARY NODULE: ICD-10-CM

## 2021-03-20 PROCEDURE — 71250 CT THORAX DX C-: CPT

## 2021-03-20 PROCEDURE — G1004 CDSM NDSC: HCPCS

## 2021-03-26 ENCOUNTER — TELEPHONE (OUTPATIENT)
Dept: INTERNAL MEDICINE CLINIC | Facility: CLINIC | Age: 81
End: 2021-03-26

## 2021-03-26 NOTE — TELEPHONE ENCOUNTER
----- Message from Theresa Ace MD sent at 3/26/2021  5:35 AM EDT -----   Please call patient -CT scan of the chest unchanged showing changes of emphysema and lung scarring related to his smoking -no evidence of lung cancer -he will need repeat CT scan in 1 year

## 2021-03-30 ENCOUNTER — OFFICE VISIT (OUTPATIENT)
Dept: CARDIOLOGY CLINIC | Facility: CLINIC | Age: 81
End: 2021-03-30
Payer: MEDICARE

## 2021-03-30 VITALS
DIASTOLIC BLOOD PRESSURE: 60 MMHG | HEART RATE: 68 BPM | SYSTOLIC BLOOD PRESSURE: 146 MMHG | WEIGHT: 169 LBS | BODY MASS INDEX: 26.53 KG/M2 | HEIGHT: 67 IN

## 2021-03-30 DIAGNOSIS — Z72.0 TOBACCO ABUSE: ICD-10-CM

## 2021-03-30 DIAGNOSIS — G47.30 SEVERE SLEEP APNEA: ICD-10-CM

## 2021-03-30 DIAGNOSIS — I25.10 CORONARY ARTERY DISEASE INVOLVING NATIVE HEART WITHOUT ANGINA PECTORIS, UNSPECIFIED VESSEL OR LESION TYPE: Primary | Chronic | ICD-10-CM

## 2021-03-30 DIAGNOSIS — R94.39 ABNORMAL NUCLEAR STRESS TEST: ICD-10-CM

## 2021-03-30 DIAGNOSIS — E78.2 MIXED HYPERLIPIDEMIA: ICD-10-CM

## 2021-03-30 PROCEDURE — 99214 OFFICE O/P EST MOD 30 MIN: CPT | Performed by: INTERNAL MEDICINE

## 2021-03-30 RX ORDER — ROSUVASTATIN CALCIUM 10 MG/1
10 TABLET, COATED ORAL DAILY
Qty: 90 TABLET | Refills: 4 | Status: SHIPPED | OUTPATIENT
Start: 2021-03-30 | End: 2022-04-11

## 2021-03-30 NOTE — PROGRESS NOTES
Cardiology Follow Up    Jennifer Darling  1940  [id number]  [address] [facility] [place]  [address] [facility] [address]  [facility]  [phone]    1  Coronary artery disease involving native heart without angina pectoris, unspecified vessel or lesion type     2  Severe sleep apnea     3  Tobacco abuse     4  Mixed hyperlipidemia  rosuvastatin (CRESTOR) 10 MG tablet   5  Abnormal nuclear stress test           Discussion/Summary: All of his assessed cardiac problems are presently stable  I stressed to him that he must stop smoking  He said, " that is not gonna happen  "  I will increase his Crestor from 5 to 10 mg daily  The rest of his medications remain the same  No cardiac testing is ordered  RTO 1 year  Interval History: he has not had any cardiac problems since his last office visit  He is not very active due to his severe LE PVD  He denies CP or significant SOB  Nuclear stress test in 12/2020 - normal EF, no ischemia  He continues to smoke  Hgb A1c is 7 5  LDL is 88          Patient Active Problem List   Diagnosis    Atherosclerosis of artery of extremity with intermittent claudication (HCC)    Popliteal artery occlusion, left (HCC)    Essential hypertension    Abnormal nuclear stress test    Anemia    Anxiety    Arthritis    Asymptomatic carotid artery stenosis    Controlled diabetes mellitus ([address] )    Coronary artery disease    Diverticulosis of large intestine without hemorrhage    Elevated prostate specific antigen (PSA)    Enlarged prostate without lower urinary tract symptoms (luts)    Hyperlipidemia    Iron deficiency anemia    Peripheral arterial disease (HCC)    Shoulder pain    Weight loss    Memory loss    Bypass graft stenosis (HCC)    Type 2 diabetes mellitus with diabetic peripheral angiopathy without gangrene ([address] )    Fall    Other chest pain    Excessive anger    Severe sleep apnea    Excessive daytime sleepiness    Pulmonary nodule    Abnormal CT scan of lung    Thoracic aortic aneurysm without rupture (HCC)    Non-intractable vomiting    Tobacco abuse    RUIZ (dyspnea on exertion)    Pruritus    Hearing difficulty of right ear    Other fatigue    Chest pain     Past Medical History:   Diagnosis Date    Atherosclerosis     Cardiac disease     COPD (chronic obstructive pulmonary disease) ([facility] [facility] [address]     Coronary artery disease     Hyperlipidemia     Hypertension     Peripheral arterial disease (HCC)     Smoking addiction      Social History     Socioeconomic History    Marital status:       Spouse name: Not on file    Number of children: Not on file    Years of education: Not on file    Highest education level: Not on file   Occupational History    Not on file   Social Needs    Financial resource strain: Not on file    Food insecurity     Worry: Not on file     Inability: Not on file    Transportation needs     Medical: Not on file     Non-medical: Not on file   Tobacco Use    Smoking status: Current Every Day Smoker     Packs/day: 0 25     Types: Cigarettes    Smokeless tobacco: Former User     Quit date: 1960    Tobacco comment: 10 sticks/day    Substance and Sexual Activity    Alcohol use: No     Comment: Social drinker    Drug use: No    Sexual activity: Yes   Lifestyle    Physical activity     Days per week: Not on file     Minutes per session: Not on file    Stress: Not on file   Relationships    Social connections     Talks on phone: Not on file     Gets together: Not on file     Attends [employer] service: Not on file     Active member of club or organization: Not on file     Attends meetings of clubs or organizations: Not on file     Relationship status: Not on file    Intimate partner violence     Fear of current or ex partner: Not on file     Emotionally abused: Not on file     Physically abused: Not on file     Forced sexual activity: Not on file Other Topics Concern    Not on file   Social History Narrative    Not on file      Family History   Problem Relation Age of Onset    Arthritis Mother     Arthritis Family     Coronary artery disease Family     Hyperlipidemia Family     Peripheral vascular disease Family      Past Surgical History:   Procedure Laterality Date    BYPASS FEMORAL-POPLITEAL Left 4/30/2018    Procedure: BYPASS FEMORAL-POPLITEAL, WITH INTRAOP ARTERIOGRAM;  Surgeon: Nacho Rosa MD;  [facility]: [facility];  Service: Vascular    CARDIAC SURGERY      FEMORAL ARTERY - POPLITEAL ARTERY BYPASS GRAFT Bilateral     HAND SURGERY      IR AORTAGRAM WITH RUN-OFF  10/4/2018    LEG SURGERY      Repair    OTHER SURGICAL HISTORY      Percutaneous repair nasoethmoid fx lacrimal apparatus    THROMBOENDARTERECTOMY  10/12/2010    Tibioperoneal trunk    TONSILLECTOMY      TONSILLECTOMY         Current Outpatient Medications:     Cholecalciferol (VITAMIN D3) 2000 units capsule, Take 1,000 Units by mouth daily, Disp: , Rfl:     clopidogrel (PLAVIX) 75 mg tablet, Take 75 mg by mouth daily, Disp: , Rfl:     glipiZIDE (GLUCOTROL) 5 mg tablet, Take 5 mg by mouth Take 1/2 tab daily, Disp: , Rfl:     insulin glargine (LANTUS SOLOSTAR) 100 units/mL injection pen, INJECT 20 UNITS SUBCUTANEOUSLY AT BEDTIME FOR DIABETES  TO BE TRITRATED FROM 5 UNITS, EVERY THREE  DAYS AS PER NON [facility], DR Carolynn Melvin, Disp: , Rfl:     lisinopril (ZESTRIL) 20 mg tablet, Take 20 mg by mouth daily, Disp: , Rfl:     metFORMIN (GLUCOPHAGE) 1000 MG tablet, Take 500 mg by mouth 2 (two) times a day with meals Take 1/2 tablet twice daily, Disp: , Rfl:     metoprolol tartrate (LOPRESSOR) 25 mg tablet, Take 25 mg by mouth every 12 (twelve) hours, Disp: , Rfl:     pantoprazole (PROTONIX) 40 mg tablet, Take 40 mg by mouth daily, Disp: , Rfl:     PARoxetine (PAXIL) 40 MG tablet, Take 40 mg by mouth Take one tab and 1/2 tab daily total 60mg, Disp: , Rfl:     Zoster Vac Recomb Adjuvanted 50 MCG/0 5ML SUSR, INJECT 1 VIAL (0 5ML) INTRAMUSCULARLY ONCE, Disp: , Rfl:     nitroglycerin (NITROSTAT) 0 4 mg SL tablet, , Disp: , Rfl:     ondansetron (ZOFRAN-ODT) 4 mg disintegrating tablet, Take 1 tablet (4 mg total) by mouth every 6 (six) hours as needed for nausea or vomiting for up to 30 days (Patient not taking: Reported on 3/30/2020), Disp: 60 tablet, Rfl: 0    oxyCODONE-acetaminophen (PERCOCET) 5-325 mg per tablet, Take 1 tablet by mouth every 4 (four) hours as needed for moderate pain for up to 12 dosesMax Daily Amount: 6 tablets (Patient not taking: Reported on 3/30/2021), Disp: 12 tablet, Rfl: 0    rosuvastatin (CRESTOR) 10 MG tablet, Take 1 tablet (10 mg total) by mouth daily, Disp: 90 tablet, Rfl: 4  Allergies   Allergen Reactions    Etomidate Swelling     (anesthetic)"I blew up and couldn't breath"     Vitals:    03/30/21 0840   BP: 146/60   BP Location: Left arm   Cuff Size: Standard   Pulse: 68   Weight: 76 7 kg (169 lb)   Height: 5' 7" (1 702 m)     Weight (last 2 days)     Date/Time   Weight    03/30/21 0840   76 7 (169)             Blood pressure 146/60, pulse 68, height 5' 7" (1 702 m), weight 76 7 kg (169 lb)  , Body mass index is 26 47 kg/m²  Labs:  Office Visit on 03/12/2021   Component Date Value    Hemoglobin A1C 03/12/2021 7 [address] [place] [address] Outpatient Visit on 12/21/2020   Component Date Value    Protocol Name 12/21/2020 DESIRE WALK     Time In Exercise Phase 12/21/2020 00:03:00     MAX  SYSTOLIC BP 74/54/7323 558     Max Diastolic Bp 43/80/3107 72     Max Heart Rate 12/21/2020 81     Max Predicted Heart Rate 12/21/2020 139     Reason for Termination 12/21/2020 TEST COMPLETE        Test Indication 12/21/2020 CHEST PAIN     Target Hr Formular 12/21/2020 (220 - Age)*100%     Chest Pain Statement 12/21/2020 none    Lab on 11/24/2020   Component Date Value    WBC 11/24/2020 5 35     RBC 11/24/2020 5 07     Hemoglobin 11/24/2020 14 4     Hematocrit 11/24/2020 45 1     MCV 11/24/2020 89     MCH 11/24/2020 28 4     MCHC 11/24/2020 31 9     RDW 11/24/2020 13 4     MPV 11/24/2020 11 4     Platelets 80/79/4355 220     nRBC 11/24/2020 0     Neutrophils Relative 11/24/2020 56     Immat GRANS % 11/24/2020 0     Lymphocytes Relative 11/24/2020 29     Monocytes Relative 11/24/2020 7     Eosinophils Relative 11/24/2020 6     Basophils Relative 11/24/2020 2*    Neutrophils Absolute 11/24/2020 2 99     Immature Grans Absolute 11/24/2020 0 01     Lymphocytes Absolute 11/24/2020 1 53     Monocytes Absolute 11/24/2020 0 37     Eosinophils Absolute 11/24/2020 0 33     Basophils Absolute 11/24/2020 0 12*    Sodium 11/24/2020 139     Potassium 11/24/2020 4 0     Chloride 11/24/2020 105     CO2 11/24/2020 28     ANION GAP 11/24/2020 6     BUN 11/24/2020 14     Creatinine 11/24/2020 1 04     Glucose, Fasting 11/24/2020 138*    Calcium 11/24/2020 9 7     AST 11/24/2020 <5*    ALT 11/24/2020 13     Alkaline Phosphatase 11/24/2020 94     Total Protein 11/24/2020 7 3     Albumin 11/24/2020 3 6     Total Bilirubin 11/24/2020 0 63     eGFR 11/24/2020 67     Cholesterol 11/24/2020 157     HDL, Direct 11/24/2020 51     LDL Direct 11/24/2020 88     Triglycerides 11/24/2020 121     Hemoglobin A1C 11/24/2020 8 1*    EAG 11/24/2020 186     Creatinine, Ur 11/24/2020 130 0     Microalbum  ,U,Random 11/24/2020 24 2*    Microalb Creat Ratio 11/24/2020 19     Iron 11/24/2020 70     Ferritin 11/24/2020 22     Sed Rate 11/24/2020 19     CRP 11/24/2020 <3 0     Lyme total antibody 11/24/2020 3      Imaging: Ct Chest Wo Contrast    Result Date: 3/25/2021  Narrative: CT CHEST WITHOUT IV CONTRAST INDICATION:   R91 1: Solitary pulmonary nodule  COMPARISON:  CT, dated 5/21/2020   TECHNIQUE: CT examination of the chest was performed without intravenous contrast   Axial, sagittal, and coronal 2D reformatted images were created from the source data and submitted for interpretation  Radiation dose length product (DLP) for this visit:  281 92 mGy-cm   This examination, like all CT scans performed in the [facility], was performed utilizing techniques to minimize radiation dose exposure, including the use of iterative  reconstruction and automated exposure control  FINDINGS: LUNGS:  Redemonstrated is the 3 mm left lower lobe pulmonary nodule (series 3, image 82), stable from studies dating back to 2019, and thus benign  Note is made of scattered calcified granuloma as well as scattered intrapulmonary lymph nodes extending along the fissures, benign  No new concerning pulmonary nodules are present  Paraseptal emphysema is mild and unchanged  Also unchanged is bilateral lower lung predominant juxtapleural reticulation and groundglass opacification, likely reflecting mild fibrosis  A small amount of debris is present in the trachea  PLEURA:  Unremarkable  HEART/GREAT VESSELS:  Extensive coronary artery calcification is noted  Heart is otherwise unremarkable  No thoracic aortic aneurysm  MEDIASTINUM AND BABITA:  Unremarkable  CHEST WALL AND LOWER NECK:   Unremarkable  VISUALIZED STRUCTURES IN THE UPPER ABDOMEN:  Unremarkable  OSSEOUS STRUCTURES:  No acute fracture or destructive osseous lesion  Impression: 1  Stable benign pulmonary nodule  No new concerning pulmonary nodules identified  2   Unchanged mild pulmonary fibrosis  3   A small amount of debris is present in the trachea  Workstation performed: [id number]       Review of Systems:  Review of Systems   Constitutional: Negative for diaphoresis, fatigue, fever and unexpected weight change  HENT: Negative  Respiratory: Negative for cough, shortness of breath and wheezing  Cardiovascular: Negative for chest pain, palpitations and leg swelling  Gastrointestinal: Negative for abdominal pain, diarrhea and nausea  Musculoskeletal: Negative for gait problem and myalgias     Skin: Negative for rash    Neurological: Negative for dizziness and numbness  Psychiatric/Behavioral: Negative  Physical Exam:  Physical Exam  Constitutional:       Appearance: He is well-developed  HENT:      Head: Normocephalic and atraumatic  Eyes:      Pupils: Pupils are equal, round, and reactive to light  Neck:      Musculoskeletal: Normal range of motion and neck supple  Vascular: No JVD  Cardiovascular:      Rate and Rhythm: Regular rhythm  Pulses: Normal pulses  Carotid pulses are 2+ on the right side and 2+ on the left side  Heart sounds: S1 normal and S2 normal    Pulmonary:      Effort: Pulmonary effort is normal       Breath sounds: Normal breath sounds  No wheezing or rales  Abdominal:      General: Bowel sounds are normal       Palpations: Abdomen is soft  Tenderness: There is no abdominal tenderness  Musculoskeletal: Normal range of motion  General: No tenderness  Skin:     General: Skin is warm  Neurological:      Mental Status: He is alert and oriented to person, place, and time  Cranial Nerves: No cranial nerve deficit  Deep Tendon Reflexes: Reflexes are normal and symmetric

## 2021-04-05 NOTE — TELEPHONE ENCOUNTER
Patient called  To tell you that the cardiologist increased his Crestor from 5 mg to 10 mg  Patient did get his prescription of empagliflozin 25 mg  Do you want him to take as followed?    Jardiance 25 milligrams daily and would then decrease his insulin to 15 units

## 2021-05-06 ENCOUNTER — TELEPHONE (OUTPATIENT)
Dept: INTERNAL MEDICINE CLINIC | Facility: CLINIC | Age: 81
End: 2021-05-06

## 2021-05-06 NOTE — TELEPHONE ENCOUNTER
He needs to check his sugar before eating dinner tonight as well as a fasting sugar tomorrow morning -schedule him tomorrow morning in place of Omar trejo  And please move [place] To Saturday at 8:30 a m

## 2021-05-06 NOTE — TELEPHONE ENCOUNTER
PT'S DAUGHTER CALLED, SAID THAT DAD HAD BEEN THROWING UP BUT DIDN'T HAVE MANY DETAILS SO I CALLED HIM TO SEE WHAT WAS UP    PT SAID THAT YESTERDAY HE WAS THROWING UP AND HAD SOME STOMACH PAIN  HE SAID THAT HE FIGURED IT WAS A DIVERTICULITIS FLARE UP SO HE STAYED IN BED UNTIL TODAY AROUND NOON    HE SAID THAT HE'S FEELING MUCH BETTER AND HASN'T THROWN UP TODAY  HE SAID THAT HE'S BEEN EATING SOME CRACKERS AND DRINKING CRANBERRY JUICE WHICH HE'S BEEN ABLE TO KEEP DOWN    DAUGHTER WAS WORRIED BECAUSE HE DIDN'T TAKE HIS INSULIN LAST NIGHT   I ASKED IF HE'D CHECKED HIS SUGAR YET TODAY BUT HE HADN'T     I TOLD HIM TO LET ME KNOW IF IT'S TOO HIGH, HE SAID HE WOULD BUT THINKS IT'LL BE OK BECAUSE HE FEELS OK    PLEASE ADVISE

## 2021-05-07 ENCOUNTER — OFFICE VISIT (OUTPATIENT)
Dept: INTERNAL MEDICINE CLINIC | Facility: CLINIC | Age: 81
End: 2021-05-07
Payer: MEDICARE

## 2021-05-07 ENCOUNTER — TELEPHONE (OUTPATIENT)
Dept: INTERNAL MEDICINE CLINIC | Facility: CLINIC | Age: 81
End: 2021-05-07

## 2021-05-07 VITALS
HEART RATE: 72 BPM | SYSTOLIC BLOOD PRESSURE: 130 MMHG | HEIGHT: 67 IN | WEIGHT: 166.8 LBS | RESPIRATION RATE: 14 BRPM | TEMPERATURE: 98.1 F | DIASTOLIC BLOOD PRESSURE: 80 MMHG | BODY MASS INDEX: 26.18 KG/M2

## 2021-05-07 DIAGNOSIS — E11.51 TYPE 2 DIABETES MELLITUS WITH DIABETIC PERIPHERAL ANGIOPATHY WITHOUT GANGRENE, WITHOUT LONG-TERM CURRENT USE OF INSULIN (HCC): ICD-10-CM

## 2021-05-07 DIAGNOSIS — R10.9 ABDOMINAL PAIN, UNSPECIFIED ABDOMINAL LOCATION: Primary | ICD-10-CM

## 2021-05-07 DIAGNOSIS — I10 ESSENTIAL HYPERTENSION: Chronic | ICD-10-CM

## 2021-05-07 PROCEDURE — 99214 OFFICE O/P EST MOD 30 MIN: CPT | Performed by: INTERNAL MEDICINE

## 2021-05-07 NOTE — TELEPHONE ENCOUNTER
----- Message from Mayuri Schmidt sent at 5/7/2021  9:21 AM EDT -----  Per Doc,    Change crestor to a full tablet not a half tab   Drop insulin to 15 units

## 2021-05-07 NOTE — PROGRESS NOTES
Assessment/Plan:   1  Abdominal pain which is resolving with diarrhea which is resolving -this may represent a gastroenteritis induced by his eating 1-1/2 molasses polyps  Was concerned about GI bleed when he described dark stool  Initially but then he said this was dark brown and not black in his stool is guaiac negative today  Rule out obstruction but has noted he had diarrhea  Rule out diverticulitis  At this point he is overall improving I feel we can monitor  He will reintroduce his diet but avoid fried or greasy foods  He notes severe pain ocean go to ER and will require potential CT scan of abdomen pelvis  2  Uncontrolled diabetes -A1c had been as high as 10 8  With skipping meals because of this we discontinue glipizide  Did not tolerate short-acting metformin -only on 500 milligrams b i d   On Lantus insulin with A1c down to 7 5  He is now starting generic Jardiance 25 milligrams daily but will decrease his insulin to 15 units  3  Hyperlipidemia - she recently saw cardiology a they wanted him to increase his Crestor to a whole 10 milligrams daily - REVIEW NEXT VISIT  4  History of pulmonary nodule -most recent CT scan March 2021 shows stable benign nodule -3 millimeters left lower lobe unchanged dating back to 2019 at felt to be benign  He has paraseptal emphysema and mild pulmonary fibrosis    He has repeat CT in a year with his ongoing smoking which would be due in March of 2022    ALL OTHER PROBLEMS AS PER NOTE OF MARCH 2021, September 2013       MEDICAL REGIMEN:      Adding generic Jardiance 25 milligrams daily, paroxetine 40 milligrams daily, pantoprazole 40 milligrams daily, metoprolol tartrate 25 milligrams half tablet b i d , metformin 500 milligrams b i d , lisinopril 20 milligrams daily, Plavix 75 milligrams daily, Crestor 10 milligrams daily, decreasing Lantus to 15 units, adjusting his insulin as per prior note, intermittent  Injection Lotrisone into the ear per ENT, nitroglycerin p r n  for chest pain      This patient will be seen at previously scheduled appointment with previously scheduled labs  No problem-specific Assessment & Plan notes found for this encounter  Diagnoses and all orders for this visit:    Abdominal pain, unspecified abdominal location    Essential hypertension    Type 2 diabetes mellitus with diabetic peripheral angiopathy without gangrene, without long-term current use of insulin (HCC)          Subjective:      Patient ID: Elian Henderson is a 79 y o  male  This patient is seen today as an emergency appointment  he began with abdominal pain over the last 2 days  He was worried that represented diverticulitis  He said he was vomiting mucus  He had diarrhea which has resolved  He said he had 5+ watery stools  He did not see any bleeding  He initially said that his stools were dark but then told me he had eaten 1 and half molasses pies  He was not using any iron  He was not using any Pepto-Bismol  He does not think he had a fever  -His only prior surgery is appendix surgery at a young age  He did not have any urinary symptoms  He denies dysuria, urine frequency  He did not have any hematuria  He said he had been eating less and "skipped all his pills"    He received a new med from the [place] wanted to know if he should take it  This is generic Jardiance  We talked about this previously  Blood sugar last eating was in the 150 range  This morning was 150  He said previous to that sugars have been lower between 110-120 as a fasting  But I push the issue as to whether not he had black stool he said it was more dark stool rather than black stool  Digital rectal exam was difficult today because a tight anal sphincter but there was some stool was guaiac negative  He denies flank pain  He denies abdominal trauma  He has not had anything to suggest herpes zoster          This patient wanted to know their preferred analgesic agent  Because of their various comorbidities I recommended that this be acetaminophen  This patient has no history of chronic liver disease that would put them at greater risk for use of acetaminophen  This patient may use up to 500-650 mg of acetaminophen at a time and no more than 3 g a day total  Nonsteroidal anti-inflammatory agents have the potential to exacerbate hypertension, hypercoagulability, chronic renal failure, congestive heart failure, and various allergic tendencies  We talked about that previously in reviewed that again today  We do not want him using nonsteroidals because of his comorbidities     He has known vascular disease  He had no associated chest pain or pressure with the above  He most recent CT scan of abdomen pelvis reviewed  CONTRAST     INDICATION:   Chest-abdomen-pelvis trauma, moderate, blunt      COMPARISON:  None      TECHNIQUE: CT examination of the chest, abdomen and pelvis was performed  Axial, sagittal, and coronal 2D reformatted images were created from the source data and submitted for interpretation      Radiation dose length product (DLP) for this visit:  832 03 mGy-cm   This examination, like all CT scans performed in the [facility], was performed utilizing techniques to minimize radiation dose exposure, including the use of iterative   reconstruction and automated exposure control      IV Contrast:  100 mL of iohexol (OMNIPAQUE)  Enteric Contrast: Enteric contrast was administered      FINDINGS:     CHEST     LUNGS:  Bibasilar density seen right greater than left suggests atelectasis    A granuloma seen in the right lower lobe  A focal density seen in the right midlung, in image 53 series 602, measuring about 1 3 cm x 1 cm  PLEURA:  Mild pleural thickening seen on the right side     HEART/GREAT VESSELS:  Coronary artery calcification seen     MEDIASTINUM AND BABITA:  Unremarkable      CHEST WALL AND LOWER NECK: Unremarkable      ABDOMEN     LIVER/BILIARY TREE:  Unremarkable      GALLBLADDER:  No calcified gallstones  No pericholecystic inflammatory change      SPLEEN:  Unremarkable      PANCREAS:  Unremarkable      ADRENAL GLANDS:  Unremarkable      KIDNEYS/URETERS:  Unremarkable  No hydronephrosis      STOMACH AND BOWEL:  The sigmoid colon is nondistended  However there is no thickening of the sigmoid colon seen this probably due to peristalsis     APPENDIX:  No findings to suggest appendicitis      ABDOMINOPELVIC CAVITY:  No ascites or free intraperitoneal air  No lymphadenopathy      VESSELS:  Celiac trunk, SMA, CARL are patent  The renal arteries are patent     PELVIS     REPRODUCTIVE ORGANS:  Enlarged prostate seen, measuring 6 4 x 5 5 x 6 3 cm     URINARY BLADDER:  Unremarkable      ABDOMINAL WALL/INGUINAL REGIONS:  Unremarkable      OSSEOUS STRUCTURES:  Bridging ossification seen  A healing/healed fracture of the right 5th rib  IMPRESSION:  No solid visceral injury seen     No pleural effusion or pneumothorax seen     A focal density seen at the right lung base along the [place] of diaphragm in image 41 series 2, may represent atelectasis, less likely May represent infiltrate  Follow-up suggested at 3 months to demonstrate resolution     The study was marked in EPIC for significant notification          Workstation performed: [id number]   Results for orders placed or performed in visit on 03/12/21  -POCT hemoglobin A1c       Result                      Value             Ref Range           Hemoglobin A1C              7 5 (A)           6 5            Most recent A1c reviewed    He has known hypertension  He is fearful this would be exacerbated      The following portions of the patient's history were reviewed and updated as appropriate: allergies, current medications, past family history, past medical history, past social history, past surgical history and problem list     Review of Systems   Constitutional: Negative  HENT: Negative  Respiratory: Negative  Cardiovascular: Negative  Gastrointestinal: Positive for abdominal pain and vomiting  Endocrine: Negative  Genitourinary: Negative  Musculoskeletal: Negative  Skin: Negative  Neurological: Negative  Hematological: Negative  Psychiatric/Behavioral: Negative  Objective:      Temp 98 1 °F (36 7 °C) (Oral)   Ht 5' 7" (1 702 m)   Wt 75 7 kg (166 lb 12 8 oz)   BMI 26 12 kg/m²          Physical Exam  Vitals signs reviewed  Constitutional:       General: He is not in acute distress  Appearance: Normal appearance  He is not ill-appearing, toxic-appearing or diaphoretic  HENT:      Head: Normocephalic and atraumatic  Right Ear: Ear canal and external ear normal       Left Ear: Ear canal and external ear normal       Nose: Nose normal  No congestion or rhinorrhea  Mouth/Throat:      Mouth: Mucous membranes are moist       Pharynx: Oropharynx is clear  No oropharyngeal exudate or posterior oropharyngeal erythema  Eyes:      General: No scleral icterus  Right eye: No discharge  Left eye: No discharge  Extraocular Movements: Extraocular movements intact  Pupils: Pupils are equal, round, and reactive to light  Neck:      Musculoskeletal: Normal range of motion and neck supple  No neck rigidity or muscular tenderness  Vascular: No carotid bruit  Cardiovascular:      Rate and Rhythm: Normal rate and regular rhythm  Heart sounds: Normal heart sounds  No murmur  No friction rub  No gallop  Comments:  Decreased DP and PT pulses bilaterally  Pulmonary:      Effort: Pulmonary effort is normal  No respiratory distress  Breath sounds: Normal breath sounds  No stridor  No wheezing, rhonchi or rales  Chest:      Chest wall: No tenderness  Abdominal:      General: Abdomen is flat  Bowel sounds are normal  There is no distension  Palpations: Abdomen is soft  There is no mass  Tenderness: There is no abdominal tenderness  There is no right CVA tenderness, left CVA tenderness, guarding or rebound  Hernia: No hernia is present  Comments:  Scar from prior surgery-normal bowel sounds  No tenderness   Genitourinary:     Rectum: Guaiac result negative  Musculoskeletal: Normal range of motion  General: No swelling, tenderness, deformity or signs of injury  Right lower leg: No edema  Left lower leg: No edema  Lymphadenopathy:      Cervical: No cervical adenopathy  Skin:     General: Skin is warm and dry  Coloration: Skin is not jaundiced or pale  Findings: No bruising, erythema, lesion or rash  Neurological:      General: No focal deficit present  Mental Status: He is alert and oriented to person, place, and time  Mental status is at baseline  Cranial Nerves: No cranial nerve deficit  Sensory: No sensory deficit  Motor: No weakness  Coordination: Coordination normal       Gait: Gait normal       Deep Tendon Reflexes: Reflexes normal    Psychiatric:         Mood and Affect: Mood normal          Behavior: Behavior normal          Thought Content:  Thought content normal          Judgment: Judgment normal

## 2021-06-28 ENCOUNTER — APPOINTMENT (OUTPATIENT)
Dept: LAB | Facility: CLINIC | Age: 81
End: 2021-06-28
Payer: MEDICARE

## 2021-06-28 DIAGNOSIS — E78.2 MIXED HYPERLIPIDEMIA: ICD-10-CM

## 2021-06-28 DIAGNOSIS — E53.8 B12 DEFICIENCY: ICD-10-CM

## 2021-06-28 DIAGNOSIS — E55.9 VITAMIN D DEFICIENCY: ICD-10-CM

## 2021-06-28 DIAGNOSIS — D50.8 OTHER IRON DEFICIENCY ANEMIA: Chronic | ICD-10-CM

## 2021-06-28 DIAGNOSIS — E11.9 CONTROLLED TYPE 2 DIABETES MELLITUS WITHOUT COMPLICATION, WITHOUT LONG-TERM CURRENT USE OF INSULIN (HCC): Chronic | ICD-10-CM

## 2021-06-28 LAB
25(OH)D3 SERPL-MCNC: 41.8 NG/ML (ref 30–100)
ALBUMIN SERPL BCP-MCNC: 3.4 G/DL (ref 3.5–5)
ALP SERPL-CCNC: 95 U/L (ref 46–116)
ALT SERPL W P-5'-P-CCNC: 12 U/L (ref 12–78)
ANION GAP SERPL CALCULATED.3IONS-SCNC: 7 MMOL/L (ref 4–13)
AST SERPL W P-5'-P-CCNC: 5 U/L (ref 5–45)
BASOPHILS # BLD AUTO: 0.09 THOUSANDS/ΜL (ref 0–0.1)
BASOPHILS NFR BLD AUTO: 2 % (ref 0–1)
BILIRUB SERPL-MCNC: 0.61 MG/DL (ref 0.2–1)
BUN SERPL-MCNC: 17 MG/DL (ref 5–25)
CALCIUM ALBUM COR SERPL-MCNC: 10.1 MG/DL (ref 8.3–10.1)
CALCIUM SERPL-MCNC: 9.6 MG/DL (ref 8.3–10.1)
CHLORIDE SERPL-SCNC: 108 MMOL/L (ref 100–108)
CHOLEST SERPL-MCNC: 144 MG/DL (ref 50–200)
CO2 SERPL-SCNC: 25 MMOL/L (ref 21–32)
CREAT SERPL-MCNC: 1.02 MG/DL (ref 0.6–1.3)
CREAT UR-MCNC: 57.7 MG/DL
EOSINOPHIL # BLD AUTO: 0.34 THOUSAND/ΜL (ref 0–0.61)
EOSINOPHIL NFR BLD AUTO: 6 % (ref 0–6)
ERYTHROCYTE [DISTWIDTH] IN BLOOD BY AUTOMATED COUNT: 12.9 % (ref 11.6–15.1)
EST. AVERAGE GLUCOSE BLD GHB EST-MCNC: 157 MG/DL
FERRITIN SERPL-MCNC: 23 NG/ML (ref 8–388)
GFR SERPL CREATININE-BSD FRML MDRD: 68 ML/MIN/1.73SQ M
GLUCOSE P FAST SERPL-MCNC: 149 MG/DL (ref 65–99)
HBA1C MFR BLD: 7.1 %
HCT VFR BLD AUTO: 47.5 % (ref 36.5–49.3)
HDLC SERPL-MCNC: 53 MG/DL
HGB BLD-MCNC: 15.4 G/DL (ref 12–17)
IMM GRANULOCYTES # BLD AUTO: 0.03 THOUSAND/UL (ref 0–0.2)
IMM GRANULOCYTES NFR BLD AUTO: 1 % (ref 0–2)
IRON SERPL-MCNC: 64 UG/DL (ref 65–175)
LDLC SERPL DIRECT ASSAY-MCNC: 72 MG/DL (ref 0–100)
LYMPHOCYTES # BLD AUTO: 1.86 THOUSANDS/ΜL (ref 0.6–4.47)
LYMPHOCYTES NFR BLD AUTO: 30 % (ref 14–44)
MCH RBC QN AUTO: 29.6 PG (ref 26.8–34.3)
MCHC RBC AUTO-ENTMCNC: 32.4 G/DL (ref 31.4–37.4)
MCV RBC AUTO: 91 FL (ref 82–98)
MICROALBUMIN UR-MCNC: 6.6 MG/L (ref 0–20)
MICROALBUMIN/CREAT 24H UR: 11 MG/G CREATININE (ref 0–30)
MONOCYTES # BLD AUTO: 0.44 THOUSAND/ΜL (ref 0.17–1.22)
MONOCYTES NFR BLD AUTO: 7 % (ref 4–12)
NEUTROPHILS # BLD AUTO: 3.4 THOUSANDS/ΜL (ref 1.85–7.62)
NEUTS SEG NFR BLD AUTO: 54 % (ref 43–75)
NRBC BLD AUTO-RTO: 0 /100 WBCS
PLATELET # BLD AUTO: 182 THOUSANDS/UL (ref 149–390)
PMV BLD AUTO: 10.7 FL (ref 8.9–12.7)
POTASSIUM SERPL-SCNC: 4.2 MMOL/L (ref 3.5–5.3)
PROT SERPL-MCNC: 7.3 G/DL (ref 6.4–8.2)
RBC # BLD AUTO: 5.21 MILLION/UL (ref 3.88–5.62)
SODIUM SERPL-SCNC: 140 MMOL/L (ref 136–145)
TRIGL SERPL-MCNC: 105 MG/DL
VIT B12 SERPL-MCNC: 1171 PG/ML (ref 100–900)
WBC # BLD AUTO: 6.16 THOUSAND/UL (ref 4.31–10.16)

## 2021-06-28 PROCEDURE — 83721 ASSAY OF BLOOD LIPOPROTEIN: CPT

## 2021-06-28 PROCEDURE — 83540 ASSAY OF IRON: CPT

## 2021-06-28 PROCEDURE — 80061 LIPID PANEL: CPT

## 2021-06-28 PROCEDURE — 82306 VITAMIN D 25 HYDROXY: CPT

## 2021-06-28 PROCEDURE — 80053 COMPREHEN METABOLIC PANEL: CPT

## 2021-06-28 PROCEDURE — 36415 COLL VENOUS BLD VENIPUNCTURE: CPT

## 2021-06-28 PROCEDURE — 82607 VITAMIN B-12: CPT

## 2021-06-28 PROCEDURE — 83036 HEMOGLOBIN GLYCOSYLATED A1C: CPT

## 2021-06-28 PROCEDURE — 82570 ASSAY OF URINE CREATININE: CPT

## 2021-06-28 PROCEDURE — 82043 UR ALBUMIN QUANTITATIVE: CPT

## 2021-06-28 PROCEDURE — 82728 ASSAY OF FERRITIN: CPT

## 2021-06-28 PROCEDURE — 85025 COMPLETE CBC W/AUTO DIFF WBC: CPT

## 2021-07-08 ENCOUNTER — TELEPHONE (OUTPATIENT)
Dept: INTERNAL MEDICINE CLINIC | Facility: CLINIC | Age: 81
End: 2021-07-08

## 2021-07-08 NOTE — TELEPHONE ENCOUNTER
----- Message from Dakota Reveles MD sent at 7/8/2021  7:11 AM EDT -----   He is scheduled for an appointment on Friday June 16 -please have him bring all of his medicines in a bag to that visit including over-the-counter medicines

## 2021-07-16 ENCOUNTER — OFFICE VISIT (OUTPATIENT)
Dept: INTERNAL MEDICINE CLINIC | Facility: CLINIC | Age: 81
End: 2021-07-16
Payer: MEDICARE

## 2021-07-16 VITALS
WEIGHT: 165.4 LBS | DIASTOLIC BLOOD PRESSURE: 70 MMHG | HEART RATE: 68 BPM | SYSTOLIC BLOOD PRESSURE: 120 MMHG | HEIGHT: 67 IN | BODY MASS INDEX: 25.96 KG/M2 | RESPIRATION RATE: 12 BRPM

## 2021-07-16 DIAGNOSIS — D64.9 ANEMIA, UNSPECIFIED TYPE: ICD-10-CM

## 2021-07-16 DIAGNOSIS — E78.2 MIXED HYPERLIPIDEMIA: ICD-10-CM

## 2021-07-16 DIAGNOSIS — G47.30 SEVERE SLEEP APNEA: ICD-10-CM

## 2021-07-16 DIAGNOSIS — I10 ESSENTIAL HYPERTENSION: ICD-10-CM

## 2021-07-16 DIAGNOSIS — E11.51 TYPE 2 DIABETES MELLITUS WITH DIABETIC PERIPHERAL ANGIOPATHY WITHOUT GANGRENE, WITHOUT LONG-TERM CURRENT USE OF INSULIN (HCC): Primary | ICD-10-CM

## 2021-07-16 DIAGNOSIS — I70.202 POPLITEAL ARTERY OCCLUSION, LEFT (HCC): Chronic | ICD-10-CM

## 2021-07-16 DIAGNOSIS — D50.8 OTHER IRON DEFICIENCY ANEMIA: Chronic | ICD-10-CM

## 2021-07-16 PROCEDURE — 99215 OFFICE O/P EST HI 40 MIN: CPT | Performed by: INTERNAL MEDICINE

## 2021-07-16 NOTE — PROGRESS NOTES
Assessment/Plan:   1  General care- patient brought all his meds in a bag-compared this to his med list and there were some discrepancies  Med lists recreated  Medical regimen updated  2  Left upper leg pain -noted with prolonged sitting -may have cramp like situation-this could be predisposed by his high dose of Crestor -he is currently on 20 milligrams daily- will call if this is more of an issue  3  Diabetes-A1c had been all the way up to 10 8  Had been on glipizide but with skip meals we discontinued that  Only tolerates long-acting metformin 1 gram daily  He is on Lantus insulin  Now on Jardiance as well  A1c is progressively improved in prior to this visit is 7 1  Urine for microalbumin negative  He knows to see Ophthalmology yearly  4  Hyperlipidemia-cardiology increased his Crestor now on 20 milligrams daily  5  Pulmonary nodule -most recent CT scan done in March 2021 shows stable benign nodule -3 millimeters left lower lobe unchanged dating back to 2019  He has paraseptal emphysema and mild pulmonary fibrosis  He is repeat CT scan in the year with ongoing smoking which will be due in March of 2022  6  Previously noted chest discomfort -possibly from his known CAD  Cardiac catheterization in the remote past showed moderate disease with some disease of the LAD  Has diffuse vascular disease with ongoing risk factors and is a smoker  Pharmacologic stress test done in December 2021 shows he went 3 minutes with maximal heart rate 81, no perfusion abnormalities of the low level exercise -chest symptoms have not recurred  7   previouslynoted asymmetric hearing loss on the right-Lyme titer normal-ENT ordered an MRI of his brain to rule out acoustic which shows small-vessel disease but no evidence of acoustic  8  Right fungal otitis-treated instillation by ENT with successful treatment-they have used installation of Lotrisone cream into the ear96  Pruritus -was present over the last year plus   CT scan of the chest done because of other issues shows mild fibrosis, early emphysema unchanged pulmonary nodule   Protein electrophoresis normal   Was felt this may been related to his meds-specifically Jardiance   This is all resolved  9  Iron deficiency anemia -this was noted in the past prior negative endoscopy and colonoscopy   Recently hemoglobin 11 4 with normal white count and platelet count   Protein electrophoresis normal   Ferritin low at 8   TSH normal  Celiac profile recently done and normal   Had repeat endoscopy and colonoscopy in July 2020 showing only diverticular disease otherwise all normal   Remains on iron supplement   May need to consider some capsule endoscopy of the small intestine in the future -hemoglobin prior to this visit has improved to 15 4 from prior value of 11 -CBC with diff iron ferritin ordered prior to next visit  10  Pulmonary nodule -CT the chest on May 2020 shows mild fibrosis, early emphysema and unchanged pulmonary nodule- needs repeat CT in a year which would be due in May of 2021  11  Visual symptoms-may represent Kel martinez - I recommended evaluation in the past by his ophthalmologist- A A REVIEW NEXT VISIT  12  41 millimeter ascending aortic aneurysm   Surgery recommended we monitor  [facility] is on a beta-blocker  [facility] knows not to lift more than 40 pounds  Uyen Lorenz feel surgical criteria would  Be 55 millimeters   Will continue to monitor when he has surveillance for his pulmonary nodule  13  Hypertension -adequately controlled on current dose of ACE-inhibitor plus beta-blocker  14   Severe sleep apnea -on CPAP study 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   Pulmonary was considering CPAP at 8 centimeters open that is central events with clear were time   The patient returns machine to the sleep group stating he would not sleep with it-reviewed again today he states he is not using his device-urged to sleep on his side and he knows not to nuse alcohol closer than 4 hours before bed     All other problems as per note of September 2013        MEDICAL REGIMEN:       5 B12 a 1000 micrograms daily, metoprolol tartrate 25 milligrams half tablet b i d , vitamin D3/1000 units a day, paroxetine 40 milligrams daily, pantoprazole 40 milligrams daily, metformin  milligrams b i d , lisinopril 20 milligrams daily in the evening, iron 65 milligrams daily, generic Jardiance 25 milligrams daily, rosuvastatin 20 milligrams daily, -previously using 40 milligram tablets and now using 20 milligram tablets clopidogrel 75 milligrams daily, Lantus 15 units daily     Appointment in several months with prior chemistry profile cholesterol profile hemoglobin A1c urine for microalbumin  No problem-specific Assessment & Plan notes found for this encounter  a a     Diagnoses and all orders for this visit:    Type 2 diabetes mellitus with diabetic peripheral angiopathy without gangrene, without long-term current use of insulin (HCC)  -     CBC and differential; Future  -     Comprehensive metabolic panel; Future  -     Cholesterol, total; Future  -     HDL cholesterol; Future  -     LDL cholesterol, direct; Future  -     Triglycerides; Future  -     Iron; Future  -     Ferritin; Future  -     Hemoglobin A1C; Future  -     Microalbumin / creatinine urine ratio; Future    Essential hypertension  -     CBC and differential; Future  -     Comprehensive metabolic panel; Future  -     Cholesterol, total; Future  -     HDL cholesterol; Future  -     LDL cholesterol, direct; Future  -     Triglycerides; Future  -     Iron; Future  -     Ferritin; Future  -     Hemoglobin A1C; Future  -     Microalbumin / creatinine urine ratio; Future    Mixed hyperlipidemia  -     CBC and differential; Future  -     Comprehensive metabolic panel; Future  -     Cholesterol, total; Future  -     HDL cholesterol; Future  -     LDL cholesterol, direct; Future  -     Triglycerides; Future  -     Iron;  Future  - chest pain or pressure with activity  There has been no recent evidence of abrupt onset of back pain to suggest potential abdominal aortic aneurysm  We also again reviewed contributing factors of atherosclerosis-including tobacco abuse, hypertension, diabetes mellitus, hyperlipidemia, family history and age  This patient understands the whole concept of risk factors and the need for more aggressive treatment of them as compared to patients who do not have significant underlying atherosclerosis  His risk factors are his diabetes hypertension hyperlipidemia ongoing tobacco use  I strongly urged him to discontinue smoking any told me today that if he stops he feels as though that will "upset the apple cart" he understands that this is a risk factor for lung cancer in addition to accelerating his vascular disease  He brought all of his medicines in a bag today and we went over the 1 x 1    Results for orders placed or performed in visit on 06/28/21  -CBC and differential       Result                      Value             Ref Range           WBC                         6 16              4 31 - 10 16*       RBC                         5 21              3 88 - 5 62 *       Hemoglobin                  15 4              12 0 - 17 0 *       Hematocrit                  47 5              36 5 - 49 3 %       MCV                         91                82 - 98 fL          MCH                         29 6              26 8 - 34 3 *       MCHC                        32 4              31 4 - 37 4 *       RDW                         12 9              11 6 - 15 1 %       MPV                         10 7              8 9 - 12 7 fL       Platelets                   182               149 - 390 Th*       nRBC                        0                 /100 WBCs           Neutrophils Relative        54                43 - 75 %           Immat GRANS %               1                 0 - 2 %             Lymphocytes Relative        30                14 - 44 %           Monocytes Relative          7                 4 - 12 %            Eosinophils Relative        6                 0 - 6 %             Basophils Relative          2 (H)             0 - 1 %             Neutrophils Absolute        3 40              1 85 - 7 62 *       Immature Grans Absolute     0 03              0 00 - 0 20 *       Lymphocytes Absolute        1 86              0 60 - 4 47 *       Monocytes Absolute          0 44              0 17 - 1 22 *       Eosinophils Absolute        0 34              0 00 - 0 61 *       Basophils Absolute          0 09              0 00 - 0 10 *  -Comprehensive metabolic panel       Result                      Value             Ref Range           Sodium                      140               136 - 145 mm*       Potassium                   4 2               3 5 - 5 3 mm*       Chloride                    108               100 - 108 mm*       CO2                         25                21 - 32 mmol*       ANION GAP                   7                 4 - 13 mmol/L       BUN                         17                5 - 25 mg/dL        Creatinine                  1 02              0 60 - 1 30 *       Glucose, Fasting            149 (H)           65 - 99 mg/dL       Calcium                     9 6               8 3 - 10 1 m*       Corrected Calcium           10 1              8 3 - 10 1 m*       AST                         5                 5 - 45 U/L          ALT                         12                12 - 78 U/L         Alkaline Phosphatase        95                46 - 116 U/L        Total Protein               7 3               6 4 - 8 2 g/*       Albumin                     3 4 (L)           3 5 - 5 0 g/*       Total Bilirubin             0 61              0 20 - 1 00 *       eGFR                        68                ml/min/1 73s*  -Hemoglobin A1C       Result                      Value Ref Range           Hemoglobin A1C              7 1 (H)           Normal 3 8-5*       EAG                         157               mg/dl          -Triglycerides       Result                      Value             Ref Range           Triglycerides               105               <=150 mg/dL    -LDL cholesterol, direct       Result                      Value             Ref Range           LDL Direct                  72                0 - 100 mg/dl  -HDL cholesterol       Result                      Value             Ref Range           HDL, Direct                 53                >=40 mg/dL     -Cholesterol, total       Result                      Value             Ref Range           Cholesterol                 144               50 - 200 mg/*  -Vitamin B12       Result                      Value             Ref Range           Vitamin B-12                1,171 (H)         100 - 900 pg*  -Vitamin D 25 hydroxy       Result                      Value             Ref Range           Vit D, 25-Hydroxy           41 8              30 0 - 100 0*  -Microalbumin / creatinine urine ratio       Result                      Value             Ref Range           Creatinine, Ur              57 7              mg/dL               Microalbum  ,U,Random        6 6               0 0 - 20 0 m*       Microalb Creat Ratio        11                0 - 30 mg/g *  -Iron       Result                      Value             Ref Range           Iron                        64 (L)            65 - 175 ug/*  -Ferritin       Result                      Value             Ref Range           Ferritin                    23                8 - 388 ng/mL        He has known hypertension  Preferred BP under 130/80 with his known vascular disease  Preferred LDL under 70 preferred triglycerides under 100  Vitamin-D level is therapeutic at his current dose of vitamin-D supplementation          This patient wanted to know their preferred analgesic agent  Because of their various comorbidities I recommended that this be acetaminophen  This patient has no history of chronic liver disease that would put them at greater risk for use of acetaminophen  This patient may use up to 500-650 mg of acetaminophen at a time and no more than 3 g a day total  Nonsteroidal anti-inflammatory agents have the potential to exacerbate hypertension, hypercoagulability, chronic renal failure, congestive heart failure, and various allergic tendencies  Because of his comorbidities we wanted to use acetaminophen rather than nonsteroidals  The previously noted abdominal pain is not recurred  This may represent a gastroenteritis  Most recent CT scan of his chest reviewed in detail  CT CHEST WITHOUT IV CONTRAST     INDICATION:   R91 1: Solitary pulmonary nodule      COMPARISON:  CT, dated 5/21/2020      TECHNIQUE: CT examination of the chest was performed without intravenous contrast   Axial, sagittal, and coronal 2D reformatted images were created from the source data and submitted for interpretation      Radiation dose length product (DLP) for this visit:  281 92 mGy-cm   This examination, like all CT scans performed in the [facility], was performed utilizing techniques to minimize radiation dose exposure, including the use of iterative   reconstruction and automated exposure control       FINDINGS:     LUNGS:  Redemonstrated is the 3 mm left lower lobe pulmonary nodule (series 3, image 82), stable from studies dating back to 2019, and thus benign  Note is made of scattered calcified granuloma as well as scattered intrapulmonary lymph nodes extending   along the fissures, benign  No new concerning pulmonary nodules are present      Paraseptal emphysema is mild and unchanged    Also unchanged is bilateral lower lung predominant juxtapleural reticulation and groundglass opacification, likely reflecting mild fibrosis      A small amount of debris is present in the trachea      PLEURA:  Unremarkable      HEART/GREAT VESSELS:  Extensive coronary artery calcification is noted  Heart is otherwise unremarkable  No thoracic aortic aneurysm      MEDIASTINUM AND BABITA:  Unremarkable      CHEST WALL AND LOWER NECK:   Unremarkable      VISUALIZED STRUCTURES IN THE UPPER ABDOMEN:  Unremarkable      OSSEOUS STRUCTURES:  No acute fracture or destructive osseous lesion      IMPRESSION:     1  Stable benign pulmonary nodule  No new concerning pulmonary nodules identified  2   Unchanged mild pulmonary fibrosis  3   A small amount of debris is present in the trachea               Workstation performed: [id number]    he is interested in continuous glucose monitoring  He will be speaking to the [place] about this  I reviewed with the patient that prior [facility] users had not been able to obtain this but he hopes to "break the mold"     He denies any chest pain or pressure with known history CAD  He did obtain COVID vaccine without any major complications  There is a question as to whether not he should have repeat pneumococcal 23 vaccine  He at last had this in June of 2012  Literature is controversial reference to this -1 group does not recommend this another group is recommending booster every 10 years in non high risks upset patients  He denies significant cough or hemoptysis with his ongoing tobacco abuse    We talked about occasional left upper leg pain going to get up from a sitting position -borderline cramp-      The following portions of the patient's history were reviewed and updated as appropriate: allergies, current medications, past family history, past medical history, past social history, past surgical history and problem list     Review of Systems   Constitutional: Negative  HENT: Negative  Respiratory: Negative  Cardiovascular: Negative  Gastrointestinal: Negative  Endocrine: Negative  Genitourinary: Negative           Nocturia times 2-3 Musculoskeletal: Negative  Skin: Negative  Neurological: Negative  Hematological: Negative  Psychiatric/Behavioral: Negative  Objective:      Ht 5' 7" (1 702 m)   Wt 75 kg (165 lb 6 4 oz)   BMI 25 91 kg/m²          Physical Exam  Vitals reviewed  Constitutional:       General: He is not in acute distress  Appearance: Normal appearance  He is not ill-appearing, toxic-appearing or diaphoretic  HENT:      Head: Normocephalic and atraumatic  Right Ear: Ear canal and external ear normal       Left Ear: Ear canal and external ear normal       Nose: Nose normal  No congestion or rhinorrhea  Mouth/Throat:      Mouth: Mucous membranes are moist       Pharynx: Oropharynx is clear  No oropharyngeal exudate or posterior oropharyngeal erythema  Eyes:      General: No scleral icterus  Right eye: No discharge  Left eye: No discharge  Extraocular Movements: Extraocular movements intact  Pupils: Pupils are equal, round, and reactive to light  Neck:      Vascular: No carotid bruit  Cardiovascular:      Rate and Rhythm: Normal rate and regular rhythm  Heart sounds: Normal heart sounds  No murmur heard  No friction rub  No gallop  Comments: DP and PT pulses 1+ bilaterally  Pulmonary:      Effort: Pulmonary effort is normal  No respiratory distress  Breath sounds: Normal breath sounds  No stridor  No wheezing, rhonchi or rales  Chest:      Chest wall: No tenderness  Abdominal:      General: Abdomen is flat  Bowel sounds are normal  There is no distension  Palpations: Abdomen is soft  There is no mass  Tenderness: There is no abdominal tenderness  There is no right CVA tenderness, left CVA tenderness, guarding or rebound  Hernia: No hernia is present  Comments: Scar from prior surgery   Musculoskeletal:         General: No swelling, tenderness, deformity or signs of injury  Normal range of motion        Cervical back: Normal range of motion and neck supple  No rigidity  No muscular tenderness  Right lower leg: No edema  Left lower leg: No edema  Lymphadenopathy:      Cervical: No cervical adenopathy  Skin:     General: Skin is warm and dry  Coloration: Skin is not jaundiced or pale  Findings: No bruising, erythema, lesion or rash  Neurological:      General: No focal deficit present  Mental Status: He is alert and oriented to person, place, and time  Mental status is at baseline  Cranial Nerves: No cranial nerve deficit  Sensory: No sensory deficit  Motor: No weakness  Coordination: Coordination normal       Gait: Gait normal       Deep Tendon Reflexes: Reflexes normal    Psychiatric:         Mood and Affect: Mood normal          Behavior: Behavior normal          Thought Content:  Thought content normal          Judgment: Judgment normal

## 2021-08-02 DIAGNOSIS — E78.2 MIXED HYPERLIPIDEMIA: ICD-10-CM

## 2021-08-02 RX ORDER — ROSUVASTATIN CALCIUM 10 MG/1
10 TABLET, COATED ORAL DAILY
Qty: 90 TABLET | Refills: 0 | Status: CANCELLED | OUTPATIENT
Start: 2021-08-02

## 2021-08-02 NOTE — TELEPHONE ENCOUNTER
Medical regimen shows pt is on 20mg      I called and sp/w [facility] but they think he is cuttting it in half giving him only a 45 day supply     He says he is now 20mg once daily with 90 day supply    Dr Mccracken please sign handwritten script I put on desk in order to fax to the [place] @ [phone]

## 2021-08-19 ENCOUNTER — TELEPHONE (OUTPATIENT)
Dept: INTERNAL MEDICINE CLINIC | Facility: CLINIC | Age: 81
End: 2021-08-19

## 2021-08-19 NOTE — TELEPHONE ENCOUNTER
patient called stating his equilibrium was off this morning   He is lightheaded  Patient stated he took a pill and it helped calm it down   Natanael Benjamin Didn't know the name off hand , but is he requesting a new script to be called ?   Or does he need an appointment     Please advise

## 2021-08-20 NOTE — TELEPHONE ENCOUNTER
Pt missed appt,   I sp/w Ismael Violeta wife and said she said didn't get call  I advised we called and left a message but she claims he is feeling better now since he has taken over the counter dramamine

## 2021-09-20 ENCOUNTER — TELEPHONE (OUTPATIENT)
Dept: INTERNAL MEDICINE CLINIC | Facility: CLINIC | Age: 81
End: 2021-09-20

## 2021-09-20 NOTE — TELEPHONE ENCOUNTER
Najma Me called and said his equilibrium is off  It happened a few times recently before now  He siad he gets sick, cant stand or walk  It started over the weekend  Please advise      [phone]

## 2021-09-21 ENCOUNTER — OFFICE VISIT (OUTPATIENT)
Dept: INTERNAL MEDICINE CLINIC | Facility: CLINIC | Age: 81
End: 2021-09-21
Payer: MEDICARE

## 2021-09-21 VITALS
SYSTOLIC BLOOD PRESSURE: 126 MMHG | HEART RATE: 72 BPM | HEIGHT: 67 IN | RESPIRATION RATE: 12 BRPM | WEIGHT: 163 LBS | BODY MASS INDEX: 25.58 KG/M2 | DIASTOLIC BLOOD PRESSURE: 70 MMHG

## 2021-09-21 DIAGNOSIS — R42 DIZZINESS: ICD-10-CM

## 2021-09-21 DIAGNOSIS — H81.10 BENIGN PAROXYSMAL POSITIONAL VERTIGO, UNSPECIFIED LATERALITY: Primary | ICD-10-CM

## 2021-09-21 DIAGNOSIS — E11.51 TYPE 2 DIABETES MELLITUS WITH DIABETIC PERIPHERAL ANGIOPATHY WITHOUT GANGRENE, WITHOUT LONG-TERM CURRENT USE OF INSULIN (HCC): ICD-10-CM

## 2021-09-21 PROCEDURE — 99214 OFFICE O/P EST MOD 30 MIN: CPT | Performed by: INTERNAL MEDICINE

## 2021-09-21 NOTE — PROGRESS NOTES
Assessment/Plan:  1  Dizziness-suspect inner ear related -this may represent BPPV  Borderline Hallpike maneuver today  I recommended formal vestibular therapy  As noted he has used his significant other's meclizine intermittently but I told him it is preferred he proceed with vestibular therapy  2  Asymmetric hearing loss on the right  Lyme titer normal   MRI the brain done in December 2020 shows small-vessel disease but no evidence of acoustic neuroma  3  Hypertension-adequately controlled on current dose of ACE-inhibitor plus beta-blocker     All other problems as per note of July 2021 and September 2013      Vitamin B12 -1000 micrograms daily, metoprolol tartrate 25 milligrams half tablet b i d , vitamin D3 / 1000 units a day, paroxetine 40 milligrams daily, pantoprazole 40 milligrams daily, metformin  milligrams b i d , lisinopril 20 milligrams daily in the evening, iron 65 milligrams daily, Jardiance 25 milligrams daily, rosuvastatin 20 milligrams daily - was previously using 40 milligram tablets and now continuing that-40 milligrams daily clopidogrel 75 milligrams daily, Lantus insulin 15 units daily    He will be seen at previously scheduled appointment with previously scheduled labs  No problem-specific Assessment & Plan notes found for this encounter  Diagnoses and all orders for this visit:    Benign paroxysmal positional vertigo, unspecified laterality  -     Ambulatory referral to Physical Therapy; Future    Dizziness    Type 2 diabetes mellitus with diabetic peripheral angiopathy without gangrene, without long-term current use of insulin ([facility])          Subjective:      Patient ID: Arjun García is a 79 y o  male  He is seen today as an emergency appointment  He describes dizziness  He says he can have this on and off for 2-3 days a time  He says it is a vertigo like sensation  He notes it with turning his head  He can have nausea with it  He does not have a headache    He does not have any weakness of 1 arm and leg compared to the other  He does not have any numbness of 1 arm and leg compared to the other  He denies blurred or double vision or difficulty with speech  He describes  Some hearing loss in his right ear times the past several months  He has not had any head trauma  He had a borderline Hallpike maneuver today  He has a known history of diffuse vascular disease  He has a known history of CAD  He denies any chest pain or pressure with the above  He denies any palpitations with the above  He has known hypertension  BP adequately controlled on current regimen  Avoiding salt and decongestants  No evidence of orthostatic hypotension today      he has not had any weight loss  He denies any fever night sweats  He denies any head trauma  He has used his  A significant other's meclizine with some relief of symptoms  He has diffuse atherosclerosis on always watching to make sure any symptoms are not a manifestation of that  He was concerned about significant cerumen accumulation  He had no evidence of cerumen in either ear today  He has not had any vomiting with the above  He has known diabetes  He has not had any associated hypoglycemia to explain his symptoms  MRI BRAIN AND IAC'S -  WITH AND WITHOUT CONTRAST     INDICATION: H90 42: Sensorineural hearing loss, unilateral, left ear, with unrestricted hearing on the contralateral side  R26 89: Other abnormalities of gait and mobility  Imbalance      COMPARISON:  2/15/2019     TECHNIQUE:  Brain:   Sagittal T1, axial T2, axial [place], axial T1, axial FLAIR, axial diffusion imaging  Axial T1 postcontrast   Axial BRAVO post contrast   IAC'S:  Coronal FIESTA, coronal T1 postcontrast, axial T1 postcontrast with fat suppression    Targeted images of the IAC'S were performed requiring additional time at acquisition and interpretation of approximately 25%     IV Contrast:  7 mL of Gadobutrol injection (SINGLE-DOSE)      IMAGE QUALITY:   Diagnostic      FINDINGS:     BRAIN PARENCHYMA:  There is no evidence of intra-axial or extra-axial hemorrhage  No midline shift or mass-effect is demonstrated  The ventricular system is not dilated out of proportion to the degree of parenchymal volume loss  The cerebral parenchyma   demonstrates signal abnormality in the  subcortical  white matter, compatible with mild microangiopathy  Age-appropriate parenchymal involution noted  The cerebellar tonsils are normal in position        There is no diffusion restriction to suggest recent infarction  Normal postcontrast imaging      IAC'S:  No CP angle mass or abnormal enhancement  Normal aeration of the mastoid air cells and middle ear cavity      VENTRICLES:  Normal      SELLA AND PITUITARY GLAND:  Normal      ORBITS:  Normal      PARANASAL SINUSES:  Moderate right maxillary sinus mucosal thickening /signal abnormality      VASCULATURE:  Evaluation of the major intracranial vasculature demonstrates appropriate flow voids      CALVARIUM AND SKULL BASE:  Normal      EXTRACRANIAL SOFT TISSUES:  Normal      IMPRESSION:        1  No acute infarction, intracranial hemorrhage or mass  2   Mild, chronic microangiopathy  3   No cerebellopontine angle cistern mass lesion  Normal-appearing 7th and 8th cranial nerve complexes      Workstation performed: [id number]         The following portions of the patient's history were reviewed and updated as appropriate: allergies, current medications, past family history, past medical history, past social history, past surgical history and problem list     Review of Systems   Constitutional: Negative  HENT: Negative  Respiratory: Negative  Cardiovascular: Negative  Gastrointestinal: Negative  Endocrine: Negative  Genitourinary: Negative  Musculoskeletal: Negative  Skin: Negative  Neurological: Positive for dizziness  Hematological: Negative      Psychiatric/Behavioral: Negative  Objective:      /72   Pulse 72   Resp 12   Ht 5' 7" (1 702 m)   Wt 73 9 kg (163 lb)   BMI 25 53 kg/m²          Physical Exam  Vitals reviewed  Constitutional:       General: He is not in acute distress  Appearance: Normal appearance  He is not ill-appearing, toxic-appearing or diaphoretic  HENT:      Head: Normocephalic and atraumatic  Right Ear: Tympanic membrane, ear canal and external ear normal       Left Ear: Tympanic membrane, ear canal and external ear normal       Nose: Nose normal  No congestion or rhinorrhea  Mouth/Throat:      Mouth: Mucous membranes are moist       Pharynx: Oropharynx is clear  No oropharyngeal exudate or posterior oropharyngeal erythema  Eyes:      General: No scleral icterus  Right eye: No discharge  Left eye: No discharge  Extraocular Movements: Extraocular movements intact  Pupils: Pupils are equal, round, and reactive to light  Neck:      Vascular: No carotid bruit  Cardiovascular:      Rate and Rhythm: Normal rate and regular rhythm  Heart sounds: Normal heart sounds  No murmur heard  No friction rub  No gallop  Comments: Decreased PT and DP a pulses bilaterally  Pulmonary:      Effort: Pulmonary effort is normal  No respiratory distress  Breath sounds: Normal breath sounds  No stridor  No wheezing, rhonchi or rales  Chest:      Chest wall: No tenderness  Abdominal:      General: Abdomen is flat  Bowel sounds are normal  There is no distension  Palpations: Abdomen is soft  There is no mass  Tenderness: There is no abdominal tenderness  There is no right CVA tenderness, left CVA tenderness, guarding or rebound  Hernia: No hernia is present  Musculoskeletal:         General: No swelling, tenderness, deformity or signs of injury  Normal range of motion  Cervical back: Normal range of motion and neck supple  No rigidity  No muscular tenderness        Right lower leg: No edema  Left lower leg: No edema  Lymphadenopathy:      Cervical: No cervical adenopathy  Skin:     General: Skin is warm and dry  Coloration: Skin is not jaundiced or pale  Findings: No bruising, erythema, lesion or rash  Neurological:      General: No focal deficit present  Mental Status: He is alert and oriented to person, place, and time  Mental status is at baseline  Cranial Nerves: No cranial nerve deficit  Sensory: No sensory deficit  Motor: No weakness  Coordination: Coordination normal       Gait: Gait normal       Deep Tendon Reflexes: Reflexes normal    Psychiatric:         Mood and Affect: Mood normal          Behavior: Behavior normal          Thought Content:  Thought content normal          Judgment: Judgment normal

## 2021-09-27 ENCOUNTER — EVALUATION (OUTPATIENT)
Dept: PHYSICAL THERAPY | Facility: CLINIC | Age: 81
End: 2021-09-27
Payer: MEDICARE

## 2021-09-27 DIAGNOSIS — H81.10 BENIGN PAROXYSMAL POSITIONAL VERTIGO, UNSPECIFIED LATERALITY: ICD-10-CM

## 2021-09-27 DIAGNOSIS — R26.89 BALANCE DISORDER: Primary | ICD-10-CM

## 2021-09-27 PROCEDURE — 97163 PT EVAL HIGH COMPLEX 45 MIN: CPT

## 2021-09-27 NOTE — PROGRESS NOTES
PT Evaluation     Today's date: 2021  Patient name: Thien De Leon  : 1940  MRN: [id number]  Referring provider: Connie Ramirez MD  Dx:   Encounter Diagnosis     ICD-10-CM    1  Balance disorder  R26 89    2  Benign paroxysmal positional vertigo, unspecified laterality  H81 10 Ambulatory referral to Physical Therapy       Start Time: 0900  Stop Time: 1000  Total time in clinic (min): 60 minutes    Assessment  Assessment details: Patient presents to clinic with signs and symptoms of previous BPPV  All central vestibular signs negative, as well as no dizziness evoked with positional testing, or VOR activities  Patient does however present with impaired balance, as evidenced with impaired MCTSIB in eyes closed and foam situations, demonstrating that patient has impaired vestibular function that may be from recent vestibular deficits, as well as impaired proprioception, possibly due to diabetic neuropathy in bilateral feet  Patient would benefit from skilled physical therapy to improve balance, improve safety, and decrease fall risk  Patient would benefit from further evaluation of balance with FGA performed next session to further assess fall risk  Impairments: impaired balance, lacks appropriate home exercise program and safety issue  Understanding of Dx/Px/POC: good   Prognosis: good    Goals  ST  Pt will improve gait speed to at least 1 m/s  within 2 weeks needed to improve safety with ambulation  2  Assess dynamic balance with FGA and plan goal accordingly  3  Pt will improve TUG score to at <11 5 seconds in 2 weeks needed to reduce fall risk  4  Pt will improve MCTSIB score to at least 15 seconds on foam and eyes closed situations in 2 weeks to improve balance  5  Pt will continue to demonstrate negative positional testing for BPPV  LT  Pt will improve gait speed to at least 1 22 m/s within 4 weeks needed to improve safety with ambulation    2  Pt will improve FGA score to at least 22/30 within 4 weeks needed to reduce fall risk  3  Pt will improve MCTSIB score to at least 30 seconds with eyes closed and foam surfaces in 4 weeks to improve balance  4  Pt will demonstrate independence with HEP within 4 weeks  Plan  Plan details: Continue to assess positional testing for reoccurrence of BPPV  Further assessment of balance with FGA next session and address balance deficits  Was limited during initial evaluation due to time constraints  Patient would benefit from: skilled physical therapy  Planned therapy interventions: balance, balance/weight bearing training, neuromuscular re-education, canalith repositioning, patient education, strengthening, therapeutic activities, therapeutic exercise and home exercise program  Frequency: 2x week  Duration in weeks: 4  Plan of Care beginning date: 9/27/2021  Plan of Care expiration date: 10/25/2021  Treatment plan discussed with: patient        Subjective Evaluation    History of Present Illness  Mechanism of injury: Patient reports he has "particles in the snow globe go off", starting 3 weeks ago, 3 weekends in a row  He reports that everything starts spinning  He reports he had this happened years ago, where he needed to lay in bed for a few days and then it went away, but was so bad he had to crawl on the floor because he could not walk  Patient reports he only has been having dizziness on the weekends  Reports he has dizziness and room spinning when getting out bed  Decreased when he stays still and does not move, but is still occasionally there  Looking up and down, bending over make it worse  States that when he gets dizzy he stays dizzy the entire day  Dizziness right now 0/10, at worst it gets 10/10  States when he is dizzy he feels unbalanced when walking and feels like he could fall over  He states he has not had any dizziness for about a week at this time   Took meclazine for dizziness when he had it 2 weekends ago that helped, but has not been taking it since then  He states that his balance is not as good, he feels like he might fall when turning around quickly  He states his legs also get tired, and he has some numbness in the bottom of both feet  History of migraines, last one 20 years ago  Pain  Relieving factors: rest    Social Support  Steps to enter house: no  Stairs in house: yes   5  Patient lives at: split level  Lives with: spouse    Patient Goals  Patient goal: get rid of dizziness and get stronger        Objective     Concurrent Complaints  Positive for nausea/motion sickness, hearing loss (R ear) and peripheral neuropathy (diabetic neuropathy in feet)  Negative for headaches, tinnitus, visual change, memory loss, aural fullness and poor concentration    Neurological Testing     Additional Neurological Details  Decreased sensation at bilateral feet    Active Range of Motion   Cervical/Thoracic Spine       Cervical    Flexion: Neck active flexion: soreness in R side of neck, decreased ROM extension, R rotation, SB  Neuro Exam:     Dizziness  Positive for disequilibrium, vertigo, light-headedness and rocking or swaying  Negative for oscillopsia, motion sickness, diplopia and floating or swimming  Exacerbating factors  Positive for bending over, rolling in bed, looking up, walking, turning head and supine to/from sitting  Negative for optokinetic movement and walking in busy environment  Symptoms   Intensity at best: 0/10  Intensity at worst: 10/10  Average intensity: 4/10    Headaches   Patient reports headaches: No      Cervical exam   Ligament Laxity Testing   Alar ligament: WNL  Sharp Liliya:  WNL  Modified VBI   Left: asymptomatic  Right: asymptomatic  Seated posture: forward head posture    Oculomotor exam   Oculomotor ROM: WNL  Resting nystagmus: not present   Gaze holding nystagmus: not present left  and not present right  Smooth pursuits: within normal limits  Vertical saccades: normal  Horizontal saccades: normal  Convergence: normal  Head thrust: left normal and right normal    Positional testing   Harriet-Hallpike   Left posterior canal: WNL  Right posterior canal: WNL  Roll test   Left horizontal canal: WNL  Right horizontal canal: WNL  Positional testing comment: Harriet-Hallpike position with pillow underneath upper back due to decreased cervical ROM  Sidelying test for posterior canal negative bilaterally  Sensation   Light touch LE: left impaired and right impaired    Coordination   Finger to nose: left WNL and right WNL  Rapid alternating movements: UE WNL      Balance assessments   MCTSIB   Eyes open level surface: 30  Eyes open foam surface: 15  Eyes closed level surface: 8  Eyes closed foam surface: 5    Balance Test 21   6 Minute Walk Test (ft):    FGA NV   Gait Speed (ft/s): NV   5x Sit To Stand (s): 12 5 seconds without UE's   TU 2 seconds without AD             Gait Assessment: Patient ambulates with decreased gait speed, increased VASILIY, decreased stability with turning around               Precautions: HTN    Manuals                                                                 Neuro Re-Ed FGA NV    Gait speed next session NV                                                                                                         Ther Ex                                                                                                                     Ther Activity                                       Gait Training                                       Modalities

## 2021-10-01 ENCOUNTER — OFFICE VISIT (OUTPATIENT)
Dept: PHYSICAL THERAPY | Facility: CLINIC | Age: 81
End: 2021-10-01
Payer: MEDICARE

## 2021-10-01 DIAGNOSIS — H81.10 BENIGN PAROXYSMAL POSITIONAL VERTIGO, UNSPECIFIED LATERALITY: ICD-10-CM

## 2021-10-01 DIAGNOSIS — R26.89 BALANCE DISORDER: Primary | ICD-10-CM

## 2021-10-01 PROCEDURE — 97110 THERAPEUTIC EXERCISES: CPT

## 2021-10-01 PROCEDURE — 97112 NEUROMUSCULAR REEDUCATION: CPT

## 2021-10-05 ENCOUNTER — OFFICE VISIT (OUTPATIENT)
Dept: PHYSICAL THERAPY | Facility: CLINIC | Age: 81
End: 2021-10-05
Payer: MEDICARE

## 2021-10-05 DIAGNOSIS — R26.89 BALANCE DISORDER: Primary | ICD-10-CM

## 2021-10-05 DIAGNOSIS — H81.10 BENIGN PAROXYSMAL POSITIONAL VERTIGO, UNSPECIFIED LATERALITY: ICD-10-CM

## 2021-10-05 PROCEDURE — 97112 NEUROMUSCULAR REEDUCATION: CPT

## 2021-10-05 PROCEDURE — 97110 THERAPEUTIC EXERCISES: CPT

## 2021-10-07 ENCOUNTER — OFFICE VISIT (OUTPATIENT)
Dept: PHYSICAL THERAPY | Facility: CLINIC | Age: 81
End: 2021-10-07
Payer: MEDICARE

## 2021-10-07 DIAGNOSIS — R26.89 BALANCE DISORDER: Primary | ICD-10-CM

## 2021-10-07 DIAGNOSIS — H81.10 BENIGN PAROXYSMAL POSITIONAL VERTIGO, UNSPECIFIED LATERALITY: ICD-10-CM

## 2021-10-07 PROCEDURE — 97112 NEUROMUSCULAR REEDUCATION: CPT | Performed by: PHYSICAL THERAPIST

## 2021-10-07 PROCEDURE — 97110 THERAPEUTIC EXERCISES: CPT | Performed by: PHYSICAL THERAPIST

## 2021-10-11 ENCOUNTER — OFFICE VISIT (OUTPATIENT)
Dept: PHYSICAL THERAPY | Facility: CLINIC | Age: 81
End: 2021-10-11
Payer: MEDICARE

## 2021-10-11 DIAGNOSIS — R26.89 BALANCE DISORDER: Primary | ICD-10-CM

## 2021-10-11 DIAGNOSIS — H81.10 BENIGN PAROXYSMAL POSITIONAL VERTIGO, UNSPECIFIED LATERALITY: ICD-10-CM

## 2021-10-11 PROCEDURE — 97110 THERAPEUTIC EXERCISES: CPT

## 2021-10-11 PROCEDURE — 97112 NEUROMUSCULAR REEDUCATION: CPT

## 2021-10-14 ENCOUNTER — OFFICE VISIT (OUTPATIENT)
Dept: PHYSICAL THERAPY | Facility: CLINIC | Age: 81
End: 2021-10-14
Payer: MEDICARE

## 2021-10-14 DIAGNOSIS — H81.10 BENIGN PAROXYSMAL POSITIONAL VERTIGO, UNSPECIFIED LATERALITY: Primary | ICD-10-CM

## 2021-10-14 DIAGNOSIS — R26.89 BALANCE DISORDER: ICD-10-CM

## 2021-10-14 PROCEDURE — 97112 NEUROMUSCULAR REEDUCATION: CPT

## 2021-10-14 PROCEDURE — 97110 THERAPEUTIC EXERCISES: CPT

## 2021-10-18 ENCOUNTER — OFFICE VISIT (OUTPATIENT)
Dept: PHYSICAL THERAPY | Facility: CLINIC | Age: 81
End: 2021-10-18
Payer: MEDICARE

## 2021-10-18 DIAGNOSIS — R26.89 BALANCE DISORDER: ICD-10-CM

## 2021-10-18 DIAGNOSIS — H81.10 BENIGN PAROXYSMAL POSITIONAL VERTIGO, UNSPECIFIED LATERALITY: Primary | ICD-10-CM

## 2021-10-18 PROCEDURE — 97112 NEUROMUSCULAR REEDUCATION: CPT

## 2021-10-18 PROCEDURE — 97110 THERAPEUTIC EXERCISES: CPT

## 2021-10-22 ENCOUNTER — OFFICE VISIT (OUTPATIENT)
Dept: PHYSICAL THERAPY | Facility: CLINIC | Age: 81
End: 2021-10-22
Payer: MEDICARE

## 2021-10-22 DIAGNOSIS — R26.89 BALANCE DISORDER: ICD-10-CM

## 2021-10-22 DIAGNOSIS — H81.10 BENIGN PAROXYSMAL POSITIONAL VERTIGO, UNSPECIFIED LATERALITY: Primary | ICD-10-CM

## 2021-10-22 PROCEDURE — 97110 THERAPEUTIC EXERCISES: CPT

## 2021-10-25 ENCOUNTER — EVALUATION (OUTPATIENT)
Dept: PHYSICAL THERAPY | Facility: CLINIC | Age: 81
End: 2021-10-25
Payer: MEDICARE

## 2021-10-25 DIAGNOSIS — H81.10 BENIGN PAROXYSMAL POSITIONAL VERTIGO, UNSPECIFIED LATERALITY: Primary | ICD-10-CM

## 2021-10-25 DIAGNOSIS — R26.89 BALANCE DISORDER: ICD-10-CM

## 2021-10-25 PROCEDURE — 97112 NEUROMUSCULAR REEDUCATION: CPT | Performed by: PHYSICAL THERAPIST

## 2021-10-27 NOTE — PATIENT INSTRUCTIONS
Atherosclerosis of artery of extremity with intermittent claudication (HCC)  Doing well following revision of femoral to above knee popliteal bypass with extension of bypass to the below knee popliteal artery  He has resolution of his claudication symptoms  He does complain of some swelling in the foot and some numbness of the toes which was present preop  At this time we will plan standard follow-up with duplex evaluation in 3 months 
No

## 2021-10-28 ENCOUNTER — APPOINTMENT (OUTPATIENT)
Dept: PHYSICAL THERAPY | Facility: CLINIC | Age: 81
End: 2021-10-28
Payer: MEDICARE

## 2021-11-03 ENCOUNTER — TELEPHONE (OUTPATIENT)
Dept: INTERNAL MEDICINE CLINIC | Facility: CLINIC | Age: 81
End: 2021-11-03

## 2021-12-03 ENCOUNTER — OFFICE VISIT (OUTPATIENT)
Dept: INTERNAL MEDICINE CLINIC | Facility: CLINIC | Age: 81
End: 2021-12-03
Payer: MEDICARE

## 2021-12-03 VITALS
HEART RATE: 72 BPM | BODY MASS INDEX: 26.18 KG/M2 | WEIGHT: 166.8 LBS | DIASTOLIC BLOOD PRESSURE: 70 MMHG | SYSTOLIC BLOOD PRESSURE: 120 MMHG | RESPIRATION RATE: 12 BRPM | HEIGHT: 67 IN

## 2021-12-03 DIAGNOSIS — G47.30 SEVERE SLEEP APNEA: ICD-10-CM

## 2021-12-03 DIAGNOSIS — R93.89 ABNORMAL CT SCAN: Primary | ICD-10-CM

## 2021-12-03 DIAGNOSIS — I10 ESSENTIAL HYPERTENSION: Chronic | ICD-10-CM

## 2021-12-03 DIAGNOSIS — G47.30 OBSERVED SLEEP APNEA: ICD-10-CM

## 2021-12-03 DIAGNOSIS — I73.9 PERIPHERAL ARTERIAL DISEASE (HCC): Chronic | ICD-10-CM

## 2021-12-03 DIAGNOSIS — R91.8 ABNORMAL CT SCAN OF LUNG: ICD-10-CM

## 2021-12-03 DIAGNOSIS — E11.51 TYPE 2 DIABETES MELLITUS WITH DIABETIC PERIPHERAL ANGIOPATHY WITHOUT GANGRENE, WITHOUT LONG-TERM CURRENT USE OF INSULIN (HCC): ICD-10-CM

## 2021-12-03 DIAGNOSIS — H81.10 BENIGN PAROXYSMAL POSITIONAL VERTIGO, UNSPECIFIED LATERALITY: ICD-10-CM

## 2021-12-03 PROCEDURE — 99215 OFFICE O/P EST HI 40 MIN: CPT | Performed by: INTERNAL MEDICINE

## 2021-12-03 RX ORDER — CARBOXYMETHYLCELLULOSE SODIUM 5 MG/ML
SOLUTION/ DROPS OPHTHALMIC
COMMUNITY
Start: 2021-07-23

## 2022-01-26 ENCOUNTER — HOSPITAL ENCOUNTER (OUTPATIENT)
Dept: NON INVASIVE DIAGNOSTICS | Facility: CLINIC | Age: 82
Discharge: HOME | End: 2022-01-26
Payer: MEDICARE

## 2022-01-26 DIAGNOSIS — I65.23 CAROTID STENOSIS, BILATERAL: ICD-10-CM

## 2022-01-26 PROCEDURE — 93880 EXTRACRANIAL BILAT STUDY: CPT

## 2022-01-26 PROCEDURE — 93880 EXTRACRANIAL BILAT STUDY: CPT | Performed by: SURGERY

## 2022-02-15 NOTE — PROGRESS NOTES
Assessment/Plan:    Status post femoral-popliteal bypass surgery  History of right fem-below-knee pop bypass in 2010 with subsequent percutaneous intervention  History of left fem above knee pop bypass in 2011 with subsequent percutaneous intervention  Patient remains asymptomatic  Patient's last surveillance study was performed on 12/09/2020  Will repeat in 6 months to coincide with his repeat carotid duplex  Continue Plavix and statin  Asymptomatic carotid artery stenosis  Asymptomatic bilateral carotid stenosis  Velocities consistent with less than 50% stenosis on the right and 50-69% stenosis on the left  Continue biannual surveillance  Thoracic aortic aneurysm without rupture (HCC)  Known 4 cm ascending aortic aneurysm  Patient also followed for pulmonary nodule  Has yearly CT scans ordered by primary care physician  The aneurysm can continue to be monitored with chest CT: next CT scheduled for March 2022  Diagnoses and all orders for this visit:    Asymptomatic bilateral carotid artery stenosis  -     VAS carotid complete study; Future  -     VAS lower limb arterial duplex, complete bilateral; Future    Atherosclerosis of artery of extremity with intermittent claudication (HCC)  -     VAS carotid complete study; Future  -     VAS lower limb arterial duplex, complete bilateral; Future    Carotid stenosis, bilateral  -     VAS carotid complete study; Future  -     VAS lower limb arterial duplex, complete bilateral; Future    Type 2 diabetes mellitus with diabetic peripheral angiopathy without gangrene, without long-term current use of insulin (HCC)    Thoracic aortic aneurysm without rupture (HCC)          Subjective:      Patient ID: Winsome Ruiz is a 79 y o  male  Patient present of a yearly visit  Patient had a CV done on 1/26/22  Patient remains asymptomatic  Tests/images and previous notes reviewed and discussed with patient   Patient denies any TIA CVA symptoms or claudication  Discussed importance of smoking cessation and maintaining a daily walking exercise program   Patient is currently taking Rosuvastatin and Plavix  Procedure hx:   2018-10-04 Left Angioplasty with stent from distal SFA to distal Popliteal artery  2018-04-30 Left FemPop Bypass BK  2018-01-22 Drug eluded balloon angioplasty Left Popliteal artery with stent  placement  2012-04-04 Left Percutaneous graft balloon angioplasty  2012-04-04 Right Percutaneous graft balloon angioplasty  2012-04-04 Rt SFA Stent  2011-01-25 Left FemPop Bypass AK Greater saphenous  2010-10-12 Right FemPop Bypass BK Greater saphenous  2010-10-12 Right Tibioperoneal endarterectomy Greater saphenous patch      The following portions of the patient's history were reviewed and updated as appropriate: allergies, current medications, past family history, past medical history, past social history, past surgical history and problem list     Review of Systems   Constitutional: Negative  HENT: Negative  Eyes: Negative  Respiratory: Negative  Cardiovascular: Negative  Gastrointestinal: Negative  Endocrine: Negative  Genitourinary: Negative  Musculoskeletal: Negative  Skin: Negative  Allergic/Immunologic: Negative  Neurological: Negative  Hematological: Negative  Psychiatric/Behavioral: Negative  Objective:      /60 (BP Location: Right arm, Patient Position: Sitting, Cuff Size: Adult)   Pulse 63   Ht 5' 8" (1 727 m)   Wt 72 6 kg (160 lb)   BMI 24 33 kg/m²          Physical Exam  Vitals and nursing note reviewed  Constitutional:       Appearance: He is well-developed  HENT:      Head: Normocephalic and atraumatic  Eyes:      Conjunctiva/sclera: Conjunctivae normal       Pupils: Pupils are equal, round, and reactive to light  Neck:      Vascular: No JVD  Cardiovascular:      Rate and Rhythm: Normal rate and regular rhythm        Comments: Palpable graft pulses bilaterally  Pulmonary:      Effort: No respiratory distress  Breath sounds: No stridor  No wheezing or rales  Chest:      Chest wall: No tenderness  Abdominal:      General: There is no distension  Palpations: Abdomen is soft  There is no mass  Tenderness: There is no abdominal tenderness  There is no guarding or rebound  Musculoskeletal:         General: No tenderness or deformity  Normal range of motion  Cervical back: Normal range of motion and neck supple  Skin:     General: Skin is warm and dry  Findings: No erythema or rash  Neurological:      Mental Status: He is alert and oriented to person, place, and time  Psychiatric:         Behavior: Behavior normal          Thought Content:  Thought content normal

## 2022-02-16 ENCOUNTER — OFFICE VISIT (OUTPATIENT)
Dept: VASCULAR SURGERY | Facility: CLINIC | Age: 82
End: 2022-02-16
Payer: MEDICARE

## 2022-02-16 VITALS
DIASTOLIC BLOOD PRESSURE: 60 MMHG | HEIGHT: 68 IN | HEART RATE: 63 BPM | SYSTOLIC BLOOD PRESSURE: 110 MMHG | BODY MASS INDEX: 24.25 KG/M2 | WEIGHT: 160 LBS

## 2022-02-16 DIAGNOSIS — I70.219 ATHEROSCLEROSIS OF ARTERY OF EXTREMITY WITH INTERMITTENT CLAUDICATION (HCC): ICD-10-CM

## 2022-02-16 DIAGNOSIS — I65.23 CAROTID STENOSIS, BILATERAL: ICD-10-CM

## 2022-02-16 DIAGNOSIS — I65.23 ASYMPTOMATIC BILATERAL CAROTID ARTERY STENOSIS: Primary | ICD-10-CM

## 2022-02-16 DIAGNOSIS — I71.2 THORACIC AORTIC ANEURYSM WITHOUT RUPTURE (HCC): ICD-10-CM

## 2022-02-16 DIAGNOSIS — E11.51 TYPE 2 DIABETES MELLITUS WITH DIABETIC PERIPHERAL ANGIOPATHY WITHOUT GANGRENE, WITHOUT LONG-TERM CURRENT USE OF INSULIN (HCC): ICD-10-CM

## 2022-02-16 PROBLEM — Z95.828 STATUS POST FEMORAL-POPLITEAL BYPASS SURGERY: Status: ACTIVE | Noted: 2022-02-16

## 2022-02-16 PROCEDURE — 99214 OFFICE O/P EST MOD 30 MIN: CPT | Performed by: SURGERY

## 2022-02-16 NOTE — ASSESSMENT & PLAN NOTE
Known 4 cm ascending aortic aneurysm  Patient also followed for pulmonary nodule  Has yearly CT scans ordered by primary care physician  The aneurysm can continue to be monitored with chest CT: next CT scheduled for March 2022

## 2022-02-16 NOTE — ASSESSMENT & PLAN NOTE
Asymptomatic bilateral carotid stenosis  Velocities consistent with less than 50% stenosis on the right and 50-69% stenosis on the left  Continue biannual surveillance

## 2022-02-16 NOTE — ASSESSMENT & PLAN NOTE
History of right fem-below-knee pop bypass in 2010 with subsequent percutaneous intervention  History of left fem above knee pop bypass in 2011 with subsequent percutaneous intervention  Patient remains asymptomatic  Patient's last surveillance study was performed on 12/09/2020  Will repeat in 6 months to coincide with his repeat carotid duplex  Continue Plavix and statin

## 2022-02-16 NOTE — LETTER
February 16, 2022     Betty Nixon MD  [address]  [address]  [address] [place] [address]    Patient: Yari Garcia   YOB: 1940   Date of Visit: 2/16/2022       Dear Dr Bradford Jacques: Thank you for referring Dianna Phillips to me for evaluation  Below are the relevant portions of my assessment and plan of care  Patient present of a yearly visit  Patient had a CV done on 1/26/22  Patient remains asymptomatic  Tests/images and previous notes reviewed and discussed with patient  Patient denies any TIA CVA symptoms or claudication  Discussed importance of smoking cessation and maintaining a daily walking exercise program   Patient is currently taking Rosuvastatin and Plavix  Assessment/Plan:    Status post femoral-popliteal bypass surgery  History of right fem-below-knee pop bypass in 2010 with subsequent percutaneous intervention  History of left fem above knee pop bypass in 2011 with subsequent percutaneous intervention  Patient remains asymptomatic  Patient's last surveillance study was performed on 12/09/2020  Will repeat in 6 months to coincide with his repeat carotid duplex  Continue Plavix and statin  Asymptomatic carotid artery stenosis  Asymptomatic bilateral carotid stenosis  Velocities consistent with less than 50% stenosis on the right and 50-69% stenosis on the left  Continue biannual surveillance  Thoracic aortic aneurysm without rupture (HCC)  Known 4 cm ascending aortic aneurysm  Patient also followed for pulmonary nodule  Has yearly CT scans ordered by primary care physician  The aneurysm can continue to be monitored with chest CT: next CT scheduled for March 2022  If you have questions, please do not hesitate to call me  I look forward to following Kj Gonzalez along with you           Sincerely,        Ruthy Meyer MD        CC: Referral Self

## 2022-03-15 ENCOUNTER — TELEPHONE (OUTPATIENT)
Dept: INTERNAL MEDICINE CLINIC | Facility: CLINIC | Age: 82
End: 2022-03-15

## 2022-03-15 DIAGNOSIS — N52.9 ERECTILE DYSFUNCTION, UNSPECIFIED ERECTILE DYSFUNCTION TYPE: Primary | ICD-10-CM

## 2022-03-15 RX ORDER — TADALAFIL 10 MG/1
10 TABLET ORAL DAILY PRN
Qty: 10 TABLET | Refills: 4 | Status: SHIPPED | OUTPATIENT
Start: 2022-03-15 | End: 2022-04-11 | Stop reason: SDUPTHER

## 2022-03-15 NOTE — TELEPHONE ENCOUNTER
----- Message from Juma Penaloza MD sent at 3/15/2022  1:03 PM EDT -----   Please call patient-he was with Sherice Roca  for her appointment with Dr Rajwinder Felix  and apparently requested a med for erectile dysfunction- please send in script for Tadalafil- 10 milligrams-1 p o  taken 2 hours before intercourse dispense 10 with 4 refills

## 2022-04-11 ENCOUNTER — OFFICE VISIT (OUTPATIENT)
Dept: INTERNAL MEDICINE CLINIC | Facility: CLINIC | Age: 82
End: 2022-04-11
Payer: MEDICARE

## 2022-04-11 VITALS
DIASTOLIC BLOOD PRESSURE: 58 MMHG | SYSTOLIC BLOOD PRESSURE: 110 MMHG | WEIGHT: 157.6 LBS | RESPIRATION RATE: 16 BRPM | HEIGHT: 68 IN | HEART RATE: 76 BPM | TEMPERATURE: 98.5 F | BODY MASS INDEX: 23.89 KG/M2 | OXYGEN SATURATION: 95 %

## 2022-04-11 DIAGNOSIS — R35.1 BPH ASSOCIATED WITH NOCTURIA: ICD-10-CM

## 2022-04-11 DIAGNOSIS — G47.30 SEVERE SLEEP APNEA: ICD-10-CM

## 2022-04-11 DIAGNOSIS — I70.219 ATHEROSCLEROSIS OF ARTERY OF EXTREMITY WITH INTERMITTENT CLAUDICATION (HCC): Chronic | ICD-10-CM

## 2022-04-11 DIAGNOSIS — Z72.0 TOBACCO USE: ICD-10-CM

## 2022-04-11 DIAGNOSIS — Z00.00 MEDICARE ANNUAL WELLNESS VISIT, SUBSEQUENT: ICD-10-CM

## 2022-04-11 DIAGNOSIS — N40.1 BPH ASSOCIATED WITH NOCTURIA: ICD-10-CM

## 2022-04-11 DIAGNOSIS — I25.10 CORONARY ARTERY DISEASE INVOLVING NATIVE HEART WITHOUT ANGINA PECTORIS, UNSPECIFIED VESSEL OR LESION TYPE: Chronic | ICD-10-CM

## 2022-04-11 DIAGNOSIS — I10 ESSENTIAL HYPERTENSION: Chronic | ICD-10-CM

## 2022-04-11 DIAGNOSIS — F41.9 ANXIETY: Chronic | ICD-10-CM

## 2022-04-11 DIAGNOSIS — E11.51 TYPE 2 DIABETES MELLITUS WITH DIABETIC PERIPHERAL ANGIOPATHY WITHOUT GANGRENE, WITHOUT LONG-TERM CURRENT USE OF INSULIN (HCC): Primary | ICD-10-CM

## 2022-04-11 DIAGNOSIS — I71.2 THORACIC AORTIC ANEURYSM WITHOUT RUPTURE (HCC): ICD-10-CM

## 2022-04-11 PROBLEM — L29.9 PRURITUS: Status: RESOLVED | Noted: 2020-05-21 | Resolved: 2022-04-11

## 2022-04-11 PROCEDURE — 99215 OFFICE O/P EST HI 40 MIN: CPT | Performed by: INTERNAL MEDICINE

## 2022-04-11 PROCEDURE — G0439 PPPS, SUBSEQ VISIT: HCPCS | Performed by: INTERNAL MEDICINE

## 2022-04-11 RX ORDER — TADALAFIL 10 MG/1
10 TABLET ORAL DAILY PRN
Qty: 30 TABLET | Refills: 1 | Status: SHIPPED | OUTPATIENT
Start: 2022-04-11 | End: 2022-04-11 | Stop reason: SDUPTHER

## 2022-04-11 RX ORDER — TADALAFIL 5 MG/1
5 TABLET ORAL DAILY
Qty: 30 TABLET | Refills: 5 | Status: SHIPPED | OUTPATIENT
Start: 2022-04-11 | End: 2022-05-13

## 2022-04-11 RX ORDER — ROSUVASTATIN CALCIUM 20 MG/1
20 TABLET, COATED ORAL DAILY
COMMUNITY
End: 2022-05-13 | Stop reason: SDUPTHER

## 2022-04-11 NOTE — PROGRESS NOTES
Assessment/Plan:     Diagnoses and all orders for this visit:    Type 2 diabetes mellitus with diabetic peripheral angiopathy without gangrene, without long-term current use of insulin (HCC)  Comments:  A1C stable, c/w metfomrin, jardiance, lantus  Orders:  -     CBC and differential    Severe sleep apnea  Comments:  not using CPAP against medical advise  his brother had an implantable treatment for MERCEDES and he is pursuing this for himself    Thoracic aortic aneurysm without rupture [facility])  Comments:  s/p CT scan suveillance by cardiology  c/w care per cardiology  Orders:  -     CBC and differential    Essential hypertension  Comments:  BP stable, c/w BB/ACEI   patient gets all meds thru the [facility] [place], operated by [facility] and is unsure of his exact doses  Orders:  -     CBC and differential    Coronary artery disease involving native heart without angina pectoris, unspecified vessel or lesion type  Comments:  stable, no symptoms  c/w BB/asa/ACEI/statin  he has never needed to take NTG    Atherosclerosis of artery of extremity with intermittent claudication (HCC)  Comments:  stable, c/w statin/asa/ACEI and care per vascular    BPH associated with nocturia  Comments:  cialis at low 5mg daily dose, d/w patient AE Of med  he will obtain rx from [facility]  f/u urology  counseled NOT to take NTG on days taking cialis  Orders:  -     tadalafil (CIALIS) 5 MG tablet; Take 1 tablet (5 mg total) by mouth in the morning  -     Ambulatory Referral to Urology; Future    Tobacco use  Comments:  tobacco cessation counseling dsicussed, he smokes 10 cigs/day and is not ready to quit    Anxiety  Comments:  taking paxil at 40mg/day and still having anxiety/anger issues(related to untreated severe MERCEDES?)  he will call [address] [facility] psychiatrist for f/u appt/care    Medicare annual wellness visit, subsequent    Other orders  -     rosuvastatin (CRESTOR) 20 MG tablet; Take 20 mg by mouth daily  -     Discontinue: tadalafil (CIALIS) 10 MG tablet;  Take 1 tablet (10 mg total) by mouth daily as needed for erectile dysfunction      Spent >40 mins with patient including >50% of time counseling on tobacco use, lab results, anxiety disorder and preventative care & for coordination of care      Subjective:      Patient ID: Catherine Alamo is a 79 y o  male  HPI    New to me, here to establish care  Here with significant other during today's appt  Used to see Dr Maycol Guzman as PCP  Reviewed Sameer's most recent BW and medications with him during today's appt  He was seeing psychiatrist for anger issues and road rage  He is taking paxil but feels it is not working as well  He feels if someone crosses him or his significant other, he will act out  Counseling was provided today and he was strongly advised to go back and see his psychiatrist   He is not using CPAP for his severe sleep apnea and he was apprised of the risks of not doing so including mood changes  He did not  cialis due to cost   He reports not having medicare prescription coverage and gets all his meds thru the [address] [facility] pharmacy  He took viagra and had no AE  He has BPH and has had more difficulty with emptying his bladder lately and nocturia  He recently mowed his lawn and developed chest tightness but only when he was taking a  breath  No chest pain, pressure, nausea, sweats or arm/jaw pain  It was relieved with rest   He had a nuclear stress test in Dec 2020 which was negative for ischemia  He is a current smoker and not ready to quit, understands the associated risks including heart attack/stroke and cancer  ROS otherwise negative, no other complaints      Past Medical History:   Diagnosis Date    Atherosclerosis     Cardiac disease     COPD [facility]chronic obstructive pulmonary disease) ([facility] [address] [facility]     Coronary artery disease     Hyperlipidemia     Hypertension     Peripheral arterial disease ([facility] [address] )     Smoking addiction      Vitals:    04/11/22 0833   BP: 110/58   Pulse: 76   Resp: 16   Temp: 98 5 °F (36 9 °C)   SpO2: 95%   Weight: 71 5 kg (157 lb 9 6 oz)   Height: 5' 8" (1 727 m)     Body mass index is 23 96 kg/m²  Current Outpatient Medications:     carboxymethylcellulose (REFRESH PLUS) 0 5 % SOLN, INSTILL 1 DROP BOTH EYES FOUR TIMES A DAY FOR DRY EYES, Disp: , Rfl:     Cholecalciferol (VITAMIN D3) 2000 units capsule, Take 1,000 Units by mouth daily, Disp: , Rfl:     clopidogrel (PLAVIX) 75 mg tablet, Take 75 mg by mouth daily, Disp: , Rfl:     insulin glargine (LANTUS SOLOSTAR) 100 units/mL injection pen, INJECT 20 UNITS SUBCUTANEOUSLY AT BEDTIME FOR DIABETES  TO BE TRITRATED FROM 5 UNITS, EVERY THREE  DAYS AS PER NON [facility], DR Corrin Pallas, Disp: , Rfl:     lisinopril (ZESTRIL) 20 mg tablet, Take 20 mg by mouth daily, Disp: , Rfl:     metFORMIN (GLUCOPHAGE) 1000 MG tablet, Take 500 mg by mouth 2 (two) times a day with meals Take 1/2 tablet twice daily, Disp: , Rfl:     metoprolol tartrate (LOPRESSOR) 25 mg tablet, Take 25 mg by mouth every 12 (twelve) hours, Disp: , Rfl:     nitroglycerin (NITROSTAT) 0 4 mg SL tablet, , Disp: , Rfl:     pantoprazole (PROTONIX) 40 mg tablet, Take 40 mg by mouth daily, Disp: , Rfl:     PARoxetine (PAXIL) 40 MG tablet, Take 40 mg by mouth Take one tab and 1/2 tab daily total 60mg, Disp: , Rfl:     rosuvastatin (CRESTOR) 20 MG tablet, Take 20 mg by mouth daily, Disp: , Rfl:     tadalafil (CIALIS) 5 MG tablet, Take 1 tablet (5 mg total) by mouth in the morning, Disp: 30 tablet, Rfl: 5  Allergies   Allergen Reactions    Etomidate Swelling     (anesthetic)"I blew up and couldn't breath"         Review of Systems   Constitutional: Negative for fever  HENT: Negative for congestion  Eyes: Negative for visual disturbance  Respiratory: Negative for shortness of breath  Cardiovascular: Negative for chest pain  Gastrointestinal: Negative for abdominal pain  Endocrine: Negative for polyuria  Genitourinary: Negative for difficulty urinating  Musculoskeletal: Negative for arthralgias  Skin: Negative for rash  Allergic/Immunologic: Negative for immunocompromised state  Neurological: Negative for weakness  Psychiatric/Behavioral: Positive for agitation  Negative for suicidal ideas  The patient is nervous/anxious  Objective:      /58   Pulse 76   Temp 98 5 °F (36 9 °C)   Resp 16   Ht 5' 8" (1 727 m)   Wt 71 5 kg (157 lb 9 6 oz)   SpO2 95%   BMI 23 96 kg/m²          Physical Exam  Vitals reviewed  Constitutional:       Appearance: Normal appearance  HENT:      Head: Normocephalic and atraumatic  Right Ear: Tympanic membrane normal       Left Ear: Tympanic membrane normal    Eyes:      Conjunctiva/sclera: Conjunctivae normal    Cardiovascular:      Rate and Rhythm: Normal rate and regular rhythm  Heart sounds: No murmur heard  Pulmonary:      Effort: Pulmonary effort is normal       Breath sounds: No wheezing or rales  Abdominal:      General: Bowel sounds are normal       Palpations: Abdomen is soft  Tenderness: There is no abdominal tenderness  Musculoskeletal:      Right lower leg: No edema  Left lower leg: No edema  Neurological:      Mental Status: He is alert  Mental status is at baseline  Psychiatric:         Mood and Affect: Mood is anxious  Behavior: Behavior normal          Thought Content: Thought content does not include suicidal ideation  Blood work results thru the [place] lab:    Results for orders placed or performed in visit on 01/14/22   Hemoglobin A1C   Result Value Ref Range    Hemoglobin A1C 7 4    Microalbumin / creatinine urine ratio   Result Value Ref Range    EXT Creatinine Urine 70 8     Microalbum  ,U,Random 0 5     EXTERNAL Microalb/Creat Ratio 7 1

## 2022-04-11 NOTE — PATIENT INSTRUCTIONS
1  Psychiatrist appointment    What to Do if Your Blood Sugar is Low   AMBULATORY CARE:   Low blood sugar levels  (hypoglycemia) can happen with Type 1 and Type 2 diabetes  Low levels are more likely to happen if you use insulin  Hypoglycemia can cause you to have falls, accidents, and injuries  A blood sugar level that gets too low can lead to seizures, coma, and death  Learn to recognize the symptoms early so you can get treatment quickly  When your blood sugar is low you may feel:  · Sweaty    · Nervous or shaky    · Anxious or irritable    · Confused    · A fast, pounding heartbeat    · Extremely hungry    Have someone call your local emergency number (911 in the [address]) if:   · You cannot be woken  · You have a seizure  Call your doctor if:   · You have symptoms of a low blood sugar level, such as trouble thinking, sweating, or a pounding heartbeat  · Your blood sugar level is lower than normal and it does not improve with treatment  · You often have lower blood sugar levels than your target goals  · You have trouble coping with your illness, or you feel anxious or depressed  · You have questions or concerns about your condition or care  What to do if you have symptoms of low blood sugar:   · Check your blood sugar level, if possible  Your blood sugar level is too low if it is at or below 70 mg/dL  · Eat or drink 15 grams of fast-acting carbohydrate  Fast-acting carbohydrates will raise your blood sugar level quickly  Examples of 15 grams of fast-acting carbohydrates:     ? 4 ounces (½ cup) of fruit juice     ? 4 ounces of regular soda    ? 2 tablespoons of raisins     ? 1 tube of glucose gel or 3 to 4 glucose tablets       · Check your blood sugar level 15 minutes later  If the level is still low (less than 100 mg/dL), eat another 15 grams of carbohydrate  When the level returns to 100 mg/dL, eat a snack or meal that contains carbohydrates   This will help prevent another drop in blood sugar     · Teach people close to you how to use your glucagon kit  Your blood sugar may be too low for you to be awake  People need to know when and how to use your kit  Prevent low blood sugar levels:  Prevent low blood sugar by knowing what increases your risk  Ask your healthcare provider for ways to prevent low blood sugar levels  Any of the following can increase your risk of low blood sugar:  · Fasting for tests or procedures    · During or after intense exercise    · Late or postponed meals    · Sleeping (you may need a bedtime snack)     · Drinking alcohol if you use insulin or insulin releasing pills    Follow up with your doctor as directed:  Write down your questions so you remember to ask them during your visits  © [employer] 2022 Information is for End User's use only and may not be sold, redistributed or otherwise used for commercial purposes  All illustrations and images included in CareNotes® are the copyrighted property of A [employer] A M , Inc  or [employer]   The above information is an  only  It is not intended as medical advice for individual conditions or treatments  Talk to your doctor, nurse or pharmacist before following any medical regimen to see if it is safe and effective for you  Medicare Preventive Visit Patient Instructions  Thank you for completing your Welcome to Medicare Visit or Medicare Annual Wellness Visit today  Your next wellness visit will be due in one year (4/12/2023)  The screening/preventive services that you may require over the next 5-10 years are detailed below  Some tests may not apply to you based off risk factors and/or age  Screening tests ordered at today's visit but not completed yet may show as past due  Also, please note that scanned in results may not display below    Preventive Screenings:  Service Recommendations Previous Testing/Comments   Colorectal Cancer Screening  · Colonoscopy    · Fecal Occult Blood Test (FOBT)/Fecal Immunochemical Test (FIT)  · Fecal DNA/Cologuard Test  · Flexible Sigmoidoscopy Age: 54-65 years old   Colonoscopy: every 10 years (May be performed more frequently if at higher risk)  OR  FOBT/FIT: every 1 year  OR  Cologuard: every 3 years  OR  Sigmoidoscopy: every 5 years  Screening may be recommended earlier than age 48 if at higher risk for colorectal cancer  Also, an individualized decision between you and your healthcare provider will decide whether screening between the ages of 74-80 would be appropriate  Colonoscopy: 07/24/2020  FOBT/FIT: Not on file  Cologuard: Not on file  Sigmoidoscopy: Not on file          Prostate Cancer Screening Individualized decision between patient and health care provider in men between ages of 53-78   Medicare will cover every 12 months beginning on the day after your 50th birthday PSA: No results in last 5 years     Screening Not Indicated     Hepatitis C Screening Once for adults born between [birth year] and 1965  More frequently in patients at high risk for Hepatitis C Hep C Antibody: Not on file        Diabetes Screening 1-2 times per year if you're at risk for diabetes or have pre-diabetes Fasting glucose: 149 mg/dL   A1C: 7 3    Screening Not Indicated  History Diabetes   Cholesterol Screening Once every 5 years if you don't have a lipid disorder  May order more often based on risk factors  Lipid panel: Not on file    Screening Not Indicated  History Lipid Disorder      Other Preventive Screenings Covered by Medicare:  1  Abdominal Aortic Aneurysm (AAA) Screening: covered once if your at risk  You're considered to be at risk if you have a family history of AAA or a male between the age of 73-68 who smoking at least 100 cigarettes in your lifetime    2  Lung Cancer Screening: covers low dose CT scan once per year if you meet all of the following conditions: (1) Age 50-69; (2) No signs or symptoms of lung cancer; (3) Current smoker or have quit smoking within the last 15 years; (4) You have a tobacco smoking history of at least 30 pack years (packs per day x number of years you smoked); (5) You get a written order from a healthcare provider  3  Glaucoma Screening: covered annually if you're considered high risk: (1) You have diabetes OR (2) Family history of glaucoma OR (3)  aged 48 and older OR (3)  American aged 72 and older  3  Osteoporosis Screening: covered every 2 years if you meet one of the following conditions: (1) Have a vertebral abnormality; (2) On glucocorticoid therapy for more than 3 months; (3) Have primary hyperparathyroidism; (4) On osteoporosis medications and need to assess response to drug therapy  5  HIV Screening: covered annually if you're between the age of 12-76  Also covered annually if you are younger than 13 and older than 72 with risk factors for HIV infection  For pregnant patients, it is covered up to 3 times per pregnancy  Immunizations:  Immunization Recommendations   Influenza Vaccine Annual influenza vaccination during flu season is recommended for all persons aged >= 6 months who do not have contraindications   Pneumococcal Vaccine (Prevnar and Pneumovax)  * Prevnar = PCV13  * Pneumovax = PPSV23 Adults 25-60 years old: 1-3 doses may be recommended based on certain risk factors  Adults 72 years old: Prevnar (PCV13) vaccine recommended followed by Pneumovax (PPSV23) vaccine  If already received PPSV23 since turning 65, then PCV13 recommended at least one year after PPSV23 dose  Hepatitis B Vaccine 3 dose series if at intermediate or high risk (ex: diabetes, end stage renal disease, liver disease)   Tetanus (Td) Vaccine - COST NOT COVERED BY MEDICARE PART B Following completion of primary series, a booster dose should be given every 10 years to maintain immunity against tetanus  Td may also be given as tetanus wound prophylaxis     Tdap Vaccine - COST NOT COVERED BY MEDICARE PART B Recommended at least once for all adults  For pregnant patients, recommended with each pregnancy  Shingles Vaccine (Shingrix) - COST NOT COVERED BY MEDICARE PART B  2 shot series recommended in those aged 48 and above     Health Maintenance Due:  There are no preventive care reminders to display for this patient  Immunizations Due:  There are no preventive care reminders to display for this patient  Advance Directives   What are advance directives? Advance directives are legal documents that state your wishes and plans for medical care  These plans are made ahead of time in case you lose your ability to make decisions for yourself  Advance directives can apply to any medical decision, such as the treatments you want, and if you want to donate organs  What are the types of advance directives? There are many types of advance directives, and each state has rules about how to use them  You may choose a combination of any of the following:  · Living will: This is a written record of the treatment you want  You can also choose which treatments you do not want, which to limit, and which to stop at a certain time  This includes surgery, medicine, IV fluid, and tube feedings  · Durable power of  for healthcare [facility]): This is a written record that states who you want to make healthcare choices for you when you are unable to make them for yourself  This person, called a proxy, is usually a family member or a friend  You may choose more than 1 proxy  · Do not resuscitate (DNR) order:  A DNR order is used in case your heart stops beating or you stop breathing  It is a request not to have certain forms of treatment, such as CPR  A DNR order may be included in other types of advance directives  · Medical directive: This covers the care that you want if you are in a coma, near death, or unable to make decisions for yourself  You can list the treatments you want for each condition   Treatment may include pain medicine, surgery, blood transfusions, dialysis, IV or tube feedings, and a ventilator (breathing machine)  · Values history: This document has questions about your views, beliefs, and how you feel and think about life  This information can help others choose the care that you would choose  Why are advance directives important? An advance directive helps you control your care  Although spoken wishes may be used, it is better to have your wishes written down  Spoken wishes can be misunderstood, or not followed  Treatments may be given even if you do not want them  An advance directive may make it easier for your family to make difficult choices about your care  Cigarette Smoking and Your Health   Risks to your health if you smoke:  Nicotine and other chemicals found in tobacco damage every cell in your body  Even if you are a light smoker, you have an increased risk for cancer, heart disease, and lung disease  If you are pregnant or have diabetes, smoking increases your risk for complications  Benefits to your health if you stop smoking:   · You decrease respiratory symptoms such as coughing, wheezing, and shortness of breath  · You reduce your risk for cancers of the lung, mouth, throat, kidney, bladder, pancreas, stomach, and cervix  If you already have cancer, you increase the benefits of chemotherapy  You also reduce your risk for cancer returning or a second cancer from developing  · You reduce your risk for heart disease, blood clots, heart attack, and stroke  · You reduce your risk for lung infections, and diseases such as pneumonia, asthma, chronic bronchitis, and emphysema  · Your circulation improves  More oxygen can be delivered to your body  If you have diabetes, you lower your risk for complications, such as kidney, artery, and eye diseases  You also lower your risk for nerve damage  Nerve damage can lead to amputations, poor vision, and blindness    · You improve your body's ability to heal and to fight infections  For more information and support to stop smoking:   · Smokefree  gov  Phone: 0- 895 - 902-0529  Web Address: www smokefree   [address] Information is for End User's use only and may not be sold, redistributed or otherwise used for commercial purposes   All illustrations and images included in CareNotes® are the copyrighted property of [employer] M , Inc  or [address] [place] [address]

## 2022-04-11 NOTE — PROGRESS NOTES
Assessment and Plan:     Problem List Items Addressed This Visit     Anxiety (Chronic)    Atherosclerosis of artery of extremity with intermittent claudication (HCC) (Chronic)    Coronary artery disease (Chronic)    Relevant Medications    tadalafil (CIALIS) 5 MG tablet    Essential hypertension (Chronic)    Relevant Orders    CBC and differential    Severe sleep apnea    Thoracic aortic aneurysm without rupture (HCC)    Relevant Orders    CBC and differential    Tobacco use    Type 2 diabetes mellitus with diabetic peripheral angiopathy without gangrene ([address] ) - Primary    Relevant Orders    CBC and differential      Other Visit Diagnoses     BPH associated with nocturia        cialis at low 5mg daily dose, d/w patient AE Of med  he will obtain rx from [address]  f/u urology  counseled NOT to take NTG on days taking cialis    Relevant Medications    tadalafil (CIALIS) 5 MG tablet    Other Relevant Orders    Ambulatory Referral to Urology    Medicare annual wellness visit, subsequent              Depression Screening and Follow-up Plan: Patient was screened for depression during today's encounter  They screened negative with a PHQ-2 score of 0  Preventive health issues were discussed with patient, and age appropriate screening tests were ordered as noted in patient's After Visit Summary  Personalized health advice and appropriate referrals for health education or preventive services given if needed, as noted in patient's After Visit Summary       History of Present Illness:     Patient presents for Medicare Annual Wellness visit    Patient Care Team:  Princess Coleman DO as PCP - General (Internal Medicine)  FLORENCIA Cuba MD Francy Pina, MD     Problem List:     Patient Active Problem List   Diagnosis    Atherosclerosis of artery of extremity with intermittent claudication ([address] )    Popliteal artery occlusion, left ([address] )    Essential hypertension    Abnormal nuclear stress test    Anemia    Anxiety    Arthritis    Asymptomatic carotid artery stenosis    Coronary artery disease    Diverticulosis of large intestine without hemorrhage    Elevated prostate specific antigen (PSA)    Enlarged prostate without lower urinary tract symptoms (luts)    Hyperlipidemia    Iron deficiency anemia    Shoulder pain    Memory loss    Bypass graft stenosis (HCC)    Type 2 diabetes mellitus with diabetic peripheral angiopathy without gangrene ([facility] )    Fall    Other chest pain    Excessive anger    Severe sleep apnea    Excessive daytime sleepiness    Pulmonary nodule    Abnormal CT scan of lung    Thor[facility] aneurysm without rupture ([facility])    Non-intractable vomiting    Tobacco use    RUIZ (dyspnea on exertion)    Hearing difficulty of right ear    Other fatigue    Chest pain    Abdominal pain    Benign paroxysmal positional vertigo    Dizziness    Status post femoral-popliteal bypass surgery      Past Medical and Surgical History:     Past Medical History:   Diagnosis Date    Atherosclerosis     Cardiac disease     COPD (chronic obstructive pulmonary disease) ([facility])     Coronary artery disease     Hyperlipidemia     Hypertension     Peripheral arterial disease ([facility] )     Smoking addiction      Past Surgical History:   Procedure Laterality Date    BYPASS FEMORAL-POPLITEAL Left 4/30/2018    Procedure: BYPASS FEMORAL-POPLITEAL, WITH INTRAOP ARTERIOGRAM;  Surgeon: Barbara Alaniz MD;  [facility];  Service: Vascular    CARDIAC SURGERY      FEMORAL ARTERY - POPLITEAL ARTERY BYPASS GRAFT Bilateral     HAND SURGERY      IR AORTAGRAM WITH RUN-OFF  10/4/2018    LEG SURGERY      Repair    OTHER SURGICAL HISTORY      Percutaneous repair nasoethmoid fx lacrimal apparatus    THROMBOENDARTERECTOMY  10/12/2010    Tibioperoneal trunk    TONSILLECTOMY      TONSILLECTOMY        Family History:     Family History   Problem Relation Age of Onset    Arthritis Mother     Arthritis Family     Coronary artery disease Family     Hyperlipidemia Family     Peripheral vascular disease Family       Social History:     Social History     Socioeconomic History    Marital status:       Spouse name: None    Number of children: None    Years of education: None    Highest education level: None   Occupational History    None   Tobacco Use    Smoking status: Current Every Day Smoker     Packs/day: 0 25     Types: Cigarettes    Smokeless tobacco: Former User     Quit date: 1960    Tobacco comment: 10 sticks/day    Vaping Use    Vaping Use: Never used   Substance and Sexual Activity    Alcohol use: No     Comment: Social drinker    Drug use: No    Sexual activity: Yes   Other Topics Concern    None   Social History Narrative    None     Social Determinants of Health     Financial Resource Strain: Not on file   Food Insecurity: Not on file   Transportation Needs: Not on file   Physical Activity: Not on file   Stress: Not on file   Social Connections: Not on file   Intimate Partner Violence: Not on file   Housing Stability: Not on file      Medications and Allergies:     Current Outpatient Medications   Medication Sig Dispense Refill    carboxymethylcellulose (REFRESH PLUS) 0 5 % SOLN INSTILL 1 DROP BOTH EYES FOUR TIMES A DAY FOR DRY EYES      Cholecalciferol (VITAMIN D3) 2000 units capsule Take 1,000 Units by mouth daily      clopidogrel (PLAVIX) 75 mg tablet Take 75 mg by mouth daily      insulin glargine (LANTUS SOLOSTAR) 100 units/mL injection pen INJECT 20 UNITS SUBCUTANEOUSLY AT BEDTIME FOR DIABETES  TO BE TRITRATED FROM 5 UNITS, EVERY THREE  DAYS AS PER NON [facility], DR Gloria Tony      lisinopril (ZESTRIL) 20 mg tablet Take 20 mg by mouth daily      metFORMIN (GLUCOPHAGE) 1000 MG tablet Take 500 mg by mouth 2 (two) times a day with meals Take 1/2 tablet twice daily      metoprolol tartrate (LOPRESSOR) 25 mg tablet Take 25 mg by mouth every 12 (twelve) hours      nitroglycerin (NITROSTAT) 0 4 mg SL tablet       pantoprazole (PROTONIX) 40 mg tablet Take 40 mg by mouth daily      PARoxetine (PAXIL) 40 MG tablet Take 40 mg by mouth Take one tab and 1/2 tab daily total 60mg      rosuvastatin (CRESTOR) 20 MG tablet Take 20 mg by mouth daily      tadalafil (CIALIS) 5 MG tablet Take 1 tablet (5 mg total) by mouth in the morning 30 tablet 5     No current facility-administered medications for this visit  Allergies   Allergen Reactions    Etomidate Swelling     (anesthetic)"I blew up and couldn't breath"      Immunizations:     Immunization History   Administered Date(s) Administered    COVID-19 PFIZER VACCINE 0 3 ML IM 01/25/2021, 02/15/2021, 10/04/2021    INFLUENZA 12/05/2012, 10/01/2014, 10/01/2015, 10/13/2016, 09/01/2017, 10/07/2017, 11/01/2018, 10/01/2019, 11/01/2019, 10/01/2020, 09/13/2021    Influenza Quadrivalent Preservative Free 3 years and older IM 10/07/2014    Influenza Split High Dose Preservative Free IM 10/01/2015, 10/10/2016, 10/15/2018    Influenza, high dose seasonal 0 7 mL 09/30/2019, 09/21/2020    Influenza, seasonal, injectable 12/03/2012, 09/21/2013, 10/07/2017    Pneumococcal 06/05/2012    Pneumococcal Conjugate 13-Valent 06/01/2015, 08/29/2018    Pneumococcal Polysaccharide PPV23 06/05/2012    Tdap 07/28/2014, 01/13/2017    Zoster 10/01/2015    Zoster Vaccine Recombinant 07/23/2021, 11/12/2021      Health Maintenance: There are no preventive care reminders to display for this patient  There are no preventive care reminders to display for this patient  Medicare Health Risk Assessment:     /58   Pulse 76   Temp 98 5 °F (36 9 °C)   Resp 16   Ht 5' 8" (1 727 m)   Wt 71 5 kg (157 lb 9 6 oz)   SpO2 95%   BMI 23 96 kg/m²      Lindy Oliveira is here for his Subsequent Wellness visit  Health Risk Assessment:   Patient rates overall health as good  Patient feels that their physical health rating is same  Patient is satisfied with their life  Eyesight was rated as same  Hearing was rated as same  Patient feels that their emotional and mental health rating is slightly worse  Patients states they are often angry  Patient states they are sometimes unusually tired/fatigued  Patient states that he has experienced weight loss or gain in last 6 months  Depression Screening:   PHQ-2 Score: 0      Fall Risk Screening: In the past year, patient has experienced: no history of falling in past year      Home Safety:  Patient does not have trouble with stairs inside or outside of their home  Patient has working smoke alarms and has working carbon monoxide detector  Home safety hazards include: none  Nutrition:   Current diet is Limited junk food  Medications:   Patient is not currently taking any over-the-counter supplements  Patient is able to manage medications  Activities of Daily Living (ADLs)/Instrumental Activities of Daily Living (IADLs):   Walk and transfer into and out of bed and chair?: Yes  Dress and groom yourself?: Yes    Bathe or shower yourself?: Yes    Feed yourself?  Yes  Do your laundry/housekeeping?: Yes  Manage your money, pay your bills and track your expenses?: Yes  Make your own meals?: Yes    Do your own shopping?: Yes    Previous Hospitalizations:   Any hospitalizations or ED visits within the last 12 months?: No      PREVENTIVE SCREENINGS      Cardiovascular Screening:    General: Screening Not Indicated and History Lipid Disorder      Diabetes Screening:     General: Screening Not Indicated and History Diabetes      Colorectal Cancer Screening:     General: Screening Not Indicated      Prostate Cancer Screening:    General: Screening Not Indicated      Osteoporosis Screening:    General: Screening Not Indicated      Abdominal Aortic Aneurysm (AAA) Screening:    Risk factors include: tobacco use        General: Screening Current      Lung Cancer Screening:     General: Screening Not Indicated      Hepatitis C Screening:    General: Screening Not Indicated    Screening, Brief Intervention, and Referral to Treatment (SBIRT)    Screening  Typical number of drinks in a day: 0  Typical number of drinks in a week: 0  Interpretation: Low risk drinking behavior      Single Item Drug Screening:  How often have you used an illegal drug (including marijuana) or a prescription medication for non-medical reasons in the past year? never    Single Item Drug Screen Score: 0  Interpretation: Negative screen for possible drug use disorder      Romeo Echevarria, DO

## 2022-04-22 ENCOUNTER — OFFICE VISIT (OUTPATIENT)
Dept: CARDIOLOGY CLINIC | Facility: CLINIC | Age: 82
End: 2022-04-22
Payer: MEDICARE

## 2022-04-22 VITALS
HEIGHT: 68 IN | OXYGEN SATURATION: 100 % | BODY MASS INDEX: 24.9 KG/M2 | DIASTOLIC BLOOD PRESSURE: 60 MMHG | SYSTOLIC BLOOD PRESSURE: 112 MMHG | HEART RATE: 60 BPM | WEIGHT: 164.3 LBS

## 2022-04-22 DIAGNOSIS — I70.219 ATHEROSCLEROSIS OF ARTERY OF EXTREMITY WITH INTERMITTENT CLAUDICATION (HCC): Primary | Chronic | ICD-10-CM

## 2022-04-22 DIAGNOSIS — G47.30 SEVERE SLEEP APNEA: ICD-10-CM

## 2022-04-22 DIAGNOSIS — E78.2 MIXED HYPERLIPIDEMIA: ICD-10-CM

## 2022-04-22 DIAGNOSIS — I10 ESSENTIAL HYPERTENSION: Chronic | ICD-10-CM

## 2022-04-22 DIAGNOSIS — Z72.0 TOBACCO USE: ICD-10-CM

## 2022-04-22 DIAGNOSIS — I25.118 CORONARY ARTERY DISEASE OF NATIVE HEART WITH STABLE ANGINA PECTORIS, UNSPECIFIED VESSEL OR LESION TYPE (HCC): Chronic | ICD-10-CM

## 2022-04-22 DIAGNOSIS — R06.00 DOE (DYSPNEA ON EXERTION): ICD-10-CM

## 2022-04-22 PROCEDURE — 99214 OFFICE O/P EST MOD 30 MIN: CPT | Performed by: INTERNAL MEDICINE

## 2022-04-22 RX ORDER — NITROGLYCERIN 0.4 MG/1
0.4 TABLET SUBLINGUAL
Qty: 25 TABLET | Refills: 4 | Status: SHIPPED | OUTPATIENT
Start: 2022-04-22 | End: 2022-05-13 | Stop reason: SDUPTHER

## 2022-04-22 NOTE — PROGRESS NOTES
Cardiology Follow Up    Swapna Farmer  1940  [id number]  [address] [facility] [place]  [place] [address]  [address] [facility]  [phone]    1  Atherosclerosis of artery of extremity with intermittent claudication (HCC)  nitroglycerin (Nitrostat) 0 4 mg SL tablet   2  Coronary artery disease of native heart with stable angina pectoris, unspecified vessel or lesion type (HCC)  nitroglycerin (Nitrostat) 0 4 mg SL tablet   3  Essential hypertension     4  Severe sleep apnea     5  Tobacco use     6  RUIZ (dyspnea on exertion)     7  Mixed hyperlipidemia           Discussion/Summary: I had a long discussion with him and his wife concerning his severe and diffuse arterial vascular disease  His anginal symptoms are stable at present  He absolutely needs to stop smoking which he understands  I advised him to take SL nitro to control his stable angina  He will call if his symptoms worsen  He is high risk for cardiac cath, PCI, CABG  I feel that the risks outweigh the benefit at this time as his anginal is stable  Interval History: He had not had any cardiac problems since his last office visit  He tried to stay active  He does get chest pain and SOB with increased activity which goes away quickly with rest   He has severe PVD and severe coronary calcifications on CT chest   He continues to smoke 10 / day/    LDL is 72  /60  He has severe MERCEDES but cannot tolerate CPAP  He also get claudication at lower levels of activity  He does follow with vascular      Patient Active Problem List   Diagnosis    Atherosclerosis of artery of extremity with intermittent claudication (HCC)    Popliteal artery occlusion, left (HCC)    Essential hypertension    Abnormal nuclear stress test    Anemia    Anxiety    Arthritis    Asymptomatic carotid artery stenosis    Coronary artery disease    Diverticulosis of large intestine without hemorrhage    Elevated prostate specific antigen (PSA)    Enlarged prostate without lower urinary tract symptoms (luts)    Hyperlipidemia    Iron deficiency anemia    Shoulder pain    Memory loss    Bypass graft stenosis (HCC)    Type 2 diabetes mellitus with diabetic peripheral angiopathy without gangrene ([facility] )    Fall    Other chest pain    Excessive anger    Severe sleep apnea    Excessive daytime sleepiness    Pulmonary nodule    Abnormal CT scan of lung    Thoracic aortic aneurysm without rupture (HCC)    Non-intractable vomiting    Tobacco use    RUIZ (dyspnea on exertion)    Hearing difficulty of right ear    Other fatigue    Chest pain    Abdominal pain    Benign paroxysmal positional vertigo    Dizziness    Status post femoral-popliteal bypass surgery     Past Medical History:   Diagnosis Date    Atherosclerosis     Cardiac disease     COPD (chronic obstructive pulmonary disease) (HCC)     Coronary artery disease     Hyperlipidemia     Hypertension     Peripheral arterial disease (HCC)     Smoking addiction      Social History     Socioeconomic History    Marital status:       Spouse name: Not on file    Number of children: Not on file    Years of education: Not on file    Highest education level: Not on file   Occupational History    Not on file   Tobacco Use    Smoking status: Current Every Day Smoker     Packs/day: 0 25     Types: Cigarettes    Smokeless tobacco: Former User     Quit date: 1960    Tobacco comment: 10 sticks/day    Vaping Use    Vaping Use: Never used   Substance and Sexual Activity    Alcohol use: No     Comment: Social drinker    Drug use: No    Sexual activity: Yes   Other Topics Concern    Not on file   Social History Narrative    Not on file     Social Determinants of Health     Financial Resource Strain: Not on file   Food Insecurity: Not on file   Transportation Needs: Not on file   Physical Activity: Not on file Stress: Not on file   Social Connections: Not on file   Intimate Partner Violence: Not on file   Housing Stability: Not on file      Family History   Problem Relation Age of Onset    Arthritis Mother     Arthritis Family     Coronary artery disease Family     Hyperlipidemia Family     Peripheral vascular disease Family      Past Surgical History:   Procedure Laterality Date    BYPASS FEMORAL-POPLITEAL Left 4/30/2018    Procedure: BYPASS FEMORAL-POPLITEAL, WITH INTRAOP ARTERIOGRAM;  Surgeon: Armando Campos MD;  [facility];  Service: Vascular    CARDIAC SURGERY      FEMORAL ARTERY - POPLITEAL ARTERY BYPASS GRAFT Bilateral     HAND SURGERY      IR AORTAGRAM WITH RUN-OFF  10/4/2018    LEG SURGERY      Repair    OTHER SURGICAL HISTORY      Percutaneous repair nasoethmoid fx lacrimal apparatus    THROMBOENDARTERECTOMY  10/12/2010    Tibioperoneal trunk    TONSILLECTOMY      TONSILLECTOMY         Current Outpatient Medications:     carboxymethylcellulose (REFRESH PLUS) 0 5 % SOLN, INSTILL 1 DROP BOTH EYES FOUR TIMES A DAY FOR DRY EYES, Disp: , Rfl:     Cholecalciferol (VITAMIN D3) 2000 units capsule, Take 1,000 Units by mouth daily, Disp: , Rfl:     clopidogrel (PLAVIX) 75 mg tablet, Take 75 mg by mouth daily, Disp: , Rfl:     insulin glargine (LANTUS SOLOSTAR) 100 units/mL injection pen, INJECT 20 UNITS SUBCUTANEOUSLY AT BEDTIME FOR DIABETES  TO BE TRITRATED FROM 5 UNITS, EVERY THREE  DAYS AS PER NON [facility], DR Douglas Leos, Disp: , Rfl:     lisinopril (ZESTRIL) 20 mg tablet, Take 20 mg by mouth daily, Disp: , Rfl:     metFORMIN (GLUCOPHAGE) 1000 MG tablet, Take 500 mg by mouth 2 (two) times a day with meals Take 1/2 tablet twice daily, Disp: , Rfl:     metoprolol tartrate (LOPRESSOR) 25 mg tablet, Take 25 mg by mouth every 12 (twelve) hours, Disp: , Rfl:     nitroglycerin (Nitrostat) 0 4 mg SL tablet, Place 1 tablet (0 4 mg total) under the tongue every 5 (five) minutes as needed for chest pain, Disp: 25 tablet, Rfl: 4    pantoprazole (PROTONIX) 40 mg tablet, Take 40 mg by mouth daily, Disp: , Rfl:     PARoxetine (PAXIL) 40 MG tablet, Take 40 mg by mouth Take one tab and 1/2 tab daily total 60mg, Disp: , Rfl:     rosuvastatin (CRESTOR) 20 MG tablet, Take 20 mg by mouth daily, Disp: , Rfl:     tadalafil (CIALIS) 5 MG tablet, Take 1 tablet (5 mg total) by mouth in the morning, Disp: 30 tablet, Rfl: 5  Allergies   Allergen Reactions    Etomidate Swelling     (anesthetic)"I blew up and couldn't breath"     Vitals:    04/22/22 0801   BP: 112/60   BP Location: Right arm   Patient Position: Sitting   Cuff Size: Standard   Pulse: 60   SpO2: 100%   Weight: 74 5 kg (164 lb 4 8 oz)   Height: 5' 8" (1 727 m)     Weight (last 2 days)     Date/Time Weight    04/22/22 0801 74 5 (164 3)         Blood pressure 112/60, pulse 60, height 5' 8" (1 727 m), weight 74 5 kg (164 lb 4 8 oz), SpO2 100 %  , Body mass index is 24 98 kg/m²  Labs:  Orders Only on 01/14/2022   Component Date Value    Hemoglobin A1C 01/14/2022 7 4     EXT Creatinine Urine 01/14/2022 70 8     Microalbum  ,U,Random 01/14/2022 0 5     EXTERNAL Microalb/Creat * 01/14/2022 7 1    Orders Only on 11/12/2021   Component Date Value    EXT Creatinine Urine 11/12/2021 59 1     Microalbum  ,U,Random 11/12/2021 2 4     EXTERNAL Microalb/Creat * 11/12/2021 40 6     Hemoglobin A1C 11/12/2021 7 3      Imaging: No results found  Review of Systems:  Review of Systems   Constitutional: Negative for diaphoresis, fatigue, fever and unexpected weight change  HENT: Negative  Respiratory: Positive for shortness of breath  Negative for cough and wheezing  Cardiovascular: Positive for chest pain  Negative for palpitations and leg swelling  Gastrointestinal: Negative for abdominal pain, diarrhea and nausea  Musculoskeletal: Negative for gait problem and myalgias  Skin: Negative for rash     Neurological: Negative for dizziness and numbness  Psychiatric/Behavioral: Negative  Physical Exam:  Physical Exam  Constitutional:       Appearance: He is well-developed  HENT:      Head: Normocephalic and atraumatic  Eyes:      Pupils: Pupils are equal, round, and reactive to light  Neck:      Vascular: No JVD  Cardiovascular:      Rate and Rhythm: Regular rhythm  Pulses: Normal pulses  Carotid pulses are 2+ on the right side and 2+ on the left side  Heart sounds: S1 normal and S2 normal    Pulmonary:      Effort: Pulmonary effort is normal       Breath sounds: Normal breath sounds  No wheezing or rales  Abdominal:      General: Bowel sounds are normal       Palpations: Abdomen is soft  Tenderness: There is no abdominal tenderness  Musculoskeletal:         General: No tenderness  Normal range of motion  Cervical back: Normal range of motion and neck supple  Skin:     General: Skin is warm  Neurological:      Mental Status: He is alert and oriented to person, place, and time  Cranial Nerves: No cranial nerve deficit  Deep Tendon Reflexes: Reflexes are normal and symmetric

## 2022-05-13 ENCOUNTER — OFFICE VISIT (OUTPATIENT)
Dept: INTERNAL MEDICINE CLINIC | Facility: CLINIC | Age: 82
End: 2022-05-13
Payer: MEDICARE

## 2022-05-13 VITALS
HEART RATE: 68 BPM | OXYGEN SATURATION: 98 % | TEMPERATURE: 98.2 F | WEIGHT: 157.6 LBS | DIASTOLIC BLOOD PRESSURE: 62 MMHG | HEIGHT: 68 IN | RESPIRATION RATE: 16 BRPM | BODY MASS INDEX: 23.89 KG/M2 | SYSTOLIC BLOOD PRESSURE: 108 MMHG

## 2022-05-13 DIAGNOSIS — I10 ESSENTIAL HYPERTENSION: Chronic | ICD-10-CM

## 2022-05-13 DIAGNOSIS — E78.2 MIXED HYPERLIPIDEMIA: ICD-10-CM

## 2022-05-13 DIAGNOSIS — E11.51 TYPE 2 DIABETES MELLITUS WITH DIABETIC PERIPHERAL ANGIOPATHY WITHOUT GANGRENE, WITHOUT LONG-TERM CURRENT USE OF INSULIN (HCC): Primary | ICD-10-CM

## 2022-05-13 DIAGNOSIS — Z72.0 TOBACCO USE: ICD-10-CM

## 2022-05-13 DIAGNOSIS — F41.9 ANXIETY: Chronic | ICD-10-CM

## 2022-05-13 DIAGNOSIS — K21.9 GASTROESOPHAGEAL REFLUX DISEASE WITHOUT ESOPHAGITIS: ICD-10-CM

## 2022-05-13 DIAGNOSIS — N52.9 ERECTILE DYSFUNCTION, UNSPECIFIED ERECTILE DYSFUNCTION TYPE: ICD-10-CM

## 2022-05-13 DIAGNOSIS — I25.118 CORONARY ARTERY DISEASE OF NATIVE HEART WITH STABLE ANGINA PECTORIS, UNSPECIFIED VESSEL OR LESION TYPE (HCC): Chronic | ICD-10-CM

## 2022-05-13 PROBLEM — R11.10 NON-INTRACTABLE VOMITING: Status: RESOLVED | Noted: 2020-02-03 | Resolved: 2022-05-13

## 2022-05-13 PROBLEM — M25.519 SHOULDER PAIN: Status: RESOLVED | Noted: 2017-06-12 | Resolved: 2022-05-13

## 2022-05-13 PROCEDURE — 99213 OFFICE O/P EST LOW 20 MIN: CPT | Performed by: INTERNAL MEDICINE

## 2022-05-13 RX ORDER — LISINOPRIL 20 MG/1
20 TABLET ORAL DAILY
Qty: 90 TABLET | Refills: 3 | Status: SHIPPED | OUTPATIENT
Start: 2022-05-13

## 2022-05-13 RX ORDER — NITROGLYCERIN 0.4 MG/1
0.4 TABLET SUBLINGUAL
Qty: 25 TABLET | Refills: 3 | Status: SHIPPED | OUTPATIENT
Start: 2022-05-13

## 2022-05-13 RX ORDER — METFORMIN HYDROCHLORIDE 500 MG/1
500 TABLET, EXTENDED RELEASE ORAL 2 TIMES DAILY WITH MEALS
Qty: 180 TABLET | Refills: 3 | Status: SHIPPED | OUTPATIENT
Start: 2022-05-13

## 2022-05-13 RX ORDER — NITROGLYCERIN 0.4 MG/1
0.4 TABLET SUBLINGUAL
Qty: 75 TABLET | Refills: 3 | Status: SHIPPED | OUTPATIENT
Start: 2022-05-13 | End: 2022-05-13 | Stop reason: SDUPTHER

## 2022-05-13 RX ORDER — PAROXETINE HYDROCHLORIDE 40 MG/1
40 TABLET, FILM COATED ORAL DAILY
Qty: 90 TABLET | Refills: 3 | Status: SHIPPED | OUTPATIENT
Start: 2022-05-13

## 2022-05-13 RX ORDER — ROSUVASTATIN CALCIUM 20 MG/1
20 TABLET, COATED ORAL DAILY
Qty: 90 TABLET | Refills: 3 | Status: SHIPPED | OUTPATIENT
Start: 2022-05-13

## 2022-05-13 RX ORDER — PANTOPRAZOLE SODIUM 40 MG/1
40 TABLET, DELAYED RELEASE ORAL DAILY
Qty: 90 TABLET | Refills: 3 | Status: SHIPPED | OUTPATIENT
Start: 2022-05-13

## 2022-05-13 RX ORDER — TADALAFIL 5 MG/1
5 TABLET ORAL DAILY PRN
Qty: 30 TABLET | Refills: 2 | Status: SHIPPED | OUTPATIENT
Start: 2022-05-13

## 2022-05-13 RX ORDER — CLOPIDOGREL BISULFATE 75 MG/1
75 TABLET ORAL DAILY
Qty: 90 TABLET | Refills: 3 | Status: SHIPPED | OUTPATIENT
Start: 2022-05-13

## 2022-05-13 NOTE — PROGRESS NOTES
Assessment/Plan:     Diagnoses and all orders for this visit:    Type 2 diabetes mellitus with diabetic peripheral angiopathy without gangrene, without long-term current use of insulin (Formerly [facility])  Comments:  Last A1C stable at 7 4%, c/w jardiance, lantus and metformin SR  Orders:  -     insulin glargine (LANTUS SOLOSTAR) 100 units/mL injection pen; Inject 15 Units under the skin in the morning   -     metFORMIN (GLUCOPHAGE-XR) 500 mg 24 hr tablet; Take 1 tablet (500 mg total) by mouth in the morning and 1 tablet (500 mg total) in the evening  Take with meals   -     Empagliflozin 25 MG TABS; Take 1 tablet (25 mg total) by mouth every morning    Essential hypertension  Comments:  BP doing well, c/w BB/ACEI  Orders:  -     metoprolol tartrate (LOPRESSOR) 25 mg tablet; Take 0 5 tablets (12 5 mg total) by mouth every 12 (twelve) hours  -     lisinopril (ZESTRIL) 20 mg tablet; Take 1 tablet (20 mg total) by mouth in the morning  Tobacco use  Comments:  tobacco cessation counseling discussed at last OV, he is not ready to quit    Anxiety  Comments:  taking paxil and strongly advised to see psychiatry at [place] for his anger issues(he was being seen there regularly)  Orders:  -     PARoxetine (PAXIL) 40 MG tablet; Take 1 tablet (40 mg total) by mouth in the morning  Coronary artery disease of native heart with stable angina pectoris, unspecified vessel or lesion type [facility])  Comments:  s/p cardiology eval   c/w BB/ACEI/asa/tobacco cessation and care per cardiologist  Orders:  -     Discontinue: nitroglycerin (Nitrostat) 0 4 mg SL tablet; Place 1 tablet (0 4 mg total) under the tongue every 5 (five) minutes as needed for chest pain  -     metoprolol tartrate (LOPRESSOR) 25 mg tablet; Take 0 5 tablets (12 5 mg total) by mouth every 12 (twelve) hours  -     clopidogrel (PLAVIX) 75 mg tablet;  Take 1 tablet (75 mg total) by mouth in the morning   -     nitroglycerin (Nitrostat) 0 4 mg SL tablet; Place 1 tablet (0 4 mg total) under the tongue every 5 (five) minutes as needed for chest pain    Mixed hyperlipidemia  Comments:  taking statin and no SE, c/w rx  Orders:  -     rosuvastatin (CRESTOR) 20 MG tablet; Take 1 tablet (20 mg total) by mouth in the morning  Gastroesophageal reflux disease without esophagitis  Comments:  taking PPI and stable, c/w rx  Orders:  -     pantoprazole (PROTONIX) 40 mg tablet; Take 1 tablet (40 mg total) by mouth in the morning  Erectile dysfunction, unspecified erectile dysfunction type  Comments:  taking sildenafil as needed and requests to take cialis instead  advised NOT to take cialis/sildenafil on same day he is taking NTG  Orders:  -     tadalafil (CIALIS) 5 MG tablet; Take 1 tablet (5 mg total) by mouth as needed in the morning for erectile dysfunction  DO NOT TAKE ON DAYS YOU TAKE NITROGLYCERIN       all meds prescriptions printed and sent to [place] [facility] per patient request(today)  [facility]    Subjective:      Patient ID: Joleen Millan is a 79 y o  male  HPI    Here for follow up and medication review  He is overall doing well  He sees the [place] office as PCP and has medications prescribed for him there but demands that we order his medications to the [place] pharmacy as well  He got NTG From his cardiologist and was told to take this before being active or if he has chest pain  He is not taking cialis due to cost and hasn't been taking sildenafil  We extensively discussed the drug interaction between NTG and cialis/viagra and to NOT take them on same days  He verbalized understanding and these instructions were written down for him as well  He is seeing urology next month  he does not check his blood sugars/refuses to do so against my advice  ROS Otherwise negative, no other complaints      Past Medical History:   Diagnosis Date    Atherosclerosis     Cardiac disease     COPD [facility]chronic obstructive pulmonary disease) ([facility] [address] )     Coronary artery disease     Hyperlipidemia     Hypertension     Peripheral arterial disease (HCC)      Vitals:    05/13/22 0949   BP: 108/62   Pulse: 68   Resp: 16   Temp: 98 2 °F (36 8 °C)   SpO2: 98%   Weight: 71 5 kg (157 lb 9 6 oz)   Height: 5' 8" (1 727 m)     Body mass index is 23 96 kg/m²  Current Outpatient Medications:     Cholecalciferol (VITAMIN D3) 2000 units capsule, Take 1,000 Units by mouth daily, Disp: , Rfl:     clopidogrel (PLAVIX) 75 mg tablet, Take 1 tablet (75 mg total) by mouth in the morning , Disp: 90 tablet, Rfl: 3    Empagliflozin 25 MG TABS, Take 1 tablet (25 mg total) by mouth every morning, Disp: 90 tablet, Rfl: 3    insulin glargine (LANTUS SOLOSTAR) 100 units/mL injection pen, Inject 15 Units under the skin in the morning , Disp: 15 mL, Rfl: 3    lisinopril (ZESTRIL) 20 mg tablet, Take 1 tablet (20 mg total) by mouth in the morning , Disp: 90 tablet, Rfl: 3    metFORMIN (GLUCOPHAGE-XR) 500 mg 24 hr tablet, Take 1 tablet (500 mg total) by mouth in the morning and 1 tablet (500 mg total) in the evening  Take with meals  , Disp: 180 tablet, Rfl: 3    metoprolol tartrate (LOPRESSOR) 25 mg tablet, Take 0 5 tablets (12 5 mg total) by mouth every 12 (twelve) hours, Disp: 90 tablet, Rfl: 3    nitroglycerin (Nitrostat) 0 4 mg SL tablet, Place 1 tablet (0 4 mg total) under the tongue every 5 (five) minutes as needed for chest pain, Disp: 25 tablet, Rfl: 3    pantoprazole (PROTONIX) 40 mg tablet, Take 1 tablet (40 mg total) by mouth in the morning , Disp: 90 tablet, Rfl: 3    PARoxetine (PAXIL) 40 MG tablet, Take 1 tablet (40 mg total) by mouth in the morning , Disp: 90 tablet, Rfl: 3    rosuvastatin (CRESTOR) 20 MG tablet, Take 1 tablet (20 mg total) by mouth in the morning , Disp: 90 tablet, Rfl: 3    tadalafil (CIALIS) 5 MG tablet, Take 1 tablet (5 mg total) by mouth as needed in the morning for erectile dysfunction   DO NOT TAKE ON DAYS YOU TAKE NITROGLYCERIN , Disp: 30 tablet, Rfl: 2   carboxymethylcellulose (REFRESH PLUS) 0 5 % SOLN, INSTILL 1 DROP BOTH EYES FOUR TIMES A DAY FOR DRY EYES, Disp: , Rfl:   Allergies   Allergen Reactions    Etomidate Swelling     (anesthetic)"I blew up and couldn't breath"         Review of Systems   Constitutional: Negative for fever  HENT: Negative for congestion  Respiratory: Negative for shortness of breath  Cardiovascular: Negative for palpitations and leg swelling  Gastrointestinal: Negative for abdominal pain  Musculoskeletal: Negative for gait problem  Allergic/Immunologic: Negative for immunocompromised state  Neurological: Negative for dizziness  Psychiatric/Behavioral: The patient is nervous/anxious (& anger issues, seeing psychiatrist at [address] [place] [address])  Objective:      /62   Pulse 68   Temp 98 2 °F (36 8 °C)   Resp 16   Ht 5' 8" (1 727 m)   Wt 71 5 kg (157 lb 9 6 oz)   SpO2 98%   BMI 23 96 kg/m²          Physical Exam  Vitals reviewed  Constitutional:       Appearance: Normal appearance  HENT:      Head: Normocephalic and atraumatic  Right Ear: Tympanic membrane normal       Left Ear: Tympanic membrane normal    Eyes:      Conjunctiva/sclera: Conjunctivae normal    Cardiovascular:      Rate and Rhythm: Normal rate and regular rhythm  Heart sounds: No murmur heard  Pulmonary:      Effort: Pulmonary effort is normal       Breath sounds: No wheezing or rales  Abdominal:      General: Bowel sounds are normal       Palpations: Abdomen is soft  Tenderness: There is no abdominal tenderness  Musculoskeletal:      Right lower leg: No edema  Left lower leg: No edema  Neurological:      Mental Status: He is alert  Psychiatric:         Mood and Affect: Mood normal  Affect is angry (gets easily agitated)           Behavior: Behavior normal

## 2022-05-13 NOTE — PATIENT INSTRUCTIONS
Return as scheduled  Medications re-ordered  DO NOT TAKE TADALFIL OR SILDENAFIL IF YOU TAKE NITROGLYCERIN    What to Do if Your Blood Sugar is Low   AMBULATORY CARE:   Low blood sugar levels  (hypoglycemia) can happen with Type 1 and Type 2 diabetes  Low levels are more likely to happen if you use insulin  Hypoglycemia can cause you to have falls, accidents, and injuries  A blood sugar level that gets too low can lead to seizures, coma, and death  Learn to recognize the symptoms early so you can get treatment quickly  When your blood sugar is low you may feel:  Sweaty    Nervous or shaky    Anxious or irritable    Confused    A fast, pounding heartbeat    Extremely hungry    Have someone call your local emergency number (911 in the [address]) if:   You cannot be woken  You have a seizure  Call your doctor if:   You have symptoms of a low blood sugar level, such as trouble thinking, sweating, or a pounding heartbeat  Your blood sugar level is lower than normal and it does not improve with treatment  You often have lower blood sugar levels than your target goals  You have trouble coping with your illness, or you feel anxious or depressed  You have questions or concerns about your condition or care  What to do if you have symptoms of low blood sugar:   Check your blood sugar level, if possible  Your blood sugar level is too low if it is at or below 70 mg/dL  Eat or drink 15 grams of fast-acting carbohydrate  Fast-acting carbohydrates will raise your blood sugar level quickly  Examples of 15 grams of fast-acting carbohydrates:     4 ounces (½ cup) of fruit juice     4 ounces of regular soda    2 tablespoons of raisins     1 tube of glucose gel or 3 to 4 glucose tablets       Check your blood sugar level 15 minutes later  If the level is still low (less than 100 mg/dL), eat another 15 grams of carbohydrate  When the level returns to 100 mg/dL, eat a snack or meal that contains carbohydrates   This will help prevent another drop in blood sugar  Teach people close to you how to use your glucagon kit  Your blood sugar may be too low for you to be awake  People need to know when and how to use your kit  Prevent low blood sugar levels:  Prevent low blood sugar by knowing what increases your risk  Ask your healthcare provider for ways to prevent low blood sugar levels  Any of the following can increase your risk of low blood sugar:  Fasting for tests or procedures    During or after intense exercise    Late or postponed meals    Sleeping (you may need a bedtime snack)     Drinking alcohol if you use insulin or insulin releasing pills    Follow up with your doctor as directed:  Write down your questions so you remember to ask them during your visits  © [employer] 2022 Information is for End User's use only and may not be sold, redistributed or otherwise used for commercial purposes  All illustrations and images included in CareNotes® are the copyrighted property of [employer]  or [facility] Theresa Shafer   The above information is an  only  It is not intended as medical advice for individual conditions or treatments  Talk to your doctor, nurse or pharmacist before following any medical regimen to see if it is safe and effective for you

## 2022-06-01 RX ORDER — SILDENAFIL 100 MG/1
100 TABLET, FILM COATED ORAL
COMMUNITY
Start: 2022-02-28

## 2022-06-07 ENCOUNTER — OFFICE VISIT (OUTPATIENT)
Dept: UROLOGY | Facility: AMBULATORY SURGERY CENTER | Age: 82
End: 2022-06-07
Payer: MEDICARE

## 2022-06-07 VITALS
HEIGHT: 68 IN | WEIGHT: 160 LBS | BODY MASS INDEX: 24.25 KG/M2 | HEART RATE: 65 BPM | DIASTOLIC BLOOD PRESSURE: 70 MMHG | SYSTOLIC BLOOD PRESSURE: 130 MMHG

## 2022-06-07 DIAGNOSIS — R35.1 BPH ASSOCIATED WITH NOCTURIA: Primary | ICD-10-CM

## 2022-06-07 DIAGNOSIS — N40.1 BPH ASSOCIATED WITH NOCTURIA: Primary | ICD-10-CM

## 2022-06-07 LAB
POST-VOID RESIDUAL VOLUME, ML POC: 15 ML
SL AMB  POCT GLUCOSE, UA: NORMAL
SL AMB LEUKOCYTE ESTERASE,UA: NORMAL
SL AMB POCT BILIRUBIN,UA: NORMAL
SL AMB POCT BLOOD,UA: NORMAL
SL AMB POCT CLARITY,UA: CLEAR
SL AMB POCT COLOR,UA: YELLOW
SL AMB POCT KETONES,UA: NORMAL
SL AMB POCT NITRITE,UA: NORMAL
SL AMB POCT PH,UA: 5
SL AMB POCT SPECIFIC GRAVITY,UA: 1.01
SL AMB POCT URINE PROTEIN: NORMAL
SL AMB POCT UROBILINOGEN: 0.2

## 2022-06-07 PROCEDURE — 99203 OFFICE O/P NEW LOW 30 MIN: CPT | Performed by: NURSE PRACTITIONER

## 2022-06-07 PROCEDURE — 81002 URINALYSIS NONAUTO W/O SCOPE: CPT | Performed by: NURSE PRACTITIONER

## 2022-06-07 PROCEDURE — 51798 US URINE CAPACITY MEASURE: CPT | Performed by: NURSE PRACTITIONER

## 2022-06-07 RX ORDER — ALFUZOSIN HYDROCHLORIDE 10 MG/1
10 TABLET, EXTENDED RELEASE ORAL DAILY
Qty: 90 TABLET | Refills: 3 | Status: SHIPPED | OUTPATIENT
Start: 2022-06-07

## 2022-06-07 NOTE — PATIENT INSTRUCTIONS
Alfuzosin daily- take in the evening  Make sure you are drinking more water during the day and decrease the amount of coffee per day

## 2022-06-07 NOTE — PROGRESS NOTES
6/7/2022    Assessment and Plan    79 y o  male managed by NEW PATIENT    1  Benign prostatic hyperplasia  · Bladder Scan PVR 15 ml  · Urine dip in office unremarkable  · We discussed need to increase water intake upwards to 32 oz per day while decreasing from 2 pots of coffee down to 2-3 cups per day  · Ensure complete bladder emptying with urination  · Timed voiding  · CHICHO-prostate 45-50 g with no nodules  · Prescription for alfuzosin provided  · Will have patient come back into the office in 7-8 weeks for re-evaluation of symptoms with AUA, PVR, uroflow      History of Present Illness  Maria A Antunez is a 79 y o  male here for evaluation of  urinary symptoms secondary to benign prostatic hyperplasia  Patient reports a history of benign prostatic hyperplasia  He reports currently nocturia episodes 2-3 times at night and reports urinating every 2 hours during the day  He reports his urinary stream to be weak with complete sensation of bladder emptying with urination  He denies dysuria hematuria  He also reports erectile dysfunction and has been prescribed Cialis by his PCP  He denies use of this medication at this time  Patient reports drinking no water during the day and proximally 2 pots of coffee during the day with occasional soda at dinner time  He denies a family history of prostate cancer    Past medical history includes type 2 diabetes with his most recent hemoglobin A1c resulting 7 4% performed on 1/14/22, COPD, sleep apnea, CAD, hypertension, PSA elevation, hyperlipidemia      Review of Systems   Constitutional: Negative for chills and fever  Respiratory: Negative for cough and shortness of breath  Cardiovascular: Negative for chest pain  Gastrointestinal: Negative for abdominal distention, abdominal pain, blood in stool, nausea and vomiting  Genitourinary: Negative for difficulty urinating, dysuria, enuresis, flank pain, frequency, hematuria and urgency  Skin: Negative for rash  Past Medical History  Past Medical History:   Diagnosis Date    Atherosclerosis     Cardiac disease     [facility]) [facility] [address] [facility]     Coronary artery disease     Hyperlipidemia     Hypertension     Peripheral arterial disease ([facility] )        Past Social History  Past Surgical History:   Procedure Laterality Date    BYPASS FEMORAL-POPLITEAL Left 4/30/2018    Procedure: BYPASS FEMORAL-POPLITEAL, WITH INTRAOP ARTERIOGRAM;  Surgeon: Jerman Pires MD;  [facility];  Service: Vascular    CARDIAC SURGERY      FEMORAL ARTERY - POPLITEAL ARTERY BYPASS GRAFT Bilateral     HAND SURGERY      IR AORTAGRAM WITH RUN-OFF  10/4/2018    LEG SURGERY      Repair    OTHER SURGICAL HISTORY      Percutaneous repair nasoethmoid fx lacrimal apparatus    THROMBOENDARTERECTOMY  10/12/2010    Tibioperoneal trunk    TONSILLECTOMY      TONSILLECTOMY       Social History     Tobacco Use   Smoking Status Current Every Day Smoker    Packs/day: 0 25    Types: Cigarettes   Smokeless Tobacco Former User    Quit date: 1960   Tobacco Comment    10 sticks/day        Past Family History  Family History   Problem Relation Age of Onset    Arthritis Mother     Arthritis Family     Coronary artery disease Family     Hyperlipidemia Family     Peripheral vascular disease Family        Past Social history  Social History     Socioeconomic History    Marital status:       Spouse name: Not on file    Number of children: Not on file    Years of education: Not on file    Highest education level: Not on file   Occupational History    Not on file   Tobacco Use    Smoking status: Current Every Day Smoker     Packs/day: 0 25     Types: Cigarettes    Smokeless tobacco: Former User     Quit date: 1960    Tobacco comment: 10 sticks/day    Vaping Use    Vaping Use: Never used   Substance and Sexual Activity    Alcohol use: No     Comment: Social drinker    Drug use: No    Sexual activity: Yes Other Topics Concern    Not on file   Social History Narrative    Not on file     Social Determinants of Health     Financial Resource Strain: Not on file   Food Insecurity: Not on file   Transportation Needs: Not on file   Physical Activity: Not on file   Stress: Not on file   Social Connections: Not on file   Intimate Partner Violence: Not on file   Housing Stability: Not on file       Current Medications  Current Outpatient Medications   Medication Sig Dispense Refill    alfuzosin (UROXATRAL) 10 mg 24 hr tablet Take 1 tablet (10 mg total) by mouth daily 90 tablet 3    carboxymethylcellulose (REFRESH PLUS) 0 5 % SOLN INSTILL 1 DROP BOTH EYES FOUR TIMES A DAY FOR DRY EYES      Cholecalciferol (VITAMIN D3) 2000 units capsule Take 1,000 Units by mouth daily      clopidogrel (PLAVIX) 75 mg tablet Take 1 tablet (75 mg total) by mouth in the morning  90 tablet 3    Empagliflozin 25 MG TABS Take 1 tablet (25 mg total) by mouth every morning 90 tablet 3    insulin glargine (LANTUS SOLOSTAR) 100 units/mL injection pen Inject 15 Units under the skin in the morning  15 mL 3    lisinopril (ZESTRIL) 20 mg tablet Take 1 tablet (20 mg total) by mouth in the morning  90 tablet 3    metFORMIN (GLUCOPHAGE-XR) 500 mg 24 hr tablet Take 1 tablet (500 mg total) by mouth in the morning and 1 tablet (500 mg total) in the evening  Take with meals  180 tablet 3    metoprolol tartrate (LOPRESSOR) 25 mg tablet Take 0 5 tablets (12 5 mg total) by mouth every 12 (twelve) hours 90 tablet 3    pantoprazole (PROTONIX) 40 mg tablet Take 1 tablet (40 mg total) by mouth in the morning  90 tablet 3    PARoxetine (PAXIL) 40 MG tablet Take 1 tablet (40 mg total) by mouth in the morning  90 tablet 3    rosuvastatin (CRESTOR) 20 MG tablet Take 1 tablet (20 mg total) by mouth in the morning  90 tablet 3    tadalafil (CIALIS) 5 MG tablet Take 1 tablet (5 mg total) by mouth as needed in the morning for erectile dysfunction   DO NOT TAKE ON DAYS YOU TAKE NITROGLYCERIN  30 tablet 2    nitroglycerin (Nitrostat) 0 4 mg SL tablet Place 1 tablet (0 4 mg total) under the tongue every 5 (five) minutes as needed for chest pain (Patient not taking: Reported on 6/7/2022) 25 tablet 3    sildenafil (VIAGRA) 100 mg tablet Take 100 mg by mouth (Patient not taking: Reported on 6/7/2022)       No current facility-administered medications for this visit  Allergies  Allergies   Allergen Reactions    Etomidate Swelling     (anesthetic)"I blew up and couldn't breath"         The following portions of the patient's history were reviewed and updated as appropriate: allergies, current medications, past medical history, past social history, past surgical history and problem list       Vitals  Vitals:    06/07/22 1003   BP: 130/70   Pulse: 65   Weight: 72 6 kg (160 lb)   Height: 5' 8" (1 727 m)           Physical Exam  Physical Exam  Vitals reviewed  Constitutional:       General: He is not in acute distress  Appearance: Normal appearance  He is normal weight  HENT:      Head: Normocephalic  Pulmonary:      Effort: No respiratory distress  Breath sounds: Normal breath sounds  Genitourinary:     Comments: CHICHO-prostate 50 g with no nodules  Smooth symmetrical  Skin:     General: Skin is warm and dry  Neurological:      General: No focal deficit present  Mental Status: He is alert and oriented to person, place, and time  Psychiatric:         Mood and Affect: Mood normal          Behavior: Behavior normal        Results  No results found for this or any previous visit (from the past 1 hour(s))  ]  No results found for: PSA  Lab Results   Component Value Date    GLUCOSE 210 (H) 04/30/2018    CALCIUM 9 6 06/28/2021     12/10/2015    K 4 2 06/28/2021    CO2 25 06/28/2021     06/28/2021    BUN 17 06/28/2021    CREATININE 1 02 06/28/2021     Lab Results   Component Value Date    WBC 6 16 06/28/2021    HGB 15 4 06/28/2021    HCT 47 5 06/28/2021    MCV 91 06/28/2021     06/28/2021       Orders  Orders Placed This Encounter   Procedures    POCT urine dip    POCT Measure PVR       FLORENCIA Ponce

## 2022-06-10 ENCOUNTER — TELEPHONE (OUTPATIENT)
Dept: UROLOGY | Facility: AMBULATORY SURGERY CENTER | Age: 82
End: 2022-06-10

## 2022-06-10 NOTE — TELEPHONE ENCOUNTER
Alfuzosin /uroxatrial 10 mg 24 hr was ordered on 6/7 Called [phone] - sent to 2 different people   Pharmacy researched for me and gave me number - [phone] - called that number and rang and rang- if she calls back more information is needed

## 2022-06-10 NOTE — TELEPHONE ENCOUNTER
We need more information about a medication that was ordered please call Talita Sylvester @ [phone]

## 2022-06-21 ENCOUNTER — PROBLEM (OUTPATIENT)
Dept: URBAN - METROPOLITAN AREA CLINIC 6 | Facility: CLINIC | Age: 82
End: 2022-06-21

## 2022-06-21 DIAGNOSIS — H15.012: ICD-10-CM

## 2022-06-21 DIAGNOSIS — E11.9: ICD-10-CM

## 2022-06-21 DIAGNOSIS — Z79.4: ICD-10-CM

## 2022-06-21 PROCEDURE — 92014 COMPRE OPH EXAM EST PT 1/>: CPT

## 2022-06-21 ASSESSMENT — VISUAL ACUITY: OD_CC: 20/60+1

## 2022-06-21 ASSESSMENT — TONOMETRY
OS_IOP_MMHG: 10
OD_IOP_MMHG: 10

## 2022-07-01 ENCOUNTER — FOLLOW UP (OUTPATIENT)
Dept: URBAN - METROPOLITAN AREA CLINIC 6 | Facility: CLINIC | Age: 82
End: 2022-07-01

## 2022-07-01 DIAGNOSIS — E11.9: ICD-10-CM

## 2022-07-01 DIAGNOSIS — H15.012: ICD-10-CM

## 2022-07-01 DIAGNOSIS — Z79.4: ICD-10-CM

## 2022-07-01 PROCEDURE — 92012 INTRM OPH EXAM EST PATIENT: CPT

## 2022-07-01 ASSESSMENT — VISUAL ACUITY
OS_CC: 20/60-1
OU_CC: J2-1
OD_PH: 20/60-1
OD_CC: 20/70+1

## 2022-07-01 ASSESSMENT — TONOMETRY
OS_IOP_MMHG: 12
OD_IOP_MMHG: 11

## 2022-07-15 LAB — HBA1C MFR BLD HPLC: 7.5 %

## 2022-07-27 ENCOUNTER — OFFICE VISIT (OUTPATIENT)
Dept: UROLOGY | Facility: AMBULATORY SURGERY CENTER | Age: 82
End: 2022-07-27
Payer: MEDICARE

## 2022-07-27 ENCOUNTER — OFFICE VISIT (OUTPATIENT)
Dept: INTERNAL MEDICINE CLINIC | Facility: CLINIC | Age: 82
End: 2022-07-27
Payer: MEDICARE

## 2022-07-27 VITALS
WEIGHT: 159.6 LBS | SYSTOLIC BLOOD PRESSURE: 120 MMHG | BODY MASS INDEX: 24.19 KG/M2 | HEART RATE: 71 BPM | HEIGHT: 68 IN | TEMPERATURE: 97 F | RESPIRATION RATE: 16 BRPM | DIASTOLIC BLOOD PRESSURE: 60 MMHG | OXYGEN SATURATION: 97 %

## 2022-07-27 VITALS
HEIGHT: 68 IN | WEIGHT: 161 LBS | SYSTOLIC BLOOD PRESSURE: 122 MMHG | DIASTOLIC BLOOD PRESSURE: 62 MMHG | BODY MASS INDEX: 24.4 KG/M2

## 2022-07-27 DIAGNOSIS — E11.51 TYPE 2 DIABETES MELLITUS WITH DIABETIC PERIPHERAL ANGIOPATHY WITHOUT GANGRENE, WITHOUT LONG-TERM CURRENT USE OF INSULIN (HCC): ICD-10-CM

## 2022-07-27 DIAGNOSIS — I25.118 CORONARY ARTERY DISEASE OF NATIVE HEART WITH STABLE ANGINA PECTORIS, UNSPECIFIED VESSEL OR LESION TYPE (HCC): Chronic | ICD-10-CM

## 2022-07-27 DIAGNOSIS — F41.9 ANXIETY: ICD-10-CM

## 2022-07-27 DIAGNOSIS — N40.1 BPH ASSOCIATED WITH NOCTURIA: Primary | ICD-10-CM

## 2022-07-27 DIAGNOSIS — E78.2 MIXED HYPERLIPIDEMIA: ICD-10-CM

## 2022-07-27 DIAGNOSIS — I10 ESSENTIAL HYPERTENSION: Chronic | ICD-10-CM

## 2022-07-27 DIAGNOSIS — R35.1 BPH ASSOCIATED WITH NOCTURIA: Primary | ICD-10-CM

## 2022-07-27 DIAGNOSIS — E61.1 IRON DEFICIENCY: Primary | ICD-10-CM

## 2022-07-27 DIAGNOSIS — Z72.0 TOBACCO USE: ICD-10-CM

## 2022-07-27 LAB — POST-VOID RESIDUAL VOLUME, ML POC: 46 ML

## 2022-07-27 PROCEDURE — 99213 OFFICE O/P EST LOW 20 MIN: CPT | Performed by: NURSE PRACTITIONER

## 2022-07-27 PROCEDURE — 51798 US URINE CAPACITY MEASURE: CPT | Performed by: NURSE PRACTITIONER

## 2022-07-27 PROCEDURE — 99214 OFFICE O/P EST MOD 30 MIN: CPT | Performed by: INTERNAL MEDICINE

## 2022-07-27 PROCEDURE — 51736 URINE FLOW MEASUREMENT: CPT | Performed by: NURSE PRACTITIONER

## 2022-07-27 RX ORDER — PAROXETINE HYDROCHLORIDE 40 MG/1
40 TABLET, FILM COATED ORAL EVERY MORNING
Qty: 5 TABLET | Refills: 0 | Status: SHIPPED | OUTPATIENT
Start: 2022-07-27

## 2022-07-27 RX ORDER — ROSUVASTATIN CALCIUM 20 MG/1
20 TABLET, COATED ORAL DAILY
Qty: 5 TABLET | Refills: 0 | Status: SHIPPED | OUTPATIENT
Start: 2022-07-27

## 2022-07-27 RX ORDER — METFORMIN HYDROCHLORIDE 500 MG/1
500 TABLET, EXTENDED RELEASE ORAL 2 TIMES DAILY WITH MEALS
Qty: 10 TABLET | Refills: 0 | Status: SHIPPED | OUTPATIENT
Start: 2022-07-27

## 2022-07-27 NOTE — PROGRESS NOTES
7/27/2022      Assessment and Plan    79 y o  male managed by new patient  1  Benign prostatic hyperplasia  · Bladder scan PVR- 46 ml  · Uroflow see results below  · Maintain adequate hydration upwards 32-40 oz of water intake per day while limiting bladder irritating foods and beverages-coffee, soda, teas spicy foods and artificial sweeteners  · Ensure complete bladder emptying with urination  · Continue alfuzosin  · Follow up the office in 6 month with bladder scan PVR, AUA, and uroflow    Uroflow    Date/Time: 7/27/2022 8:33 AM  Performed by: FLORENCIA Abbott  Authorized by: FLORENCIA Abbott   Procedure - Urodynamics:  Procedure details: Simple Uroflow          Post-procedure:     Patient tolerance: Patient tolerated procedure well with no immediate complications      Comments:      Maximum flow-3 8 mL/second  Average flow-4 1 mL/second  Voiding time 53 3 mm ss S  Voided volume 129 3 mL  PVR-46 ml        History of Present Illness  Lakisha Bay is a 79 y o  male here for follow up evaluation of  urinary symptoms secondary to benign prostatic hyperplasia  Patient reports a history of benign prostatic hyperplasia  He reports currently nocturia episodes 2-3 times at night and reports urinating every 2 hours during the day  He reports his urinary stream to be weak with complete sensation of bladder emptying with urination  He denies dysuria hematuria  He also reports erectile dysfunction and has been prescribed Cialis by his PCP  He denies use of this medication at this time  Patient reports drinking no water during the day and proximally 2 pots of coffee during the day with occasional soda at dinner time    He denies a family history of prostate cancer     Past medical history includes type 2 diabetes with his most recent hemoglobin A1c resulting 7 4% performed on 1/14/22, COPD, sleep apnea, CAD, hypertension, PSA elevation, hyperlipidemia    Unfortunately, patient has not started his alfuzosin as of yet  He reports he just received the prescription in the mail from the pharmacy yesterday  Review of Systems   Constitutional: Negative for chills and fever  Respiratory: Negative for cough and shortness of breath  Cardiovascular: Negative for chest pain  Gastrointestinal: Negative for abdominal distention, abdominal pain, blood in stool, nausea and vomiting  Genitourinary: Positive for frequency  Negative for difficulty urinating, dysuria, enuresis, flank pain, hematuria and urgency  Nocturia   Skin: Negative for rash  AUA SYMPTOM SCORE    Flowsheet Row Most Recent Value   AUA SYMPTOM SCORE    How often have you had a sensation of not emptying your bladder completely after you finished urinating? 0   How often have you had to urinate again less than two hours after you finished urinating? 3   How often have you found you stopped and started again several times when you urinate? 5   How often have you found it difficult to postpone urination? 5   How often have you had a weak urinary stream? 1   How often have you had to push or strain to begin urination? 0   How many times did you most typically get up to urinate from the time you went to bed at night until the time you got up in the morning?  4   Quality of Life: If you were to spend the rest of your life with your urinary condition just the way it is now, how would you feel about that? 3   AUA SYMPTOM SCORE 18             Past Medical History  Past Medical History:   Diagnosis Date    Atherosclerosis     Cardiac disease     [facility]     Coronary artery disease     Hyperlipidemia     Hypertension     Peripheral arterial disease [facility]        Past Social History  Past Surgical History:   Procedure Laterality Date    BYPASS FEMORAL-POPLITEAL Left 4/30/2018    Procedure: BYPASS FEMORAL-POPLITEAL, WITH INTRAOP ARTERIOGRAM;  Surgeon: Roxann Morales MD;  [facility];  Service: Vascular  CARDIAC SURGERY      FEMORAL ARTERY - POPLITEAL ARTERY BYPASS GRAFT Bilateral     HAND SURGERY      IR AORTAGRAM WITH RUN-OFF  10/4/2018    LEG SURGERY      Repair    OTHER SURGICAL HISTORY      Percutaneous repair nasoethmoid fx lacrimal apparatus    THROMBOENDARTERECTOMY  10/12/2010    Tibioperoneal trunk    TONSILLECTOMY      TONSILLECTOMY       Social History     Tobacco Use   Smoking Status Current Every Day Smoker    Packs/day: 0 25    Types: Cigarettes   Smokeless Tobacco Former User    Quit date: 1960   Tobacco Comment    10 sticks/day        Past Family History  Family History   Problem Relation Age of Onset    Arthritis Mother     Arthritis Family     Coronary artery disease Family     Hyperlipidemia Family     Peripheral vascular disease Family        Past Social history  Social History     Socioeconomic History    Marital status:       Spouse name: Not on file    Number of children: Not on file    Years of education: Not on file    Highest education level: Not on file   Occupational History    Not on file   Tobacco Use    Smoking status: Current Every Day Smoker     Packs/day: 0 25     Types: Cigarettes    Smokeless tobacco: Former User     Quit date: 1960    Tobacco comment: 10 sticks/day    Vaping Use    Vaping Use: Never used   Substance and Sexual Activity    Alcohol use: No     Comment: Social drinker    Drug use: No    Sexual activity: Yes   Other Topics Concern    Not on file   Social History Narrative    Not on file     Social Determinants of Health     Financial Resource Strain: Not on file   Food Insecurity: Not on file   Transportation Needs: Not on file   Physical Activity: Not on file   Stress: Not on file   Social Connections: Not on file   Intimate Partner Violence: Not on file   Housing Stability: Not on file       Current Medications  Current Outpatient Medications   Medication Sig Dispense Refill    alfuzosin (UROXATRAL) 10 mg 24 hr tablet Take 1 tablet (10 mg total) by mouth daily 90 tablet 3    carboxymethylcellulose (REFRESH PLUS) 0 5 % SOLN INSTILL 1 DROP BOTH EYES FOUR TIMES A DAY FOR DRY EYES      Cholecalciferol (VITAMIN D3) 2000 units capsule Take 1,000 Units by mouth daily      clopidogrel (PLAVIX) 75 mg tablet Take 1 tablet (75 mg total) by mouth in the morning  90 tablet 3    Empagliflozin 25 MG TABS Take 1 tablet (25 mg total) by mouth every morning 90 tablet 3    insulin glargine (LANTUS SOLOSTAR) 100 units/mL injection pen Inject 15 Units under the skin in the morning  15 mL 3    lisinopril (ZESTRIL) 20 mg tablet Take 1 tablet (20 mg total) by mouth in the morning  90 tablet 3    metFORMIN (GLUCOPHAGE-XR) 500 mg 24 hr tablet Take 1 tablet (500 mg total) by mouth in the morning and 1 tablet (500 mg total) in the evening  Take with meals  180 tablet 3    metoprolol tartrate (LOPRESSOR) 25 mg tablet Take 0 5 tablets (12 5 mg total) by mouth every 12 (twelve) hours 90 tablet 3    nitroglycerin (Nitrostat) 0 4 mg SL tablet Place 1 tablet (0 4 mg total) under the tongue every 5 (five) minutes as needed for chest pain 25 tablet 3    pantoprazole (PROTONIX) 40 mg tablet Take 1 tablet (40 mg total) by mouth in the morning  90 tablet 3    PARoxetine (PAXIL) 40 MG tablet Take 1 tablet (40 mg total) by mouth in the morning  90 tablet 3    rosuvastatin (CRESTOR) 20 MG tablet Take 1 tablet (20 mg total) by mouth in the morning  90 tablet 3    sildenafil (VIAGRA) 100 mg tablet Take 100 mg by mouth      tadalafil (CIALIS) 5 MG tablet Take 1 tablet (5 mg total) by mouth as needed in the morning for erectile dysfunction  DO NOT TAKE ON DAYS YOU TAKE NITROGLYCERIN  30 tablet 2     No current facility-administered medications for this visit         Allergies  Allergies   Allergen Reactions    Etomidate Swelling     (anesthetic)"I blew up and couldn't breath"         The following portions of the patient's history were reviewed and updated as appropriate: allergies, current medications, past medical history, past social history, past surgical history and problem list       Vitals  Vitals:    07/27/22 0836   BP: 122/62   BP Location: Left arm   Patient Position: Sitting   Cuff Size: Adult   Weight: 73 kg (161 lb)   Height: 5' 8" (1 727 m)     Physical Exam  Physical Exam  Vitals reviewed  Constitutional:       General: He is not in acute distress  Appearance: Normal appearance  He is normal weight  HENT:      Head: Normocephalic  Pulmonary:      Effort: No respiratory distress  Breath sounds: Normal breath sounds  Skin:     General: Skin is warm and dry  Neurological:      General: No focal deficit present  Mental Status: He is alert and oriented to person, place, and time     Psychiatric:         Mood and Affect: Mood normal          Behavior: Behavior normal        Results  Recent Results (from the past 1 hour(s))   POCT Measure PVR    Collection Time: 07/27/22  8:41 AM   Result Value Ref Range    POST-VOID RESIDUAL VOLUME, ML POC 46 mL   ]  No results found for: PSA  Lab Results   Component Value Date    GLUCOSE 210 (H) 04/30/2018    CALCIUM 9 6 06/28/2021     12/10/2015    K 4 2 06/28/2021    CO2 25 06/28/2021     06/28/2021    BUN 17 06/28/2021    CREATININE 1 02 06/28/2021     Lab Results   Component Value Date    WBC 6 16 06/28/2021    HGB 15 4 06/28/2021    HCT 47 5 06/28/2021    MCV 91 06/28/2021     06/28/2021     Orders  Orders Placed This Encounter   Procedures    POCT Measure PVR    Uroflow     This order was created via procedure documentation       FLORENCIA Abrams

## 2022-07-27 NOTE — PATIENT INSTRUCTIONS
1  Stop iron for now  2  Blood work in 2 months  3  Try paroxetine in the evening  What to Do if Your Blood Sugar is Low   AMBULATORY CARE:   Low blood sugar levels  (hypoglycemia) can happen with Type 1 and Type 2 diabetes  Low levels are more likely to happen if you use insulin  Hypoglycemia can cause you to have falls, accidents, and injuries  A blood sugar level that gets too low can lead to seizures, coma, and death  Learn to recognize the symptoms early so you can get treatment quickly  When your blood sugar is low you may feel:  Sweaty    Nervous or shaky    Anxious or irritable    Confused    A fast, pounding heartbeat    Extremely hungry    Have someone call your local emergency number (911 in the [address]) if:   You cannot be woken  You have a seizure  Call your doctor if:   You have symptoms of a low blood sugar level, such as trouble thinking, sweating, or a pounding heartbeat  Your blood sugar level is lower than normal and it does not improve with treatment  You often have lower blood sugar levels than your target goals  You have trouble coping with your illness, or you feel anxious or depressed  You have questions or concerns about your condition or care  What to do if you have symptoms of low blood sugar:   Check your blood sugar level, if possible  Your blood sugar level is too low if it is at or below 70 mg/dL  Eat or drink 15 grams of fast-acting carbohydrate  Fast-acting carbohydrates will raise your blood sugar level quickly  Examples of 15 grams of fast-acting carbohydrates:     4 ounces (½ cup) of fruit juice     4 ounces of regular soda    2 tablespoons of raisins     1 tube of glucose gel or 3 to 4 glucose tablets       Check your blood sugar level 15 minutes later  If the level is still low (less than 100 mg/dL), eat another 15 grams of carbohydrate  When the level returns to 100 mg/dL, eat a snack or meal that contains carbohydrates   This will help prevent another drop in blood sugar  Teach people close to you how to use your glucagon kit  Your blood sugar may be too low for you to be awake  People need to know when and how to use your kit  Prevent low blood sugar levels:  Prevent low blood sugar by knowing what increases your risk  Ask your healthcare provider for ways to prevent low blood sugar levels  Any of the following can increase your risk of low blood sugar:  Fasting for tests or procedures    During or after intense exercise    Late or postponed meals    Sleeping (you may need a bedtime snack)     Drinking alcohol if you use insulin or insulin releasing pills    Follow up with your doctor as directed:  Write down your questions so you remember to ask them during your visits  © Copyright [employer] 2022 Information is for End User's use only and may not be sold, redistributed or otherwise used for commercial purposes  All illustrations and images included in CareNotes® are the copyrighted property of A D A M , Inc  or [facility] Theresa Shafer   The above information is an  only  It is not intended as medical advice for individual conditions or treatments  Talk to your doctor, nurse or pharmacist before following any medical regimen to see if it is safe and effective for you    4

## 2022-07-27 NOTE — PROGRESS NOTES
Assessment/Plan:     Diagnoses and all orders for this visit:    Iron deficiency  Comments:  he has been taking iron supplement daily for low iron/anemia  advised him to stop rx and check BW for blood count/iron in 2 mos  call if symptoms or concerns  Orders:  -     Iron Saturation %; Future  -     CBC and differential; Future    Type 2 diabetes mellitus with diabetic peripheral angiopathy without gangrene, without long-term current use of insulin ([facility])  Comments:  A1C stable, c/w metformin, jardiance, lantus  Orders:  -     metFORMIN (GLUCOPHAGE-XR) 500 mg 24 hr tablet; Take 1 tablet (500 mg total) by mouth 2 (two) times a day with meals    Essential hypertension  Comments:  BP doing well, c/w BB/ACEI    Coronary artery disease of native heart with stable angina pectoris, unspecified vessel or lesion type ([facility] )  Comments:  stable, no symptoms  c/w asa/statin/BB/ACEI  Orders:  -     rosuvastatin (CRESTOR) 20 MG tablet; Take 1 tablet (20 mg total) by mouth daily    Tobacco use  Comments:  he is not ready to quit    Mixed hyperlipidemia  Comments:  taking statin and no SE, c/w rx  Orders:  -     rosuvastatin (CRESTOR) 20 MG tablet; Take 1 tablet (20 mg total) by mouth daily    Anxiety  Comments:  taking paxil 40mg/day and I advised him not to self increase the dose further but rather to see psychiatry for asap appt  he prefers to go thru [address]  Orders:  -     PARoxetine (PAXIL) 40 MG tablet; Take 1 tablet (40 mg total) by mouth every morning          Subjective:      Patient ID: Nabil Barnes is a 79 y o  male  HPI    Here for follow up, here with significant other, Ovi Chakraborty during today's appt  Angel needs refill of some of his medications as the [facility] has not been sending him his medications on time  One of this medications, he got 2 months after sending the rx [facility] [address] [address] [facility]  He is going away to visit family in [place] [facility] over this weekend    He did not see psychiatry as advised for his anxiety and 'road rage'  He is aware not to act on his anxiety and road rage and of the benefits of obtaining psychiatric help  He declines a referral to [facility]'s psychiatry  He does not check his blood sugars but A1C from [address] [facility] is 7 5%  ROS Otherwise negative, no other complaints  Past Medical History:   Diagnosis Date    Atherosclerosis     Cardiac disease     COPD (chronic obstructive pulmonary disease) ([address]     Coronary artery disease     Hyperlipidemia     Hypertension     Peripheral arterial disease (HCC)      Vitals:    07/27/22 1522   BP: 120/60   BP Location: Left arm   Patient Position: Sitting   Cuff Size: Adult   Pulse: 71   Resp: 16   Temp: (!) 97 °F (36 1 °C)   TempSrc: Tympanic   SpO2: 97%   Weight: 72 4 kg (159 lb 9 6 oz)   Height: 5' 8" (1 727 m)     Body mass index is 24 27 kg/m²  Current Outpatient Medications:     alfuzosin (UROXATRAL) 10 mg 24 hr tablet, Take 1 tablet (10 mg total) by mouth daily, Disp: 90 tablet, Rfl: 3    carboxymethylcellulose (REFRESH PLUS) 0 5 % SOLN, INSTILL 1 DROP BOTH EYES FOUR TIMES A DAY FOR DRY EYES, Disp: , Rfl:     Cholecalciferol (VITAMIN D3) 2000 units capsule, Take 1,000 Units by mouth daily, Disp: , Rfl:     clopidogrel (PLAVIX) 75 mg tablet, Take 1 tablet (75 mg total) by mouth in the morning , Disp: 90 tablet, Rfl: 3    Empagliflozin 25 MG TABS, Take 1 tablet (25 mg total) by mouth every morning, Disp: 90 tablet, Rfl: 3    insulin glargine (LANTUS SOLOSTAR) 100 units/mL injection pen, Inject 15 Units under the skin in the morning , Disp: 15 mL, Rfl: 3    lisinopril (ZESTRIL) 20 mg tablet, Take 1 tablet (20 mg total) by mouth in the morning , Disp: 90 tablet, Rfl: 3    metFORMIN (GLUCOPHAGE-XR) 500 mg 24 hr tablet, Take 1 tablet (500 mg total) by mouth in the morning and 1 tablet (500 mg total) in the evening  Take with meals  , Disp: 180 tablet, Rfl: 3    metFORMIN (GLUCOPHAGE-XR) 500 mg 24 hr tablet, Take 1 tablet (500 mg total) by mouth 2 (two) times a day with meals, Disp: 10 tablet, Rfl: 0    metoprolol tartrate (LOPRESSOR) 25 mg tablet, Take 0 5 tablets (12 5 mg total) by mouth every 12 (twelve) hours, Disp: 90 tablet, Rfl: 3    nitroglycerin (Nitrostat) 0 4 mg SL tablet, Place 1 tablet (0 4 mg total) under the tongue every 5 (five) minutes as needed for chest pain, Disp: 25 tablet, Rfl: 3    pantoprazole (PROTONIX) 40 mg tablet, Take 1 tablet (40 mg total) by mouth in the morning , Disp: 90 tablet, Rfl: 3    PARoxetine (PAXIL) 40 MG tablet, Take 1 tablet (40 mg total) by mouth in the morning , Disp: 90 tablet, Rfl: 3    PARoxetine (PAXIL) 40 MG tablet, Take 1 tablet (40 mg total) by mouth every morning, Disp: 5 tablet, Rfl: 0    rosuvastatin (CRESTOR) 20 MG tablet, Take 1 tablet (20 mg total) by mouth in the morning , Disp: 90 tablet, Rfl: 3    rosuvastatin (CRESTOR) 20 MG tablet, Take 1 tablet (20 mg total) by mouth daily, Disp: 5 tablet, Rfl: 0    sildenafil (VIAGRA) 100 mg tablet, Take 100 mg by mouth (Patient not taking: Reported on 7/27/2022), Disp: , Rfl:     tadalafil (CIALIS) 5 MG tablet, Take 1 tablet (5 mg total) by mouth as needed in the morning for erectile dysfunction  DO NOT TAKE ON DAYS YOU TAKE NITROGLYCERIN  (Patient not taking: Reported on 7/27/2022), Disp: 30 tablet, Rfl: 2  Allergies   Allergen Reactions    Etomidate Swelling     (anesthetic)"I blew up and couldn't breath"         Review of Systems   Constitutional: Negative for fever  HENT: Negative for congestion  Eyes: Negative for visual disturbance  Respiratory: Negative for shortness of breath  Cardiovascular: Negative for chest pain  Gastrointestinal: Negative for abdominal pain  Endocrine: Negative for polyuria  Genitourinary: Negative for difficulty urinating  Musculoskeletal: Negative for arthralgias  Skin: Negative for rash  Allergic/Immunologic: Negative for immunocompromised state  Neurological: Negative for dizziness  Psychiatric/Behavioral: The patient is nervous/anxious  Objective:      /60 (BP Location: Left arm, Patient Position: Sitting, Cuff Size: Adult)   Pulse 71   Temp (!) 97 °F (36 1 °C) (Tympanic)   Resp 16   Ht 5' 8" (1 727 m)   Wt 72 4 kg (159 lb 9 6 oz)   SpO2 97%   BMI 24 27 kg/m²          Physical Exam  Vitals reviewed  Constitutional:       Appearance: Normal appearance  HENT:      Head: Normocephalic and atraumatic  Right Ear: Tympanic membrane normal       Left Ear: Tympanic membrane normal    Eyes:      Conjunctiva/sclera: Conjunctivae normal    Cardiovascular:      Rate and Rhythm: Normal rate and regular rhythm  Heart sounds: No murmur heard  Pulmonary:      Effort: Pulmonary effort is normal       Breath sounds: No wheezing or rales  Abdominal:      General: Bowel sounds are normal       Palpations: Abdomen is soft  Tenderness: There is no abdominal tenderness  Musculoskeletal:      Right lower leg: No edema  Left lower leg: No edema  Neurological:      Mental Status: He is alert  Mental status is at baseline  Psychiatric:         Mood and Affect: Mood is anxious  Affect is angry           Behavior: Behavior normal

## 2022-08-12 ENCOUNTER — FOLLOW UP (OUTPATIENT)
Dept: URBAN - METROPOLITAN AREA CLINIC 6 | Facility: CLINIC | Age: 82
End: 2022-08-12

## 2022-08-12 DIAGNOSIS — Z79.4: ICD-10-CM

## 2022-08-12 DIAGNOSIS — E11.9: ICD-10-CM

## 2022-08-12 DIAGNOSIS — H40.023: ICD-10-CM

## 2022-08-12 PROCEDURE — 92012 INTRM OPH EXAM EST PATIENT: CPT

## 2022-08-12 PROCEDURE — 92020 GONIOSCOPY: CPT

## 2022-08-12 PROCEDURE — 92083 EXTENDED VISUAL FIELD XM: CPT

## 2022-08-12 ASSESSMENT — VISUAL ACUITY
OU_CC: J2
OD_PH: 20/200
OD_SC: 20/80
OS_CC: 20/70+1
OD_CC: 20/200

## 2022-08-12 ASSESSMENT — TONOMETRY
OS_IOP_MMHG: 11
OD_IOP_MMHG: 11

## 2022-08-16 ENCOUNTER — HOSPITAL ENCOUNTER (OUTPATIENT)
Dept: NON INVASIVE DIAGNOSTICS | Facility: CLINIC | Age: 82
Discharge: HOME | End: 2022-08-16
Payer: MEDICARE

## 2022-08-16 DIAGNOSIS — I70.219 ATHEROSCLEROSIS OF ARTERY OF EXTREMITY WITH INTERMITTENT CLAUDICATION (HCC): ICD-10-CM

## 2022-08-16 DIAGNOSIS — Z95.828 STATUS POST FEMORAL-POPLITEAL BYPASS SURGERY: ICD-10-CM

## 2022-08-16 DIAGNOSIS — I65.29 ASYMPTOMATIC CAROTID ARTERY STENOSIS, UNSPECIFIED LATERALITY: Primary | ICD-10-CM

## 2022-08-16 DIAGNOSIS — I65.23 ASYMPTOMATIC BILATERAL CAROTID ARTERY STENOSIS: ICD-10-CM

## 2022-08-16 DIAGNOSIS — I65.23 CAROTID STENOSIS, BILATERAL: ICD-10-CM

## 2022-08-16 PROCEDURE — 93923 UPR/LXTR ART STDY 3+ LVLS: CPT

## 2022-08-16 PROCEDURE — 93922 UPR/L XTREMITY ART 2 LEVELS: CPT | Performed by: SURGERY

## 2022-08-16 PROCEDURE — 93880 EXTRACRANIAL BILAT STUDY: CPT | Performed by: SURGERY

## 2022-08-16 PROCEDURE — 93925 LOWER EXTREMITY STUDY: CPT

## 2022-08-16 PROCEDURE — 93925 LOWER EXTREMITY STUDY: CPT | Performed by: SURGERY

## 2022-08-16 PROCEDURE — 93880 EXTRACRANIAL BILAT STUDY: CPT

## 2022-08-17 ENCOUNTER — TRANSCRIBE ORDERS (OUTPATIENT)
Dept: VASCULAR SURGERY | Facility: CLINIC | Age: 82
End: 2022-08-17

## 2022-08-17 DIAGNOSIS — I65.23 CAROTID STENOSIS, BILATERAL: Primary | ICD-10-CM

## 2022-10-12 ENCOUNTER — OFFICE VISIT (OUTPATIENT)
Dept: INTERNAL MEDICINE CLINIC | Facility: CLINIC | Age: 82
End: 2022-10-12
Payer: MEDICARE

## 2022-10-12 VITALS
DIASTOLIC BLOOD PRESSURE: 70 MMHG | WEIGHT: 159 LBS | SYSTOLIC BLOOD PRESSURE: 130 MMHG | TEMPERATURE: 96.5 F | HEART RATE: 95 BPM | HEIGHT: 68 IN | RESPIRATION RATE: 16 BRPM | BODY MASS INDEX: 24.1 KG/M2 | OXYGEN SATURATION: 98 %

## 2022-10-12 DIAGNOSIS — I25.10 CORONARY ARTERY DISEASE INVOLVING NATIVE HEART WITHOUT ANGINA PECTORIS, UNSPECIFIED VESSEL OR LESION TYPE: Chronic | ICD-10-CM

## 2022-10-12 DIAGNOSIS — F41.9 ANXIETY: Chronic | ICD-10-CM

## 2022-10-12 DIAGNOSIS — I10 ESSENTIAL HYPERTENSION: Chronic | ICD-10-CM

## 2022-10-12 DIAGNOSIS — Z72.0 TOBACCO USE: ICD-10-CM

## 2022-10-12 DIAGNOSIS — E11.51 TYPE 2 DIABETES MELLITUS WITH DIABETIC PERIPHERAL ANGIOPATHY WITHOUT GANGRENE, WITHOUT LONG-TERM CURRENT USE OF INSULIN (HCC): Primary | ICD-10-CM

## 2022-10-12 PROCEDURE — 99214 OFFICE O/P EST MOD 30 MIN: CPT | Performed by: INTERNAL MEDICINE

## 2022-10-12 NOTE — PROGRESS NOTES
Name: Karley Hernández      : 1940      MRN: [id number]  Encounter Provider: Bruce Borden DO  Encounter Date: 10/12/2022   Encounter department: [place] [address]     1  Type 2 diabetes mellitus with diabetic peripheral angiopathy without gangrene, without long-term current use of insulin ([facility] [address] )  Comments:  A1C stable when last checked by [employer] in July  c/w jardiance, lantus, metformin and A1C ordered  he declines to check BS at home  Orders:  -     Hemoglobin A1C    2  Essential hypertension  Comments:  BP doing well, c/w BB/ACEI    3  Coronary artery disease involving native heart without angina pectoris, unspecified vessel or lesion type  Comments:  having chest pain with exertion for years per patient  it is unchanged and goes away after resting/doesn't return  f/u cardiology advised    4  Anxiety  Comments:  taking paxil and encouraged to see [employer] psychiatry for ongoing care    5  Tobacco use  Comments:  emphasized need for him to quit tobacco use, he verbalized understanding and reports he will not quit        Depression Screening and Follow-up Plan: Patient was screened for depression during today's encounter  They screened negative with a PHQ-2 score of 0  Tobacco Cessation Counseling: Tobacco cessation counseling was provided  The patient is sincerely urged to quit consumption of tobacco  He is not ready to quit tobacco        He is interested in CGM, advised this is covered if someone is taking 3 or more shots of insulin per day  Eveline Cedillo does not want to pay out of pocket for the CGM and defers on an order for this at this time  Subjective      HPI     Here for follow up, Eveline Cedillo is here with his significant other, Andrei during the appt  He is overall stable, had BW in July thru the [employer] of which a copy of these results are in his chart  He continues to smoke and is not ready to quit  He understands the associated risks of cont'd tob use    He has anxiety and is taking paxil  He does occ get chest pain with exertion, relieved with rest   If he resumes the same activity again, the chest pain does not come back  He has discussed these symptoms with his cardiologist in the past and had a stress test in 2021 which was neg for ischemia  He stopped iron supplement more than 2 mos ago but didn't get follow up BW to check on this yet  ROS Otherwise negative, no other complaints  Review of Systems   Constitutional: Negative for fever  HENT: Negative for congestion  Eyes: Negative for visual disturbance  Respiratory: Negative for shortness of breath  Cardiovascular: Negative for chest pain  Gastrointestinal: Negative for abdominal pain  Endocrine: Negative for polyuria  Genitourinary: Negative for difficulty urinating  Musculoskeletal: Negative for arthralgias  Skin: Negative for rash  Allergic/Immunologic: Negative for immunocompromised state  Neurological: Negative for dizziness  Psychiatric/Behavioral: Negative for dysphoric mood  The patient is nervous/anxious  Current Outpatient Medications on File Prior to Visit   Medication Sig   • alfuzosin (UROXATRAL) 10 mg 24 hr tablet Take 1 tablet (10 mg total) by mouth daily   • carboxymethylcellulose (REFRESH PLUS) 0 5 % SOLN INSTILL 1 DROP BOTH EYES FOUR TIMES A DAY FOR DRY EYES   • Cholecalciferol (VITAMIN D3) 2000 units capsule Take 1,000 Units by mouth daily   • clopidogrel (PLAVIX) 75 mg tablet Take 1 tablet (75 mg total) by mouth in the morning  • Empagliflozin 25 MG TABS Take 1 tablet (25 mg total) by mouth every morning   • insulin glargine (LANTUS SOLOSTAR) 100 units/mL injection pen Inject 15 Units under the skin in the morning  • lisinopril (ZESTRIL) 20 mg tablet Take 1 tablet (20 mg total) by mouth in the morning  • metFORMIN (GLUCOPHAGE-XR) 500 mg 24 hr tablet Take 1 tablet (500 mg total) by mouth in the morning and 1 tablet (500 mg total) in the evening   Take with meals  • metoprolol tartrate (LOPRESSOR) 25 mg tablet Take 0 5 tablets (12 5 mg total) by mouth every 12 (twelve) hours   • pantoprazole (PROTONIX) 40 mg tablet Take 1 tablet (40 mg total) by mouth in the morning  • PARoxetine (PAXIL) 40 MG tablet Take 1 tablet (40 mg total) by mouth in the morning  • rosuvastatin (CRESTOR) 20 MG tablet Take 1 tablet (20 mg total) by mouth in the morning  • sildenafil (VIAGRA) 100 mg tablet Take 100 mg by mouth   • tadalafil (CIALIS) 5 MG tablet Take 1 tablet (5 mg total) by mouth as needed in the morning for erectile dysfunction  DO NOT TAKE ON DAYS YOU TAKE NITROGLYCERIN  • metFORMIN (GLUCOPHAGE-XR) 500 mg 24 hr tablet Take 1 tablet (500 mg total) by mouth 2 (two) times a day with meals   • nitroglycerin (Nitrostat) 0 4 mg SL tablet Place 1 tablet (0 4 mg total) under the tongue every 5 (five) minutes as needed for chest pain (Patient not taking: Reported on 10/12/2022)   • PARoxetine (PAXIL) 40 MG tablet Take 1 tablet (40 mg total) by mouth every morning   • rosuvastatin (CRESTOR) 20 MG tablet Take 1 tablet (20 mg total) by mouth daily       Objective     /70 (BP Location: Left arm, Patient Position: Sitting, Cuff Size: Adult)   Pulse 95   Temp (!) 96 5 °F (35 8 °C) (Tympanic)   Resp 16   Ht 5' 8" (1 727 m)   Wt 72 1 kg (159 lb)   SpO2 98%   BMI 24 18 kg/m²     Physical Exam  Vitals reviewed  Constitutional:       Appearance: Normal appearance  HENT:      Head: Normocephalic and atraumatic  Right Ear: Tympanic membrane normal       Left Ear: Tympanic membrane normal    Eyes:      Conjunctiva/sclera: Conjunctivae normal    Cardiovascular:      Rate and Rhythm: Normal rate and regular rhythm  Heart sounds: No murmur heard  Pulmonary:      Effort: Pulmonary effort is normal       Breath sounds: No wheezing or rales  Abdominal:      General: Bowel sounds are normal       Palpations: Abdomen is soft  Tenderness:  There is no abdominal tenderness  Musculoskeletal:      Right lower leg: No edema  Left lower leg: No edema  Neurological:      Mental Status: He is alert  Mental status is at baseline  Psychiatric:         Mood and Affect: Mood is anxious           Behavior: Behavior normal        Romeo Echevarria DO

## 2022-10-17 ENCOUNTER — APPOINTMENT (OUTPATIENT)
Dept: LAB | Facility: CLINIC | Age: 82
End: 2022-10-17
Payer: MEDICARE

## 2022-10-17 ENCOUNTER — TELEPHONE (OUTPATIENT)
Dept: INTERNAL MEDICINE CLINIC | Facility: CLINIC | Age: 82
End: 2022-10-17

## 2022-10-17 DIAGNOSIS — E61.1 IRON DEFICIENCY: ICD-10-CM

## 2022-10-17 LAB
BASOPHILS # BLD AUTO: 0.12 THOUSANDS/ΜL (ref 0–0.1)
BASOPHILS NFR BLD AUTO: 2 % (ref 0–1)
EOSINOPHIL # BLD AUTO: 0.5 THOUSAND/ΜL (ref 0–0.61)
EOSINOPHIL NFR BLD AUTO: 7 % (ref 0–6)
ERYTHROCYTE [DISTWIDTH] IN BLOOD BY AUTOMATED COUNT: 13.2 % (ref 11.6–15.1)
EST. AVERAGE GLUCOSE BLD GHB EST-MCNC: 143 MG/DL
HBA1C MFR BLD: 6.6 %
HCT VFR BLD AUTO: 48.9 % (ref 36.5–49.3)
HGB BLD-MCNC: 15.3 G/DL (ref 12–17)
IMM GRANULOCYTES # BLD AUTO: 0.02 THOUSAND/UL (ref 0–0.2)
IMM GRANULOCYTES NFR BLD AUTO: 0 % (ref 0–2)
IRON SATN MFR SERPL: 17 % (ref 20–50)
IRON SERPL-MCNC: 55 UG/DL (ref 65–175)
LYMPHOCYTES # BLD AUTO: 2.23 THOUSANDS/ΜL (ref 0.6–4.47)
LYMPHOCYTES NFR BLD AUTO: 33 % (ref 14–44)
MCH RBC QN AUTO: 29.4 PG (ref 26.8–34.3)
MCHC RBC AUTO-ENTMCNC: 31.3 G/DL (ref 31.4–37.4)
MCV RBC AUTO: 94 FL (ref 82–98)
MONOCYTES # BLD AUTO: 0.54 THOUSAND/ΜL (ref 0.17–1.22)
MONOCYTES NFR BLD AUTO: 8 % (ref 4–12)
NEUTROPHILS # BLD AUTO: 3.36 THOUSANDS/ΜL (ref 1.85–7.62)
NEUTS SEG NFR BLD AUTO: 50 % (ref 43–75)
NRBC BLD AUTO-RTO: 0 /100 WBCS
PLATELET # BLD AUTO: 240 THOUSANDS/UL (ref 149–390)
PMV BLD AUTO: 10.1 FL (ref 8.9–12.7)
RBC # BLD AUTO: 5.2 MILLION/UL (ref 3.88–5.62)
TIBC SERPL-MCNC: 328 UG/DL (ref 250–450)
WBC # BLD AUTO: 6.77 THOUSAND/UL (ref 4.31–10.16)

## 2022-10-17 PROCEDURE — 83550 IRON BINDING TEST: CPT

## 2022-10-17 PROCEDURE — 83540 ASSAY OF IRON: CPT

## 2022-10-17 PROCEDURE — 83036 HEMOGLOBIN GLYCOSYLATED A1C: CPT | Performed by: INTERNAL MEDICINE

## 2022-10-17 NOTE — TELEPHONE ENCOUNTER
----- Message from Delicia Stacy DO sent at 10/17/2022  3:42 PM EDT -----  BW is back, his blood count is normal/no anemia but iron level is low  A1C is well controlled at 6 6% for diabetes  Recommend Sameer re-start iron one tablet per day & go back and see Dr Nael Salazar as he may have to have a capsule test(a pill that is a camera that is swallowed and takes pictures of his GI tract before being passed)    If he needs a referral, let me know

## 2022-11-01 ENCOUNTER — OFFICE VISIT (OUTPATIENT)
Dept: CARDIOLOGY CLINIC | Facility: CLINIC | Age: 82
End: 2022-11-01

## 2022-11-01 VITALS
SYSTOLIC BLOOD PRESSURE: 110 MMHG | BODY MASS INDEX: 23.49 KG/M2 | HEART RATE: 49 BPM | HEIGHT: 68 IN | WEIGHT: 155 LBS | DIASTOLIC BLOOD PRESSURE: 50 MMHG

## 2022-11-01 DIAGNOSIS — I71.20 THORACIC AORTIC ANEURYSM WITHOUT RUPTURE, UNSPECIFIED PART: ICD-10-CM

## 2022-11-01 DIAGNOSIS — I70.219 ATHEROSCLEROSIS OF ARTERY OF EXTREMITY WITH INTERMITTENT CLAUDICATION (HCC): Primary | ICD-10-CM

## 2022-11-01 DIAGNOSIS — I10 ESSENTIAL HYPERTENSION: ICD-10-CM

## 2022-11-01 DIAGNOSIS — I25.10 CORONARY ARTERY DISEASE INVOLVING NATIVE CORONARY ARTERY OF NATIVE HEART WITHOUT ANGINA PECTORIS: ICD-10-CM

## 2022-11-01 RX ORDER — FERROUS SULFATE 325(65) MG
325 TABLET ORAL
COMMUNITY

## 2022-11-01 NOTE — PROGRESS NOTES
Cardiology Follow Up    Viry Arroyo  1940  [id number]  [facility] [place]  [address]  [place] [address]  [address] [id number] [phone]    1  Atherosclerosis of artery of extremity with intermittent claudication (HCC)  POCT ECG   2  Essential hypertension  POCT ECG   3  Thoracic aortic aneurysm without rupture, unspecified part  POCT ECG   4  Coronary artery disease involving native coronary artery of native heart without angina pectoris           Discussion/Summary: I again stressed to him that he must stop smoking  I reviewed his medications and made no changes  He was advised to call if his CP symptoms recur or his SOB gets worse  RTO 9 months  Interval History: He has not had any cardiac problems since his last office visit  He has severe PVD with multiple procedures done  He continues to smoke 10 cigs a day  He has severe coronary calcifications on CT scan of the chest   Nuclear stress test 12/2020 - EF 65%, no ischemia  /50    He denies CP, chest pressure  He does get SOB at lower levels of activity      LDL 72            Patient Active Problem List   Diagnosis   • Atherosclerosis of artery of extremity with intermittent claudication (HCC)   • Popliteal artery occlusion, left (HCC)   • Essential hypertension   • Abnormal nuclear stress test   • Anemia   • Anxiety   • Arthritis   • Asymptomatic carotid artery stenosis   • Coronary artery disease   • Diverticulosis of large intestine without hemorrhage   • Elevated prostate specific antigen (PSA)   • Enlarged prostate without lower urinary tract symptoms (luts)   • Hyperlipidemia   • Iron deficiency anemia   • Memory loss   • Bypass graft stenosis (HCC)   • Type 2 diabetes mellitus with diabetic peripheral angiopathy without gangrene [facility])   • Fall   • Other chest pain   • Excessive anger   • Severe sleep apnea   • Excessive daytime sleepiness   • Pulmonary nodule   • Abnormal CT scan of lung   • Thoracic aortic aneurysm without rupture   • Tobacco use   • RUIZ (dyspnea on exertion)   • Hearing difficulty of right ear   • Other fatigue   • Chest pain   • Abdominal pain   • Benign paroxysmal positional vertigo   • Dizziness   • Status post femoral-popliteal bypass surgery   • Gastroesophageal reflux disease without esophagitis     Past Medical History:   Diagnosis Date   • Atherosclerosis    • Cardiac disease    • COPD (chronic obstructive pulmonary disease) ([facility])    • Coronary artery disease    • Hyperlipidemia    • Hypertension    • Peripheral arterial disease ([facility])      Social History     Socioeconomic History   • Marital status:       Spouse name: Not on file   • Number of children: Not on file   • Years of education: Not on file   • Highest education level: Not on file   Occupational History   • Not on file   Tobacco Use   • Smoking status: Current Every Day Smoker     Packs/day: 0 25     Types: Cigarettes   • Smokeless tobacco: Former User     Quit date: 1960   • Tobacco comment: 10 sticks/day    Vaping Use   • Vaping Use: Never used   Substance and Sexual Activity   • Alcohol use: No     Comment: Social drinker   • Drug use: No   • Sexual activity: Yes   Other Topics Concern   • Not on file   Social History Narrative   • Not on file     Social Determinants of Health     Financial Resource Strain: Not on file   Food Insecurity: Not on file   Transportation Needs: Not on file   Physical Activity: Not on file   Stress: Not on file   Social Connections: Not on file   Intimate Partner Violence: Not on file   Housing Stability: Not on file      Family History   Problem Relation Age of Onset   • Arthritis Mother    • Arthritis Family    • Coronary artery disease Family    • Hyperlipidemia Family    • Peripheral vascular disease Family      Past Surgical History:   Procedure Laterality Date   • BYPASS FEMORAL-POPLITEAL Left 4/30/2018    Procedure: BYPASS FEMORAL-POPLITEAL, WITH INTRAOP ARTERIOGRAM;  Surgeon: Ran Escobar MD;  Location: [facility];  Service: Vascular   • CARDIAC SURGERY     • FEMORAL ARTERY - POPLITEAL ARTERY BYPASS GRAFT Bilateral    • HAND SURGERY     • IR AORTAGRAM WITH RUN-OFF  10/4/2018   • LEG SURGERY      Repair   • OTHER SURGICAL HISTORY      Percutaneous repair nasoethmoid fx lacrimal apparatus   • THROMBOENDARTERECTOMY  10/12/2010    Tibioperoneal trunk   • TONSILLECTOMY     • TONSILLECTOMY         Current Outpatient Medications:   •  carboxymethylcellulose (REFRESH PLUS) 0 5 % SOLN, INSTILL 1 DROP BOTH EYES FOUR TIMES A DAY FOR DRY EYES, Disp: , Rfl:   •  Cholecalciferol (VITAMIN D3) 2000 units capsule, Take 1,000 Units by mouth daily, Disp: , Rfl:   •  clopidogrel (PLAVIX) 75 mg tablet, Take 1 tablet (75 mg total) by mouth in the morning , Disp: 90 tablet, Rfl: 3  •  Empagliflozin 25 MG TABS, Take 1 tablet (25 mg total) by mouth every morning, Disp: 90 tablet, Rfl: 3  •  ferrous sulfate 325 (65 Fe) mg tablet, Take 325 mg by mouth daily with breakfast, Disp: , Rfl:   •  insulin glargine (LANTUS SOLOSTAR) 100 units/mL injection pen, Inject 15 Units under the skin in the morning , Disp: 15 mL, Rfl: 3  •  lisinopril (ZESTRIL) 20 mg tablet, Take 1 tablet (20 mg total) by mouth in the morning , Disp: 90 tablet, Rfl: 3  •  metFORMIN (GLUCOPHAGE-XR) 500 mg 24 hr tablet, Take 1 tablet (500 mg total) by mouth in the morning and 1 tablet (500 mg total) in the evening  Take with meals  , Disp: 180 tablet, Rfl: 3  •  metoprolol tartrate (LOPRESSOR) 25 mg tablet, Take 0 5 tablets (12 5 mg total) by mouth every 12 (twelve) hours, Disp: 90 tablet, Rfl: 3  •  pantoprazole (PROTONIX) 40 mg tablet, Take 1 tablet (40 mg total) by mouth in the morning , Disp: 90 tablet, Rfl: 3  •  PARoxetine (PAXIL) 40 MG tablet, Take 1 tablet (40 mg total) by mouth in the morning , Disp: 90 tablet, Rfl: 3  •  rosuvastatin (CRESTOR) 20 MG tablet, Take 1 tablet (20 mg total) by mouth in the morning , Disp: 90 tablet, Rfl: 3  •  alfuzosin (UROXATRAL) 10 mg 24 hr tablet, Take 1 tablet (10 mg total) by mouth daily, Disp: 90 tablet, Rfl: 3  •  metFORMIN (GLUCOPHAGE-XR) 500 mg 24 hr tablet, Take 1 tablet (500 mg total) by mouth 2 (two) times a day with meals, Disp: 10 tablet, Rfl: 0  •  nitroglycerin (Nitrostat) 0 4 mg SL tablet, Place 1 tablet (0 4 mg total) under the tongue every 5 (five) minutes as needed for chest pain (Patient not taking: No sig reported), Disp: 25 tablet, Rfl: 3  •  PARoxetine (PAXIL) 40 MG tablet, Take 1 tablet (40 mg total) by mouth every morning, Disp: 5 tablet, Rfl: 0  •  rosuvastatin (CRESTOR) 20 MG tablet, Take 1 tablet (20 mg total) by mouth daily, Disp: 5 tablet, Rfl: 0  •  sildenafil (VIAGRA) 100 mg tablet, Take 100 mg by mouth, Disp: , Rfl:   •  tadalafil (CIALIS) 5 MG tablet, Take 1 tablet (5 mg total) by mouth as needed in the morning for erectile dysfunction  DO NOT TAKE ON DAYS YOU TAKE NITROGLYCERIN , Disp: 30 tablet, Rfl: 2  Allergies   Allergen Reactions   • Etomidate Swelling     (anesthetic)"I blew up and couldn't breath"     Vitals:    11/01/22 1046   BP: 110/50   BP Location: Right arm   Cuff Size: Standard   Pulse: (!) 49   Weight: 70 3 kg (155 lb)   Height: 5' 8" (1 727 m)     Weight (last 2 days)     Date/Time Weight    11/01/22 1046 70 3 (155)         Blood pressure 110/50, pulse (!) 49, height 5' 8" (1 727 m), weight 70 3 kg (155 lb)  , Body mass index is 23 57 kg/m²      Labs:  Appointment on 10/17/2022   Component Date Value   • Iron Saturation 10/17/2022 17 (A)   • TIBC 10/17/2022 328    • Iron 10/17/2022 55 (A)   Office Visit on 10/12/2022   Component Date Value   • Hemoglobin A1C 10/17/2022 6 6 (A)   • EAG 10/17/2022 143    Office Visit on 07/27/2022   Component Date Value   • POST-VOID RESIDUAL VOLUM* 07/27/2022 46    Orders Only on 07/15/2022   Component Date Value   • Hemoglobin A1C 07/15/2022 7 5    Office Visit on 06/07/2022 Component Date Value   • LEUKOCYTE ESTERASE,UA 06/07/2022 neg    • NITRITE,UA 06/07/2022 neg    • SL AMB POCT UROBILINOGEN 06/07/2022 0 2    • POCT URINE PROTEIN 06/07/2022 neg    •  PH,UA 06/07/2022 5 0    • BLOOD,UA 06/07/2022 neg    • SPECIFIC GRAVITY,UA 06/07/2022 1 015    • KETONES,UA 06/07/2022 neg    • BILIRUBIN,UA 06/07/2022 neg    • GLUCOSE, UA 06/07/2022 neg    •  COLOR,UA 06/07/2022 yellow    • CLARITY,UA 06/07/2022 clear    • POST-VOID RESIDUAL VOLUM* 06/07/2022 15      Imaging: No results found  Review of Systems:  Review of Systems   Constitutional: Negative for diaphoresis, fatigue, fever and unexpected weight change  HENT: Negative  Respiratory: Positive for shortness of breath  Negative for cough and wheezing  Cardiovascular: Negative for chest pain, palpitations and leg swelling  Gastrointestinal: Negative for abdominal pain, diarrhea and nausea  Musculoskeletal: Negative for gait problem and myalgias  Skin: Negative for rash  Neurological: Negative for dizziness and numbness  Psychiatric/Behavioral: Negative  Physical Exam:  Physical Exam  Constitutional:       Appearance: He is well-developed  HENT:      Head: Normocephalic and atraumatic  Eyes:      Pupils: Pupils are equal, round, and reactive to light  Neck:      Vascular: No JVD  Cardiovascular:      Rate and Rhythm: Regular rhythm  Pulses: Normal pulses  Carotid pulses are 2+ on the right side and 2+ on the left side  Heart sounds: S1 normal and S2 normal    Pulmonary:      Effort: Pulmonary effort is normal       Breath sounds: Normal breath sounds  No wheezing or rales  Abdominal:      General: Bowel sounds are normal       Palpations: Abdomen is soft  Tenderness: There is no abdominal tenderness  Musculoskeletal:         General: No tenderness  Normal range of motion  Cervical back: Normal range of motion and neck supple  Skin:     General: Skin is warm  Neurological:      Mental Status: He is alert and oriented to person, place, and time  Cranial Nerves: No cranial nerve deficit  Deep Tendon Reflexes: Reflexes are normal and symmetric

## 2023-02-17 ENCOUNTER — ESTABLISHED COMPREHENSIVE EXAM (OUTPATIENT)
Dept: URBAN - METROPOLITAN AREA CLINIC 6 | Facility: CLINIC | Age: 83
End: 2023-02-17

## 2023-02-17 DIAGNOSIS — Z79.4: ICD-10-CM

## 2023-02-17 DIAGNOSIS — E11.9: ICD-10-CM

## 2023-02-17 DIAGNOSIS — H40.023: ICD-10-CM

## 2023-02-17 PROCEDURE — 92202 OPSCPY EXTND ON/MAC DRAW: CPT

## 2023-02-17 PROCEDURE — 92133 CPTRZD OPH DX IMG PST SGM ON: CPT

## 2023-02-17 PROCEDURE — 92014 COMPRE OPH EXAM EST PT 1/>: CPT

## 2023-02-17 ASSESSMENT — VISUAL ACUITY
OU_CC: J2
OD_CC: 20/50-2
OS_CC: 20/30

## 2023-02-17 ASSESSMENT — KERATOMETRY
OS_K2POWER_DIOPTERS: 44.50
OD_K2POWER_DIOPTERS: 44.75
OD_AXISANGLE_DEGREES: 087
OS_K1POWER_DIOPTERS: 42.50
OD_AXISANGLE2_DEGREES: 177
OS_AXISANGLE2_DEGREES: 168
OS_AXISANGLE_DEGREES: 78
OD_K1POWER_DIOPTERS: 42.75

## 2023-02-17 ASSESSMENT — TONOMETRY
OD_IOP_MMHG: 11
OS_IOP_MMHG: 10

## 2023-02-20 ENCOUNTER — HOSPITAL ENCOUNTER (OUTPATIENT)
Dept: NON INVASIVE DIAGNOSTICS | Facility: CLINIC | Age: 83
Discharge: HOME | End: 2023-02-20

## 2023-02-20 DIAGNOSIS — I65.23 CAROTID STENOSIS, BILATERAL: ICD-10-CM

## 2023-03-16 ENCOUNTER — OFFICE VISIT (OUTPATIENT)
Dept: INTERNAL MEDICINE CLINIC | Facility: CLINIC | Age: 83
End: 2023-03-16

## 2023-03-16 VITALS
RESPIRATION RATE: 16 BRPM | SYSTOLIC BLOOD PRESSURE: 136 MMHG | BODY MASS INDEX: 23.7 KG/M2 | HEIGHT: 68 IN | HEART RATE: 65 BPM | TEMPERATURE: 96.7 F | DIASTOLIC BLOOD PRESSURE: 70 MMHG | WEIGHT: 156.4 LBS | OXYGEN SATURATION: 98 %

## 2023-03-16 DIAGNOSIS — Z71.89 ADVANCED CARE PLANNING/COUNSELING DISCUSSION: ICD-10-CM

## 2023-03-16 DIAGNOSIS — F41.9 ANXIETY: Chronic | ICD-10-CM

## 2023-03-16 DIAGNOSIS — E11.51 TYPE 2 DIABETES MELLITUS WITH DIABETIC PERIPHERAL ANGIOPATHY WITHOUT GANGRENE, WITHOUT LONG-TERM CURRENT USE OF INSULIN (HCC): Primary | ICD-10-CM

## 2023-03-16 DIAGNOSIS — I10 ESSENTIAL HYPERTENSION: Chronic | ICD-10-CM

## 2023-03-16 DIAGNOSIS — R41.3 MEMORY CHANGES: ICD-10-CM

## 2023-03-16 DIAGNOSIS — Z72.0 TOBACCO USE: ICD-10-CM

## 2023-03-16 DIAGNOSIS — I25.10 CORONARY ARTERY DISEASE INVOLVING NATIVE CORONARY ARTERY OF NATIVE HEART WITHOUT ANGINA PECTORIS: Chronic | ICD-10-CM

## 2023-03-16 DIAGNOSIS — K21.9 GASTROESOPHAGEAL REFLUX DISEASE WITHOUT ESOPHAGITIS: ICD-10-CM

## 2023-03-16 NOTE — PROGRESS NOTES
Name: Dariana Garner      : 1940      MRN: [id number]  Encounter Provider: Christine Palomino DO  Encounter Date: 3/16/2023   Encounter department: [place] [address]     1  Type 2 diabetes mellitus with diabetic peripheral angiopathy without gangrene, without long-term current use of insulin (HCC)  Comments:  he is not checking his BS, refuses to do so  c/w metformin, jardiance and lantus and [facility]  Orders:  -     TSH, 3rd generation with Free T4 reflex    2  Gastroesophageal reflux disease without esophagitis  Comments:  taking PPI and stable, c/w rx    3  Coronary artery disease involving native coronary artery of native heart without angina pectoris  Comments:  stable, no symptoms  c/w BB/statin/clopidogrel/ACEI and care per cardiology    4  Essential hypertension  Comments:  BP doing well, c/w BB/ACEI    5  Tobacco use  Comments:  tobacco cessation counseling dicussed, he is not ready to quit    6  Anxiety  Comments:  taking paxil and no AE, c/w rx    7  Memory changes  Comments:  mostly short term and having some vision problems due to cataracts  BW ordered and will do MOCA testing at f/u visit  Orders:  -     Vitamin B12    8  Advanced care planning/counseling discussion  Comments:  son is POA and patient has living will already  advised he can bring in a copy and we can scan it into his chart  Subjective      HPI     Here for follow up, here with his significant other, Shar May during today's appt  [facility], taking his medications as prescribed  He gets all his meds thru the [place] [facility] & has BW and is seeing [place] for a follow up soon  He continues to smoke and is not ready to quit  He is an avid  and has been having trouble seeing and remembering how to sort his coins  He has cataracts and sees ophthalmology for care  He does not check his blood sugars    He c/o occ forgetfulness such as driving to a new office or remembering what he was going to do when he goes to another room in his house  No headaches, dizziness or any other related symptoms  His healthcare POA is his son: Gila Balbuena and he has a living will  He is full code but does not want to live on a ventilator chronically if he had no chance of recovery  ROS Otherwise negative, no other complaints  Review of Systems   Constitutional: Negative for fever  HENT: Negative for congestion  Eyes: Negative for visual disturbance  Respiratory: Negative for shortness of breath  Cardiovascular: Negative for chest pain  Gastrointestinal: Negative for abdominal pain  Endocrine: Negative for polyuria  Genitourinary: Negative for difficulty urinating  Musculoskeletal: Negative for arthralgias  Skin: Negative for rash  Allergic/Immunologic: Negative for immunocompromised state  Neurological: Negative for dizziness and headaches  Psychiatric/Behavioral: Negative for dysphoric mood  Current Outpatient Medications on File Prior to Visit   Medication Sig   • Cholecalciferol (VITAMIN D3) 2000 units capsule Take 1,000 Units by mouth daily   • alfuzosin (UROXATRAL) 10 mg 24 hr tablet Take 1 tablet (10 mg total) by mouth daily   • carboxymethylcellulose (REFRESH PLUS) 0 5 % SOLN INSTILL 1 DROP BOTH EYES FOUR TIMES A DAY FOR DRY EYES   • clopidogrel (PLAVIX) 75 mg tablet Take 1 tablet (75 mg total) by mouth in the morning  • Empagliflozin 25 MG TABS Take 1 tablet (25 mg total) by mouth every morning   • ferrous sulfate 325 (65 Fe) mg tablet Take 325 mg by mouth daily with breakfast   • insulin glargine (LANTUS SOLOSTAR) 100 units/mL injection pen Inject 15 Units under the skin in the morning  • lisinopril (ZESTRIL) 20 mg tablet Take 1 tablet (20 mg total) by mouth in the morning  • metFORMIN (GLUCOPHAGE-XR) 500 mg 24 hr tablet Take 1 tablet (500 mg total) by mouth in the morning and 1 tablet (500 mg total) in the evening   Take with meals  • metFORMIN (GLUCOPHAGE-XR) 500 mg 24 hr tablet Take 1 tablet (500 mg total) by mouth 2 (two) times a day with meals   • metoprolol tartrate (LOPRESSOR) 25 mg tablet Take 0 5 tablets (12 5 mg total) by mouth every 12 (twelve) hours   • nitroglycerin (Nitrostat) 0 4 mg SL tablet Place 1 tablet (0 4 mg total) under the tongue every 5 (five) minutes as needed for chest pain (Patient not taking: No sig reported)   • pantoprazole (PROTONIX) 40 mg tablet Take 1 tablet (40 mg total) by mouth in the morning  • PARoxetine (PAXIL) 40 MG tablet Take 1 tablet (40 mg total) by mouth in the morning  • PARoxetine (PAXIL) 40 MG tablet Take 1 tablet (40 mg total) by mouth every morning   • rosuvastatin (CRESTOR) 20 MG tablet Take 1 tablet (20 mg total) by mouth in the morning  • rosuvastatin (CRESTOR) 20 MG tablet Take 1 tablet (20 mg total) by mouth daily   • sildenafil (VIAGRA) 100 mg tablet Take 100 mg by mouth   • tadalafil (CIALIS) 5 MG tablet Take 1 tablet (5 mg total) by mouth as needed in the morning for erectile dysfunction  DO NOT TAKE ON DAYS YOU TAKE NITROGLYCERIN  Objective     /70 (BP Location: Left arm, Patient Position: Sitting, Cuff Size: Adult)   Pulse 65   Temp (!) 96 7 °F (35 9 °C) (Tympanic)   Resp 16   Ht 5' 8" (1 727 m)   Wt 70 9 kg (156 lb 6 4 oz)   SpO2 98%   BMI 23 78 kg/m²     Physical Exam  Vitals reviewed  Constitutional:       Appearance: Normal appearance  HENT:      Head: Normocephalic and atraumatic  Right Ear: Tympanic membrane normal       Left Ear: Tympanic membrane normal    Eyes:      Conjunctiva/sclera: Conjunctivae normal    Cardiovascular:      Rate and Rhythm: Normal rate and regular rhythm  Heart sounds:     No gallop  Pulmonary:      Effort: Pulmonary effort is normal       Breath sounds: No wheezing or rales  Abdominal:      General: Bowel sounds are normal       Palpations: Abdomen is soft  Tenderness:  There is no abdominal tenderness  Musculoskeletal:      Cervical back: Normal range of motion  Right lower leg: No edema  Left lower leg: No edema  Neurological:      Mental Status: He is alert  Mental status is at baseline     Psychiatric:         Mood and Affect: Mood normal          Behavior: Behavior normal        Romeo Echevarria DO

## 2023-03-16 NOTE — PATIENT INSTRUCTIONS
Gas-x or beano  If that does not work, try probiotic such as align over the counter for 7-10 days  Bring me a copy of your blood work  Return in June for wellness and memory testing

## 2023-03-24 ENCOUNTER — APPOINTMENT (OUTPATIENT)
Dept: LAB | Facility: CLINIC | Age: 83
End: 2023-03-24

## 2023-03-24 LAB
TSH SERPL DL<=0.05 MIU/L-ACNC: 1.37 UIU/ML (ref 0.45–4.5)
VIT B12 SERPL-MCNC: 1338 PG/ML (ref 100–900)

## 2023-05-09 ENCOUNTER — TELEPHONE (OUTPATIENT)
Dept: UROLOGY | Facility: MEDICAL CENTER | Age: 83
End: 2023-05-09

## 2023-05-09 NOTE — TELEPHONE ENCOUNTER
Pt called the office stated that he never made the appt for 4/5/23 or 5/3/23 that's why he missed the appt   Pt does not know who called to make these appts    Pt was told that he does not need follow up unless he needs it       Please review

## 2023-05-09 NOTE — TELEPHONE ENCOUNTER
Return in about 6 months (around 1/27/2023) for Recheck, PVR, AUA, Uroflow  7-      Return in about 6 weeks (around 7/19/2022) for Recheck, PVR, AUA, Uroflow  As per findings on AVS from last appointment with Christiano Drake

## 2023-06-09 ENCOUNTER — OFFICE VISIT (OUTPATIENT)
Dept: INTERNAL MEDICINE CLINIC | Facility: CLINIC | Age: 83
End: 2023-06-09
Payer: MEDICARE

## 2023-06-09 VITALS
SYSTOLIC BLOOD PRESSURE: 124 MMHG | TEMPERATURE: 96.7 F | WEIGHT: 156 LBS | BODY MASS INDEX: 23.64 KG/M2 | HEIGHT: 68 IN | HEART RATE: 58 BPM | DIASTOLIC BLOOD PRESSURE: 78 MMHG | OXYGEN SATURATION: 98 %

## 2023-06-09 DIAGNOSIS — E11.51 TYPE 2 DIABETES MELLITUS WITH DIABETIC PERIPHERAL ANGIOPATHY WITHOUT GANGRENE, WITHOUT LONG-TERM CURRENT USE OF INSULIN (HCC): Primary | ICD-10-CM

## 2023-06-09 DIAGNOSIS — I10 ESSENTIAL HYPERTENSION: Chronic | ICD-10-CM

## 2023-06-09 DIAGNOSIS — I25.10 CORONARY ARTERY DISEASE INVOLVING NATIVE CORONARY ARTERY OF NATIVE HEART WITHOUT ANGINA PECTORIS: Chronic | ICD-10-CM

## 2023-06-09 DIAGNOSIS — E78.2 MIXED HYPERLIPIDEMIA: ICD-10-CM

## 2023-06-09 DIAGNOSIS — Z72.0 TOBACCO USE: ICD-10-CM

## 2023-06-09 DIAGNOSIS — Z00.00 MEDICARE ANNUAL WELLNESS VISIT, SUBSEQUENT: ICD-10-CM

## 2023-06-09 DIAGNOSIS — F41.9 ANXIETY: Chronic | ICD-10-CM

## 2023-06-09 PROCEDURE — 99214 OFFICE O/P EST MOD 30 MIN: CPT | Performed by: INTERNAL MEDICINE

## 2023-06-09 PROCEDURE — G0439 PPPS, SUBSEQ VISIT: HCPCS | Performed by: INTERNAL MEDICINE

## 2023-06-09 NOTE — PROGRESS NOTES
Assessment and Plan:     Problem List Items Addressed This Visit     Anxiety (Chronic)    Coronary artery disease (Chronic)    Essential hypertension (Chronic)    Mixed hyperlipidemia    Tobacco use    Type 2 diabetes mellitus with diabetic peripheral angiopathy without gangrene ([place] [id number] ) - Primary   Other Visit Diagnoses     Medicare annual wellness visit, subsequent              Depression Screening and Follow-up Plan: Patient was screened for depression during today's encounter  They screened negative with a PHQ-2 score of 0  Preventive health issues were discussed with patient, and age appropriate screening tests were ordered as noted in patient's After Visit Summary  Personalized health advice and appropriate referrals for health education or preventive services given if needed, as noted in patient's After Visit Summary      Patient presents for a Medicare Wellness Visit      Patient Care Team:  Fredi Cali DO as PCP - General (Internal Medicine)  FLORENCIA Reyes MD Curry Bow, MD       Problem List:     Patient Active Problem List   Diagnosis   • Atherosclerosis of artery of extremity with intermittent claudication [facility])   • Popliteal artery occlusion, left [facility])   • Essential hypertension   • Abnormal nuclear stress test   • Anemia   • Anxiety   • Arthritis   • Asymptomatic carotid artery stenosis   • Coronary artery disease   • Diverticulosis of large intestine without hemorrhage   • Elevated prostate specific antigen (PSA)   • Enlarged prostate without lower urinary tract symptoms (luts)   • Mixed hyperlipidemia   • Iron deficiency anemia   • Memory loss   • Bypass graft stenosis ([email or web address] )   • Type 2 diabetes mellitus with diabetic peripheral angiopathy without gangrene ([email or web address] )   • Fall   • Other chest pain   • Excessive anger   • Severe sleep apnea   • Excessive daytime sleepiness   • Pulmonary nodule   • Abnormal CT scan of lung   • Tobacco use   • RUIZ (dyspnea on exertion) • Hearing difficulty of right ear   • Other fatigue   • Chest pain   • Abdominal pain   • Benign paroxysmal positional vertigo   • Dizziness   • Status post femoral-popliteal bypass surgery   • Gastroesophageal reflux disease without esophagitis      Past Medical and Surgical History:     Past Medical History:   Diagnosis Date   • Atherosclerosis    • Cardiac disease    • COPD (chronic obstructive pulmonary disease) ([facility] [address] )    • Coronary artery disease    • Hypertension    • Peripheral arterial disease (HCC)      Past Surgical History:   Procedure Laterality Date   • BYPASS FEMORAL-POPLITEAL Left 4/30/2018    Procedure: BYPASS FEMORAL-POPLITEAL, WITH INTRAOP ARTERIOGRAM;  Surgeon: Greg Gaming MD;  Location: [facility];  Service: Vascular   • CARDIAC SURGERY     • FEMORAL ARTERY - POPLITEAL ARTERY BYPASS GRAFT Bilateral    • HAND SURGERY     • IR AORTAGRAM WITH RUN-OFF  10/4/2018   • LEG SURGERY      Repair   • OTHER SURGICAL HISTORY      Percutaneous repair nasoethmoid fx lacrimal apparatus   • THROMBOENDARTERECTOMY  10/12/2010    Tibioperoneal trunk   • TONSILLECTOMY     • TONSILLECTOMY        Family History:     Family History   Problem Relation Age of Onset   • Arthritis Mother    • Arthritis Family    • Coronary artery disease Family    • Hyperlipidemia Family    • Peripheral vascular disease Family       Social History:     Social History     Socioeconomic History   • Marital status:       Spouse name: None   • Number of children: None   • Years of education: None   • Highest education level: None   Occupational History   • None   Tobacco Use   • Smoking status: Every Day     Packs/day: 0 25     Types: Cigarettes   • Smokeless tobacco: Former     Quit date: 1960   • Tobacco comments:     10 sticks/day    Vaping Use   • Vaping Use: Never used   Substance and Sexual Activity   • Alcohol use: No     Comment: Social drinker   • Drug use: No   • Sexual activity: Yes   Other Topics Concern   • None   Social History Narrative   • None     Social Determinants of Health     Financial Resource Strain: Medium Risk (6/9/2023)    Overall Financial Resource Strain (CARDIA)    • Difficulty of Paying Living Expenses: Somewhat hard   Food Insecurity: Not on file   Transportation Needs: No Transportation Needs (6/9/2023)    PRAPARE - Transportation    • Lack of Transportation (Medical): No    • Lack of Transportation (Non-Medical): No   Physical Activity: Not on file   Stress: Not on file   Social Connections: Not on file   Intimate Partner Violence: Not on file   Housing Stability: Not on file      Medications and Allergies:     Current Outpatient Medications   Medication Sig Dispense Refill   • alfuzosin (UROXATRAL) 10 mg 24 hr tablet Take 1 tablet (10 mg total) by mouth daily 90 tablet 3   • carboxymethylcellulose (REFRESH PLUS) 0 5 % SOLN INSTILL 1 DROP BOTH EYES FOUR TIMES A DAY FOR DRY EYES     • Cholecalciferol (VITAMIN D3) 2000 units capsule Take 1,000 Units by mouth daily     • clopidogrel (PLAVIX) 75 mg tablet Take 1 tablet (75 mg total) by mouth in the morning  90 tablet 3   • Empagliflozin 25 MG TABS Take 1 tablet (25 mg total) by mouth every morning 90 tablet 3   • ferrous sulfate 325 (65 Fe) mg tablet Take 325 mg by mouth daily with breakfast     • insulin glargine (LANTUS SOLOSTAR) 100 units/mL injection pen Inject 15 Units under the skin in the morning  15 mL 3   • lisinopril (ZESTRIL) 20 mg tablet Take 1 tablet (20 mg total) by mouth in the morning   90 tablet 3   • metFORMIN (GLUCOPHAGE-XR) 500 mg 24 hr tablet Take 1 tablet (500 mg total) by mouth 2 (two) times a day with meals 10 tablet 0   • metoprolol tartrate (LOPRESSOR) 25 mg tablet Take 0 5 tablets (12 5 mg total) by mouth every 12 (twelve) hours 90 tablet 3   • nitroglycerin (Nitrostat) 0 4 mg SL tablet Place 1 tablet (0 4 mg total) under the tongue every 5 (five) minutes as needed for chest pain 25 tablet 3   • pantoprazole (PROTONIX) 40 mg tablet Take 1 "tablet (40 mg total) by mouth in the morning  90 tablet 3   • PARoxetine (PAXIL) 40 MG tablet Take 1 tablet (40 mg total) by mouth every morning 5 tablet 0   • rosuvastatin (CRESTOR) 20 MG tablet Take 1 tablet (20 mg total) by mouth daily 5 tablet 0   • sildenafil (VIAGRA) 100 mg tablet Take 100 mg by mouth     • tadalafil (CIALIS) 5 MG tablet Take 1 tablet (5 mg total) by mouth as needed in the morning for erectile dysfunction  DO NOT TAKE ON DAYS YOU TAKE NITROGLYCERIN  30 tablet 2     No current facility-administered medications for this visit  Allergies   Allergen Reactions   • Etomidate Swelling     (anesthetic)\"I blew up and couldn't breath\"      Immunizations:     Immunization History   Administered Date(s) Administered   • COVID-19 PFIZER VACCINE 0 3 ML IM 01/25/2021, 02/15/2021, 10/04/2021   • COVID-19 Pfizer Vac BIVALENT Guzman-sucrose 12 Yr+ IM (BOOSTER ONLY) 01/13/2023   • INFLUENZA 12/05/2012, 10/01/2014, 10/01/2015, 10/13/2016, 09/01/2017, 10/07/2017, 11/01/2018, 10/01/2019, 11/01/2019, 10/01/2020, 09/13/2021   • Influenza Quadrivalent Preservative Free 3 years and older IM 10/07/2014   • Influenza Split High Dose Preservative Free IM 10/01/2015, 10/10/2016, 10/15/2018   • Influenza, high dose seasonal 0 7 mL 09/30/2019, 09/21/2020, 09/24/2022   • Influenza, seasonal, injectable 12/03/2012, 09/21/2013, 10/07/2017   • Pneumococcal 06/05/2012   • Pneumococcal Conjugate 13-Valent 06/01/2015, 08/29/2018   • Pneumococcal Polysaccharide PPV23 06/05/2012   • Tdap 07/28/2014, 01/13/2017   • Zoster 10/01/2015   • Zoster Vaccine Recombinant 07/23/2021, 11/12/2021      Health Maintenance: There are no preventive care reminders to display for this patient  Topic Date Due   • COVID-19 Vaccine (5 - Pfizer series) 05/13/2023      Medicare Screening Tests and Risk Assessments:     Clay Cabrera is here for his Subsequent Wellness visit  Health Risk Assessment:   Patient rates overall health as very good   " Patient feels that their physical health rating is same  Patient is very satisfied with their life  Eyesight was rated as slightly worse  Hearing was rated as slightly worse  Patient feels that their emotional and mental health rating is same  Patients states they are sometimes angry  Patient states they are sometimes unusually tired/fatigued  Pain experienced in the last 7 days has been none  Patient states that he has experienced no weight loss or gain in last 6 months  Depression Screening:   PHQ-2 Score: 0      Fall Risk Screening: In the past year, patient has experienced: history of falling in past year    Number of falls: 1  Injured during fall?: No    Feels unsteady when standing or walking?: Yes    Worried about falling?: Yes      Home Safety:  Patient does not have trouble with stairs inside or outside of their home  Patient has working smoke alarms and has working carbon monoxide detector  Home safety hazards include: none  Nutrition:   Current diet is Regular  Medications:   Patient is not currently taking any over-the-counter supplements  Patient is not able to manage medications  Activities of Daily Living (ADLs)/Instrumental Activities of Daily Living (IADLs):   Walk and transfer into and out of bed and chair?: Yes  Dress and groom yourself?: Yes    Bathe or shower yourself?: Yes    Feed yourself? Yes  Do your laundry/housekeeping?: Yes  Manage your money, pay your bills and track your expenses?: Yes  Make your own meals?: Yes    Do your own shopping?: Yes    Previous Hospitalizations:   Any hospitalizations or ED visits within the last 12 months?: No      Advance Care Planning:   Living will: Yes    Durable POA for healthcare:  Yes    Advanced directive: No      PREVENTIVE SCREENINGS      Cardiovascular Screening:    General: Screening Not Indicated and History Lipid Disorder      Diabetes Screening:     General: Screening Not Indicated and History Diabetes      Colorectal Cancer "Screening:     General: Screening Not Indicated      Prostate Cancer Screening:    General: Screening Not Indicated      Osteoporosis Screening:    General: Screening Not Indicated      Abdominal Aortic Aneurysm (AAA) Screening:    Risk factors include: tobacco use        General: Screening Not Indicated      Lung Cancer Screening:     General: Screening Not Indicated      Hepatitis C Screening:    General: Screening Not Indicated    Screening, Brief Intervention, and Referral to Treatment (SBIRT)    Screening      Single Item Drug Screening:  How often have you used an illegal drug (including marijuana) or a prescription medication for non-medical reasons in the past year? never    Single Item Drug Screen Score: 0  Interpretation: Negative screen for possible drug use disorder    No results found      /78 (BP Location: Left arm, Patient Position: Sitting, Cuff Size: Standard)   Pulse 58   Temp (!) 96 7 °F (35 9 °C)   Ht 5' 8\" (1 727 m)   Wt 70 8 kg (156 lb)   SpO2 98%   BMI 23 72 kg/m²         Romeo Echevarria,   "

## 2023-06-09 NOTE — PATIENT INSTRUCTIONS
1  Return in 6 months or sooner if questions  2  Please bring us a copy of your [facility] blood work to my office    • Do activities that engage you  Examples include computer games, solving jigsaw puzzles, gardening, and creating artwork  Activities that involve language skills include reading, writing, and solving crossword puzzles  You may want to join a group so you can interact with others who share your interests  Other people can help challenge and motivate you  • Manage health conditions  Hypertension, diabetes, and other conditions can cause MCI to progress to dementia  Talk to your healthcare provider if you need help managing a health condition you have  • Eat a variety of healthy foods  Healthy foods include fruits, vegetables, low-fat meats, fish, beans, whole-grain breads, and low-fat dairy products  Your provider may recommend a heart healthy diet to prevent atherosclerosis, or hardening of the arteries  This is a condition that increases your risk for dementia  A heart healthy diet is high in omega-3 fatty acids, fruits, and vegetables  It is low in saturated fats, salt, and sugar  • Exercise every day, or as directed  Physical activity may help improve memory and the ability to think clearly  Try to get 30 minutes of physical activity on most days of the week  Walking is a good activity for most people  Talk to your healthcare provider about the best activity for you  • Do not smoke  Nicotine in cigarettes and cigars contain harmful chemicals that can damage blood vessels to your brain  Smoking increases your risk for MCI and may cause it to progress more quickly  Ask your healthcare provider for information if you currently smoke and need help to quit  Smokeless tobacco products still contain nicotine  Talk to your healthcare provider before you use these products    Medicare Preventive Visit Patient Instructions  Thank you for completing your Welcome to Medicare Visit or Medicare Annual Wellness Visit today  Your next wellness visit will be due in one year (6/9/2024)  The screening/preventive services that you may require over the next 5-10 years are detailed below  Some tests may not apply to you based off risk factors and/or age  Screening tests ordered at today's visit but not completed yet may show as past due  Also, please note that scanned in results may not display below  Preventive Screenings:  Service Recommendations Previous Testing/Comments   Colorectal Cancer Screening  Colonoscopy    Fecal Occult Blood Test (FOBT)/Fecal Immunochemical Test (FIT)  Fecal DNA/Cologuard Test  Flexible Sigmoidoscopy Age: 39-70 years old   Colonoscopy: every 10 years (May be performed more frequently if at higher risk)  OR  FOBT/FIT: every 1 year  OR  Cologuard: every 3 years  OR  Sigmoidoscopy: every 5 years  Screening may be recommended earlier than age 39 if at higher risk for colorectal cancer  Also, an individualized decision between you and your healthcare provider will decide whether screening between the ages of 74-80 would be appropriate  Colonoscopy: 07/24/2020  FOBT/FIT: Not on file  Cologuard: Not on file  Sigmoidoscopy: Not on file          Prostate Cancer Screening Individualized decision between patient and health care provider in men between ages of 53-78   Medicare will cover every 12 months beginning on the day after your 50th birthday PSA: No results in last 5 years     Screening Not Indicated     Hepatitis C Screening Once for adults born between 1945 and 1965  More frequently in patients at high risk for Hepatitis C Hep C Antibody: Not on file        Diabetes Screening 1-2 times per year if you're at risk for diabetes or have pre-diabetes Fasting glucose: 149 mg/dL (6/28/2021)  A1C: 6 6 % (10/17/2022)  Screening Not Indicated  History Diabetes   Cholesterol Screening Once every 5 years if you don't have a lipid disorder  May order more often based on risk factors   Lipid panel: Not on file  Screening Not Indicated  History Lipid Disorder      Other Preventive Screenings Covered by Medicare:  Abdominal Aortic Aneurysm (AAA) Screening: covered once if your at risk  You're considered to be at risk if you have a family history of AAA or a male between the age of 73-68 who smoking at least 100 cigarettes in your lifetime  Lung Cancer Screening: covers low dose CT scan once per year if you meet all of the following conditions: (1) Age 50-69; (2) No signs or symptoms of lung cancer; (3) Current smoker or have quit smoking within the last 15 years; (4) You have a tobacco smoking history of at least 20 pack years (packs per day x number of years you smoked); (5) You get a written order from a healthcare provider  Glaucoma Screening: covered annually if you're considered high risk: (1) You have diabetes OR (2) Family history of glaucoma OR (3)  aged 48 and older OR (3)  American aged 72 and older  Osteoporosis Screening: covered every 2 years if you meet one of the following conditions: (1) Have a vertebral abnormality; (2) On glucocorticoid therapy for more than 3 months; (3) Have primary hyperparathyroidism; (4) On osteoporosis medications and need to assess response to drug therapy  HIV Screening: covered annually if you're between the age of 12-76  Also covered annually if you are younger than 13 and older than 72 with risk factors for HIV infection  For pregnant patients, it is covered up to 3 times per pregnancy      Immunizations:  Immunization Recommendations   Influenza Vaccine Annual influenza vaccination during flu season is recommended for all persons aged >= 6 months who do not have contraindications   Pneumococcal Vaccine   * Pneumococcal conjugate vaccine = PCV13 (Prevnar 13), PCV15 (Vaxneuvance), PCV20 (Prevnar 20)  * Pneumococcal polysaccharide vaccine = PPSV23 (Pneumovax) Adults 25-60 years old: 1-3 doses may be recommended based on certain risk factors  Adults 72 years old: 1-2 doses may be recommended based off what pneumonia vaccine you previously received   Hepatitis B Vaccine 3 dose series if at intermediate or high risk (ex: diabetes, end stage renal disease, liver disease)   Tetanus (Td) Vaccine - COST NOT COVERED BY MEDICARE PART B Following completion of primary series, a booster dose should be given every 10 years to maintain immunity against tetanus  Td may also be given as tetanus wound prophylaxis  Tdap Vaccine - COST NOT COVERED BY MEDICARE PART B Recommended at least once for all adults  For pregnant patients, recommended with each pregnancy  Shingles Vaccine (Shingrix) - COST NOT COVERED BY MEDICARE PART B  2 shot series recommended in those aged 48 and above     Health Maintenance Due:  There are no preventive care reminders to display for this patient  Immunizations Due:      Topic Date Due   • COVID-19 Vaccine (5 - Pfizer series) 05/13/2023     Advance Directives   What are advance directives? Advance directives are legal documents that state your wishes and plans for medical care  These plans are made ahead of time in case you lose your ability to make decisions for yourself  Advance directives can apply to any medical decision, such as the treatments you want, and if you want to donate organs  What are the types of advance directives? There are many types of advance directives, and each state has rules about how to use them  You may choose a combination of any of the following:  Living will: This is a written record of the treatment you want  You can also choose which treatments you do not want, which to limit, and which to stop at a certain time  This includes surgery, medicine, IV fluid, and tube feedings  Durable power of  for healthcare [facility]): This is a written record that states who you want to make healthcare choices for you when you are unable to make them for yourself   This person, called a proxy, is usually a family member or a friend  You may choose more than 1 proxy  Do not resuscitate (DNR) order:  A DNR order is used in case your heart stops beating or you stop breathing  It is a request not to have certain forms of treatment, such as CPR  A DNR order may be included in other types of advance directives  Medical directive: This covers the care that you want if you are in a coma, near death, or unable to make decisions for yourself  You can list the treatments you want for each condition  Treatment may include pain medicine, surgery, blood transfusions, dialysis, IV or tube feedings, and a ventilator (breathing machine)  Values history: This document has questions about your views, beliefs, and how you feel and think about life  This information can help others choose the care that you would choose  Why are advance directives important? An advance directive helps you control your care  Although spoken wishes may be used, it is better to have your wishes written down  Spoken wishes can be misunderstood, or not followed  Treatments may be given even if you do not want them  An advance directive may make it easier for your family to make difficult choices about your care  Fall Prevention    Fall prevention  includes ways to make your home and other areas safer  It also includes ways you can move more carefully to prevent a fall  Health conditions that cause changes in your blood pressure, vision, or muscle strength and coordination may increase your risk for falls  Medicines may also increase your risk for falls if they make you dizzy, weak, or sleepy  Fall prevention tips:   Stand or sit up slowly  Use assistive devices as directed  Wear shoes that fit well and have soles that   Wear a personal alarm  Stay active  Manage your medical conditions  Home Safety Tips:  Add items to prevent falls in the bathroom  Keep paths clear  Install bright lights in your home      Keep items you use often on shelves within reach  [place] or place reflective tape on the edges of your stairs  Cigarette Smoking and Your Health   Risks to your health if you smoke:  Nicotine and other chemicals found in tobacco damage every cell in your body  Even if you are a light smoker, you have an increased risk for cancer, heart disease, and lung disease  If you are pregnant or have diabetes, smoking increases your risk for complications  Benefits to your health if you stop smoking: You decrease respiratory symptoms such as coughing, wheezing, and shortness of breath  You reduce your risk for cancers of the lung, mouth, throat, kidney, bladder, pancreas, stomach, and cervix  If you already have cancer, you increase the benefits of chemotherapy  You also reduce your risk for cancer returning or a second cancer from developing  You reduce your risk for heart disease, blood clots, heart attack, and stroke  You reduce your risk for lung infections, and diseases such as pneumonia, asthma, chronic bronchitis, and emphysema  Your circulation improves  More oxygen can be delivered to your body  If you have diabetes, you lower your risk for complications, such as kidney, artery, and eye diseases  You also lower your risk for nerve damage  Nerve damage can lead to amputations, poor vision, and blindness  You improve your body's ability to heal and to fight infections  For more information and support to stop smoking:   [employer]  [employer]  Phone: 1- 906 - 344-0164  Web Address: www [employer]   Fiona Piressanam 2018 Information is for End User's use only and may not be sold, redistributed or otherwise used for commercial purposes   All illustrations and images included in CareNotes® are the copyrighted property of A D A M , Inc  or [address] [place] [address] [employer]

## 2023-06-09 NOTE — PROGRESS NOTES
Name: Juarez Cuenca      : 1940      MRN: [id number]  Encounter Provider: Heriberto Barry DO  Encounter Date: 2023   Encounter department: [place] [address]  Type 2 diabetes mellitus with diabetic peripheral angiopathy without gangrene, without long-term current use of insulin ([facility])  Comments:  DM was doing well on previous BW   he declines to check his blood sugars  he will send me a copy of his [facility] blood work when he gets it done    2  Essential hypertension  Comments:  BP doing well, c/w BB/ACEI    3  Coronary artery disease involving native coronary artery of native heart without angina pectoris  Comments:  stable, no symptoms  c/w BB/ACEI/plavix & care per cardiology    4  Mixed hyperlipidemia  Comments:  taking statin and no SE, c/w rx    5  Anxiety  Comments:  taking paxil 40mg per day which is MAX recommended dose for patients over the age ot 72  advised to see psychiatry and/or therapist but he declined    6  Tobacco use  Comments:  tobacco cessation counseling, he is not ready to quit    7  Medicare annual wellness visit, subsequent        Depression Screening and Follow-up Plan: Patient was screened for depression during today's encounter  They screened negative with a PHQ-2 score of 0  Subjective      HPI     Here for follow up, Timothy Crane is overall stable  He is here with his significant other(Esthela) during the appt  He is taking his medications as prescribed and is getting BW thru the [facility] shortly for his DM and care  He is UTD on eye exam   He continues to smoke and refuses to quit  He has anger and road rage issues and inquires if he can take a higher dose of paxil  He is interested in changing the medication as well but on further discussion, he declined to do this  He declines to see a psychiatrist again or a therapist   He does not like using the parking garage when he comes into this office    He had concerns regarding his memory and we did a MOCA test today: score 25/30(finished 9th grade)  ROS otherwise negative, no other complaints  Review of Systems   Constitutional: Negative for fever  HENT: Negative for congestion  Eyes: Negative for visual disturbance  Respiratory: Negative for shortness of breath  Cardiovascular: Negative for chest pain  Gastrointestinal: Negative for abdominal pain  Endocrine: Negative for polyuria  Genitourinary: Negative for difficulty urinating  Musculoskeletal: Negative for gait problem  Skin: Negative for rash  Allergic/Immunologic: Negative for immunocompromised state  Neurological: Negative for dizziness  Psychiatric/Behavioral: Positive for agitation  The patient is nervous/anxious  Current Outpatient Medications on File Prior to Visit   Medication Sig   • alfuzosin (UROXATRAL) 10 mg 24 hr tablet Take 1 tablet (10 mg total) by mouth daily   • carboxymethylcellulose (REFRESH PLUS) 0 5 % SOLN INSTILL 1 DROP BOTH EYES FOUR TIMES A DAY FOR DRY EYES   • Cholecalciferol (VITAMIN D3) 2000 units capsule Take 1,000 Units by mouth daily   • clopidogrel (PLAVIX) 75 mg tablet Take 1 tablet (75 mg total) by mouth in the morning  • Empagliflozin 25 MG TABS Take 1 tablet (25 mg total) by mouth every morning   • ferrous sulfate 325 (65 Fe) mg tablet Take 325 mg by mouth daily with breakfast   • insulin glargine (LANTUS SOLOSTAR) 100 units/mL injection pen Inject 15 Units under the skin in the morning  • lisinopril (ZESTRIL) 20 mg tablet Take 1 tablet (20 mg total) by mouth in the morning     • metFORMIN (GLUCOPHAGE-XR) 500 mg 24 hr tablet Take 1 tablet (500 mg total) by mouth 2 (two) times a day with meals   • metoprolol tartrate (LOPRESSOR) 25 mg tablet Take 0 5 tablets (12 5 mg total) by mouth every 12 (twelve) hours   • nitroglycerin (Nitrostat) 0 4 mg SL tablet Place 1 tablet (0 4 mg total) under the tongue every 5 (five) minutes as needed for chest pain   • pantoprazole "(PROTONIX) 40 mg tablet Take 1 tablet (40 mg total) by mouth in the morning  • PARoxetine (PAXIL) 40 MG tablet Take 1 tablet (40 mg total) by mouth every morning   • rosuvastatin (CRESTOR) 20 MG tablet Take 1 tablet (20 mg total) by mouth daily   • sildenafil (VIAGRA) 100 mg tablet Take 100 mg by mouth   • tadalafil (CIALIS) 5 MG tablet Take 1 tablet (5 mg total) by mouth as needed in the morning for erectile dysfunction  DO NOT TAKE ON DAYS YOU TAKE NITROGLYCERIN  Objective     /78 (BP Location: Left arm, Patient Position: Sitting, Cuff Size: Standard)   Pulse 58   Temp (!) 96 7 °F (35 9 °C)   Ht 5' 8\" (1 727 m)   Wt 70 8 kg (156 lb)   SpO2 98%   BMI 23 72 kg/m²     Physical Exam  Vitals reviewed  Constitutional:       General: He is not in acute distress  HENT:      Head: Normocephalic and atraumatic  Right Ear: Tympanic membrane normal       Left Ear: Tympanic membrane normal    Eyes:      Conjunctiva/sclera: Conjunctivae normal    Cardiovascular:      Rate and Rhythm: Normal rate and regular rhythm  Heart sounds:      No gallop  Pulmonary:      Effort: Pulmonary effort is normal       Breath sounds: No wheezing or rales  Abdominal:      General: Bowel sounds are normal       Palpations: Abdomen is soft  Tenderness: There is no abdominal tenderness  Musculoskeletal:      Cervical back: Neck supple  Right lower leg: No edema  Left lower leg: No edema  Neurological:      Mental Status: He is alert  Mental status is at baseline  Psychiatric:         Mood and Affect: Mood is anxious  Behavior: Behavior is agitated and aggressive         Inocencia Isbell DO  "

## 2023-07-18 NOTE — TELEPHONE ENCOUNTER
----- Message from Manny Kwong MD sent at 11/19/2018  6:59 AM EST -----  This patient was to the office on November 19th given a lab slip-he had a lab slip from prior office visit-he was rescheduled in 2 months-please make sure labs prior to next visit include CBC with diff, chemistry profile, cholesterol, HDL, LDL, triglycerides, hemoglobin A1c, urine for microalbumin, iron, ferritin with diagnosis of hyperlipidemia, hypertension, iron deficiency anemia -8 hour fast 1 week before visit Soolantra Counseling: I discussed with the patients the risks of topial Soolantra. This is a medicine which decreases the number of mites and inflammation in the skin. You experience burning, stinging, eye irritation or allergic reactions.  Please call our office if you develop any problems from using this medication.

## 2023-07-28 ENCOUNTER — OFFICE VISIT (OUTPATIENT)
Dept: CARDIOLOGY CLINIC | Facility: CLINIC | Age: 83
End: 2023-07-28
Payer: MEDICARE

## 2023-07-28 VITALS
BODY MASS INDEX: 23.26 KG/M2 | WEIGHT: 153 LBS | HEART RATE: 54 BPM | SYSTOLIC BLOOD PRESSURE: 112 MMHG | OXYGEN SATURATION: 99 % | DIASTOLIC BLOOD PRESSURE: 50 MMHG

## 2023-07-28 DIAGNOSIS — I10 ESSENTIAL HYPERTENSION: Chronic | ICD-10-CM

## 2023-07-28 DIAGNOSIS — I70.219 ATHEROSCLEROSIS OF ARTERY OF EXTREMITY WITH INTERMITTENT CLAUDICATION (HCC): Chronic | ICD-10-CM

## 2023-07-28 DIAGNOSIS — Z72.0 TOBACCO USE: ICD-10-CM

## 2023-07-28 DIAGNOSIS — I25.10 CORONARY ARTERY CALCIFICATION SEEN ON CAT SCAN: ICD-10-CM

## 2023-07-28 DIAGNOSIS — I25.10 CORONARY ARTERY DISEASE INVOLVING NATIVE CORONARY ARTERY OF NATIVE HEART WITHOUT ANGINA PECTORIS: Primary | Chronic | ICD-10-CM

## 2023-07-28 DIAGNOSIS — R06.09 DOE (DYSPNEA ON EXERTION): ICD-10-CM

## 2023-07-28 PROCEDURE — 99214 OFFICE O/P EST MOD 30 MIN: CPT | Performed by: INTERNAL MEDICINE

## 2023-07-28 NOTE — PROGRESS NOTES
Cardiology Follow Up    Dominique Sterling  1940  [id number]  [facility]  [address] [place]  [address]  [address] [id number] [phone]    1. Coronary artery disease involving native coronary artery of native heart without angina pectoris        2. Atherosclerosis of artery of extremity with intermittent claudication ([address] )        3. Essential hypertension        4. RUIZ (dyspnea on exertion)        5. Tobacco use        6. Coronary artery calcification seen on CAT scan              Discussion/Summary: I stressed to him again that he must stop smoking. He is controlled all of his other CAD risk factors. He would be at higher risk for cardiac cath / PCI. No cardiac testing is ordered. He is not having any active anginal symptoms. RTO 1 year. Interval History: He has not had any cardiac problems since his last OV. He is very active and denies any CP. He does get SOB at moderate levels of activity. He continues to smoke at least 10 cigs a day. He has severe LE PVD and has carotid artery disease. He does follow with vascular. CT chest shows severe coronary calcifications. Nuclear stress test 12/2020 - normal EF, no ischemia.     /50  A1C 6.6      Patient Active Problem List   Diagnosis   • Atherosclerosis of artery of extremity with intermittent claudication (HCC)   • Popliteal artery occlusion, left (HCC)   • Essential hypertension   • Abnormal nuclear stress test   • Anemia   • Anxiety   • Arthritis   • Asymptomatic carotid artery stenosis   • Coronary artery disease   • Diverticulosis of large intestine without hemorrhage   • Elevated prostate specific antigen (PSA)   • Enlarged prostate without lower urinary tract symptoms (luts)   • Mixed hyperlipidemia   • Iron deficiency anemia   • Memory loss   • Bypass graft stenosis (HCC)   • Type 2 diabetes mellitus with diabetic peripheral angiopathy without gangrene [facility])   • Fall   • Other chest pain   • Excessive anger   • Severe sleep apnea   • Excessive daytime sleepiness   • Pulmonary nodule   • Abnormal CT scan of lung   • Tobacco use   • RUIZ (dyspnea on exertion)   • Hearing difficulty of right ear   • Other fatigue   • Chest pain   • Abdominal pain   • Benign paroxysmal positional vertigo   • Dizziness   • Status post femoral-popliteal bypass surgery   • Gastroesophageal reflux disease without esophagitis   • Coronary artery calcification seen on CAT scan     Past Medical History:   Diagnosis Date   • Atherosclerosis    • Cardiac disease    • COPD (chronic obstructive pulmonary disease) ([facility])    • Coronary artery disease    • Hypertension    • Peripheral arterial disease ([facility])      Social History     Socioeconomic History   • Marital status:      Spouse name: Not on file   • Number of children: Not on file   • Years of education: Not on file   • Highest education level: Not on file   Occupational History   • Not on file   Tobacco Use   • Smoking status: Every Day     Packs/day: 0.25     Types: Cigarettes   • Smokeless tobacco: Former     Quit date: 1960   • Tobacco comments:     10 sticks/day    Vaping Use   • Vaping Use: Never used   Substance and Sexual Activity   • Alcohol use: No     Comment: Social drinker   • Drug use: No   • Sexual activity: Yes   Other Topics Concern   • Not on file   Social History Narrative   • Not on file     Social Determinants of Health     Financial Resource Strain: Medium Risk (6/9/2023)    Overall Financial Resource Strain (CARDIA)    • Difficulty of Paying Living Expenses: Somewhat hard   Food Insecurity: Not on file   Transportation Needs: No Transportation Needs (6/9/2023)    PRAPARE - Transportation    • Lack of Transportation (Medical): No    • Lack of Transportation (Non-Medical):  No   Physical Activity: Not on file   Stress: Not on file   Social Connections: Not on file   Intimate Partner Violence: Not on file   Housing Stability: Not on file      Family History   Problem Relation Age of Onset   • Arthritis Mother    • Arthritis Family    • Coronary artery disease Family    • Hyperlipidemia Family    • Peripheral vascular disease Family      Past Surgical History:   Procedure Laterality Date   • BYPASS FEMORAL-POPLITEAL Left 4/30/2018    Procedure: BYPASS FEMORAL-POPLITEAL, WITH INTRAOP ARTERIOGRAM;  Surgeon: Paresh Nelson MD;  Location: [facility];  Service: Vascular   • CARDIAC SURGERY     • FEMORAL ARTERY - POPLITEAL ARTERY BYPASS GRAFT Bilateral    • HAND SURGERY     • IR AORTAGRAM WITH RUN-OFF  10/4/2018   • LEG SURGERY      Repair   • OTHER SURGICAL HISTORY      Percutaneous repair nasoethmoid fx lacrimal apparatus   • THROMBOENDARTERECTOMY  10/12/2010    Tibioperoneal trunk   • TONSILLECTOMY     • TONSILLECTOMY         Current Outpatient Medications:   •  Cholecalciferol (VITAMIN D3) 2000 units capsule, Take 1,000 Units by mouth daily, Disp: , Rfl:   •  clopidogrel (PLAVIX) 75 mg tablet, Take 1 tablet (75 mg total) by mouth in the morning., Disp: 90 tablet, Rfl: 3  •  Empagliflozin 25 MG TABS, Take 1 tablet (25 mg total) by mouth every morning, Disp: 90 tablet, Rfl: 3  •  ferrous sulfate 325 (65 Fe) mg tablet, Take 325 mg by mouth daily with breakfast, Disp: , Rfl:   •  insulin glargine (LANTUS SOLOSTAR) 100 units/mL injection pen, Inject 15 Units under the skin in the morning., Disp: 15 mL, Rfl: 3  •  lisinopril (ZESTRIL) 20 mg tablet, Take 1 tablet (20 mg total) by mouth in the morning., Disp: 90 tablet, Rfl: 3  •  metFORMIN (GLUCOPHAGE-XR) 500 mg 24 hr tablet, Take 1 tablet (500 mg total) by mouth 2 (two) times a day with meals, Disp: 10 tablet, Rfl: 0  •  metoprolol tartrate (LOPRESSOR) 25 mg tablet, Take 0.5 tablets (12.5 mg total) by mouth every 12 (twelve) hours, Disp: 90 tablet, Rfl: 3  •  nitroglycerin (Nitrostat) 0.4 mg SL tablet, Place 1 tablet (0.4 mg total) under the tongue every 5 (five) minutes as needed for chest pain, Disp: 25 tablet, Rfl: 3  •  pantoprazole (PROTONIX) 40 mg tablet, Take 1 tablet (40 mg total) by mouth in the morning., Disp: 90 tablet, Rfl: 3  •  PARoxetine (PAXIL) 40 MG tablet, Take 1 tablet (40 mg total) by mouth every morning, Disp: 5 tablet, Rfl: 0  •  rosuvastatin (CRESTOR) 20 MG tablet, Take 1 tablet (20 mg total) by mouth daily, Disp: 5 tablet, Rfl: 0  •  sildenafil (VIAGRA) 100 mg tablet, Take 100 mg by mouth, Disp: , Rfl:   •  alfuzosin (UROXATRAL) 10 mg 24 hr tablet, Take 1 tablet (10 mg total) by mouth daily (Patient not taking: Reported on 7/28/2023), Disp: 90 tablet, Rfl: 3  •  carboxymethylcellulose (REFRESH PLUS) 0.5 % SOLN, INSTILL 1 DROP BOTH EYES FOUR TIMES A DAY FOR DRY EYES (Patient not taking: Reported on 7/28/2023), Disp: , Rfl:   •  tadalafil (CIALIS) 5 MG tablet, Take 1 tablet (5 mg total) by mouth as needed in the morning for erectile dysfunction. DO NOT TAKE ON DAYS YOU TAKE NITROGLYCERIN., Disp: 30 tablet, Rfl: 2  Allergies   Allergen Reactions   • Etomidate Swelling     (anesthetic)"I blew up and couldn't breath"     Vitals:    07/28/23 0917   BP: 112/50   BP Location: Left arm   Patient Position: Sitting   Cuff Size: Standard   Pulse: (!) 54   SpO2: 99%   Weight: 69.4 kg (153 lb)     Weight (last 2 days)     Date/Time Weight    07/28/23 0917 69.4 (153)         Blood pressure 112/50, pulse (!) 54, weight 69.4 kg (153 lb), SpO2 99 %. , Body mass index is 23.26 kg/m². Labs:  Office Visit on 03/16/2023   Component Date Value   • TSH 3RD GENERATON 03/24/2023 1.370    • Vitamin B-12 03/24/2023 1,338 (H)      Imaging: No results found. Review of Systems:  Review of Systems   Constitutional: Negative for diaphoresis, fatigue, fever and unexpected weight change. HENT: Negative. Respiratory: Positive for shortness of breath. Negative for cough and wheezing. Cardiovascular: Negative for chest pain, palpitations and leg swelling.    Gastrointestinal: Negative for abdominal pain, diarrhea and nausea. Musculoskeletal: Negative for gait problem and myalgias. Skin: Negative for rash. Neurological: Negative for dizziness and numbness. Psychiatric/Behavioral: Negative. Physical Exam:  Physical Exam  Constitutional:       Appearance: He is well-developed. HENT:      Head: Normocephalic and atraumatic. Eyes:      Pupils: Pupils are equal, round, and reactive to light. Neck:      Vascular: No JVD. Cardiovascular:      Rate and Rhythm: Regular rhythm. Bradycardia present. Pulses: Normal pulses. Carotid pulses are 2+ on the right side and 2+ on the left side. Heart sounds: S1 normal and S2 normal.   Pulmonary:      Effort: Pulmonary effort is normal.      Breath sounds: Normal breath sounds. No wheezing or rales. Abdominal:      General: Bowel sounds are normal.      Palpations: Abdomen is soft. Tenderness: There is no abdominal tenderness. Musculoskeletal:         General: No tenderness. Normal range of motion. Cervical back: Normal range of motion and neck supple. Skin:     General: Skin is warm. Neurological:      Mental Status: He is alert and oriented to person, place, and time. Cranial Nerves: No cranial nerve deficit. Deep Tendon Reflexes: Reflexes are normal and symmetric.

## 2023-08-01 ENCOUNTER — IOP CHECK (OUTPATIENT)
Dept: URBAN - METROPOLITAN AREA CLINIC 6 | Facility: CLINIC | Age: 83
End: 2023-08-01

## 2023-08-01 DIAGNOSIS — H40.023: ICD-10-CM

## 2023-08-01 DIAGNOSIS — H25.813: ICD-10-CM

## 2023-08-01 LAB
CREAT ?TM UR-SCNC: 21.1 UMOL/L
EXT ALBUMIN URINE RANDOM: 1
EXTERNAL EGFR: 75
HBA1C MFR BLD HPLC: 7.2 %
MICROALBUMIN/CREAT UR: NORMAL MG/G{CREAT}

## 2023-08-01 PROCEDURE — 99214 OFFICE O/P EST MOD 30 MIN: CPT

## 2023-08-01 PROCEDURE — 92083 EXTENDED VISUAL FIELD XM: CPT

## 2023-08-01 PROCEDURE — 92020 GONIOSCOPY: CPT

## 2023-08-01 ASSESSMENT — TONOMETRY
OS_IOP_MMHG: 15
OD_IOP_MMHG: 14
OD_IOP_MMHG: 14
OS_IOP_MMHG: 15

## 2023-08-01 ASSESSMENT — KERATOMETRY
OD_AXISANGLE2_DEGREES: 177
OD_AXISANGLE_DEGREES: 087
OS_AXISANGLE2_DEGREES: 168
OS_AXISANGLE_DEGREES: 78
OS_K2POWER_DIOPTERS: 44.50
OD_K2POWER_DIOPTERS: 44.75
OS_K1POWER_DIOPTERS: 42.50
OD_K1POWER_DIOPTERS: 42.75

## 2023-08-01 ASSESSMENT — VISUAL ACUITY
OD_CC: 20/70
OS_CC: 20/40-2

## 2023-08-22 ENCOUNTER — HOSPITAL ENCOUNTER (OUTPATIENT)
Dept: NON INVASIVE DIAGNOSTICS | Facility: CLINIC | Age: 83
Discharge: HOME | End: 2023-08-22
Payer: MEDICARE

## 2023-08-22 ENCOUNTER — PRE-OP CATARACT MEASUREMENTS (OUTPATIENT)
Dept: URBAN - METROPOLITAN AREA CLINIC 6 | Facility: CLINIC | Age: 83
End: 2023-08-22

## 2023-08-22 DIAGNOSIS — Z95.828 STATUS POST FEMORAL-POPLITEAL BYPASS SURGERY: ICD-10-CM

## 2023-08-22 DIAGNOSIS — H25.813: ICD-10-CM

## 2023-08-22 DIAGNOSIS — I65.29 ASYMPTOMATIC CAROTID ARTERY STENOSIS, UNSPECIFIED LATERALITY: ICD-10-CM

## 2023-08-22 DIAGNOSIS — H40.023: ICD-10-CM

## 2023-08-22 PROCEDURE — 93923 UPR/LXTR ART STDY 3+ LVLS: CPT

## 2023-08-22 PROCEDURE — 93880 EXTRACRANIAL BILAT STUDY: CPT

## 2023-08-22 PROCEDURE — 92136 OPHTHALMIC BIOMETRY: CPT

## 2023-08-22 PROCEDURE — 93923 UPR/LXTR ART STDY 3+ LVLS: CPT | Performed by: SURGERY

## 2023-08-22 PROCEDURE — 93925 LOWER EXTREMITY STUDY: CPT

## 2023-08-22 PROCEDURE — 93925 LOWER EXTREMITY STUDY: CPT | Performed by: SURGERY

## 2023-08-22 PROCEDURE — 93880 EXTRACRANIAL BILAT STUDY: CPT | Performed by: SURGERY

## 2023-08-22 PROCEDURE — 92012 INTRM OPH EXAM EST PATIENT: CPT

## 2023-08-22 ASSESSMENT — KERATOMETRY
OS_K1POWER_DIOPTERS: 42.50
OD_K1POWER_DIOPTERS: 42.75
OD_AXISANGLE_DEGREES: 087
OS_AXISANGLE_DEGREES: 78
OD_K2POWER_DIOPTERS: 44.75
OS_K2POWER_DIOPTERS: 44.50
OS_AXISANGLE2_DEGREES: 168
OD_AXISANGLE2_DEGREES: 177

## 2023-08-22 ASSESSMENT — VISUAL ACUITY
OS_GLARE: 20/80
OD_CC: 20/80
OD_GLARE: 20/400
OS_CC: 20/40-1
OU_CC: J5

## 2023-08-22 ASSESSMENT — TONOMETRY
OS_IOP_MMHG: 14
OD_IOP_MMHG: 10

## 2023-08-23 ENCOUNTER — TRANSCRIBE ORDERS (OUTPATIENT)
Dept: ADMINISTRATIVE | Facility: HOSPITAL | Age: 83
End: 2023-08-23

## 2023-08-23 ENCOUNTER — TRANSCRIBE ORDERS (OUTPATIENT)
Dept: VASCULAR SURGERY | Facility: CLINIC | Age: 83
End: 2023-08-23

## 2023-08-23 DIAGNOSIS — I65.23 ASYMPTOMATIC BILATERAL CAROTID ARTERY STENOSIS: Primary | ICD-10-CM

## 2023-08-28 ENCOUNTER — TELEPHONE (OUTPATIENT)
Dept: INTERNAL MEDICINE CLINIC | Facility: CLINIC | Age: 83
End: 2023-08-28

## 2023-08-28 NOTE — TELEPHONE ENCOUNTER
----- Message from Xavier Phelps DO sent at 8/25/2023  4:51 PM EDT -----  I am seeing Miquel Bush for a pre-op in about 1 week    He must also obtain clearance from his cardiologist as he is high risk. He should call cardiology office about this. They may be able to send a letter to Same[facility] sher dr since they recently saw him.     Thank you

## 2023-09-05 ENCOUNTER — OFFICE VISIT (OUTPATIENT)
Dept: INTERNAL MEDICINE CLINIC | Facility: CLINIC | Age: 83
End: 2023-09-05

## 2023-09-05 VITALS
OXYGEN SATURATION: 98 % | SYSTOLIC BLOOD PRESSURE: 126 MMHG | TEMPERATURE: 98 F | HEART RATE: 90 BPM | DIASTOLIC BLOOD PRESSURE: 82 MMHG | WEIGHT: 153.4 LBS | BODY MASS INDEX: 23.32 KG/M2

## 2023-09-05 DIAGNOSIS — Z01.818 PRE-OP EXAMINATION: Primary | ICD-10-CM

## 2023-09-05 DIAGNOSIS — Z72.0 TOBACCO USE: ICD-10-CM

## 2023-09-05 DIAGNOSIS — G47.30 SEVERE SLEEP APNEA: ICD-10-CM

## 2023-09-05 DIAGNOSIS — I25.10 CORONARY ARTERY DISEASE INVOLVING NATIVE CORONARY ARTERY OF NATIVE HEART WITHOUT ANGINA PECTORIS: Chronic | ICD-10-CM

## 2023-09-05 DIAGNOSIS — H25.89 OTHER AGE-RELATED CATARACT OF BOTH EYES: ICD-10-CM

## 2023-09-05 DIAGNOSIS — E11.51 TYPE 2 DIABETES MELLITUS WITH DIABETIC PERIPHERAL ANGIOPATHY WITHOUT GANGRENE, WITHOUT LONG-TERM CURRENT USE OF INSULIN (HCC): ICD-10-CM

## 2023-09-05 LAB — SL AMB POCT GLUCOSE BLD: 137

## 2023-09-05 NOTE — PROGRESS NOTES
Name: Fuad De Leon      : 1940      MRN: [id number]  Encounter Provider: Suraj Salazar DO  Encounter Date: 2023   Encounter department: [address] [place] [address]     1. Pre-op examination    2. Other age-related cataract of both eyes    3. Severe sleep apnea    4. Tobacco use    5. Coronary artery disease involving native coronary artery of native heart without angina pectoris  -     POCT ECG    6. Type 2 diabetes mellitus with diabetic peripheral angiopathy without gangrene, without long-term current use of insulin (HCC)  -     POCT blood glucose           Subjective      HPI     Pre-Op Evaluation  Fuad De Leon is a 79 y.o. male who presents to the office today for a preoperative consultation at the request of surgeon Dr Carol Lobato who plans on performing bilateral eye cataract surgery on  & 2023. This consultation is requested for the specific conditions prompting preoperative evaluation (i.e. because of potential affect on operative risk). Planned anesthesia is local and IV sedation. The patient has the following known anesthesia issues: past general anesthesia with complications (has allergic reaction to etomidate) but no other personal or fam history of problems with anesthesia. Patient has a bleeding risk of: no recent abnormal bleeding. Here for pre-op evaluation. Derek Bar is a smoker and has ischemic heart disease. He is able to walk 4-5 blocks on a flat surface and/or 2 flights of steps without CP/SOB/RUIZ. He has an allergy to Etomidate anesthesia as above. He has sleep apnea and refuses to wear CPAP. He stopped his lantus insulin about 1 month ago and refuses to check his blood sugars at home. He got blood work done thru the [facility] last month. He has severe anxiety and road rage and has been referred to see psychiatry but hasn't not gone. ROS Otherwise negative, no other complaints.     Review of Systems Constitutional: Negative for fever. HENT: Negative for congestion. Eyes: Positive for visual disturbance (b/l cataracts). Respiratory: Negative for shortness of breath. Cardiovascular: Negative for chest pain. Gastrointestinal: Negative for abdominal pain. Endocrine: Negative for polyuria. Genitourinary: Negative for difficulty urinating. Musculoskeletal: Negative for gait problem. Skin: Negative for rash. Allergic/Immunologic: Negative for immunocompromised state. Neurological: Negative for dizziness. Psychiatric/Behavioral: Negative for dysphoric mood. The patient is nervous/anxious. Current Outpatient Medications on File Prior to Visit   Medication Sig   • alfuzosin (UROXATRAL) 10 mg 24 hr tablet Take 1 tablet (10 mg total) by mouth daily   • carboxymethylcellulose (REFRESH PLUS) 0.5 % SOLN    • Cholecalciferol (VITAMIN D3) 2000 units capsule Take 1,000 Units by mouth daily   • clopidogrel (PLAVIX) 75 mg tablet Take 1 tablet (75 mg total) by mouth in the morning. • Empagliflozin 25 MG TABS Take 1 tablet (25 mg total) by mouth every morning   • ferrous sulfate 325 (65 Fe) mg tablet Take 325 mg by mouth daily with breakfast   • lisinopril (ZESTRIL) 20 mg tablet Take 1 tablet (20 mg total) by mouth in the morning. • metFORMIN (GLUCOPHAGE-XR) 500 mg 24 hr tablet Take 1 tablet (500 mg total) by mouth 2 (two) times a day with meals   • metoprolol tartrate (LOPRESSOR) 25 mg tablet Take 0.5 tablets (12.5 mg total) by mouth every 12 (twelve) hours   • nitroglycerin (Nitrostat) 0.4 mg SL tablet Place 1 tablet (0.4 mg total) under the tongue every 5 (five) minutes as needed for chest pain   • pantoprazole (PROTONIX) 40 mg tablet Take 1 tablet (40 mg total) by mouth in the morning.    • PARoxetine (PAXIL) 40 MG tablet Take 1 tablet (40 mg total) by mouth every morning   • rosuvastatin (CRESTOR) 20 MG tablet Take 1 tablet (20 mg total) by mouth daily   • sildenafil (VIAGRA) 100 mg tablet Take 100 mg by mouth   • tadalafil (CIALIS) 5 MG tablet Take 1 tablet (5 mg total) by mouth as needed in the morning for erectile dysfunction. DO NOT TAKE ON DAYS YOU TAKE NITROGLYCERIN. • [DISCONTINUED] insulin glargine (LANTUS SOLOSTAR) 100 units/mL injection pen Inject 15 Units under the skin in the morning. Objective     /82 (BP Location: Left arm, Patient Position: Sitting, Cuff Size: Standard)   Pulse 90   Temp 98 °F (36.7 °C)   Wt 69.6 kg (153 lb 6.4 oz)   SpO2 98%   BMI 23.32 kg/m²     Physical Exam  Vitals reviewed. Constitutional:       General: He is not in acute distress. HENT:      Head: Normocephalic and atraumatic. Right Ear: Tympanic membrane normal.      Left Ear: Tympanic membrane normal.   Eyes:      Conjunctiva/sclera: Conjunctivae normal.   Cardiovascular:      Rate and Rhythm: Normal rate and regular rhythm. Heart sounds: No murmur heard. Pulmonary:      Effort: Pulmonary effort is normal.      Breath sounds: No wheezing or rales. Abdominal:      General: Bowel sounds are normal.      Palpations: Abdomen is soft. Tenderness: There is no abdominal tenderness. Musculoskeletal:      Cervical back: Neck supple. Right lower leg: No edema. Left lower leg: No edema. Neurological:      Mental Status: He is alert. Mental status is at baseline. Psychiatric:         Mood and Affect: Mood is anxious. Affect is angry. Behavior: Behavior normal.       A1C(from 8/1/23 done at [place] lab): 7.2%    Results for orders placed or performed in visit on 09/05/23   POCT blood glucose   Result Value Ref Range    GLUCOSE       EKG: sinus rhythm with 1st degree AV block but no other acute ST-T segment changes. EKG unchanged from previous tracing dated 11/1/2022. Reviewed EKG & patient is a moderate to high risk for low risk surgery. He has an allergy to Etomidate Anesthesia.   He has severe sleep apnea and refuses to use CPAP, so close [place]-operative monitoring of pulmonary status is recommended. He is an active smoker and refuses to quit and is aware he is at increased risk of [place] and post-operative pulmonary complications. He has diabetes, so close [place]-operative monitoring of blood sugars is recommended. He has coronary artery disease and is an active smoker. He was recommended to obtain cardiac clearance from his cardiologist prior to having cataract surgery. Other than the above, no further testing is recommended at this time & there is no contraindication to upcoming cataract surgery.     Rubén Rodriguez, DO

## 2023-09-05 NOTE — PATIENT INSTRUCTIONS
You have an allergy to Etomidate, please let your surgeon and anesthesiologist know  You have sleep apnea, please let your surgeon and anesthesiologist know  Return as scheduled or as needed

## 2023-09-14 ENCOUNTER — SURGERY/PROCEDURE (OUTPATIENT)
Dept: URBAN - METROPOLITAN AREA SURGICAL CENTER 6 | Facility: SURGICAL CENTER | Age: 83
End: 2023-09-14

## 2023-09-14 DIAGNOSIS — H25.811: ICD-10-CM

## 2023-09-14 PROCEDURE — 66984 XCAPSL CTRC RMVL W/O ECP: CPT

## 2023-09-14 PROCEDURE — 68841 INSJ RX ELUT IMPLT LAC CANAL: CPT

## 2023-09-15 ENCOUNTER — 1 DAY POST-OP (OUTPATIENT)
Dept: URBAN - METROPOLITAN AREA CLINIC 6 | Facility: CLINIC | Age: 83
End: 2023-09-15

## 2023-09-15 DIAGNOSIS — H25.812: ICD-10-CM

## 2023-09-15 DIAGNOSIS — Z96.1: ICD-10-CM

## 2023-09-15 DIAGNOSIS — H40.023: ICD-10-CM

## 2023-09-15 PROCEDURE — 99024 POSTOP FOLLOW-UP VISIT: CPT

## 2023-09-15 ASSESSMENT — KERATOMETRY
OS_AXISANGLE2_DEGREES: 170
OD_K1POWER_DIOPTERS: 42.75
OD_AXISANGLE2_DEGREES: 177
OS_AXISANGLE2_DEGREES: 168
OS_AXISANGLE_DEGREES: 080
OD_AXISANGLE_DEGREES: 087
OS_AXISANGLE_DEGREES: 78
OS_K1POWER_DIOPTERS: 42.50
OD_K2POWER_DIOPTERS: 44.50
OS_K2POWER_DIOPTERS: 44.50
OD_AXISANGLE_DEGREES: 093
OS_K2POWER_DIOPTERS: 44.75
OD_AXISANGLE2_DEGREES: 3
OD_K2POWER_DIOPTERS: 44.75

## 2023-09-15 ASSESSMENT — TONOMETRY
OS_IOP_MMHG: 12
OD_IOP_MMHG: 14

## 2023-09-15 ASSESSMENT — VISUAL ACUITY
OS_CC: 20/40
OD_SC: 20/100+1
OD_PH: 20/80+2

## 2023-09-22 ENCOUNTER — 1 WEEK POST-OP (OUTPATIENT)
Dept: URBAN - METROPOLITAN AREA CLINIC 6 | Facility: CLINIC | Age: 83
End: 2023-09-22

## 2023-09-22 DIAGNOSIS — Z96.1: ICD-10-CM

## 2023-09-22 PROCEDURE — 99024 POSTOP FOLLOW-UP VISIT: CPT

## 2023-09-22 ASSESSMENT — KERATOMETRY
OS_AXISANGLE2_DEGREES: 168
OS_K1POWER_DIOPTERS: 42.50
OS_K2POWER_DIOPTERS: 44.50
OD_AXISANGLE_DEGREES: 087
OD_K1POWER_DIOPTERS: 42.75
OD_AXISANGLE2_DEGREES: 3
OD_AXISANGLE_DEGREES: 093
OS_AXISANGLE2_DEGREES: 170
OD_K2POWER_DIOPTERS: 44.50
OS_AXISANGLE_DEGREES: 080
OD_K2POWER_DIOPTERS: 44.75
OD_AXISANGLE2_DEGREES: 177
OS_AXISANGLE_DEGREES: 78
OS_K2POWER_DIOPTERS: 44.75

## 2023-09-22 ASSESSMENT — VISUAL ACUITY
OD_SC: 20/70-1
OD_PH: 20/50
OS_SC: 20/40-2

## 2023-09-22 ASSESSMENT — TONOMETRY
OS_IOP_MMHG: 11
OD_IOP_MMHG: 8

## 2023-09-25 ASSESSMENT — KERATOMETRY
OS_K2POWER_DIOPTERS: 44.50
OD_AXISANGLE_DEGREES: 093
OD_AXISANGLE2_DEGREES: 3
OD_AXISANGLE2_DEGREES: 177
OS_K1POWER_DIOPTERS: 42.50
OD_K2POWER_DIOPTERS: 44.50
OS_K2POWER_DIOPTERS: 44.75
OS_AXISANGLE2_DEGREES: 168
OD_K2POWER_DIOPTERS: 44.75
OS_AXISANGLE2_DEGREES: 170
OD_AXISANGLE_DEGREES: 087
OS_AXISANGLE_DEGREES: 78
OS_AXISANGLE_DEGREES: 080
OD_K1POWER_DIOPTERS: 42.75

## 2023-09-29 ENCOUNTER — 2 WEEK POST-OP (OUTPATIENT)
Dept: URBAN - METROPOLITAN AREA CLINIC 6 | Facility: CLINIC | Age: 83
End: 2023-09-29

## 2023-09-29 DIAGNOSIS — Z96.1: ICD-10-CM

## 2023-09-29 PROCEDURE — 99024 POSTOP FOLLOW-UP VISIT: CPT

## 2023-09-29 PROCEDURE — 92134 CPTRZ OPH DX IMG PST SGM RTA: CPT

## 2023-09-29 ASSESSMENT — KERATOMETRY
OD_K2POWER_DIOPTERS: 44.50
OS_AXISANGLE2_DEGREES: 168
OD_K2POWER_DIOPTERS: 44.75
OD_AXISANGLE2_DEGREES: 177
OS_AXISANGLE2_DEGREES: 170
OS_AXISANGLE_DEGREES: 080
OD_AXISANGLE_DEGREES: 087
OS_AXISANGLE_DEGREES: 78
OS_K2POWER_DIOPTERS: 44.75
OD_AXISANGLE2_DEGREES: 3
OS_K2POWER_DIOPTERS: 44.50
OD_K1POWER_DIOPTERS: 42.75
OD_AXISANGLE_DEGREES: 093
OS_K1POWER_DIOPTERS: 42.50

## 2023-09-29 ASSESSMENT — VISUAL ACUITY: OD_SC: 20/100+1

## 2023-09-29 ASSESSMENT — TONOMETRY: OD_IOP_MMHG: 7

## 2023-10-05 ENCOUNTER — SURGERY/PROCEDURE (OUTPATIENT)
Dept: URBAN - METROPOLITAN AREA SURGICAL CENTER 6 | Facility: SURGICAL CENTER | Age: 83
End: 2023-10-05

## 2023-10-05 DIAGNOSIS — H25.812: ICD-10-CM

## 2023-10-05 PROCEDURE — 66984 XCAPSL CTRC RMVL W/O ECP: CPT | Mod: 79,LT

## 2023-10-05 PROCEDURE — 68841 INSJ RX ELUT IMPLT LAC CANAL: CPT | Mod: 79,LT

## 2023-10-06 ENCOUNTER — 1 DAY POST-OP (OUTPATIENT)
Dept: URBAN - METROPOLITAN AREA CLINIC 6 | Facility: CLINIC | Age: 83
End: 2023-10-06

## 2023-10-06 DIAGNOSIS — Z96.1: ICD-10-CM

## 2023-10-06 PROCEDURE — 99024 POSTOP FOLLOW-UP VISIT: CPT

## 2023-10-06 ASSESSMENT — KERATOMETRY
OD_AXISANGLE_DEGREES: 087
OS_AXISANGLE_DEGREES: 080
OD_K1POWER_DIOPTERS: 42.75
OS_AXISANGLE2_DEGREES: 170
OS_K2POWER_DIOPTERS: 44.50
OS_AXISANGLE_DEGREES: 080
OS_K2POWER_DIOPTERS: 44.75
OS_AXISANGLE2_DEGREES: 168
OD_K2POWER_DIOPTERS: 44.75
OD_AXISANGLE_DEGREES: 093
OD_K2POWER_DIOPTERS: 44.50
OS_AXISANGLE_DEGREES: 78
OS_K2POWER_DIOPTERS: 44.50
OD_AXISANGLE_DEGREES: 087
OS_K2POWER_DIOPTERS: 44.75
OS_AXISANGLE_DEGREES: 78
OD_K1POWER_DIOPTERS: 42.75
OS_AXISANGLE2_DEGREES: 170
OS_K1POWER_DIOPTERS: 42.50
OD_AXISANGLE2_DEGREES: 177
OD_AXISANGLE2_DEGREES: 3
OD_K2POWER_DIOPTERS: 44.50
OD_AXISANGLE2_DEGREES: 177
OD_AXISANGLE2_DEGREES: 3
OD_K2POWER_DIOPTERS: 44.75
OD_AXISANGLE_DEGREES: 093
OS_K1POWER_DIOPTERS: 42.50
OS_AXISANGLE2_DEGREES: 168

## 2023-10-06 ASSESSMENT — TONOMETRY
OD_IOP_MMHG: 9
OS_IOP_MMHG: 13

## 2023-10-06 ASSESSMENT — VISUAL ACUITY
OS_PH: 20/40
OS_SC: 20/80
OD_SC: 20/100

## 2023-10-12 ENCOUNTER — TELEPHONE (OUTPATIENT)
Dept: ADMINISTRATIVE | Facility: OTHER | Age: 83
End: 2023-10-12

## 2023-10-12 NOTE — TELEPHONE ENCOUNTER
----- Message from Darlin Gutiérrez sent at 10/11/2023  2:56 PM EDT -----  10/11/23 2:57 PM    Hello, our patient Gurvinder Orourke has had Glomerular Filtration Rate (GFR) completed/performed. Please assist in updating the patient chart by pulling the Care Everywhere (CE) document. The date of service is 2023.     10/11/23 2:57 PM    Hello, our patient Gurvinder Orourke has had Urine Albumin/Creatinine Ratio completed/performed. Please assist in updating the patient chart by pulling the Care Everywhere (CE) document. The date of service is 2023.      Thank you,  Darlin Gutiérrez  [facility]       Thank you,  Darlin Gutiérrez  [facility] INTERNAL MED

## 2023-10-12 NOTE — TELEPHONE ENCOUNTER
Upon review of the In Basket request we were able to locate, review, and update the patient chart as requested for eGFR and Microalbumin Creatinine Urine Ratio OR Albumin Creatinine Urine Ratio . Any additional questions or concerns should be emailed to the Practice Liaisons via the appropriate education email address, please do not reply via In Basket.     Thank you  Quinn Cortes

## 2023-10-13 ENCOUNTER — 1 WEEK POST-OP (OUTPATIENT)
Dept: URBAN - METROPOLITAN AREA CLINIC 6 | Facility: CLINIC | Age: 83
End: 2023-10-13

## 2023-10-13 DIAGNOSIS — Z96.1: ICD-10-CM

## 2023-10-13 PROCEDURE — 99024 POSTOP FOLLOW-UP VISIT: CPT

## 2023-10-13 ASSESSMENT — KERATOMETRY
OD_K1POWER_DIOPTERS: 42.75
OS_K2POWER_DIOPTERS: 44.75
OS_AXISANGLE2_DEGREES: 168
OS_AXISANGLE2_DEGREES: 170
OS_AXISANGLE_DEGREES: 080
OS_K2POWER_DIOPTERS: 44.50
OS_K1POWER_DIOPTERS: 42.50
OD_K2POWER_DIOPTERS: 44.75
OD_AXISANGLE2_DEGREES: 177
OD_AXISANGLE2_DEGREES: 3
OD_AXISANGLE_DEGREES: 087
OD_K2POWER_DIOPTERS: 44.50
OD_AXISANGLE_DEGREES: 093
OS_AXISANGLE_DEGREES: 78

## 2023-10-13 ASSESSMENT — VISUAL ACUITY
OS_PH: 20/40-2
OD_SC: 20/70
OD_PH: 20/60
OS_SC: 20/60

## 2023-10-13 ASSESSMENT — TONOMETRY
OS_IOP_MMHG: 7
OD_IOP_MMHG: 8

## 2023-11-02 ENCOUNTER — PATIENT MESSAGE (OUTPATIENT)
Dept: INTERNAL MEDICINE CLINIC | Facility: CLINIC | Age: 83
End: 2023-11-02

## 2023-11-07 ENCOUNTER — TELEMEDICINE (OUTPATIENT)
Dept: INTERNAL MEDICINE CLINIC | Facility: CLINIC | Age: 83
End: 2023-11-07
Payer: MEDICARE

## 2023-11-07 DIAGNOSIS — R31.29 MICROSCOPIC HEMATURIA: ICD-10-CM

## 2023-11-07 DIAGNOSIS — E11.51 TYPE 2 DIABETES MELLITUS WITH DIABETIC PERIPHERAL ANGIOPATHY WITHOUT GANGRENE, WITHOUT LONG-TERM CURRENT USE OF INSULIN (HCC): ICD-10-CM

## 2023-11-07 DIAGNOSIS — E78.2 MIXED HYPERLIPIDEMIA: ICD-10-CM

## 2023-11-07 DIAGNOSIS — M54.50 ACUTE MIDLINE LOW BACK PAIN WITHOUT SCIATICA: Primary | ICD-10-CM

## 2023-11-07 PROCEDURE — 99442 PR PHYS/QHP TELEPHONE EVALUATION 11-20 MIN: CPT | Performed by: INTERNAL MEDICINE

## 2023-11-07 NOTE — PROGRESS NOTES
Virtual Brief Visit    This Visit is being completed by telephone. The Patient is located at Home and in the following state in which I hold an active license [place]    The patient was identified by name and date of birth. Berwyn Scheuermann was informed that this is a telemedicine visit and that the visit is being conducted through Telephone. My office door was closed. No one else was in the room. He acknowledged consent and understanding of privacy and security of the video platform. The patient has agreed to participate and understands they can discontinue the visit at any time. Patient is aware this is a billable service. Assessment/Plan:    Problem List Items Addressed This Visit       Mixed hyperlipidemia    Relevant Orders    Lipid Panel with Direct LDL reflex    Type 2 diabetes mellitus with diabetic peripheral angiopathy without gangrene ([address])    Relevant Orders    Comprehensive metabolic panel    Hemoglobin A1C    CBC and differential     Other Visit Diagnoses       Acute midline low back pain without sciatica    -  Primary    Microscopic hematuria        Relevant Orders    UA w Reflex to Microscopic w Reflex to Culture -Lab Collect    CBC and differential          Disposition:    -Back pain -> suspect strain as he is doing better. He will ask PF to send their records/imaging tests to my office and he will c/w current treatment plan as he is feeling better.    -microscopic hematuria -> check UA again in a couple of weeks. Lab test ordered and will mail to the patient. F/u as scheduled. BW ordered for before next visit in March 2024 and will be mailed to Ese. Recent Visits  No visits were found meeting these conditions.   Showing recent visits within past 7 days and meeting all other requirements  Today's Visits  Date Type Provider Dept   11/07/23 Telemedicine Jaelyn Millard, [address] [place] [address] Internal Med   Showing today's visits and meeting all other requirements  Future Appointments  No visits were found meeting these conditions. Showing future appointments within next 150 days and meeting all other requirements     HPI    [facility]. He feels he pulled a muscle in his back. He was helping to take care of his daughter's dog when she was visiting when he thinks he tweaked something in his back. He was treated with tylenol and lidocaine patch and is feeling better/back pain is now gone. He prefers to avoid taking muscle relaxers. PF also found blood in his urine(microscopic). He does not have dysuria or fever. He drinks a lot of coffee every day. He does not drink much water. He has no other questions or concerns today and ROS is otherwise negative. Review of Systems   Constitutional:  Negative for fever. Genitourinary:  Positive for hematuria (microscopic). Negative for dysuria. Musculoskeletal:  Positive for back pain. Allergic/Immunologic: Negative for immunocompromised state. Psychiatric/Behavioral:  Negative for dysphoric mood.           Visit Time  Total Visit Duration: 12 minutes

## 2023-11-07 NOTE — PATIENT COMMUNICATION
Called on 11/2 about Apt made advised about ibuprofen, that he should take tylenol he feels better when they put ice/hot on his back

## 2023-11-09 ENCOUNTER — 1 MONTH POST-OP (OUTPATIENT)
Dept: URBAN - METROPOLITAN AREA CLINIC 6 | Facility: CLINIC | Age: 83
End: 2023-11-09

## 2023-11-09 DIAGNOSIS — H52.203: ICD-10-CM

## 2023-11-09 DIAGNOSIS — D31.32: ICD-10-CM

## 2023-11-09 DIAGNOSIS — E11.9: ICD-10-CM

## 2023-11-09 DIAGNOSIS — Z96.1: ICD-10-CM

## 2023-11-09 DIAGNOSIS — H52.13: ICD-10-CM

## 2023-11-09 DIAGNOSIS — H40.023: ICD-10-CM

## 2023-11-09 DIAGNOSIS — H52.4: ICD-10-CM

## 2023-11-09 DIAGNOSIS — Z79.4: ICD-10-CM

## 2023-11-09 PROCEDURE — 99024 POSTOP FOLLOW-UP VISIT: CPT

## 2023-11-09 PROCEDURE — 92015 DETERMINE REFRACTIVE STATE: CPT

## 2023-11-09 ASSESSMENT — VISUAL ACUITY
OS_SC: 20/100
OD_SC: 20/50
OS_PH: 20/50+2

## 2023-11-09 ASSESSMENT — KERATOMETRY
OS_AXISANGLE2_DEGREES: 168
OD_K2POWER_DIOPTERS: 44.75
OS_K2POWER_DIOPTERS: 44.50
OD_AXISANGLE2_DEGREES: 3
OD_K2POWER_DIOPTERS: 44.50
OD_AXISANGLE_DEGREES: 087
OD_AXISANGLE2_DEGREES: 177
OS_K2POWER_DIOPTERS: 44.75
OS_K1POWER_DIOPTERS: 42.50
OS_AXISANGLE_DEGREES: 78
OD_AXISANGLE_DEGREES: 093
OS_AXISANGLE_DEGREES: 080
OD_K1POWER_DIOPTERS: 42.75
OS_AXISANGLE2_DEGREES: 170

## 2023-11-09 ASSESSMENT — TONOMETRY
OS_IOP_MMHG: 9
OD_IOP_MMHG: 8

## 2023-11-20 NOTE — TELEPHONE ENCOUNTER
"RTC in 3 months for F/U or sooner if needed.    Keep appts with specialists as scheduled.    Patient Education       Diabetes and Diet   The Basics   Written by the doctors and editors at [facility]   Why is diet important in diabetes? -- Diet is important because it is part of diabetes treatment. Many people need to change what they eat and how much they eat to help treat their diabetes. It is important for people to treat their diabetes so that they:  Keep their blood sugar at or near a normal level  Prevent long-term problems, such as heart or kidney problems, that can happen in people with diabetes  Changing your diet can also help treat obesity, high blood pressure, and high cholesterol. These conditions can affect people with diabetes and can lead to future problems, such as heart attacks or strokes.  Who will work with me to change my diet? -- Your doctor or nurse will work with you to make a food plan to change your diet. They might also recommend that you work with a "dietitian." A dietitian is an expert on food and eating.  Do I need to eat at the same times every day? -- When and how often you should eat depends, in part, on the diabetes medicines you take. For example:  People who take about the same amount of insulin at the same time each day (called a "fixed regimen") should eat meals at the same times. This is also true for people who take pills that increase insulin levels, such as sulfonylureas. Eating meals at the same time every day helps prevent low blood sugar.  People who adjust the dose and timing of their insulin each day (called a "flexible regimen") do not always have to eat meals at the same time. That's because they can time their insulin dose for before they plan to eat, and also adjust the dose for how much they plan to eat.  People who take medicines that don't usually cause low blood sugar, such as metformin, don't have to eat meals at the same time every day.  What do I need to think " Switch to generic Crestor-rosuvastatin-5 milligrams daily dispense 90 with 3 refills "about when planning what to eat? -- Our bodies break down the food we eat into small pieces called carbohydrates, proteins, and fats.  When planning what to eat, people with diabetes need to think about:  Carbohydrates (or "carbs") - Carbohydrates, which are sugars that our bodies use for energy, can raise a person's blood sugar level. Your doctor, nurse, or dietitian will tell you how many carbohydrates you should eat at each meal or snack. Foods that have carbohydrates include:  Bread, pasta, and rice  Vegetables and fruits  Dairy foods  Foods and drinks with added sugar  It is best to get your carbohydrates from fruits, vegetables, whole grains, and low-fat milk. It is best to avoid drinks with added sugar, like soda, juices, and sports drinks.   Protein - Your doctor, nurse, or dietitian will tell you how much protein you should eat each day. It is best to eat lean meats, fish, eggs, beans, peas, soy products, nuts, and seeds.  Fats - The type of fat you eat is more important than the amount of fat. "Saturated" and "trans" fats can increase your risk for heart problems, like a heart attack.  Foods that have saturated fats include meat, butter, cheese, and ice cream.  Foods that have trans fats include processed food with "partially hydrogenated oils" on the ingredient list. This may include fried foods, store bought cookies, muffins, pies, and cakes.  "Monounsaturated" and "polyunsaturated" fats are better for you. Foods with these types of fat include fish, avocado, olive oil, and nuts.  Calories - People need to eat a certain amount of calories each day to keep their weight the same. People who are overweight and want to lose weight need to eat fewer calories each day.  Fiber - Eating foods with a lot of fiber can help control a person's blood sugar level. Foods that have a lot of fiber include apples, green beans, peas, beans, lentils, nuts, oatmeal, and whole grains.  Salt - People who have high blood " pressure should not eat foods that contain a lot of salt (also called sodium). People with high blood pressure should also eat healthy foods, such as fruits, vegetables, and low-fat dairy foods.  Alcohol - Having more than 1 drink (for women) or 2 drinks (for men) a day can raise blood sugar levels. Also, drinks that have fruit juice or soda in them can raise blood sugar levels.  What can I do if I need to lose weight? -- If you need to lose weight, you can:  Exercise - Try to get at least 30 minutes of physical activity a day, most days of the week. Even gentle exercise, like walking, is good for your health. Some people with diabetes need to change their medicine dose before they exercise. They might also need to check their blood sugar levels before and after exercising.  Eat fewer calories - Your doctor, nurse, or dietitian can tell you how many calories you should eat each day in order to lose weight.  If you are worried about your weight, size, or shape, talk with your doctor, nurse, or dietitian. They can help you make changes to improve your health.  Can I eat the same foods as my family? -- Yes. You do not need to eat special foods if you have diabetes. You and your family can eat the same foods. Changing your diet is mostly about eating healthy foods and not eating too much.  What are the other parts of diabetes treatment? -- Besides changing your diet, the other parts of diabetes treatment are:  Exercise  Medicines  Some people with diabetes need to learn how to match their diet and exercise with their medicine dose. For example, people who use insulin might need to choose the dose of insulin they give themselves. To choose their dose, they need to think about:  What they plan to eat at the next meal  How much exercise they plan to do  What their blood sugar level is  If the diet and exercise do not match the medicine dose, a person's blood sugar level can get too low or too high. Blood sugar levels that  are too low or too high can cause problems.  All topics are updated as new evidence becomes available and our peer review process is complete.  This topic retrieved from [employer] on: Sep 21, 2021.  Topic 13021 Version 7.0  Release: 29.4.2 - C29.263  © 2021 UpToDate, Inc. and/or its affiliates. All rights reserved.  Consumer Information Use and Disclaimer   This information is not specific medical advice and does not replace information you receive from your health care provider. This is only a brief summary of general information. It does NOT include all information about conditions, illnesses, injuries, tests, procedures, treatments, therapies, discharge instructions or life-style choices that may apply to you. You must talk with your health care provider for complete information about your health and treatment options. This information should not be used to decide whether or not to accept your health care provider's advice, instructions or recommendations. Only your health care provider has the knowledge and training to provide advice that is right for you. The use of this information is governed by the trgt[employer]us End User License Agreement, available at https://www.Spacebar/en/solutions/Ship Mate/about/sergei.The use of [employer] content is governed by the [employer] Terms of Use. ©2021 UpToDate, Inc. All rights reserved.  Copyright   © 2021 UpToDate, Inc. and/or its affiliates. All rights reserved.    Patient Education       Low Cholesterol, Saturated Fat, and Trans Fat Diet   About this topic   Cholesterol, saturated fat, and trans fat are in many foods. These may raise your blood cholesterol levels. If your cholesterol is too high, this can cause health problems in your heart, liver, kidneys, and even your eyes. The key to lowering your risk of heart problems is to lower your bad fat intake.  Saturated fats and trans fats are the bad fats. These fats clog your arteries and raise your bad cholesterol.  "Saturated fats and trans fats are solid fats at room temperature. Saturated fats are animal fats. Trans fats are manmade fats. They add flavor to a lot of packaged foods. Staying away from saturated and trans fats will help your heart.  When you do eat foods with fat, make sure they have the good fats. Monounsaturated and polyunsaturated fats are good fats. These fats help raise your good cholesterol and protect your heart.  General   How to Lower Fat and Cholesterol in Your Diet   Read the labels of the foods you buy from the market to find out how much fat is present. Under 5% of total fat on a label means it is "low fat". Over 20% of total fat on a label means it is high fat.  Eat high fiber foods, like soluble fiber. This type of fiber helps lower cholesterol in the body. Choose oatmeal, fruits (like apples), beans, and nuts to get the most soluble fiber.  Eat foods high in omega-3 fatty acids like yennifer seeds, walnuts, salmon, tuna, trout, herring, flaxseed, and soybeans. These foods help keep the heart healthy.  Limit your bad fat and oil intake.  Stay away from butter, stick margarine, shortening, lard, and palm and coconut oil. Pick plant-based spreads instead.  Limit mayonnaise, salad dressings, gravies, and sauces, unless it is made from low-fat ingredients.  Limit chocolate.  Do not eat high-fat processed foods like hot dogs, ching, sausage, ham and other luncheon meats high in fat, and some frozen foods. Pick fish, chicken, turkey, and lean meats instead.  Eat more dried beans, lentils, and tofu to get your protein.  Do not eat organ meats, like liver.  Choose nonfat or low-fat milk, yogurt, and cheese.  Use light or fat-free cream cheese and sour cream.  Eat lots of fruits and vegetables.  Pick whole grain breads, cereals, pastas, and rice.  Do not eat snacks that are high in fats like granola, cookies, pies, pastries, doughnuts, and croissants.  Stay away from deep fried foods.  Help When Cooking "   Remove the fat portion of meats and the skin from poultry before cooking.  Bake, broil, grill, poach, or roast poultry, fish, and lean meats.  Drain and throw away the fat that drains out of meat as you cook it.  Try to add little or no fat to foods.  Use olive or canola oil for cooking or baking.  Steam your vegetables.  Use herbs or no-oil marinades to flavor foods.         Who should use this diet?   This diet is for people who are at high risk of getting health problems like heart disease, high blood pressure, diabetes, and others. This diet is also good for all people to follow to keep your heart healthy.  What foods are good to eat?   Foods with good fats are:  Canola, peanut, and olive oil  Safflower, soybean, and corn oil  Walnuts, almonds, cashews, and peanuts  Pumpkin and sunflower seeds  [place] and tuna  Tofu  Soymilk  Avocado  What foods should be limited or avoided?   Stay away from these types of foods that have saturated fats:  Whole fat dairy products like cheese, ice cream, whole milk, and cream  Palm and coconut oils  High-fat meats like beef, lamb, poultry with the skin, ching, and sausage  Butter and lard  Stay away from these types of foods that may have trans fat:  Cookies, cakes, candy, doughnuts, baked goods, muffins, pizza dough, and pie crusts that are packaged  Fried foods  Frozen dinners  Chips and crackers  Microwave popcorn  Stick margarine and vegetable shortenings  Helpful tips   To help stay away from saturated fat:  Pick lean cuts of meat  Take the skin off chicken and turkey or pick skinless  Pick low-fat cheese, milk, and ice cream  Use liquid oils when cooking and baking, such as olive oil and canola oil  To help stay away from trans fat:  Look at your labels. Choose foods with 0% trans fat. Read the ingredient list. Avoid foods with partially hydrogenated oil in the ingredient list. This means there is trans fat in the product.  Where can I learn more?   American Heart  Association   http://www.heart.org/HEARTORG/Conditions/Cholesterol/PreventionTreatmentofHighCholesterol/Know-Your-Fats_[facility]_305628_Article.jsp   Last Reviewed Date   2021-10-05  Consumer Information Use and Disclaimer   This information is not specific medical advice and does not replace information you receive from your health care provider. This is only a brief summary of general information. It does NOT include all information about conditions, illnesses, injuries, tests, procedures, treatments, therapies, discharge instructions or life-style choices that may apply to you. You must talk with your health care provider for complete information about your health and treatment options. This information should not be used to decide whether or not to accept your health care providers advice, instructions or recommendations. Only your health care provider has the knowledge and training to provide advice that is right for you.   Copyright   Copyright © 2021 UpToDate, Inc. and its affiliates and/or licensors. All rights reserved.

## 2024-01-08 ENCOUNTER — TELEPHONE (OUTPATIENT)
Dept: INTERNAL MEDICINE CLINIC | Facility: CLINIC | Age: 84
End: 2024-01-08

## 2024-01-08 NOTE — TELEPHONE ENCOUNTER
Message was left    Esthela called requesting bloodwork for patient.  She said something is off with him  Made appt for Wednesday advise if something were to happen to please go to ER

## 2024-01-10 ENCOUNTER — OFFICE VISIT (OUTPATIENT)
Dept: INTERNAL MEDICINE CLINIC | Facility: CLINIC | Age: 84
End: 2024-01-10
Payer: MEDICARE

## 2024-01-10 VITALS
HEART RATE: 86 BPM | SYSTOLIC BLOOD PRESSURE: 86 MMHG | OXYGEN SATURATION: 96 % | WEIGHT: 150.8 LBS | BODY MASS INDEX: 22.85 KG/M2 | HEIGHT: 68 IN | DIASTOLIC BLOOD PRESSURE: 50 MMHG | TEMPERATURE: 98.6 F

## 2024-01-10 DIAGNOSIS — R16.0 LIVER MASS: ICD-10-CM

## 2024-01-10 DIAGNOSIS — Z72.0 TOBACCO USE: ICD-10-CM

## 2024-01-10 DIAGNOSIS — R05.1 ACUTE COUGH: Primary | ICD-10-CM

## 2024-01-10 DIAGNOSIS — E78.2 MIXED HYPERLIPIDEMIA: ICD-10-CM

## 2024-01-10 DIAGNOSIS — I95.89 OTHER SPECIFIED HYPOTENSION: ICD-10-CM

## 2024-01-10 DIAGNOSIS — E11.51 TYPE 2 DIABETES MELLITUS WITH DIABETIC PERIPHERAL ANGIOPATHY WITHOUT GANGRENE, WITHOUT LONG-TERM CURRENT USE OF INSULIN (HCC): ICD-10-CM

## 2024-01-10 DIAGNOSIS — R19.7 DIARRHEA OF PRESUMED INFECTIOUS ORIGIN: ICD-10-CM

## 2024-01-10 DIAGNOSIS — R91.8 LUNG MASS: ICD-10-CM

## 2024-01-10 DIAGNOSIS — F41.9 ANXIETY: Chronic | ICD-10-CM

## 2024-01-10 LAB
SARS-COV-2 AG UPPER RESP QL IA: NEGATIVE
VALID CONTROL: NORMAL

## 2024-01-10 PROCEDURE — 87811 SARS-COV-2 COVID19 W/OPTIC: CPT | Performed by: INTERNAL MEDICINE

## 2024-01-10 PROCEDURE — 99214 OFFICE O/P EST MOD 30 MIN: CPT | Performed by: INTERNAL MEDICINE

## 2024-01-10 NOTE — PROGRESS NOTES
Name: Sameer Young      : 1940      MRN: [id number]  Encounter Provider: Romeo Echevarria DO  Encounter Date: 1/10/2024   Encounter department: [facility] INTERNAL MEDICINE    Assessment & Plan     1. Acute cough  -     Covid/Flu- Office Collect  -     POCT Rapid Covid Ag    2. Other specified hypotension    3. Diarrhea of presumed infectious origin    4. Type 2 diabetes mellitus with diabetic peripheral angiopathy without gangrene, without long-term current use of insulin (HCC)  Comments:  taking jardiance and metformin, c/w rx    5. Anxiety  Comments:  taking paxil and is stable, continue with rx    6. Mixed hyperlipidemia  Comments:  taking statin and no SE, continue with rx    7. Tobacco use  Comments:  he refuses to quit    8. Liver mass  -     US guided liver biopsy; Future; Expected date: 01/10/2024    9. Lung mass  -     US guided liver biopsy; Future; Expected date: 01/10/2024        Tobacco Cessation Counseling: Tobacco cessation counseling was provided. The patient is sincerely urged to quit consumption of tobacco. He is not ready to quit tobacco.       Given ongoing/progressive symptoms -> sameer was referred to ER.  His son-in-law requests that he go to [facility] ER and will take him there himself.    Addendum(4:30pm): rec'd a call from [facility] ER patient had CT of chest and has a lung mass with liver masses as well, possible lung cancer.  The ER physician, Dr Holly is arranging for [facility] Heme/Onc to see the patient but Sameer is going to need a liver biopsy from IR @ [facility] and Dr Holly and Oncology want PCP to put in order for liver biopsy([facility] oncology recommends liver biopsy for diagnosis per Dr Holly).  Plan: IR biopsy ordered and Oncology coordinator will call my office tomorrow to follow up and with fax # per Dr Holly.    Addendum(24): patient and his son-in-law state they will go to [facility] H/Onc for care and defer on biopsy at this time.  Appt today with me canceled.   Biopsy order d/c'd    Subjective      HPI    Here with son-in-law with c/o not feeling well for 1 week.  He has had cough, fever, chills, diarrhea, body aches and fatigue.  His BP is low in the office today.  His son-in-law got over COVID-19 infection a few weeks ago.  Sameer has not done a home COVID test but rapid test in the office today is negative.  He is not eating and drinking well and is ill appearing in the office today.  ROS Otherwise negative, no other complaints.    Review of Systems   Constitutional:  Positive for appetite change, chills, fatigue and fever.   HENT:  Positive for congestion.    Eyes:  Negative for visual disturbance.   Respiratory:  Positive for cough.    Cardiovascular:  Positive for chest pain (chest wall tenderness).   Gastrointestinal:  Positive for diarrhea. Negative for blood in stool.   Endocrine: Negative for polyuria.   Genitourinary:  Negative for difficulty urinating.   Musculoskeletal:  Positive for gait problem (slow gait due to not feeling well).   Skin:  Negative for rash.   Allergic/Immunologic: Negative for immunocompromised state.   Neurological:  Positive for weakness and light-headedness.   Psychiatric/Behavioral:  Negative for dysphoric mood.        Current Outpatient Medications on File Prior to Visit   Medication Sig    alfuzosin (UROXATRAL) 10 mg 24 hr tablet Take 1 tablet (10 mg total) by mouth daily    carboxymethylcellulose (REFRESH PLUS) 0.5 % SOLN     Cholecalciferol (VITAMIN D3) 2000 units capsule Take 1,000 Units by mouth daily    clopidogrel (PLAVIX) 75 mg tablet Take 1 tablet (75 mg total) by mouth in the morning.    Empagliflozin 25 MG TABS Take 1 tablet (25 mg total) by mouth every morning    ferrous sulfate 325 (65 Fe) mg tablet Take 325 mg by mouth daily with breakfast    lisinopril (ZESTRIL) 20 mg tablet Take 1 tablet (20 mg total) by mouth in the morning.    metFORMIN (GLUCOPHAGE-XR) 500 mg 24 hr tablet Take 1 tablet (500 mg total) by mouth 2 (two)  "times a day with meals    metoprolol tartrate (LOPRESSOR) 25 mg tablet Take 0.5 tablets (12.5 mg total) by mouth every 12 (twelve) hours    nitroglycerin (Nitrostat) 0.4 mg SL tablet Place 1 tablet (0.4 mg total) under the tongue every 5 (five) minutes as needed for chest pain    pantoprazole (PROTONIX) 40 mg tablet Take 1 tablet (40 mg total) by mouth in the morning.    PARoxetine (PAXIL) 40 MG tablet Take 1 tablet (40 mg total) by mouth every morning    rosuvastatin (CRESTOR) 20 MG tablet Take 1 tablet (20 mg total) by mouth daily    sildenafil (VIAGRA) 100 mg tablet Take 100 mg by mouth    tadalafil (CIALIS) 5 MG tablet Take 1 tablet (5 mg total) by mouth as needed in the morning for erectile dysfunction. DO NOT TAKE ON DAYS YOU TAKE NITROGLYCERIN.       Objective     BP (!) 86/50 (BP Location: Left arm, Patient Position: Sitting, Cuff Size: Standard)   Pulse 86   Temp 98.6 °F (37 °C) (Tympanic)   Ht 5' 8\" (1.727 m)   Wt 68.4 kg (150 lb 12.8 oz)   SpO2 96%   BMI 22.93 kg/m²     Physical Exam  Vitals reviewed.   Constitutional:       Appearance: He is ill-appearing.      Comments: Appears tired and unwell   HENT:      Head: Normocephalic and atraumatic.      Right Ear: Tympanic membrane normal.      Left Ear: Tympanic membrane normal.      Nose: Congestion present.      Mouth/Throat:      Mouth: Mucous membranes are dry.   Eyes:      Conjunctiva/sclera: Conjunctivae normal.   Cardiovascular:      Rate and Rhythm: Normal rate and regular rhythm.      Heart sounds:      No gallop.   Pulmonary:      Effort: Pulmonary effort is normal.      Breath sounds: No wheezing or rales.   Abdominal:      General: Bowel sounds are normal.      Palpations: Abdomen is soft.      Tenderness: There is no abdominal tenderness.   Musculoskeletal:      Cervical back: Neck supple.      Right lower leg: No edema.      Left lower leg: No edema.   Neurological:      Mental Status: He is alert. Mental status is at baseline. "   Psychiatric:         Mood and Affect: Mood normal.         Behavior: Behavior normal.       Results for orders placed or performed in visit on 01/10/24   POCT Rapid Covid Ag   Result Value Ref Range    POCT SARS-CoV-2 Ag Negative Negative    VALID CONTROL Valid          Romeo Echevarria DO

## 2024-01-10 NOTE — Clinical Note
Please message care gap  Patient had DM foot exam in June 2023.  Dr shahrzad Bryant DPM  Results are scanned into chart under the Media tab from August '23  thanks

## 2024-01-12 ENCOUNTER — DOCUMENTATION (OUTPATIENT)
Dept: HEMATOLOGY ONCOLOGY | Facility: CLINIC | Age: 84
End: 2024-01-12

## 2024-01-12 NOTE — PROGRESS NOTES
Staff message to Dr. Echevarria - states he believes patient is going to [facility] biopsy of liver unsure if he seeing med/onc there.     Outreach to Sameer Haddad, he states patient has decided to have IR biopsy and hem/onc referral done at [facility].  Therefore, Oncology Care Coordination team no longer needed.      Dr. Echevarria updated.

## 2024-01-17 ENCOUNTER — TELEPHONE (OUTPATIENT)
Dept: ADMINISTRATIVE | Facility: OTHER | Age: 84
End: 2024-01-17

## 2024-01-17 NOTE — TELEPHONE ENCOUNTER
----- Message from Florin Ya MA sent at 1/16/2024  2:47 PM EST -----  Regarding: Care Gap Request  05/05/22 10:22 AM    Hello, our patient attached above has had Diabetic Foot Exam completed/performed. Please assist in updating the patient chart by pulling the document from the Media Tab. The date of service is 8/24/2023.     Thank you,  Florin Ya MA  [facility] INTERNAL MED

## 2024-01-17 NOTE — TELEPHONE ENCOUNTER
Upon review of the In Basket request we were able to locate, review, and update the patient chart as requested for Diabetic Foot Exam.    Any additional questions or concerns should be emailed to the Practice Liaisons via the appropriate education email address, please do not reply via In Basket.    Thank you  Meagan Watson

## 2024-05-07 NOTE — ED NOTES
Patient was able to ambulate to the bathroom with a steady gait  Patient was shown how to use and was able to demonstrate use of incentive spirometer       Cholo White RN  02/15/19 2283
Yes

## (undated) DEVICE — ADHESIVE SKN CLSR HISTOACRYL FLEX 0.5ML LF

## (undated) DEVICE — BUTTON SWITCH PENCIL HOLSTER: Brand: VALLEYLAB

## (undated) DEVICE — INTENDED FOR TISSUE SEPARATION, AND OTHER PROCEDURES THAT REQUIRE A SHARP SURGICAL BLADE TO PUNCTURE OR CUT.: Brand: BARD-PARKER ® CARBON RIB-BACK BLADES

## (undated) DEVICE — MICROPUNCTURE 501

## (undated) DEVICE — SURGICEL FIBRILLAR 1 X 2

## (undated) DEVICE — SUT PROLENE 4-0 RB-1/RB-1 36 IN 8557H

## (undated) DEVICE — SORIN VEIN DISTENTION KIT

## (undated) DEVICE — BAG DECANTER

## (undated) DEVICE — SUT SILK 2-0 18 IN A185H

## (undated) DEVICE — SUT PROLENE 7-0 BV175-6 24 IN M8737

## (undated) DEVICE — CHLORAPREP HI-LITE 26ML ORANGE

## (undated) DEVICE — STOPCOCK 3-WAY

## (undated) DEVICE — 3M™ IOBAN™ 2 ANTIMICROBIAL INCISE DRAPE 6640EZ: Brand: IOBAN™ 2

## (undated) DEVICE — TRAY FOLEY 16FR URIMETER SURESTEP

## (undated) DEVICE — Device: Brand: MEDEX

## (undated) DEVICE — GAUZE SPONGES,16 PLY: Brand: CURITY

## (undated) DEVICE — 3000CC GUARDIAN II: Brand: GUARDIAN

## (undated) DEVICE — 1200CC GUARDIAN II: Brand: GUARDIAN

## (undated) DEVICE — IV CATH INTROCAN 18G X 1 1/4 SAFETY

## (undated) DEVICE — DRESSING MEPILEX AG BORDER 4 X 4 IN

## (undated) DEVICE — SYRINGE 10ML LL

## (undated) DEVICE — INTENDED FOR TISSUE SEPARATION, AND OTHER PROCEDURES THAT REQUIRE A SHARP SURGICAL BLADE TO PUNCTURE OR CUT.: Brand: BARD-PARKER SAFETY BLADES SIZE 15, STERILE

## (undated) DEVICE — SUT SILK 3-0 18 IN A184H

## (undated) DEVICE — SUT MONOCRYL 3-0 SH 27 IN Y416H

## (undated) DEVICE — SUT SILK 4-0 18 IN A183H

## (undated) DEVICE — GLOVE SRG BIOGEL ORTHOPEDIC 7.5

## (undated) DEVICE — SUT PROLENE 6-0 BV-1/BV-1 24 IN 8805H

## (undated) DEVICE — DRESSING MEPILEX AG BORDER 4 X 10 IN

## (undated) DEVICE — ULTRACLEAN ACCESSORY ELECTRODE 1" (2.54 CM) COATED BLADE: Brand: ULTRACLEAN

## (undated) DEVICE — SUT MONOCRYL 2-0 CT-1 27 IN Y339H

## (undated) DEVICE — LARGE (ORANGE) FOR GRAFTS UP TO 8 MM, 12" / 30.5 CM (1/PKG): Brand: SCANLAN® VASCULAR TUNNELER SHEATHS AND BULLET TIPS FULL CURVE

## (undated) DEVICE — PROXIMATE PLUS MD MULTI-DIRECTIONAL RELEASE SKIN STAPLERS CONTAINS 35 STAINLESS STEEL STAPLES APPROXIMATE CLOSED DIMENSIONS: 6.9MM X 3.9MM WIDE: Brand: PROXIMATE

## (undated) DEVICE — SUT MONOCRYL 4-0 PS-2 18 IN Y496G

## (undated) DEVICE — DRESSING MEPILEX AG BORDER 4 X 8 IN

## (undated) DEVICE — STERILE FEM POP PACK: Brand: CARDINAL HEALTH

## (undated) DEVICE — REM POLYHESIVE ADULT PATIENT RETURN ELECTRODE: Brand: VALLEYLAB

## (undated) DEVICE — 1/2 FORCE SURGICAL SPRING CLIP: Brand: STEALTH® SPRING CLIP

## (undated) RX ORDER — PREDNISOLONE ACETATE 10 MG/ML: 1 SUSPENSION/ DROPS OPHTHALMIC

## (undated) RX ORDER — KETOROLAC TROMETHAMINE 5 MG/ML: 1 SOLUTION OPHTHALMIC

## (undated) RX ORDER — SODIUM CHLORIDE 50 MG/ML: 1 SOLUTION OPHTHALMIC